# Patient Record
Sex: MALE | Race: WHITE | NOT HISPANIC OR LATINO | Employment: UNEMPLOYED | URBAN - NONMETROPOLITAN AREA
[De-identification: names, ages, dates, MRNs, and addresses within clinical notes are randomized per-mention and may not be internally consistent; named-entity substitution may affect disease eponyms.]

---

## 2018-12-26 ENCOUNTER — HOSPITAL ENCOUNTER (EMERGENCY)
Facility: HOSPITAL | Age: 43
Discharge: HOME | End: 2018-12-27
Attending: EMERGENCY MEDICINE | Admitting: EMERGENCY MEDICINE

## 2018-12-26 ENCOUNTER — APPOINTMENT (OUTPATIENT)
Dept: GENERAL RADIOLOGY | Facility: HOSPITAL | Age: 43
End: 2018-12-26

## 2018-12-26 DIAGNOSIS — J20.9 ACUTE BRONCHITIS, UNSPECIFIED ORGANISM: Primary | ICD-10-CM

## 2018-12-26 LAB
ALBUMIN SERPL-MCNC: 3.7 G/DL (ref 3.5–5)
ALBUMIN/GLOB SERPL: 1.4 G/DL (ref 1.5–2.5)
ALP SERPL-CCNC: 73 U/L (ref 40–129)
ALT SERPL W P-5'-P-CCNC: 34 U/L (ref 10–44)
ANION GAP SERPL CALCULATED.3IONS-SCNC: 11.6 MMOL/L (ref 3.6–11.2)
AST SERPL-CCNC: 25 U/L (ref 10–34)
BASOPHILS # BLD AUTO: 0.03 10*3/MM3 (ref 0–0.3)
BASOPHILS NFR BLD AUTO: 0.5 % (ref 0–2)
BILIRUB SERPL-MCNC: 0.6 MG/DL (ref 0.2–1.8)
BUN BLD-MCNC: 13 MG/DL (ref 7–21)
BUN/CREAT SERPL: 12.6 (ref 7–25)
CALCIUM SPEC-SCNC: 7.8 MG/DL (ref 7.7–10)
CHLORIDE SERPL-SCNC: 103 MMOL/L (ref 99–112)
CO2 SERPL-SCNC: 19.4 MMOL/L (ref 24.3–31.9)
CREAT BLD-MCNC: 1.03 MG/DL (ref 0.43–1.29)
DEPRECATED RDW RBC AUTO: 37.1 FL (ref 37–54)
EOSINOPHIL # BLD AUTO: 0.06 10*3/MM3 (ref 0–0.7)
EOSINOPHIL NFR BLD AUTO: 1.1 % (ref 0–5)
ERYTHROCYTE [DISTWIDTH] IN BLOOD BY AUTOMATED COUNT: 12.2 % (ref 11.5–14.5)
FLUAV AG NPH QL: NEGATIVE
FLUBV AG NPH QL IA: NEGATIVE
GFR SERPL CREATININE-BSD FRML MDRD: 79 ML/MIN/1.73
GLOBULIN UR ELPH-MCNC: 2.6 GM/DL
GLUCOSE BLD-MCNC: 397 MG/DL (ref 70–110)
HCT VFR BLD AUTO: 37.7 % (ref 42–52)
HGB BLD-MCNC: 13 G/DL (ref 14–18)
IMM GRANULOCYTES # BLD AUTO: 0.01 10*3/MM3 (ref 0–0.03)
IMM GRANULOCYTES NFR BLD AUTO: 0.2 % (ref 0–0.5)
LYMPHOCYTES # BLD AUTO: 1.8 10*3/MM3 (ref 1–3)
LYMPHOCYTES NFR BLD AUTO: 31.9 % (ref 21–51)
MCH RBC QN AUTO: 29.7 PG (ref 27–33)
MCHC RBC AUTO-ENTMCNC: 34.5 G/DL (ref 33–37)
MCV RBC AUTO: 86.3 FL (ref 80–94)
MONOCYTES # BLD AUTO: 0.39 10*3/MM3 (ref 0.1–0.9)
MONOCYTES NFR BLD AUTO: 6.9 % (ref 0–10)
NEUTROPHILS # BLD AUTO: 3.36 10*3/MM3 (ref 1.4–6.5)
NEUTROPHILS NFR BLD AUTO: 59.4 % (ref 30–70)
OSMOLALITY SERPL CALC.SUM OF ELEC: 284.9 MOSM/KG (ref 273–305)
PLATELET # BLD AUTO: 172 10*3/MM3 (ref 130–400)
PMV BLD AUTO: 10.8 FL (ref 6–10)
POTASSIUM BLD-SCNC: 3.5 MMOL/L (ref 3.5–5.3)
PROT SERPL-MCNC: 6.3 G/DL (ref 6–8)
RBC # BLD AUTO: 4.37 10*6/MM3 (ref 4.7–6.1)
SODIUM BLD-SCNC: 134 MMOL/L (ref 135–153)
WBC NRBC COR # BLD: 5.65 10*3/MM3 (ref 4.5–12.5)

## 2018-12-26 PROCEDURE — 85025 COMPLETE CBC W/AUTO DIFF WBC: CPT | Performed by: PHYSICIAN ASSISTANT

## 2018-12-26 PROCEDURE — 99284 EMERGENCY DEPT VISIT MOD MDM: CPT

## 2018-12-26 PROCEDURE — 87804 INFLUENZA ASSAY W/OPTIC: CPT | Performed by: PHYSICIAN ASSISTANT

## 2018-12-26 PROCEDURE — 71045 X-RAY EXAM CHEST 1 VIEW: CPT | Performed by: RADIOLOGY

## 2018-12-26 PROCEDURE — 25010000002 METHYLPREDNISOLONE PER 125 MG: Performed by: PHYSICIAN ASSISTANT

## 2018-12-26 PROCEDURE — 96372 THER/PROPH/DIAG INJ SC/IM: CPT

## 2018-12-26 PROCEDURE — 71045 X-RAY EXAM CHEST 1 VIEW: CPT

## 2018-12-26 PROCEDURE — 80053 COMPREHEN METABOLIC PANEL: CPT | Performed by: PHYSICIAN ASSISTANT

## 2018-12-26 PROCEDURE — 36415 COLL VENOUS BLD VENIPUNCTURE: CPT

## 2018-12-26 RX ORDER — BENZONATATE 100 MG/1
200 CAPSULE ORAL ONCE
Status: COMPLETED | OUTPATIENT
Start: 2018-12-27 | End: 2018-12-27

## 2018-12-26 RX ORDER — METHYLPREDNISOLONE SODIUM SUCCINATE 125 MG/2ML
125 INJECTION, POWDER, LYOPHILIZED, FOR SOLUTION INTRAMUSCULAR; INTRAVENOUS ONCE
Status: COMPLETED | OUTPATIENT
Start: 2018-12-26 | End: 2018-12-26

## 2018-12-26 RX ORDER — BENZONATATE 100 MG/1
100 CAPSULE ORAL 3 TIMES DAILY PRN
Qty: 30 CAPSULE | Refills: 0 | Status: ON HOLD | OUTPATIENT
Start: 2018-12-26 | End: 2021-09-01

## 2018-12-26 RX ADMIN — HYDROCODONE POLISTIREX AND CHLORPHENIRAMINE POLISTIREX 5 ML: 10; 8 SUSPENSION, EXTENDED RELEASE ORAL at 23:06

## 2018-12-26 RX ADMIN — METHYLPREDNISOLONE SODIUM SUCCINATE 125 MG: 125 INJECTION, POWDER, FOR SOLUTION INTRAMUSCULAR; INTRAVENOUS at 23:06

## 2018-12-27 VITALS
HEIGHT: 66 IN | OXYGEN SATURATION: 98 % | DIASTOLIC BLOOD PRESSURE: 62 MMHG | BODY MASS INDEX: 37.45 KG/M2 | TEMPERATURE: 98 F | HEART RATE: 88 BPM | SYSTOLIC BLOOD PRESSURE: 138 MMHG | RESPIRATION RATE: 18 BRPM | WEIGHT: 233 LBS

## 2018-12-27 RX ADMIN — BENZONATATE 200 MG: 100 CAPSULE ORAL at 00:05

## 2020-04-03 ENCOUNTER — HOSPITAL ENCOUNTER (EMERGENCY)
Facility: HOSPITAL | Age: 45
Discharge: HOME | End: 2020-04-03
Attending: FAMILY MEDICINE | Admitting: FAMILY MEDICINE

## 2020-04-03 ENCOUNTER — TRANSCRIBE ORDERS (OUTPATIENT)
Dept: INFUSION THERAPY | Facility: HOSPITAL | Age: 45
End: 2020-04-03

## 2020-04-03 VITALS
DIASTOLIC BLOOD PRESSURE: 72 MMHG | TEMPERATURE: 97.9 F | HEART RATE: 83 BPM | RESPIRATION RATE: 16 BRPM | BODY MASS INDEX: 40.18 KG/M2 | SYSTOLIC BLOOD PRESSURE: 110 MMHG | WEIGHT: 250 LBS | OXYGEN SATURATION: 99 % | HEIGHT: 66 IN

## 2020-04-03 DIAGNOSIS — S61.259A CAT BITE OF MULTIPLE SITES OF RIGHT HAND AND FINGERS WITH INFECTION, INITIAL ENCOUNTER: Primary | ICD-10-CM
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
## 2020-04-03 DIAGNOSIS — S61.451A CAT BITE OF RIGHT HAND, INITIAL ENCOUNTER: Primary | ICD-10-CM

## 2020-04-03 DIAGNOSIS — S61.451A CAT BITE OF MULTIPLE SITES OF RIGHT HAND AND FINGERS WITH INFECTION, INITIAL ENCOUNTER: Primary | ICD-10-CM

## 2020-04-03 DIAGNOSIS — L08.9 CAT BITE OF MULTIPLE SITES OF RIGHT HAND AND FINGERS WITH INFECTION, INITIAL ENCOUNTER: Primary | ICD-10-CM
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
## 2020-04-03 PROCEDURE — 25010000002 RABIES IMMUNE GLOBULIN 300 UNIT/ML SOLUTION: Performed by: PHYSICIAN ASSISTANT

## 2020-04-03 PROCEDURE — 96372 THER/PROPH/DIAG INJ SC/IM: CPT

## 2020-04-03 PROCEDURE — 25010000002 RABIES VACCINE PER 1 ML: Performed by: PHYSICIAN ASSISTANT

## 2020-04-03 PROCEDURE — 90375 RABIES IG IM/SC: CPT | Performed by: PHYSICIAN ASSISTANT

## 2020-04-03 PROCEDURE — 90675 RABIES VACCINE IM: CPT | Performed by: PHYSICIAN ASSISTANT

## 2020-04-03 PROCEDURE — 90471 IMMUNIZATION ADMIN: CPT

## 2020-04-03 PROCEDURE — 99283 EMERGENCY DEPT VISIT LOW MDM: CPT

## 2020-04-03 RX ORDER — AMOXICILLIN AND CLAVULANATE POTASSIUM 875; 125 MG/1; MG/1
1 TABLET, FILM COATED ORAL EVERY 12 HOURS
Qty: 20 TABLET | Refills: 0 | Status: ON HOLD | OUTPATIENT
Start: 2020-04-03 | End: 2021-09-01

## 2020-04-03 RX ADMIN — RABIES VACCINE 2.5 UNITS: KIT at 11:34

## 2020-04-03 RX ADMIN — RABIES IMMUNE GLOBULIN (HUMAN) 2259 UNITS: 300 INJECTION, SOLUTION INFILTRATION; INTRAMUSCULAR at 11:33
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
## 2021-09-01 ENCOUNTER — APPOINTMENT (OUTPATIENT)
Dept: CT IMAGING | Facility: HOSPITAL | Age: 46
End: 2021-09-01

## 2021-09-01 ENCOUNTER — APPOINTMENT (OUTPATIENT)
Dept: GENERAL RADIOLOGY | Facility: HOSPITAL | Age: 46
End: 2021-09-01

## 2021-09-01 ENCOUNTER — HOSPITAL ENCOUNTER (INPATIENT)
Facility: HOSPITAL | Age: 46
LOS: 1 days | Discharge: HOME | End: 2021-09-03
Attending: STUDENT IN AN ORGANIZED HEALTH CARE EDUCATION/TRAINING PROGRAM | Admitting: INTERNAL MEDICINE

## 2021-09-01 DIAGNOSIS — R07.9 CHEST PAIN, UNSPECIFIED TYPE: Primary | ICD-10-CM

## 2021-09-01 DIAGNOSIS — N17.9 AKI (ACUTE KIDNEY INJURY) (HCC): ICD-10-CM

## 2021-09-01 DIAGNOSIS — I10 HYPERTENSION, UNSPECIFIED TYPE: ICD-10-CM

## 2021-09-01 DIAGNOSIS — E11.9 TYPE 2 DIABETES MELLITUS WITHOUT COMPLICATION, WITH LONG-TERM CURRENT USE OF INSULIN (HCC): ICD-10-CM

## 2021-09-01 DIAGNOSIS — I21.4 NSTEMI, INITIAL EPISODE OF CARE (HCC): ICD-10-CM

## 2021-09-01 DIAGNOSIS — I21.4 NSTEMI (NON-ST ELEVATED MYOCARDIAL INFARCTION) (HCC): ICD-10-CM

## 2021-09-01 DIAGNOSIS — E87.20 METABOLIC ACIDOSIS: ICD-10-CM

## 2021-09-01 DIAGNOSIS — Z79.4 TYPE 2 DIABETES MELLITUS WITHOUT COMPLICATION, WITH LONG-TERM CURRENT USE OF INSULIN (HCC): ICD-10-CM

## 2021-09-01 LAB
ACETONE BLD QL: NEGATIVE
ALBUMIN SERPL-MCNC: 3.81 G/DL (ref 3.5–5.2)
ALBUMIN/GLOB SERPL: 1.2 G/DL
ALP SERPL-CCNC: 92 U/L (ref 39–117)
ALT SERPL W P-5'-P-CCNC: 28 U/L (ref 1–41)
ANION GAP SERPL CALCULATED.3IONS-SCNC: 13.9 MMOL/L (ref 5–15)
AST SERPL-CCNC: 27 U/L (ref 1–40)
ATMOSPHERIC PRESS: 722 MMHG
BACTERIA UR QL AUTO: ABNORMAL /HPF
BASE EXCESS BLDV CALC-SCNC: -7 MMOL/L (ref 0–2)
BASOPHILS # BLD AUTO: 0.03 10*3/MM3 (ref 0–0.2)
BASOPHILS NFR BLD AUTO: 0.5 % (ref 0–1.5)
BDY SITE: ABNORMAL
BILIRUB SERPL-MCNC: 1.3 MG/DL (ref 0–1.2)
BILIRUB UR QL STRIP: NEGATIVE
BODY TEMPERATURE: 37 C
BUN SERPL-MCNC: 31 MG/DL (ref 6–20)
BUN/CREAT SERPL: 17.9 (ref 7–25)
CALCIUM SPEC-SCNC: 8.9 MG/DL (ref 8.6–10.5)
CHLORIDE SERPL-SCNC: 102 MMOL/L (ref 98–107)
CLARITY UR: CLEAR
CO2 BLDA-SCNC: 20.4 MMOL/L (ref 22–33)
CO2 SERPL-SCNC: 18.1 MMOL/L (ref 22–29)
COHGB MFR BLD: 1.3 % (ref 0–5)
COLOR UR: YELLOW
CREAT SERPL-MCNC: 1.73 MG/DL (ref 0.76–1.27)
D DIMER PPP FEU-MCNC: 0.36 MCGFEU/ML (ref 0–0.5)
D-LACTATE SERPL-SCNC: 1 MMOL/L (ref 0.5–2)
DEPRECATED RDW RBC AUTO: 41.6 FL (ref 37–54)
EOSINOPHIL # BLD AUTO: 0.11 10*3/MM3 (ref 0–0.4)
EOSINOPHIL NFR BLD AUTO: 1.7 % (ref 0.3–6.2)
ERYTHROCYTE [DISTWIDTH] IN BLOOD BY AUTOMATED COUNT: 12.9 % (ref 12.3–15.4)
FLUAV SUBTYP SPEC NAA+PROBE: NOT DETECTED
FLUBV RNA ISLT QL NAA+PROBE: NOT DETECTED
GFR SERPL CREATININE-BSD FRML MDRD: 43 ML/MIN/1.73
GLOBULIN UR ELPH-MCNC: 3.2 GM/DL
GLUCOSE BLDC GLUCOMTR-MCNC: 198 MG/DL (ref 70–130)
GLUCOSE BLDC GLUCOMTR-MCNC: 264 MG/DL (ref 70–130)
GLUCOSE SERPL-MCNC: 365 MG/DL (ref 65–99)
GLUCOSE UR STRIP-MCNC: ABNORMAL MG/DL
HBA1C MFR BLD: 9.4 % (ref 4.8–5.6)
HCO3 BLDV-SCNC: 19.2 MMOL/L (ref 22–28)
HCT VFR BLD AUTO: 39.2 % (ref 37.5–51)
HGB BLD-MCNC: 13.6 G/DL (ref 13–17.7)
HGB BLDA-MCNC: 13.9 G/DL (ref 14–18)
HGB UR QL STRIP.AUTO: ABNORMAL
HOLD SPECIMEN: NORMAL
HOLD SPECIMEN: NORMAL
HYALINE CASTS UR QL AUTO: ABNORMAL /LPF
IMM GRANULOCYTES # BLD AUTO: 0.04 10*3/MM3 (ref 0–0.05)
IMM GRANULOCYTES NFR BLD AUTO: 0.6 % (ref 0–0.5)
INHALED O2 CONCENTRATION: 21 %
KETONES UR QL STRIP: ABNORMAL
LEUKOCYTE ESTERASE UR QL STRIP.AUTO: NEGATIVE
LYMPHOCYTES # BLD AUTO: 1.67 10*3/MM3 (ref 0.7–3.1)
LYMPHOCYTES NFR BLD AUTO: 26.6 % (ref 19.6–45.3)
Lab: ABNORMAL
MCH RBC QN AUTO: 30.6 PG (ref 26.6–33)
MCHC RBC AUTO-ENTMCNC: 34.7 G/DL (ref 31.5–35.7)
MCV RBC AUTO: 88.1 FL (ref 79–97)
METHGB BLD QL: 0.4 % (ref 0–3)
MODALITY: ABNORMAL
MONOCYTES # BLD AUTO: 0.46 10*3/MM3 (ref 0.1–0.9)
MONOCYTES NFR BLD AUTO: 7.3 % (ref 5–12)
NEUTROPHILS NFR BLD AUTO: 3.98 10*3/MM3 (ref 1.7–7)
NEUTROPHILS NFR BLD AUTO: 63.3 % (ref 42.7–76)
NITRITE UR QL STRIP: NEGATIVE
NRBC BLD AUTO-RTO: 0 /100 WBC (ref 0–0.2)
OXYHGB MFR BLDV: 73.5 % (ref 45–75)
PCO2 BLDV: 40.1 MM HG (ref 41–51)
PH BLDV: 7.29 PH UNITS (ref 7.32–7.42)
PH UR STRIP.AUTO: 5.5 [PH] (ref 5–8)
PLATELET # BLD AUTO: 179 10*3/MM3 (ref 140–450)
PMV BLD AUTO: 11 FL (ref 6–12)
PO2 BLDV: 47 MM HG (ref 27–53)
POTASSIUM SERPL-SCNC: 3.8 MMOL/L (ref 3.5–5.2)
PROT SERPL-MCNC: 7 G/DL (ref 6–8.5)
PROT UR QL STRIP: ABNORMAL
RBC # BLD AUTO: 4.45 10*6/MM3 (ref 4.14–5.8)
RBC # UR: ABNORMAL /HPF
REF LAB TEST METHOD: ABNORMAL
SALICYLATES SERPL-MCNC: 0.6 MG/DL
SAO2 % BLDCOV: 74.8 % (ref 45–75)
SARS-COV-2 RNA PNL SPEC NAA+PROBE: NOT DETECTED
SODIUM SERPL-SCNC: 134 MMOL/L (ref 136–145)
SP GR UR STRIP: 1.02 (ref 1–1.03)
SQUAMOUS #/AREA URNS HPF: ABNORMAL /HPF
TROPONIN T SERPL-MCNC: 0.01 NG/ML (ref 0–0.03)
TROPONIN T SERPL-MCNC: 0.02 NG/ML (ref 0–0.03)
TROPONIN T SERPL-MCNC: 0.08 NG/ML (ref 0–0.03)
UROBILINOGEN UR QL STRIP: ABNORMAL
VENTILATOR MODE: ABNORMAL
WBC # BLD AUTO: 6.29 10*3/MM3 (ref 3.4–10.8)
WBC UR QL AUTO: ABNORMAL /HPF
WHOLE BLOOD HOLD SPECIMEN: NORMAL
WHOLE BLOOD HOLD SPECIMEN: NORMAL

## 2021-09-01 PROCEDURE — 80179 DRUG ASSAY SALICYLATE: CPT | Performed by: PHYSICIAN ASSISTANT

## 2021-09-01 PROCEDURE — G0378 HOSPITAL OBSERVATION PER HR: HCPCS

## 2021-09-01 PROCEDURE — 82805 BLOOD GASES W/O2 SATURATION: CPT

## 2021-09-01 PROCEDURE — 99284 EMERGENCY DEPT VISIT MOD MDM: CPT

## 2021-09-01 PROCEDURE — 93005 ELECTROCARDIOGRAM TRACING: CPT | Performed by: PHYSICIAN ASSISTANT

## 2021-09-01 PROCEDURE — 84484 ASSAY OF TROPONIN QUANT: CPT | Performed by: PHYSICIAN ASSISTANT

## 2021-09-01 PROCEDURE — 71045 X-RAY EXAM CHEST 1 VIEW: CPT

## 2021-09-01 PROCEDURE — 85025 COMPLETE CBC W/AUTO DIFF WBC: CPT | Performed by: PHYSICIAN ASSISTANT

## 2021-09-01 PROCEDURE — 63710000001 INSULIN DETEMIR PER 5 UNITS: Performed by: INTERNAL MEDICINE

## 2021-09-01 PROCEDURE — 83605 ASSAY OF LACTIC ACID: CPT | Performed by: PHYSICIAN ASSISTANT

## 2021-09-01 PROCEDURE — 25010000002 ENOXAPARIN PER 10 MG: Performed by: INTERNAL MEDICINE

## 2021-09-01 PROCEDURE — 87636 SARSCOV2 & INF A&B AMP PRB: CPT | Performed by: PHYSICIAN ASSISTANT

## 2021-09-01 PROCEDURE — 71250 CT THORAX DX C-: CPT

## 2021-09-01 PROCEDURE — 82820 HEMOGLOBIN-OXYGEN AFFINITY: CPT

## 2021-09-01 PROCEDURE — 83036 HEMOGLOBIN GLYCOSYLATED A1C: CPT | Performed by: INTERNAL MEDICINE

## 2021-09-01 PROCEDURE — 63710000001 INSULIN REGULAR HUMAN PER 5 UNITS: Performed by: INTERNAL MEDICINE

## 2021-09-01 PROCEDURE — 93010 ELECTROCARDIOGRAM REPORT: CPT | Performed by: INTERNAL MEDICINE

## 2021-09-01 PROCEDURE — 84484 ASSAY OF TROPONIN QUANT: CPT | Performed by: INTERNAL MEDICINE

## 2021-09-01 PROCEDURE — 82962 GLUCOSE BLOOD TEST: CPT

## 2021-09-01 PROCEDURE — 81001 URINALYSIS AUTO W/SCOPE: CPT | Performed by: NURSE PRACTITIONER

## 2021-09-01 PROCEDURE — 85379 FIBRIN DEGRADATION QUANT: CPT | Performed by: PHYSICIAN ASSISTANT

## 2021-09-01 PROCEDURE — 93005 ELECTROCARDIOGRAM TRACING: CPT | Performed by: INTERNAL MEDICINE

## 2021-09-01 PROCEDURE — 71250 CT THORAX DX C-: CPT | Performed by: RADIOLOGY

## 2021-09-01 PROCEDURE — 99223 1ST HOSP IP/OBS HIGH 75: CPT | Performed by: NURSE PRACTITIONER

## 2021-09-01 PROCEDURE — 93005 ELECTROCARDIOGRAM TRACING: CPT | Performed by: STUDENT IN AN ORGANIZED HEALTH CARE EDUCATION/TRAINING PROGRAM

## 2021-09-01 PROCEDURE — 63710000001 INSULIN ASPART PER 5 UNITS: Performed by: INTERNAL MEDICINE

## 2021-09-01 PROCEDURE — 73030 X-RAY EXAM OF SHOULDER: CPT | Performed by: RADIOLOGY

## 2021-09-01 PROCEDURE — 80053 COMPREHEN METABOLIC PANEL: CPT | Performed by: PHYSICIAN ASSISTANT

## 2021-09-01 PROCEDURE — 71045 X-RAY EXAM CHEST 1 VIEW: CPT | Performed by: RADIOLOGY

## 2021-09-01 PROCEDURE — 82009 KETONE BODYS QUAL: CPT | Performed by: PHYSICIAN ASSISTANT

## 2021-09-01 PROCEDURE — 72125 CT NECK SPINE W/O DYE: CPT

## 2021-09-01 PROCEDURE — 72125 CT NECK SPINE W/O DYE: CPT | Performed by: RADIOLOGY

## 2021-09-01 PROCEDURE — 25010000002 MORPHINE PER 10 MG: Performed by: STUDENT IN AN ORGANIZED HEALTH CARE EDUCATION/TRAINING PROGRAM

## 2021-09-01 PROCEDURE — 73030 X-RAY EXAM OF SHOULDER: CPT

## 2021-09-01 RX ORDER — SODIUM CHLORIDE 0.9 % (FLUSH) 0.9 %
10 SYRINGE (ML) INJECTION AS NEEDED
Status: DISCONTINUED | OUTPATIENT
Start: 2021-09-01 | End: 2021-09-03 | Stop reason: HOSPADM

## 2021-09-01 RX ORDER — NITROGLYCERIN 0.4 MG/1
0.4 TABLET SUBLINGUAL
Status: DISCONTINUED | OUTPATIENT
Start: 2021-09-01 | End: 2021-09-03 | Stop reason: HOSPADM

## 2021-09-01 RX ORDER — GABAPENTIN 400 MG/1
800 CAPSULE ORAL 3 TIMES DAILY
Status: DISCONTINUED | OUTPATIENT
Start: 2021-09-01 | End: 2021-09-03 | Stop reason: HOSPADM

## 2021-09-01 RX ORDER — ATORVASTATIN CALCIUM 40 MG/1
40 TABLET, FILM COATED ORAL NIGHTLY
Status: DISCONTINUED | OUTPATIENT
Start: 2021-09-01 | End: 2021-09-02

## 2021-09-01 RX ORDER — ALPRAZOLAM 0.5 MG/1
0.5 TABLET ORAL 2 TIMES DAILY PRN
Status: DISCONTINUED | OUTPATIENT
Start: 2021-09-01 | End: 2021-09-03 | Stop reason: HOSPADM

## 2021-09-01 RX ORDER — ALPRAZOLAM 0.5 MG/1
0.5 TABLET ORAL 3 TIMES DAILY PRN
COMMUNITY

## 2021-09-01 RX ORDER — INSULIN ASPART 100 [IU]/ML
50 INJECTION, SUSPENSION SUBCUTANEOUS 2 TIMES DAILY WITH MEALS
Status: CANCELLED | OUTPATIENT
Start: 2021-09-01

## 2021-09-01 RX ORDER — IBUPROFEN 800 MG/1
800 TABLET ORAL 2 TIMES DAILY PRN
COMMUNITY
End: 2021-09-03 | Stop reason: HOSPADM

## 2021-09-01 RX ORDER — ONDANSETRON 2 MG/ML
4 INJECTION INTRAMUSCULAR; INTRAVENOUS EVERY 6 HOURS PRN
Status: DISCONTINUED | OUTPATIENT
Start: 2021-09-01 | End: 2021-09-02 | Stop reason: SDUPTHER

## 2021-09-01 RX ORDER — DEXTROSE MONOHYDRATE 25 G/50ML
25 INJECTION, SOLUTION INTRAVENOUS
Status: DISCONTINUED | OUTPATIENT
Start: 2021-09-01 | End: 2021-09-03 | Stop reason: HOSPADM

## 2021-09-01 RX ORDER — CLONIDINE HYDROCHLORIDE 0.1 MG/1
0.2 TABLET ORAL ONCE
Status: COMPLETED | OUTPATIENT
Start: 2021-09-01 | End: 2021-09-01

## 2021-09-01 RX ORDER — ASPIRIN 81 MG/1
81 TABLET ORAL DAILY
Status: DISCONTINUED | OUTPATIENT
Start: 2021-09-02 | End: 2021-09-03 | Stop reason: HOSPADM

## 2021-09-01 RX ORDER — ALLOPURINOL 300 MG/1
300 TABLET ORAL DAILY
COMMUNITY

## 2021-09-01 RX ORDER — IBUPROFEN 800 MG/1
800 TABLET ORAL 2 TIMES DAILY PRN
Status: CANCELLED | OUTPATIENT
Start: 2021-09-01

## 2021-09-01 RX ORDER — SODIUM CHLORIDE, SODIUM LACTATE, POTASSIUM CHLORIDE, CALCIUM CHLORIDE 600; 310; 30; 20 MG/100ML; MG/100ML; MG/100ML; MG/100ML
100 INJECTION, SOLUTION INTRAVENOUS CONTINUOUS
Status: ACTIVE | OUTPATIENT
Start: 2021-09-01 | End: 2021-09-02

## 2021-09-01 RX ORDER — ALBUTEROL SULFATE 2.5 MG/3ML
2.5 SOLUTION RESPIRATORY (INHALATION) EVERY 4 HOURS PRN
Status: DISCONTINUED | OUTPATIENT
Start: 2021-09-01 | End: 2021-09-03 | Stop reason: HOSPADM

## 2021-09-01 RX ORDER — PANTOPRAZOLE SODIUM 40 MG/1
40 TABLET, DELAYED RELEASE ORAL EVERY MORNING
Status: DISCONTINUED | OUTPATIENT
Start: 2021-09-01 | End: 2021-09-03 | Stop reason: HOSPADM

## 2021-09-01 RX ORDER — ORAL SEMAGLUTIDE 14 MG/1
14 TABLET ORAL
COMMUNITY
End: 2022-09-15

## 2021-09-01 RX ORDER — METFORMIN HYDROCHLORIDE 500 MG/1
1000 TABLET, EXTENDED RELEASE ORAL 2 TIMES DAILY
Status: ON HOLD | COMMUNITY
End: 2021-09-03 | Stop reason: SDUPTHER

## 2021-09-01 RX ORDER — ALLOPURINOL 300 MG/1
300 TABLET ORAL DAILY
Status: DISCONTINUED | OUTPATIENT
Start: 2021-09-01 | End: 2021-09-03 | Stop reason: HOSPADM

## 2021-09-01 RX ORDER — ASPIRIN 81 MG/1
324 TABLET, CHEWABLE ORAL ONCE
Status: COMPLETED | OUTPATIENT
Start: 2021-09-01 | End: 2021-09-01

## 2021-09-01 RX ORDER — SODIUM CHLORIDE 0.9 % (FLUSH) 0.9 %
10 SYRINGE (ML) INJECTION EVERY 12 HOURS SCHEDULED
Status: DISCONTINUED | OUTPATIENT
Start: 2021-09-01 | End: 2021-09-03 | Stop reason: HOSPADM

## 2021-09-01 RX ORDER — NICOTINE POLACRILEX 4 MG
15 LOZENGE BUCCAL
Status: DISCONTINUED | OUTPATIENT
Start: 2021-09-01 | End: 2021-09-03 | Stop reason: HOSPADM

## 2021-09-01 RX ORDER — ACETAMINOPHEN 325 MG/1
650 TABLET ORAL EVERY 4 HOURS PRN
Status: DISCONTINUED | OUTPATIENT
Start: 2021-09-01 | End: 2021-09-03 | Stop reason: HOSPADM

## 2021-09-01 RX ORDER — GABAPENTIN 800 MG/1
800 TABLET ORAL 3 TIMES DAILY
COMMUNITY

## 2021-09-01 RX ORDER — ALBUTEROL SULFATE 90 UG/1
2 AEROSOL, METERED RESPIRATORY (INHALATION) AS NEEDED
COMMUNITY

## 2021-09-01 RX ORDER — NIFEDIPINE 30 MG/1
30 TABLET, FILM COATED, EXTENDED RELEASE ORAL ONCE
Status: COMPLETED | OUTPATIENT
Start: 2021-09-01 | End: 2021-09-01

## 2021-09-01 RX ADMIN — ASPIRIN 324 MG: 81 TABLET, CHEWABLE ORAL at 10:12

## 2021-09-01 RX ADMIN — INSULIN ASPART 3 UNITS: 100 INJECTION, SOLUTION INTRAVENOUS; SUBCUTANEOUS at 17:42

## 2021-09-01 RX ADMIN — MORPHINE SULFATE 4 MG: 4 INJECTION, SOLUTION INTRAMUSCULAR; INTRAVENOUS at 10:12

## 2021-09-01 RX ADMIN — INSULIN ASPART 2 UNITS: 100 INJECTION, SOLUTION INTRAVENOUS; SUBCUTANEOUS at 17:42

## 2021-09-01 RX ADMIN — SODIUM CHLORIDE 500 ML: 9 INJECTION, SOLUTION INTRAVENOUS at 11:26

## 2021-09-01 RX ADMIN — SODIUM CHLORIDE, POTASSIUM CHLORIDE, SODIUM LACTATE AND CALCIUM CHLORIDE 100 ML/HR: 600; 310; 30; 20 INJECTION, SOLUTION INTRAVENOUS at 21:24

## 2021-09-01 RX ADMIN — HUMAN INSULIN 10 UNITS: 100 INJECTION, SOLUTION SUBCUTANEOUS at 14:18

## 2021-09-01 RX ADMIN — ATORVASTATIN CALCIUM 40 MG: 40 TABLET, FILM COATED ORAL at 21:51

## 2021-09-01 RX ADMIN — INSULIN DETEMIR 10 UNITS: 100 INJECTION, SOLUTION SUBCUTANEOUS at 21:53

## 2021-09-01 RX ADMIN — NITROGLYCERIN 1 INCH: 20 OINTMENT TOPICAL at 10:12

## 2021-09-01 RX ADMIN — MORPHINE SULFATE 4 MG: 4 INJECTION, SOLUTION INTRAMUSCULAR; INTRAVENOUS at 11:45

## 2021-09-01 RX ADMIN — ACETAMINOPHEN 650 MG: 325 TABLET ORAL at 18:16

## 2021-09-01 RX ADMIN — ENOXAPARIN SODIUM 40 MG: 40 INJECTION SUBCUTANEOUS at 17:42

## 2021-09-01 RX ADMIN — NIFEDIPINE 30 MG: 30 TABLET, EXTENDED RELEASE ORAL at 14:18

## 2021-09-01 RX ADMIN — CLONIDINE HYDROCHLORIDE 0.2 MG: 0.1 TABLET ORAL at 11:00

## 2021-09-01 RX ADMIN — PANTOPRAZOLE SODIUM 40 MG: 40 TABLET, DELAYED RELEASE ORAL at 21:51

## 2021-09-01 RX ADMIN — ALLOPURINOL 300 MG: 300 TABLET ORAL at 21:51

## 2021-09-01 NOTE — ED NOTES
MEDICAL SCREENING:    Reason for Visit: chest pain    Patient initially seen in triage.  The patient was advised further evaluation and diagnostic testing will be needed, some of the treatment and testing will be initiated in the lobby in order to begin the process.  The patient will be returned to the waiting area for the time being and possibly be re-assessed by a subsequent ED provider.  The patient will be brought back to the treatment area in as timely manner as possible.       Ruben Polanco PA  09/01/21 0913

## 2021-09-01 NOTE — PLAN OF CARE
Goal Outcome Evaluation:  Plan of Care Reviewed With: patient        Progress: improving  Outcome Summary: Pt resting in bed. Pt voices no concerns or complaints at this time. No s/s of acute distress. Will follow care of plan

## 2021-09-01 NOTE — H&P
[facility] Medicine Services  History & Physical          Patient Identification:  Name:  Matthew Madrid  Age:  46 y.o.  Sex:  male  :  1975  MRN:  [id number]   Admit Date: 2021   Visit Number:  [id number]  Primary Care Physician:  Malik Ridley MD    I have seen the patient in conjunction with CARLOS Irving and I agree with the following statements:     Subjective     Chief complaint: Chest pain    History of presenting illness:    Mr. Madrid is a 46 year old male patient who presented to [facility] on 21 for chest pain.  He states he woke up this morning with left-sided chest pain radiating down into his left arm.  He states this pain was sharp and throbbing 10 out of 10, he states he did have nausea but no vomiting he did have cold sweats.  His wife was with him and brought him to the ER for further evaluation.  He states the morphine did help his pain.    His treatment in the ER included aspirin 324 mg p.o. x1, clonidine 0.2 mg p.o. x1, 10 units of regular insulin IV, morphine 4 mg IV x2, nifedipine 30 mg p.o. x1, nitro ointment 1 inch, 500 cc fluid bolus of normal saline.His v/s in the ER were temp 98.5, HR 61-96, RR 18, -/96.     His past medical history is significant for hypertension, diabetes, hyperlipidemia, and gout.  He denies any history of cardiac stenting, no history of thyroid issues, no history of cancers blood clots seizures or strokes.  He is a former smoker and quit around 25 years ago at which time he daily smoked around 8 years, he does use alcohol around once a week drinking 4-5 beers sometimes on , he states his last drink was last Saturday.  He denies any drug use.  His next kin is his wife Flora.      ---------------------------------------------------------------------------------------------------------------------   Review of Systems   Constitutional: Positive for diaphoresis. Negative for chills, fatigue and fever.    HENT: Negative for congestion.    Respiratory: Negative for cough and shortness of breath.    Cardiovascular: Positive for chest pain. Negative for leg swelling.   Gastrointestinal: Positive for nausea. Negative for abdominal pain, constipation, diarrhea and vomiting.   Genitourinary: Negative for difficulty urinating.   Musculoskeletal: Positive for arthralgias (left shoulder).   Neurological: Negative for dizziness, weakness and light-headedness.   Psychiatric/Behavioral: Negative for agitation, behavioral problems and confusion.      ---------------------------------------------------------------------------------------------------------------------   Past Medical History:   Diagnosis Date   • Diabetes mellitus (CMS/[facility])    • Hyperlipidemia    • Hypertension      Past Surgical History:   Procedure Laterality Date   • LAPAROSCOPIC CHOLECYSTECTOMY       Family History   Family history unknown: Yes     Social History     Socioeconomic History   • Marital status:      Spouse name: Not on file   • Number of children: Not on file   • Years of education: Not on file   • Highest education level: Not on file   Tobacco Use   • Smoking status: Never Smoker   • Smokeless tobacco: Never Used   Substance and Sexual Activity   • Alcohol use: Yes     Comment: drinks some on saturdays    • Drug use: No   • Sexual activity: Defer     ---------------------------------------------------------------------------------------------------------------------   Allergies:  Tuberculin tests  ---------------------------------------------------------------------------------------------------------------------     Medications below are reported home medications pulling from within the system; at this time, these medications have not been reconciled unless otherwise specified and are in the verification process for further verifcation as current home medications.    Prior to Admission Medications     Prescriptions Last Dose Informant  Patient Reported? Taking?    amoxicillin-clavulanate (AUGMENTIN) 875-125 MG per tablet   No No    Take 1 tablet by mouth Every 12 (Twelve) Hours.    atorvastatin (LIPITOR) 40 MG tablet   Yes No    Take 40 mg by mouth Daily.    benzonatate (TESSALON) 100 MG capsule   No No    Take 1 capsule by mouth 3 (Three) Times a Day As Needed for Cough.    cetirizine (zyrTEC) 10 MG tablet   Yes No    Take 10 mg by mouth Daily.    gabapentin (NEURONTIN) 300 MG capsule   Yes No    Take 300 mg by mouth 2 (Two) Times a Day.    insulin NPH (humuLIN N,novoLIN N) 100 UNIT/ML injection   Yes No    Inject 50 Units under the skin 2 (Two) Times a Day Before Meals.    insulin NPH-insulin regular (novoLIN 70/30) (70-30) 100 UNIT/ML injection   Yes No    Inject 50 Units under the skin Every Night.    levoFLOXacin (LEVAQUIN) 500 MG tablet   Yes No    Take 500 mg by mouth Daily. X 7    lisinopril (PRINIVIL,ZESTRIL) 20 MG tablet   Yes No    Take 20 mg by mouth Daily.    metFORMIN (GLUCOPHAGE) 1000 MG tablet   Yes No    Take 1,000 mg by mouth 2 (Two) Times a Day With Meals.    montelukast (SINGULAIR) 10 MG tablet   Yes No    Take 10 mg by mouth Every Night.    omeprazole (priLOSEC) 20 MG capsule   Yes No    Take 20 mg by mouth 2 (Two) Times a Day.    ondansetron ODT (ZOFRAN-ODT) 4 MG disintegrating tablet   Yes No    Take 4 mg by mouth Every 6 (Six) Hours As Needed for nausea or vomiting.    predniSONE (DELTASONE) 20 MG tablet   Yes No    Take 20 mg by mouth Daily. Tapered dose pack    QUEtiapine XR (SEROquel XR) 200 MG 24 hr tablet   Yes No    Take 200 mg by mouth Every Night.    venlafaxine (EFFEXOR) 50 MG tablet   Yes No    Take 200 mg by mouth Every Morning. 100 mg Q pm        Hospital Scheduled Meds:        ---------------------------------------------------------------------------------------------------------------------   Objective       Vital Signs:  Temp:  [98.5 °F (36.9 °C)] 98.5 °F (36.9 °C)  Heart Rate:  [61-96] 66  Resp:  [18]  18  BP: (139-206)/() 139/73      09/01/21  0848   Weight: 107 kg (235 lb)     Body mass index is 37.93 kg/m².  ---------------------------------------------------------------------------------------------------------------------       Physical Exam    Physical Exam:  Constitutional:  Well-developed and well-nourished.     HENT:  Head: Normocephalic and atraumatic.  Mouth:  Moist mucous membranes.    Eyes:  Conjunctivae and EOM are normal.  Pupils are equal, round, and reactive to light.  No scleral icterus.  Neck:  Neck supple.  No JVD present.    Cardiovascular:  Normal rate, regular rhythm.  with no murmur.  Pulmonary/Chest:  No respiratory distress, no wheezes, no crackles, with normal breath sounds and good air movement.  Abdominal:  Soft.  Bowel sounds are present.  No distension and no tenderness.   Musculoskeletal:  No edema, no tenderness, and no deformity.  No red or swollen joints anywhere.    Neurological:  Alert and oriented to person, place, and time.  No cranial nerve deficit.  No tongue deviation.  No facial droop.  No slurred speech.   Skin:  Skin is warm and dry.  No rash noted.  No pallor.   Psychiatric:  Normal mood and affect.  Behavior is normal.  Judgment and thought content normal.   Peripheral vascular:  No edema and strong pulses on all 4 extremities.  ---------------------------------------------------------------------------------------------------------------------  EKG:             ---------------------------------------------------------------------------------------------------------------------   Results from last 7 days   Lab Units 09/01/21  1246 09/01/21  0942   LACTATE mmol/L 1.0  --    WBC 10*3/mm3  --  6.29   HEMOGLOBIN g/dL  --  13.6   HEMATOCRIT %  --  39.2   MCV fL  --  88.1   MCHC g/dL  --  34.7   PLATELETS 10*3/mm3  --  179         Results from last 7 days   Lab Units 09/01/21  0942   SODIUM mmol/L 134*   POTASSIUM mmol/L 3.8   CHLORIDE mmol/L 102   CO2 mmol/L 18.1*    BUN mg/dL 31*   CREATININE mg/dL 1.73*   EGFR IF NONAFRICN AM mL/min/1.73 43*   CALCIUM mg/dL 8.9   GLUCOSE mg/dL 365*   ALBUMIN g/dL 3.81   BILIRUBIN mg/dL 1.3*   ALK PHOS U/L 92   AST (SGOT) U/L 27   ALT (SGPT) U/L 28   Estimated Creatinine Clearance: 61.2 mL/min (A) (by C-G formula based on SCr of 1.73 mg/dL (H)).  No results found for: AMMONIA  Results from last 7 days   Lab Units 09/01/21  1204 09/01/21  0942   TROPONIN T ng/mL 0.023 0.011         No results found for: HGBA1C  No results found for: TSH, FREET4  No results found for: PREGTESTUR, PREGSERUM, HCG, HCGQUANT  Pain Management Panel    There is no flowsheet data to display.       No results found for: BLOODCX  No results found for: URINECX  No results found for: WOUNDCX  No results found for: STOOLCX      ---------------------------------------------------------------------------------------------------------------------  Imaging Results (Last 7 Days)     Procedure Component Value Units Date/Time    XR Chest 1 View [[id number]] Collected: 09/01/21 0941     Updated: 09/01/21 1336    Narrative:      EXAM:    XR Chest, 1 View     EXAM DATE:    9/1/2021 9:19 AM     CLINICAL HISTORY:    Chest pain protocol     TECHNIQUE:    Frontal view of the chest.     COMPARISON:    No relevant prior studies available.     FINDINGS:    LUNGS:  Unremarkable.  No consolidation.    PLEURAL SPACE:  Unremarkable.  No pneumothorax.    HEART:  Unremarkable.  No cardiomegaly.    MEDIASTINUM:  Unremarkable.    BONES/JOINTS:  Unremarkable.       Impression:        Unremarkable exam. No acute cardiopulmonary findings identified.     This report was finalized on 9/1/2021 9:41 AM by Dr. Ron Wharton MD.       CT Cervical Spine Without Contrast [[id number]] Collected: 09/01/21 1325     Updated: 09/01/21 1336    Narrative:      EXAM:    CT Cervical Spine Without Intravenous Contrast     EXAM DATE:    9/1/2021 12:12 PM     CLINICAL HISTORY:    arm pain     TECHNIQUE:    Axial computed  tomography images of the cervical spine without  intravenous contrast.  Sagittal and coronal reformatted images were  created and reviewed.  This CT exam was performed using one or more of  the following dose reduction techniques:  automated exposure control,  adjustment of the mA and/or kV according to patient size, and/or use of  iterative reconstruction technique.     COMPARISON:    No relevant prior studies available.     FINDINGS:    VERTEBRAE:  Unremarkable.  No acute fracture.    DISCS/SPINAL CANAL/NEURAL FORAMINA:  No acute findings.  No spinal  canal stenosis.    SOFT TISSUES:  Unremarkable.       Impression:        No acute findings in the cervical spine.     This report was finalized on 9/1/2021 1:25 PM by Dr. Ron Wharton MD.       CT Chest Without Contrast Diagnostic [[id number]] Collected: 09/01/21 1241     Updated: 09/01/21 1301    Narrative:      EXAM:    CT Chest Without Intravenous Contrast     EXAM DATE:    9/1/2021 12:13 PM     CLINICAL HISTORY:    chest pain     TECHNIQUE:    Axial computed tomography images of the chest without intravenous  contrast.  Sagittal and coronal reformatted images were created and  reviewed.  This CT exam was performed using one or more of the following  dose reduction techniques:  automated exposure control, adjustment of  the mA and/or kV according to patient size, and/or use of iterative  reconstruction technique.     COMPARISON:    No relevant prior studies available.     FINDINGS:    LUNGS:  Unremarkable.  No mass.  No consolidation.    PLEURAL SPACE:  Unremarkable.  No pneumothorax.  No significant  effusion.    HEART:  Unremarkable.  No cardiomegaly.  No significant pericardial  effusion.    BONES/JOINTS:  Unremarkable.  No acute fracture.  No dislocation.    SOFT TISSUES:  Unremarkable.    VASCULATURE:  Unremarkable.  No thoracic aortic aneurysm.    LYMPH NODES:  Unremarkable.  No enlarged lymph nodes.       Impression:        No acute findings in the chest.      This report was finalized on 9/1/2021 12:41 PM by Dr. Ron Wharton MD.               ---------------------------------------------------------------------------------------------------------------------  Assessment / Plan       Assessment and Plan:    ACS  -COVID-19 negative on admission today   -troponin T <0.011, repeat 0.023  -ASA 324mg PO x1 given in the ER  -Nitro ointment placed in the ER  -CT of the chest showed no acute findings in the chest   -Telemetry monitoring  -Echocardiogram ordered  -D-Dimer 0.36  -ASA 81 mg PO daily   -NPO after MN  -Lipid panel ordered for the am  -Trend troponin   -Repeat labs in the am    Left shoulder pain  -CT of the cervical spine showed no acute findings   -Left shoulder x-ray ordered  -denies any injury/fall    HTN  Hyperlipidemia  -/73, HR 66  -had clonidine, morphine, NTG ointment, and nifedipine in the ER   -await home med-rec     DM Type 2  -A1c ordered  -hypoglycemia protocol initiated  -accu checks ac/hs and prn, diabetic diet, sliding scale  -Levemir 10 units HS SQ    LEÓN on CKD vs CKD   Metabolic Acidosis   -creatinine today is 1.73  -VBG with pH 7.288 (not corrected) HCO 19.2  -lisinopril was recently stopped for around 1 month  -denies any history of CKD   -500 ml of fluid given in the ER  -Renal US ordered  -UA ordered   -Labs from PCP requested for comparison.   -Monitor urine output  -Repeat labs in the am     Hyperbilirubinemia  -Total bili is 1.3  -ALT and AST WNL  -has had cholecystectomy in the past  -Repeat CMP In the am     Obesity BMI 37.93    DVT prophylaxis: Lovenox SQ daily     Code status: Full      Disposition: home once clinically stable     Li Montoya, CARLOS  09/01/21  15:14 EDT  ---------------------------------------------------------------------------------------------------------------------

## 2021-09-01 NOTE — ED PROVIDER NOTES
Subjective   46-year-old white male presents secondary to left-sided chest pain that radiates into his left arm.  Patient states he awoke around 5 this morning with this pain.  He states he is never had anything like this before.  He states he has felt short of breath.  He has had nausea.  He denies any other relieving or exacerbating factors.  He denies any falls or injuries.  No recent lifting pulling tugging.  He has not had a cardiac work-up including stress test or heart catheterization in the past.  Patient does have a history of diabetes, hyperlipidemia, hypertension and a family history of heart disease.          Review of Systems   Constitutional: Negative.  Negative for fever.   HENT: Negative.    Respiratory: Negative.    Cardiovascular: Positive for chest pain.   Gastrointestinal: Negative.  Negative for abdominal pain.   Endocrine: Negative.    Genitourinary: Negative.  Negative for dysuria.   Skin: Negative.    Neurological: Negative.    Psychiatric/Behavioral: Negative.    All other systems reviewed and are negative.      Past Medical History:   Diagnosis Date   • Diabetes mellitus (CMS/HCC)    • Hyperlipidemia    • Hypertension        Allergies   Allergen Reactions   • Tuberculin Tests Rash       Past Surgical History:   Procedure Laterality Date   • LAPAROSCOPIC CHOLECYSTECTOMY         Family History   Family history unknown: Yes       Social History     Socioeconomic History   • Marital status:      Spouse name: Not on file   • Number of children: Not on file   • Years of education: Not on file   • Highest education level: Not on file   Tobacco Use   • Smoking status: Never Smoker   • Smokeless tobacco: Never Used   Substance and Sexual Activity   • Alcohol use: Yes     Comment: drinks some on saturdays    • Drug use: No   • Sexual activity: Defer           Objective   Physical Exam  Vitals and nursing note reviewed.   Constitutional:       General: He is not in acute distress.      Appearance: He is well-developed. He is not diaphoretic.   HENT:      Head: Normocephalic and atraumatic.      Right Ear: External ear normal.      Left Ear: External ear normal.      Nose: Nose normal.   Eyes:      Conjunctiva/sclera: Conjunctivae normal.      Pupils: Pupils are equal, round, and reactive to light.   Neck:      Vascular: No JVD.      Trachea: No tracheal deviation.   Cardiovascular:      Rate and Rhythm: Normal rate and regular rhythm.      Heart sounds: Normal heart sounds. No murmur heard.     Pulmonary:      Effort: Pulmonary effort is normal. No respiratory distress.      Breath sounds: Normal breath sounds. No wheezing.   Abdominal:      General: Bowel sounds are normal.      Palpations: Abdomen is soft.      Tenderness: There is no abdominal tenderness.   Musculoskeletal:         General: No deformity. Normal range of motion.      Cervical back: Normal range of motion and neck supple.   Skin:     General: Skin is warm and dry.      Coloration: Skin is not pale.      Findings: No erythema or rash.   Neurological:      Mental Status: He is alert and oriented to person, place, and time.      Cranial Nerves: No cranial nerve deficit.   Psychiatric:         Behavior: Behavior normal.         Thought Content: Thought content normal.         Procedures           ED Course  ED Course as of Sep 01 1723   Wed Sep 01, 2021   0852 EKG interpretation, ventricular rate 60, , QRS 94, QTc 430, normal sinus rhythm, no acute ST elevation abnormal T waves in 1 and aVL    [JM]   1135 Patient now states that he has pain in his arm is predominantly with movement.  No rash.  No sign of infection.    [JI]   1340 Accepted by Dr Behbahani.  Heart score is 5.    [JI]      ED Course User Index  [JI] Ruben Polanco PA  [JM] Juan Ramon Doan DO                                           MDM  Number of Diagnoses or Management Options  LEÓN (acute kidney injury) (CMS/[facility]): new and requires workup  Chest pain,  unspecified type: new and requires workup  Hypertension, unspecified type: new and requires workup  Metabolic acidosis: new and requires workup     Amount and/or Complexity of Data Reviewed  Clinical lab tests: reviewed and ordered  Tests in the radiology section of CPT®: reviewed and ordered  Tests in the medicine section of CPT®: reviewed and ordered  Decide to obtain previous medical records or to obtain history from someone other than the patient: yes  Discuss the patient with other providers: yes  Independent visualization of images, tracings, or specimens: yes        Final diagnoses:   Chest pain, unspecified type   Metabolic acidosis   LEÓN (acute kidney injury) (CMS/[facility])   Hypertension, unspecified type       ED Disposition  ED Disposition     ED Disposition Condition Comment    Decision to Admit  Level of Care: Telemetry [5]   Diagnosis: Chest pain, unspecified type [[id number]]            No follow-up provider specified.       Medication List      ASK your doctor about these medications    gabapentin 800 MG tablet  Commonly known as: NEURONTIN  Ask about: Which instructions should I use?     insulin NPH-insulin regular (70-30) 100 UNIT/ML injection  Commonly known as: humuLIN 70/30,novoLIN 70/30  Ask about: Which instructions should I use?     metFORMIN  MG 24 hr tablet  Commonly known as: GLUCOPHAGE-XR  Ask about: Which instructions should I use?             Ruben Polanco PA  09/01/21 3984

## 2021-09-02 ENCOUNTER — APPOINTMENT (OUTPATIENT)
Dept: CARDIOLOGY | Facility: HOSPITAL | Age: 46
End: 2021-09-02

## 2021-09-02 ENCOUNTER — APPOINTMENT (OUTPATIENT)
Dept: ULTRASOUND IMAGING | Facility: HOSPITAL | Age: 46
End: 2021-09-02

## 2021-09-02 PROBLEM — I21.4 NSTEMI, INITIAL EPISODE OF CARE: Status: ACTIVE | Noted: 2021-09-01

## 2021-09-02 PROBLEM — I21.4 NSTEMI (NON-ST ELEVATED MYOCARDIAL INFARCTION): Status: ACTIVE | Noted: 2021-09-02

## 2021-09-02 LAB
ALBUMIN SERPL-MCNC: 3.07 G/DL (ref 3.5–5.2)
ALBUMIN/GLOB SERPL: 1.1 G/DL
ALP SERPL-CCNC: 75 U/L (ref 39–117)
ALT SERPL W P-5'-P-CCNC: 25 U/L (ref 1–41)
ANION GAP SERPL CALCULATED.3IONS-SCNC: 13.2 MMOL/L (ref 5–15)
APTT PPP: 33.3 SECONDS (ref 25.5–35.4)
APTT PPP: 67.6 SECONDS (ref 25.5–35.4)
AST SERPL-CCNC: 40 U/L (ref 1–40)
BASOPHILS # BLD AUTO: 0.05 10*3/MM3 (ref 0–0.2)
BASOPHILS NFR BLD AUTO: 0.9 % (ref 0–1.5)
BH CV ECHO MEAS - ACS: 2.5 CM
BH CV ECHO MEAS - AO ROOT AREA (BSA CORRECTED): 1.6
BH CV ECHO MEAS - AO ROOT AREA: 8.6 CM^2
BH CV ECHO MEAS - AO ROOT DIAM: 3.3 CM
BH CV ECHO MEAS - BSA(HAYCOCK): 2.3 M^2
BH CV ECHO MEAS - BSA: 2.1 M^2
BH CV ECHO MEAS - BZI_BMI: 37.3 KILOGRAMS/M^2
BH CV ECHO MEAS - BZI_METRIC_HEIGHT: 167.6 CM
BH CV ECHO MEAS - BZI_METRIC_WEIGHT: 104.8 KG
BH CV ECHO MEAS - EDV(CUBED): 68.9 ML
BH CV ECHO MEAS - EDV(MOD-SP4): 69.7 ML
BH CV ECHO MEAS - EDV(TEICH): 74.2 ML
BH CV ECHO MEAS - EF(CUBED): 52.5 %
BH CV ECHO MEAS - EF(MOD-SP4): 71.2 %
BH CV ECHO MEAS - EF(TEICH): 44.8 %
BH CV ECHO MEAS - ESV(CUBED): 32.8 ML
BH CV ECHO MEAS - ESV(MOD-SP4): 20.1 ML
BH CV ECHO MEAS - ESV(TEICH): 41 ML
BH CV ECHO MEAS - FS: 22 %
BH CV ECHO MEAS - IVS/LVPW: 1.1
BH CV ECHO MEAS - IVSD: 1.7 CM
BH CV ECHO MEAS - LA DIMENSION: 4.4 CM
BH CV ECHO MEAS - LA/AO: 1.3
BH CV ECHO MEAS - LV DIASTOLIC VOL/BSA (35-75): 32.8 ML/M^2
BH CV ECHO MEAS - LV MASS(C)D: 266.9 GRAMS
BH CV ECHO MEAS - LV MASS(C)DI: 125.5 GRAMS/M^2
BH CV ECHO MEAS - LV SYSTOLIC VOL/BSA (12-30): 9.5 ML/M^2
BH CV ECHO MEAS - LVIDD: 4.1 CM
BH CV ECHO MEAS - LVIDS: 3.2 CM
BH CV ECHO MEAS - LVLD AP4: 7.3 CM
BH CV ECHO MEAS - LVLS AP4: 6.6 CM
BH CV ECHO MEAS - LVOT AREA (M): 3.8 CM^2
BH CV ECHO MEAS - LVOT AREA: 3.8 CM^2
BH CV ECHO MEAS - LVOT DIAM: 2.2 CM
BH CV ECHO MEAS - LVPWD: 1.5 CM
BH CV ECHO MEAS - MV A MAX VEL: 114 CM/SEC
BH CV ECHO MEAS - MV E MAX VEL: 98.5 CM/SEC
BH CV ECHO MEAS - MV E/A: 0.86
BH CV ECHO MEAS - PA ACC TIME: 0.12 SEC
BH CV ECHO MEAS - PA PR(ACCEL): 25 MMHG
BH CV ECHO MEAS - SI(CUBED): 17 ML/M^2
BH CV ECHO MEAS - SI(MOD-SP4): 23.3 ML/M^2
BH CV ECHO MEAS - SI(TEICH): 15.6 ML/M^2
BH CV ECHO MEAS - SV(CUBED): 36.2 ML
BH CV ECHO MEAS - SV(MOD-SP4): 49.6 ML
BH CV ECHO MEAS - SV(TEICH): 33.3 ML
BILIRUB SERPL-MCNC: 0.5 MG/DL (ref 0–1.2)
BUN SERPL-MCNC: 31 MG/DL (ref 6–20)
BUN/CREAT SERPL: 19.6 (ref 7–25)
CALCIUM SPEC-SCNC: 7.8 MG/DL (ref 8.6–10.5)
CHLORIDE SERPL-SCNC: 105 MMOL/L (ref 98–107)
CHOLEST SERPL-MCNC: 163 MG/DL (ref 0–200)
CO2 SERPL-SCNC: 16.8 MMOL/L (ref 22–29)
CREAT SERPL-MCNC: 1.58 MG/DL (ref 0.76–1.27)
DEPRECATED RDW RBC AUTO: 43.2 FL (ref 37–54)
EOSINOPHIL # BLD AUTO: 0.15 10*3/MM3 (ref 0–0.4)
EOSINOPHIL NFR BLD AUTO: 2.6 % (ref 0.3–6.2)
ERYTHROCYTE [DISTWIDTH] IN BLOOD BY AUTOMATED COUNT: 13.2 % (ref 12.3–15.4)
GFR SERPL CREATININE-BSD FRML MDRD: 47 ML/MIN/1.73
GLOBULIN UR ELPH-MCNC: 2.7 GM/DL
GLUCOSE BLDC GLUCOMTR-MCNC: 155 MG/DL (ref 70–130)
GLUCOSE BLDC GLUCOMTR-MCNC: 268 MG/DL (ref 70–130)
GLUCOSE BLDC GLUCOMTR-MCNC: 313 MG/DL (ref 70–130)
GLUCOSE SERPL-MCNC: 294 MG/DL (ref 65–99)
HCT VFR BLD AUTO: 35.5 % (ref 37.5–51)
HDLC SERPL-MCNC: 26 MG/DL (ref 40–60)
HGB BLD-MCNC: 11.8 G/DL (ref 13–17.7)
IMM GRANULOCYTES # BLD AUTO: 0.02 10*3/MM3 (ref 0–0.05)
IMM GRANULOCYTES NFR BLD AUTO: 0.3 % (ref 0–0.5)
INR PPP: 1.06 (ref 0.9–1.1)
LDLC SERPL CALC-MCNC: 94 MG/DL (ref 0–100)
LDLC/HDLC SERPL: 3.32 {RATIO}
LV EF 2D ECHO EST: 60 %
LYMPHOCYTES # BLD AUTO: 2.41 10*3/MM3 (ref 0.7–3.1)
LYMPHOCYTES NFR BLD AUTO: 41.3 % (ref 19.6–45.3)
MAXIMAL PREDICTED HEART RATE: 174 BPM
MCH RBC QN AUTO: 30.3 PG (ref 26.6–33)
MCHC RBC AUTO-ENTMCNC: 33.2 G/DL (ref 31.5–35.7)
MCV RBC AUTO: 91 FL (ref 79–97)
MONOCYTES # BLD AUTO: 0.53 10*3/MM3 (ref 0.1–0.9)
MONOCYTES NFR BLD AUTO: 9.1 % (ref 5–12)
NEUTROPHILS NFR BLD AUTO: 2.68 10*3/MM3 (ref 1.7–7)
NEUTROPHILS NFR BLD AUTO: 45.8 % (ref 42.7–76)
NRBC BLD AUTO-RTO: 0 /100 WBC (ref 0–0.2)
PLATELET # BLD AUTO: 151 10*3/MM3 (ref 140–450)
PMV BLD AUTO: 11 FL (ref 6–12)
POTASSIUM SERPL-SCNC: 3.8 MMOL/L (ref 3.5–5.2)
PROT SERPL-MCNC: 5.8 G/DL (ref 6–8.5)
PROTHROMBIN TIME: 14.2 SECONDS (ref 12.8–14.5)
QT INTERVAL: 430 MS
QT INTERVAL: 436 MS
QT INTERVAL: 470 MS
QT INTERVAL: 476 MS
QTC INTERVAL: 430 MS
QTC INTERVAL: 445 MS
QTC INTERVAL: 480 MS
QTC INTERVAL: 491 MS
RBC # BLD AUTO: 3.9 10*6/MM3 (ref 4.14–5.8)
SODIUM SERPL-SCNC: 135 MMOL/L (ref 136–145)
STRESS TARGET HR: 148 BPM
TRIGL SERPL-MCNC: 253 MG/DL (ref 0–150)
TROPONIN T SERPL-MCNC: 0.18 NG/ML (ref 0–0.03)
TROPONIN T SERPL-MCNC: 0.32 NG/ML (ref 0–0.03)
TROPONIN T SERPL-MCNC: 0.38 NG/ML (ref 0–0.03)
TROPONIN T SERPL-MCNC: 0.55 NG/ML (ref 0–0.03)
TROPONIN T SERPL-MCNC: 0.58 NG/ML (ref 0–0.03)
VLDLC SERPL-MCNC: 43 MG/DL (ref 5–40)
WBC # BLD AUTO: 5.84 10*3/MM3 (ref 3.4–10.8)

## 2021-09-02 PROCEDURE — 85025 COMPLETE CBC W/AUTO DIFF WBC: CPT | Performed by: NURSE PRACTITIONER

## 2021-09-02 PROCEDURE — 93010 ELECTROCARDIOGRAM REPORT: CPT | Performed by: INTERNAL MEDICINE

## 2021-09-02 PROCEDURE — 63710000001 INSULIN ASPART PER 5 UNITS: Performed by: INTERNAL MEDICINE

## 2021-09-02 PROCEDURE — 84484 ASSAY OF TROPONIN QUANT: CPT | Performed by: INTERNAL MEDICINE

## 2021-09-02 PROCEDURE — C1725 CATH, TRANSLUMIN NON-LASER: HCPCS | Performed by: INTERNAL MEDICINE

## 2021-09-02 PROCEDURE — 027034Z DILATION OF CORONARY ARTERY, ONE ARTERY WITH DRUG-ELUTING INTRALUMINAL DEVICE, PERCUTANEOUS APPROACH: ICD-10-PCS | Performed by: INTERNAL MEDICINE

## 2021-09-02 PROCEDURE — 99152 MOD SED SAME PHYS/QHP 5/>YRS: CPT | Performed by: INTERNAL MEDICINE

## 2021-09-02 PROCEDURE — C1874 STENT, COATED/COV W/DEL SYS: HCPCS | Performed by: INTERNAL MEDICINE

## 2021-09-02 PROCEDURE — 76775 US EXAM ABDO BACK WALL LIM: CPT

## 2021-09-02 PROCEDURE — C1769 GUIDE WIRE: HCPCS | Performed by: INTERNAL MEDICINE

## 2021-09-02 PROCEDURE — 25010000002 ENOXAPARIN PER 10 MG: Performed by: INTERNAL MEDICINE

## 2021-09-02 PROCEDURE — 4A023N7 MEASUREMENT OF CARDIAC SAMPLING AND PRESSURE, LEFT HEART, PERCUTANEOUS APPROACH: ICD-10-PCS | Performed by: INTERNAL MEDICINE

## 2021-09-02 PROCEDURE — 80053 COMPREHEN METABOLIC PANEL: CPT | Performed by: NURSE PRACTITIONER

## 2021-09-02 PROCEDURE — 99232 SBSQ HOSP IP/OBS MODERATE 35: CPT | Performed by: INTERNAL MEDICINE

## 2021-09-02 PROCEDURE — 25010000002 MORPHINE SULFATE (PF) 2 MG/ML SOLUTION: Performed by: INTERNAL MEDICINE

## 2021-09-02 PROCEDURE — 25010000002 FENTANYL CITRATE (PF) 50 MCG/ML SOLUTION: Performed by: INTERNAL MEDICINE

## 2021-09-02 PROCEDURE — 85610 PROTHROMBIN TIME: CPT | Performed by: INTERNAL MEDICINE

## 2021-09-02 PROCEDURE — 93306 TTE W/DOPPLER COMPLETE: CPT

## 2021-09-02 PROCEDURE — 25010000002 HEPARIN (PORCINE) PER 1000 UNITS: Performed by: INTERNAL MEDICINE

## 2021-09-02 PROCEDURE — 76775 US EXAM ABDO BACK WALL LIM: CPT | Performed by: RADIOLOGY

## 2021-09-02 PROCEDURE — 92928 PRQ TCAT PLMT NTRAC ST 1 LES: CPT | Performed by: INTERNAL MEDICINE

## 2021-09-02 PROCEDURE — 99222 1ST HOSP IP/OBS MODERATE 55: CPT | Performed by: NURSE PRACTITIONER

## 2021-09-02 PROCEDURE — 93454 CORONARY ARTERY ANGIO S&I: CPT | Performed by: INTERNAL MEDICINE

## 2021-09-02 PROCEDURE — 93005 ELECTROCARDIOGRAM TRACING: CPT | Performed by: INTERNAL MEDICINE

## 2021-09-02 PROCEDURE — C1894 INTRO/SHEATH, NON-LASER: HCPCS | Performed by: INTERNAL MEDICINE

## 2021-09-02 PROCEDURE — 94799 UNLISTED PULMONARY SVC/PX: CPT

## 2021-09-02 PROCEDURE — 85730 THROMBOPLASTIN TIME PARTIAL: CPT | Performed by: INTERNAL MEDICINE

## 2021-09-02 PROCEDURE — C1887 CATHETER, GUIDING: HCPCS | Performed by: INTERNAL MEDICINE

## 2021-09-02 PROCEDURE — 25010000002 MIDAZOLAM PER 1 MG: Performed by: INTERNAL MEDICINE

## 2021-09-02 PROCEDURE — 0 IOPAMIDOL PER 1 ML: Performed by: INTERNAL MEDICINE

## 2021-09-02 PROCEDURE — B2111ZZ FLUOROSCOPY OF MULTIPLE CORONARY ARTERIES USING LOW OSMOLAR CONTRAST: ICD-10-PCS | Performed by: INTERNAL MEDICINE

## 2021-09-02 PROCEDURE — 82962 GLUCOSE BLOOD TEST: CPT

## 2021-09-02 PROCEDURE — 80061 LIPID PANEL: CPT | Performed by: INTERNAL MEDICINE

## 2021-09-02 PROCEDURE — 84484 ASSAY OF TROPONIN QUANT: CPT | Performed by: PHYSICIAN ASSISTANT

## 2021-09-02 PROCEDURE — C9600 PERC DRUG-EL COR STENT SING: HCPCS | Performed by: INTERNAL MEDICINE

## 2021-09-02 PROCEDURE — 93306 TTE W/DOPPLER COMPLETE: CPT | Performed by: INTERNAL MEDICINE

## 2021-09-02 DEVICE — XIENCE SIERRA™ EVEROLIMUS ELUTING CORONARY STENT SYSTEM 2.25 MM X 23 MM / RAPID-EXCHANGE
Type: IMPLANTABLE DEVICE | Status: FUNCTIONAL
Brand: XIENCE SIERRA™

## 2021-09-02 RX ORDER — NITROGLYCERIN 0.4 MG/1
TABLET SUBLINGUAL AS NEEDED
Status: DISCONTINUED | OUTPATIENT
Start: 2021-09-02 | End: 2021-09-02 | Stop reason: HOSPADM

## 2021-09-02 RX ORDER — FENTANYL CITRATE 50 UG/ML
INJECTION, SOLUTION INTRAMUSCULAR; INTRAVENOUS AS NEEDED
Status: DISCONTINUED | OUTPATIENT
Start: 2021-09-02 | End: 2021-09-02 | Stop reason: HOSPADM

## 2021-09-02 RX ORDER — LIDOCAINE HYDROCHLORIDE 20 MG/ML
INJECTION, SOLUTION INFILTRATION; PERINEURAL AS NEEDED
Status: DISCONTINUED | OUTPATIENT
Start: 2021-09-02 | End: 2021-09-02 | Stop reason: HOSPADM

## 2021-09-02 RX ORDER — ACETAMINOPHEN 325 MG/1
650 TABLET ORAL EVERY 4 HOURS PRN
Status: DISCONTINUED | OUTPATIENT
Start: 2021-09-02 | End: 2021-09-03 | Stop reason: HOSPADM

## 2021-09-02 RX ORDER — ONDANSETRON 2 MG/ML
4 INJECTION INTRAMUSCULAR; INTRAVENOUS EVERY 6 HOURS PRN
Status: DISCONTINUED | OUTPATIENT
Start: 2021-09-02 | End: 2021-09-03 | Stop reason: HOSPADM

## 2021-09-02 RX ORDER — HEPARIN SODIUM 10000 [USP'U]/100ML
9.52 INJECTION, SOLUTION INTRAVENOUS
Status: DISCONTINUED | OUTPATIENT
Start: 2021-09-02 | End: 2021-09-02

## 2021-09-02 RX ORDER — SODIUM CHLORIDE 9 MG/ML
INJECTION, SOLUTION INTRAVENOUS CONTINUOUS PRN
Status: COMPLETED | OUTPATIENT
Start: 2021-09-02 | End: 2021-09-02

## 2021-09-02 RX ORDER — SODIUM CHLORIDE 9 MG/ML
100 INJECTION, SOLUTION INTRAVENOUS ONCE
Status: COMPLETED | OUTPATIENT
Start: 2021-09-02 | End: 2021-09-02

## 2021-09-02 RX ORDER — LORAZEPAM 2 MG/ML
1 INJECTION INTRAMUSCULAR EVERY 6 HOURS PRN
Status: DISCONTINUED | OUTPATIENT
Start: 2021-09-02 | End: 2021-09-03 | Stop reason: HOSPADM

## 2021-09-02 RX ORDER — HEPARIN SODIUM 5000 [USP'U]/ML
2500 INJECTION, SOLUTION INTRAVENOUS; SUBCUTANEOUS AS NEEDED
Status: DISCONTINUED | OUTPATIENT
Start: 2021-09-02 | End: 2021-09-02

## 2021-09-02 RX ORDER — HEPARIN SODIUM 5000 [USP'U]/ML
5000 INJECTION, SOLUTION INTRAVENOUS; SUBCUTANEOUS AS NEEDED
Status: DISCONTINUED | OUTPATIENT
Start: 2021-09-02 | End: 2021-09-02

## 2021-09-02 RX ORDER — LISINOPRIL 2.5 MG/1
5 TABLET ORAL DAILY
Status: DISCONTINUED | OUTPATIENT
Start: 2021-09-02 | End: 2021-09-03 | Stop reason: HOSPADM

## 2021-09-02 RX ORDER — MIDAZOLAM HYDROCHLORIDE 1 MG/ML
INJECTION INTRAMUSCULAR; INTRAVENOUS AS NEEDED
Status: DISCONTINUED | OUTPATIENT
Start: 2021-09-02 | End: 2021-09-02 | Stop reason: HOSPADM

## 2021-09-02 RX ORDER — ONDANSETRON 4 MG/1
4 TABLET, FILM COATED ORAL EVERY 6 HOURS PRN
Status: DISCONTINUED | OUTPATIENT
Start: 2021-09-02 | End: 2021-09-03 | Stop reason: HOSPADM

## 2021-09-02 RX ORDER — HEPARIN SODIUM 1000 [USP'U]/ML
INJECTION, SOLUTION INTRAVENOUS; SUBCUTANEOUS AS NEEDED
Status: DISCONTINUED | OUTPATIENT
Start: 2021-09-02 | End: 2021-09-02 | Stop reason: HOSPADM

## 2021-09-02 RX ORDER — MORPHINE SULFATE 2 MG/ML
INJECTION, SOLUTION INTRAMUSCULAR; INTRAVENOUS AS NEEDED
Status: DISCONTINUED | OUTPATIENT
Start: 2021-09-02 | End: 2021-09-02 | Stop reason: HOSPADM

## 2021-09-02 RX ORDER — SODIUM CHLORIDE 9 MG/ML
75 INJECTION, SOLUTION INTRAVENOUS CONTINUOUS
Status: DISCONTINUED | OUTPATIENT
Start: 2021-09-02 | End: 2021-09-03 | Stop reason: HOSPADM

## 2021-09-02 RX ORDER — ATORVASTATIN CALCIUM 40 MG/1
80 TABLET, FILM COATED ORAL NIGHTLY
Status: DISCONTINUED | OUTPATIENT
Start: 2021-09-02 | End: 2021-09-03 | Stop reason: HOSPADM

## 2021-09-02 RX ORDER — HEPARIN SODIUM 5000 [USP'U]/ML
5000 INJECTION, SOLUTION INTRAVENOUS; SUBCUTANEOUS ONCE
Status: COMPLETED | OUTPATIENT
Start: 2021-09-02 | End: 2021-09-02

## 2021-09-02 RX ADMIN — INSULIN ASPART 4 UNITS: 100 INJECTION, SOLUTION INTRAVENOUS; SUBCUTANEOUS at 16:39

## 2021-09-02 RX ADMIN — METOPROLOL TARTRATE 25 MG: 25 TABLET, FILM COATED ORAL at 13:50

## 2021-09-02 RX ADMIN — ATORVASTATIN CALCIUM 80 MG: 40 TABLET, FILM COATED ORAL at 20:23

## 2021-09-02 RX ADMIN — ALLOPURINOL 300 MG: 300 TABLET ORAL at 13:50

## 2021-09-02 RX ADMIN — HEPARIN SODIUM 9.52 UNITS/KG/HR: 10000 INJECTION, SOLUTION INTRAVENOUS at 02:51

## 2021-09-02 RX ADMIN — ENOXAPARIN SODIUM 40 MG: 40 INJECTION SUBCUTANEOUS at 20:23

## 2021-09-02 RX ADMIN — TICAGRELOR 90 MG: 90 TABLET ORAL at 21:22

## 2021-09-02 RX ADMIN — GABAPENTIN 800 MG: 400 CAPSULE ORAL at 20:24

## 2021-09-02 RX ADMIN — ASPIRIN 81 MG: 81 TABLET, COATED ORAL at 13:50

## 2021-09-02 RX ADMIN — LISINOPRIL 5 MG: 2.5 TABLET ORAL at 13:50

## 2021-09-02 RX ADMIN — SODIUM CHLORIDE, PRESERVATIVE FREE 10 ML: 5 INJECTION INTRAVENOUS at 20:24

## 2021-09-02 RX ADMIN — METOPROLOL TARTRATE 25 MG: 25 TABLET, FILM COATED ORAL at 20:24

## 2021-09-02 RX ADMIN — SODIUM CHLORIDE 75 ML/HR: 9 INJECTION, SOLUTION INTRAVENOUS at 13:51

## 2021-09-02 RX ADMIN — SODIUM CHLORIDE 100 ML/HR: 9 INJECTION, SOLUTION INTRAVENOUS at 13:51

## 2021-09-02 RX ADMIN — SODIUM CHLORIDE, PRESERVATIVE FREE 10 ML: 5 INJECTION INTRAVENOUS at 13:51

## 2021-09-02 RX ADMIN — INSULIN ASPART 8 UNITS: 100 INJECTION, SOLUTION INTRAVENOUS; SUBCUTANEOUS at 16:40

## 2021-09-02 RX ADMIN — HEPARIN SODIUM 5000 UNITS: 5000 INJECTION INTRAVENOUS; SUBCUTANEOUS at 02:52

## 2021-09-02 RX ADMIN — SODIUM CHLORIDE 75 ML/HR: 9 INJECTION, SOLUTION INTRAVENOUS at 09:25

## 2021-09-02 RX ADMIN — GABAPENTIN 800 MG: 400 CAPSULE ORAL at 15:27

## 2021-09-02 NOTE — PROGRESS NOTES
[facility] Medicine Services  PROGRESS NOTE     Patient Identification:  Name:  Matthew Madrid  Age:  46 y.o.  Sex:  male  :  1975  MRN:  [id number]  Visit Number:  [id number]  Primary Care Provider:  Malik Ridley MD    Length of stay:  0    ----------------------------------------------------------------------------------------------------------------------  Subjective     Chief Complaint:  Follow up for CP    Subjective:  Today, the patient is feeling much better after PCI. His left upper extremity pain has resolved.    ----------------------------------------------------------------------------------------------------------------------  Objective     Current Hospital Meds:  allopurinol, 300 mg, Oral, Daily  aspirin, 81 mg, Oral, Daily  atorvastatin, 80 mg, Oral, Nightly  gabapentin, 800 mg, Oral, TID  insulin aspart, 0-7 Units, Subcutaneous, TID AC  insulin aspart, 8 Units, Subcutaneous, TID With Meals  insulin detemir, 20 Units, Subcutaneous, Nightly  insulin NPH, 10 Units, Subcutaneous, Once  lisinopril, 5 mg, Oral, Daily  metoprolol tartrate, 25 mg, Oral, Q12H  pantoprazole, 40 mg, Oral, QAM  sodium chloride, 10 mL, Intravenous, Q12H  ticagrelor, 90 mg, Oral, BID      sodium chloride, 75 mL/hr, Last Rate: 75 mL/hr (21 1351)      ----------------------------------------------------------------------------------------------------------------------  Vital Signs:  Temp:  [97.5 °F (36.4 °C)-98 °F (36.7 °C)] 98 °F (36.7 °C)  Heart Rate:  [62-87] 71  Resp:  [18] 18  BP: (102-154)/(59-81) 132/71  Mean Arterial Pressure (Non-Invasive) for the past 24 hrs (Last 3 readings):   Noninvasive MAP (mmHg)   21 1735 98   21 1535 95   21 1505 106     SpO2 Percentage    21 1305 21 1405 21 1635   SpO2: 99% 99% 99%     SpO2:  [96 %-99 %] 99 %  on   ;   Device (Oxygen Therapy): room air    Body mass index is 37.28 kg/m².  Wt  Readings from Last 3 Encounters:   09/02/21 105 kg (231 lb)   04/03/20 113 kg (250 lb)   12/26/18 106 kg (233 lb)        Intake/Output Summary (Last 24 hours) at 9/2/2021 1813  Last data filed at 9/2/2021 1739  Gross per 24 hour   Intake 1500 ml   Output 1850 ml   Net -350 ml     Diet Regular; Consistent Carbohydrate, Cardiac  ----------------------------------------------------------------------------------------------------------------------  Physical exam:   GEN: NAD, obese  CV: RRR, no audible murmur  PULM: Clear to auscultation, no wheeze or crackles  SKIN: No peripheral edema, no new rashes    ----------------------------------------------------------------------------------------------------------------------  Results from last 7 days   Lab Units 09/02/21  1416 09/02/21  0835 09/02/21  0637   TROPONIN T ng/mL 0.553* 0.379* 0.318*       Results from last 7 days   Lab Units 09/02/21  0058   CHOLESTEROL mg/dL 163   TRIGLYCERIDES mg/dL 253*   HDL CHOL mg/dL 26*   LDL CHOL mg/dL 94       Results from last 7 days   Lab Units 09/02/21  0246 09/02/21  0058 09/01/21  1246 09/01/21  0942   LACTATE mmol/L  --   --  1.0  --    WBC 10*3/mm3  --  5.84  --  6.29   HEMOGLOBIN g/dL  --  11.8*  --  13.6   HEMATOCRIT %  --  35.5*  --  39.2   MCV fL  --  91.0  --  88.1   MCHC g/dL  --  33.2  --  34.7   PLATELETS 10*3/mm3  --  151  --  179   INR  1.06  --   --   --      Results from last 7 days   Lab Units 09/02/21  0058 09/01/21  0942   SODIUM mmol/L 135* 134*   POTASSIUM mmol/L 3.8 3.8   CHLORIDE mmol/L 105 102   CO2 mmol/L 16.8* 18.1*   BUN mg/dL 31* 31*   CREATININE mg/dL 1.58* 1.73*   EGFR IF NONAFRICN AM mL/min/1.73 47* 43*   CALCIUM mg/dL 7.8* 8.9   GLUCOSE mg/dL 294* 365*   ALBUMIN g/dL 3.07* 3.81   BILIRUBIN mg/dL 0.5 1.3*   ALK PHOS U/L 75 92   AST (SGOT) U/L 40 27   ALT (SGPT) U/L 25 28   Estimated Creatinine Clearance: 66.4 mL/min (A) (by C-G formula based on SCr of 1.58 mg/dL (H)).  No results found for:  AMMONIA    Hemoglobin A1C   Date/Time Value Ref Range Status   09/01/2021 0942 9.40 (H) 4.80 - 5.60 % Final     Glucose   Date/Time Value Ref Range Status   09/02/2021 1606 268 (H) 70 - 130 mg/dL Final     Comment:     Meter: [id number] : [id number] ARMIDA OJLLEY   09/02/2021 0627 313 (H) 70 - 130 mg/dL Final     Comment:     Meter: [id number] : [id number] ARMIDA JOLLEY   09/01/2021 2151 264 (H) 70 - 130 mg/dL Final     Comment:     Meter: [id number] : [id number] MILAD SCHWAB   09/01/2021 1735 198 (H) 70 - 130 mg/dL Final     Comment:     Meter: [id number] : [id number] STONEY RUSSO     Lab Results   Component Value Date    HGBA1C 9.40 (H) 09/01/2021       ----------------------------------------------------------------------------------------------------------------------  Imaging Results (Last 24 Hours)     Procedure Component Value Units Date/Time    US Renal Bilateral [[id number]] Collected: 09/02/21 0844     Updated: 09/02/21 0846    Narrative:      EXAM:    US Retroperitoneal Limited, Renal     EXAM DATE:    9/2/2021 8:01 AM     CLINICAL HISTORY:    LEÓN; R07.9-Chest pain, unspecified; E87.2-Acidosis; N17.9-Acute kidney  failure, unspecified; I10-Essential (primary) hypertension     TECHNIQUE:    Real-time limited ultrasound of the retroperitoneum with image  documentation.     COMPARISON:    No relevant prior studies available.     FINDINGS:    Right kidney:  The right kidney measures 12.08 cm.  No stones.  No  hydronephrosis.    Left kidney:  The left kidney measures  12.21 cm.  No stones.  No  hydronephrosis.       Impression:        No acute findings in the retroperitoneum.     This report was finalized on 9/2/2021 8:44 AM by Dr. Ron Wharton MD.             ----------------------------------------------------------------------------------------------------------------------  Assessment/Plan     ACS/NSTEMI (POA)-initial troponins checked in the ER were negative however increased from  there on. He had no ischemic changes on EKG however continue to have chest pain and left upper extremity pain which was resolving with nitroglycerin. He underwent left heart cath today and was found to have an RCA occlusion and received PCI. He has been started on ticagrelor and aspirin has been continued. I have increased his Lipitor to 80 mg. His heart rate remains in the sixties therefore beta-blocker was not titrated and kept at 25 mg twice daily. He was also started on lisinopril at 5 mg by cardiology today. TTE with preserved LVEF.   Hypertension-noted to be hypertensive with systolic blood pressure in the two hundreds initially on admission. Received nifedipine and clonidine in the ER which dropped blood pressures in the nineties. Both of these were held further. This morning his blood pressure was in normal range. He has been started on low-dose lisinopril by cardiology due to recent NSTEMI. Will monitor blood pressure closely. Patient was on lisinopril at one point and was taken off by PCP due to unclear reasons. He will need close follow-up for renal function test and monitoring given his LEÓN.  Diabetes 2-uncontrolled with A1c greater than 9. Titrating insulin. Will need tight control as outpatient therefore will need close follow-up with PCP  CKD 2-creatinine is around 1.5-1.7. It is unclear what his baseline creatinine had been prior to this admission however no major change noted. Renal ultrasound shows chronic changes and no acute hydronephrosis. Patient will need to follow-up with nephrology as outpatient.  Upper lipidemia-increase Lipitor to 80  Mild hyperbilirubinemia-unclear etiology. May need follow-up with PCP for repeat  Gout-allopurinol    --------------------------------------------------  DVT Prophylaxis: Lovenox     Disposition: d/c home tomorrow if okay with cards  --------------------------------------------------      Katayoun Behbahani, MD  Hospitalist Service -- [facility]      09/02/21  18:13 EDT

## 2021-09-02 NOTE — CONSULTS
Inpatient Cardiology Consult  Consult performed by: Nadira Melendez APRN  Consult ordered by: Whitney Davidson PA-C        Date of Admit: 9/1/2021  Date of Consult: 09/02/21  Provider, No Known  Matthew Madrid  1975    Consulting Physician: Vasile Moulton MD    Cardiology consultation    Reason for consultation: Non-STEMI    Assessment:  1. Non-STEMI  2. Hypertension  3. Dyslipidemia  4. Insulin-dependent type 2 diabetes, poor control with that hemoglobin A1c of 11.8  5. Obesity        Recommendations:  1. Patient's troponins continue to rise.  We recommend proceeding with cardiac catheterization.  The risks including hemorrhage, kidney damage, arrhythmia, stroke, MI, and death were discussed with the patient.  The patient elected to proceed.  2. Hypertension  3. Dyslipidemia  4. Insulin-dependent type 2 diabetes      History of Present Illness    Subjective     Chief Complaint   Patient presents with   • Chest Pain       Matthew Madrid is a 46 y.o. male with past medical history significant for hypertension, dyslipidemia, insulin-dependent type 2 diabetes, diabetic neuropathy, gout, anxiety, and obesity.  Patient presented to the ED with complaint of chest pain on 8/1/2021.  Initial troponins negative x2 and then began to trend upward.  Patient was admitted for further evaluation and management.  Cardiology was consulted for non-STEMI.    The patient was seen and examined.  He is not currently having chest pain.  He reports that he woke yesterday from sleep with left-sided chest pain which radiated down his left arm.  He stated it was associated with shortness of breath and diaphoresis.  At around 830 9:00 he came to the ER because the pain had not subsided.  He states that he was given morphine and a nitro patch which helped considerably.  He reports that he is been having chest pain for several months intermittently which never lasted as long as it had yesterday.  He states that he has had a stress test  "in the past but it was \"years ago\" and he is unsure of exactly when.    Patient denies past medical history of congenital heart defect or rheumatic fever.  He denies tobacco use.      Cardiac risk factors:diabetes mellitus, hypercholesterolemia, hypertension and Obesity    Last Echo: Completed, not yet read    Past Medical History:   Diagnosis Date   • Diabetes mellitus (CMS/HCC)    • Hyperlipidemia    • Hypertension      Past Surgical History:   Procedure Laterality Date   • LAPAROSCOPIC CHOLECYSTECTOMY       Family History   Family history unknown: Yes     Social History     Tobacco Use   • Smoking status: Never Smoker   • Smokeless tobacco: Never Used   Substance Use Topics   • Alcohol use: Yes     Comment: drinks some on saturdays    • Drug use: No     Medications Prior to Admission   Medication Sig Dispense Refill Last Dose   • albuterol sulfate  (90 Base) MCG/ACT inhaler Inhale 2 puffs As Needed for Wheezing or Shortness of Air.   8/31/2021 at Unknown time   • allopurinol (ZYLOPRIM) 300 MG tablet Take 300 mg by mouth Daily.   8/31/2021 at Unknown time   • ALPRAZolam (XANAX) 0.5 MG tablet Take 0.5 mg by mouth 2 (Two) Times a Day As Needed for Anxiety.   8/31/2021 at Unknown time   • atorvastatin (LIPITOR) 40 MG tablet Take 40 mg by mouth Every Night.   8/31/2021 at Unknown time   • gabapentin (NEURONTIN) 800 MG tablet Take 800 mg by mouth 3 (Three) Times a Day.   8/31/2021 at Unknown time   • ibuprofen (ADVIL,MOTRIN) 800 MG tablet Take 800 mg by mouth 2 (Two) Times a Day As Needed for Mild Pain .   8/31/2021 at Unknown time   • insulin NPH-insulin regular (humuLIN 70/30,novoLIN 70/30) (70-30) 100 UNIT/ML injection Inject 50 Units under the skin into the appropriate area as directed 2 (two) times a day.   8/31/2021 at Unknown time   • metFORMIN ER (GLUCOPHAGE-XR) 500 MG 24 hr tablet Take 1,000 mg by mouth 2 (two) times a day.   8/31/2021 at Unknown time   • omeprazole (priLOSEC) 20 MG capsule Take 20 mg " by mouth 2 (Two) Times a Day.   8/31/2021 at Unknown time   • Semaglutide (Rybelsus) 14 MG tablet Take 14 mg by mouth Every Morning Before Breakfast.   8/31/2021 at Unknown time     Allergies:  Tuberculin tests    Review of Systems   Constitutional: Positive for diaphoresis. Negative for fatigue.   Respiratory: Positive for shortness of breath.    Cardiovascular: Positive for chest pain. Negative for palpitations and leg swelling.   Gastrointestinal: Negative for blood in stool.   Neurological: Negative for dizziness, syncope, weakness and light-headedness.   Hematological: Does not bruise/bleed easily.   All other systems reviewed and are negative.        Objective      Vital Signs  Temp:  [96.9 °F (36.1 °C)-98.5 °F (36.9 °C)] 97.8 °F (36.6 °C)  Heart Rate:  [61-96] 66  Resp:  [18-20] 18  BP: ()/() 119/62  Body mass index is 37.28 kg/m².    Intake/Output Summary (Last 24 hours) at 9/2/2021 0844  Last data filed at 9/1/2021 2351  Gross per 24 hour   Intake 1220 ml   Output 550 ml   Net 670 ml       Physical Exam  Vitals reviewed.   Constitutional:       Appearance: He is well-developed.   HENT:      Head: Normocephalic and atraumatic.   Eyes:      Pupils: Pupils are equal, round, and reactive to light.   Neck:      Vascular: No JVD.   Cardiovascular:      Rate and Rhythm: Normal rate and regular rhythm.      Heart sounds: No murmur heard.   No friction rub. No gallop.    Pulmonary:      Effort: Pulmonary effort is normal. No respiratory distress.      Breath sounds: Normal breath sounds. No wheezing or rales.   Abdominal:      Palpations: Abdomen is soft. There is no mass.      Tenderness: There is no abdominal tenderness.      Hernia: No hernia is present.   Skin:     General: Skin is warm and dry.         Telemetry:  Sinus 60s    Results Review:   I reviewed the patient's new clinical results.  Results from last 7 days   Lab Units 09/02/21  0637 09/02/21  0058 09/01/21  1912 09/01/21  1204  09/01/21  0942   TROPONIN T ng/mL 0.318* 0.182* 0.084* 0.023 0.011     Results from last 7 days   Lab Units 09/02/21  0058 09/01/21 0942   WBC 10*3/mm3 5.84 6.29   HEMOGLOBIN g/dL 11.8* 13.6   PLATELETS 10*3/mm3 151 179     Results from last 7 days   Lab Units 09/02/21  0058 09/01/21 0942   SODIUM mmol/L 135* 134*   POTASSIUM mmol/L 3.8 3.8   CHLORIDE mmol/L 105 102   CO2 mmol/L 16.8* 18.1*   BUN mg/dL 31* 31*   CREATININE mg/dL 1.58* 1.73*   CALCIUM mg/dL 7.8* 8.9   GLUCOSE mg/dL 294* 365*   ALT (SGPT) U/L 25 28   AST (SGOT) U/L 40 27     Lab Results   Component Value Date    INR 1.06 09/02/2021     No results found for: MG  Lab Results   Component Value Date    TRIG 253 (H) 09/02/2021    HDL 26 (L) 09/02/2021    LDL 94 09/02/2021      No results found for: BNP     EKG:     Imaging Results (Last 72 Hours)     Procedure Component Value Units Date/Time    US Renal Bilateral [[id number]] Resulted: 09/02/21 0836     Updated: 09/02/21 0806    XR Shoulder 2+ View Left [[id number]] Collected: 09/01/21 1645     Updated: 09/01/21 1647    Narrative:      EXAM:    XR Left Shoulder Complete, 2 or More Views     EXAM DATE:    9/1/2021 3:50 PM     CLINICAL HISTORY:    pain; R07.9-Chest pain, unspecified; E87.2-Acidosis; N17.9-Acute  kidney failure, unspecified; I10-Essential (primary) hypertension     TECHNIQUE:    Two or more views of the left shoulder.     COMPARISON:    No relevant prior studies available.     FINDINGS:    BONES/JOINTS:  Unremarkable.  No acute fracture.  No dislocation.    SOFT TISSUES:  Unremarkable.       Impression:        No acute findings in the left shoulder.     This report was finalized on 9/1/2021 4:45 PM by Dr. Ron Wharton MD.       XR Chest 1 View [[id number]] Collected: 09/01/21 0941     Updated: 09/01/21 1336    Narrative:      EXAM:    XR Chest, 1 View     EXAM DATE:    9/1/2021 9:19 AM     CLINICAL HISTORY:    Chest pain protocol     TECHNIQUE:    Frontal view of the chest.     COMPARISON:     No relevant prior studies available.     FINDINGS:    LUNGS:  Unremarkable.  No consolidation.    PLEURAL SPACE:  Unremarkable.  No pneumothorax.    HEART:  Unremarkable.  No cardiomegaly.    MEDIASTINUM:  Unremarkable.    BONES/JOINTS:  Unremarkable.       Impression:        Unremarkable exam. No acute cardiopulmonary findings identified.     This report was finalized on 9/1/2021 9:41 AM by Dr. Ron Wharton MD.       CT Cervical Spine Without Contrast [[id number]] Collected: 09/01/21 1325     Updated: 09/01/21 1336    Narrative:      EXAM:    CT Cervical Spine Without Intravenous Contrast     EXAM DATE:    9/1/2021 12:12 PM     CLINICAL HISTORY:    arm pain     TECHNIQUE:    Axial computed tomography images of the cervical spine without  intravenous contrast.  Sagittal and coronal reformatted images were  created and reviewed.  This CT exam was performed using one or more of  the following dose reduction techniques:  automated exposure control,  adjustment of the mA and/or kV according to patient size, and/or use of  iterative reconstruction technique.     COMPARISON:    No relevant prior studies available.     FINDINGS:    VERTEBRAE:  Unremarkable.  No acute fracture.    DISCS/SPINAL CANAL/NEURAL FORAMINA:  No acute findings.  No spinal  canal stenosis.    SOFT TISSUES:  Unremarkable.       Impression:        No acute findings in the cervical spine.     This report was finalized on 9/1/2021 1:25 PM by Dr. Ron Wharton MD.       CT Chest Without Contrast Diagnostic [[id number]] Collected: 09/01/21 1241     Updated: 09/01/21 1301    Narrative:      EXAM:    CT Chest Without Intravenous Contrast     EXAM DATE:    9/1/2021 12:13 PM     CLINICAL HISTORY:    chest pain     TECHNIQUE:    Axial computed tomography images of the chest without intravenous  contrast.  Sagittal and coronal reformatted images were created and  reviewed.  This CT exam was performed using one or more of the following  dose reduction techniques:   automated exposure control, adjustment of  the mA and/or kV according to patient size, and/or use of iterative  reconstruction technique.     COMPARISON:    No relevant prior studies available.     FINDINGS:    LUNGS:  Unremarkable.  No mass.  No consolidation.    PLEURAL SPACE:  Unremarkable.  No pneumothorax.  No significant  effusion.    HEART:  Unremarkable.  No cardiomegaly.  No significant pericardial  effusion.    BONES/JOINTS:  Unremarkable.  No acute fracture.  No dislocation.    SOFT TISSUES:  Unremarkable.    VASCULATURE:  Unremarkable.  No thoracic aortic aneurysm.    LYMPH NODES:  Unremarkable.  No enlarged lymph nodes.       Impression:        No acute findings in the chest.     This report was finalized on 9/1/2021 12:41 PM by Dr. Ron Wharton MD.                Thank you very much for asking us to be involved in this patient's care.  We will follow along with you.    Nadira Melendez, ACRLOS   09/02/21  08:44 EDT

## 2021-09-02 NOTE — PLAN OF CARE
Goal Outcome Evaluation:            Patient NPO past midnight for stress. C/o chest pain intermittently. BP still borderline. PA aware. Cont to monitor.

## 2021-09-02 NOTE — NURSING NOTE
Troponin trending upward. NISA Davidson aware. Patient resting well but states the CP comes and goes. Cardiology consult placed. Spoke with Dr. Moulton. Heparin started per protocol to be stopped on call to cath lab. Prepped. NPO. Patient agreeable to procedure

## 2021-09-02 NOTE — NURSING NOTE
Time In 1430 Time Out 1450      Order received for Cardiac Rehab Consultation.     Patient stated he understands the covid restrictions and will wait for a opening .       Information discussed with: Patient        Educated on: Benefits of Exercise,  Educated on Cardiac Rehab and Program Protocol, Brochure and/or educational material provided, Contact information given and Teach Back Verified        Thank you for the referral. Please contact the Cardiac Rehab Dept. (ext. [phone]) with any further questions or concerns.

## 2021-09-02 NOTE — PLAN OF CARE
Goal Outcome Evaluation:   Pt resting in bed, watching television. Denies chest pain. No signs or symptoms of distress noted. Heart cath completed today. X1 stent placed to the RCA. Site dry and   intact. No signs of bleeding noted. Vss. Will continue with plan of care.

## 2021-09-02 NOTE — CONSULTS
Diabetes Education    Patient Name:  Matthew Madrid  YOB: 1975  MRN: [id number]  Admit Date:  9/1/2021        Patient off floor will continue to follow.       Electronically signed by:  Flora Rice RN  09/02/21 12:38 EDT

## 2021-09-03 VITALS
OXYGEN SATURATION: 99 % | SYSTOLIC BLOOD PRESSURE: 163 MMHG | RESPIRATION RATE: 18 BRPM | HEIGHT: 66 IN | HEART RATE: 70 BPM | WEIGHT: 232.6 LBS | DIASTOLIC BLOOD PRESSURE: 84 MMHG | BODY MASS INDEX: 37.38 KG/M2 | TEMPERATURE: 98 F

## 2021-09-03 PROBLEM — R07.9 CHEST PAIN: Status: RESOLVED | Noted: 2021-09-01 | Resolved: 2021-09-03

## 2021-09-03 LAB
ANION GAP SERPL CALCULATED.3IONS-SCNC: 12.4 MMOL/L (ref 5–15)
BUN SERPL-MCNC: 26 MG/DL (ref 6–20)
BUN/CREAT SERPL: 18.4 (ref 7–25)
CALCIUM SPEC-SCNC: 7.7 MG/DL (ref 8.6–10.5)
CHLORIDE SERPL-SCNC: 107 MMOL/L (ref 98–107)
CHOLEST SERPL-MCNC: 142 MG/DL (ref 0–200)
CO2 SERPL-SCNC: 16.6 MMOL/L (ref 22–29)
CREAT SERPL-MCNC: 1.41 MG/DL (ref 0.76–1.27)
DEPRECATED RDW RBC AUTO: 42.1 FL (ref 37–54)
ERYTHROCYTE [DISTWIDTH] IN BLOOD BY AUTOMATED COUNT: 13.1 % (ref 12.3–15.4)
GFR SERPL CREATININE-BSD FRML MDRD: 54 ML/MIN/1.73
GLUCOSE BLDC GLUCOMTR-MCNC: 171 MG/DL (ref 70–130)
GLUCOSE BLDC GLUCOMTR-MCNC: 247 MG/DL (ref 70–130)
GLUCOSE SERPL-MCNC: 235 MG/DL (ref 65–99)
HCT VFR BLD AUTO: 34.1 % (ref 37.5–51)
HDLC SERPL-MCNC: 25 MG/DL (ref 40–60)
HGB BLD-MCNC: 11.7 G/DL (ref 13–17.7)
LDLC SERPL CALC-MCNC: 72 MG/DL (ref 0–100)
LDLC/HDLC SERPL: 2.49 {RATIO}
MCH RBC QN AUTO: 30.3 PG (ref 26.6–33)
MCHC RBC AUTO-ENTMCNC: 34.3 G/DL (ref 31.5–35.7)
MCV RBC AUTO: 88.3 FL (ref 79–97)
PLATELET # BLD AUTO: 168 10*3/MM3 (ref 140–450)
PMV BLD AUTO: 11.5 FL (ref 6–12)
POTASSIUM SERPL-SCNC: 3.9 MMOL/L (ref 3.5–5.2)
QT INTERVAL: 462 MS
QT INTERVAL: 474 MS
QTC INTERVAL: 484 MS
QTC INTERVAL: 485 MS
RBC # BLD AUTO: 3.86 10*6/MM3 (ref 4.14–5.8)
SODIUM SERPL-SCNC: 136 MMOL/L (ref 136–145)
TRIGL SERPL-MCNC: 274 MG/DL (ref 0–150)
TROPONIN T SERPL-MCNC: 0.35 NG/ML (ref 0–0.03)
TROPONIN T SERPL-MCNC: 0.41 NG/ML (ref 0–0.03)
TROPONIN T SERPL-MCNC: 0.46 NG/ML (ref 0–0.03)
VLDLC SERPL-MCNC: 45 MG/DL (ref 5–40)
WBC # BLD AUTO: 6.46 10*3/MM3 (ref 3.4–10.8)

## 2021-09-03 PROCEDURE — 84484 ASSAY OF TROPONIN QUANT: CPT | Performed by: PHYSICIAN ASSISTANT

## 2021-09-03 PROCEDURE — 93010 ELECTROCARDIOGRAM REPORT: CPT | Performed by: INTERNAL MEDICINE

## 2021-09-03 PROCEDURE — 80061 LIPID PANEL: CPT | Performed by: INTERNAL MEDICINE

## 2021-09-03 PROCEDURE — 63710000001 INSULIN ASPART PER 5 UNITS: Performed by: INTERNAL MEDICINE

## 2021-09-03 PROCEDURE — 93005 ELECTROCARDIOGRAM TRACING: CPT | Performed by: INTERNAL MEDICINE

## 2021-09-03 PROCEDURE — 99239 HOSP IP/OBS DSCHRG MGMT >30: CPT | Performed by: INTERNAL MEDICINE

## 2021-09-03 PROCEDURE — 99232 SBSQ HOSP IP/OBS MODERATE 35: CPT | Performed by: NURSE PRACTITIONER

## 2021-09-03 PROCEDURE — 82962 GLUCOSE BLOOD TEST: CPT

## 2021-09-03 PROCEDURE — 80048 BASIC METABOLIC PNL TOTAL CA: CPT | Performed by: INTERNAL MEDICINE

## 2021-09-03 PROCEDURE — 85027 COMPLETE CBC AUTOMATED: CPT | Performed by: INTERNAL MEDICINE

## 2021-09-03 RX ORDER — ASPIRIN 81 MG/1
81 TABLET ORAL DAILY
Qty: 30 TABLET | Refills: 0 | Status: SHIPPED | OUTPATIENT
Start: 2021-09-04 | End: 2021-09-13 | Stop reason: SDUPTHER

## 2021-09-03 RX ORDER — ATORVASTATIN CALCIUM 40 MG/1
80 TABLET, FILM COATED ORAL NIGHTLY
Start: 2021-09-03 | End: 2021-09-13 | Stop reason: SDUPTHER

## 2021-09-03 RX ORDER — METFORMIN HYDROCHLORIDE 500 MG/1
1000 TABLET, EXTENDED RELEASE ORAL 2 TIMES DAILY
Start: 2021-09-04

## 2021-09-03 RX ORDER — LISINOPRIL 5 MG/1
5 TABLET ORAL DAILY
Qty: 30 TABLET | Refills: 0 | Status: SHIPPED | OUTPATIENT
Start: 2021-09-04 | End: 2021-09-13 | Stop reason: SDUPTHER

## 2021-09-03 RX ADMIN — SODIUM CHLORIDE, PRESERVATIVE FREE 10 ML: 5 INJECTION INTRAVENOUS at 08:56

## 2021-09-03 RX ADMIN — PANTOPRAZOLE SODIUM 40 MG: 40 TABLET, DELAYED RELEASE ORAL at 05:37

## 2021-09-03 RX ADMIN — LISINOPRIL 5 MG: 2.5 TABLET ORAL at 08:56

## 2021-09-03 RX ADMIN — ASPIRIN 81 MG: 81 TABLET, COATED ORAL at 08:56

## 2021-09-03 RX ADMIN — ALLOPURINOL 300 MG: 300 TABLET ORAL at 08:56

## 2021-09-03 RX ADMIN — INSULIN ASPART 8 UNITS: 100 INJECTION, SOLUTION INTRAVENOUS; SUBCUTANEOUS at 08:57

## 2021-09-03 RX ADMIN — INSULIN ASPART 2 UNITS: 100 INJECTION, SOLUTION INTRAVENOUS; SUBCUTANEOUS at 08:56

## 2021-09-03 RX ADMIN — METOPROLOL TARTRATE 25 MG: 25 TABLET, FILM COATED ORAL at 08:56

## 2021-09-03 RX ADMIN — GABAPENTIN 800 MG: 400 CAPSULE ORAL at 08:56

## 2021-09-03 RX ADMIN — TICAGRELOR 90 MG: 90 TABLET ORAL at 08:56

## 2021-09-03 RX ADMIN — SODIUM CHLORIDE 75 ML/HR: 9 INJECTION, SOLUTION INTRAVENOUS at 04:12

## 2021-09-03 NOTE — CASE MANAGEMENT/SOCIAL WORK
Continued Stay Note  COLIN Go     Patient Name: Matthew Madrid  MRN: [id number]  Today's Date: 9/3/2021    Admit Date: 9/1/2021    Discharge Plan     Row Name 09/03/21 1206       Plan    Final Note  Pt is being d/c home today. Pt will  a b/p cuff today if he does not have one and monitor twice a day. Encouraged him to keep a log and take to PCP visit. Pt has a glucometer and will monitor BS daily. Encouraged pt to use 24 hr RN call line with any questions after d/c.          Telma Zuñiga RN

## 2021-09-03 NOTE — PROGRESS NOTES
LOS: 1 day     Name: Matthew Madrid  Age/Sex: 46 y.o. male  :  1975        PCP: Malik Ridley MD    Principal Problem:    NSTEMI, initial episode of care (CMS/[facility])  Active Problems:    NSTEMI (non-ST elevated myocardial infarction) (CMS/[facility])      Admission Information: Matthew Madrid is a 46 y.o. male with a past medical history significant for hypertension, dyslipidemia, insulin-dependent type 2 diabetes, diabetic neuropathy, gout, anxiety, and obesity.  Patient presented to the ED with complaint of chest pain on 2021.  Initial troponins negative x2 and then began to trend upward.  Patient was admitted for further evaluation and management.  Cardiology was consulted for non-STEMI.    Chief Complaint: Follow up non-STEMI    Interval history: Patient underwent cardiac catheterization with PCI MONTSE to the distal RCA on 2021.  He was then transferred to the telemetry unit where his vital signs were monitored and his right radial access site was monitored.    The patient was seen and examined today.  He denies any current chest pain.  States that he feels well.  Vital signs are stable and creatinine has decreased from admission creatinine of 1.73, now down to 1.41.    Subjective     Vital Signs  Vital Signs (last 72 hrs)        0700  -   0659  07  -   0659  07  -   0659  07  -   1117   Most Recent    Temp (°F)   96.9 -  98.5    97.6 -  98.3      98     98 (36.7)    Heart Rate   61 -  96    62 -  105      70     70    Resp   18 -  20    16 -  18      18     18    BP   95/54 -  206/96    121/57 -  154/81      163/84     163/84    SpO2 (%)   96 -  100    96 -  99      99     99        Temp:  [97.6 °F (36.4 °C)-98.3 °F (36.8 °C)] 98 °F (36.7 °C)  Heart Rate:  [] 70  Resp:  [16-18] 18  BP: (121-163)/(57-84) 163/84  Body mass index is 37.54 kg/m².      Intake/Output Summary (Last 24 hours) at 9/3/2021 1117  Last data filed at 9/3/2021 1058  Gross  per 24 hour   Intake 2537 ml   Output 3100 ml   Net -563 ml       Vitals reviewed.   Constitutional:       Appearance: Well-developed.   Neck:      Vascular: No carotid bruit or JVD.   Pulmonary:      Effort: Pulmonary effort is normal. No respiratory distress.      Breath sounds: Normal breath sounds. No wheezing. No rales.   Cardiovascular:      Normal rate. Regular rhythm.      Comments: Right radial access site without ecchymosis or erythema.  Pulses intact.  No lower extremity edema  Skin:     General: Skin is warm and dry.   Neurological:      Mental Status: Alert and oriented to person, place, and time.         Telemetry: Sinus 70s       Results Review:     Results from last 7 days   Lab Units 09/03/21  0047 09/02/21  0058 09/01/21  0942   WBC 10*3/mm3 6.46 5.84 6.29   HEMOGLOBIN g/dL 11.7* 11.8* 13.6   PLATELETS 10*3/mm3 168 151 179     Results from last 7 days   Lab Units 09/03/21  0047 09/02/21  0058 09/01/21  0942   SODIUM mmol/L 136 135* 134*   POTASSIUM mmol/L 3.9 3.8 3.8   CHLORIDE mmol/L 107 105 102   CO2 mmol/L 16.6* 16.8* 18.1*   BUN mg/dL 26* 31* 31*   CREATININE mg/dL 1.41* 1.58* 1.73*   CALCIUM mg/dL 7.7* 7.8* 8.9   GLUCOSE mg/dL 235* 294* 365*     Results from last 7 days   Lab Units 09/03/21  0637 09/03/21  0047 09/02/21  1905 09/02/21  1416 09/02/21  0835   TROPONIN T ng/mL 0.406* 0.461* 0.582* 0.553* 0.379*       Results from last 7 days   Lab Units 09/02/21  0246   INR  1.06       I reviewed the patient's new clinical results.  I reviewed the patient's new imaging results and agree with the interpretation.  I personally viewed and interpreted the patient's EKG/Telemetry data      Medication Review:   allopurinol, 300 mg, Oral, Daily  aspirin, 81 mg, Oral, Daily  atorvastatin, 80 mg, Oral, Nightly  enoxaparin, 40 mg, Subcutaneous, Nightly  gabapentin, 800 mg, Oral, TID  insulin aspart, 0-7 Units, Subcutaneous, TID AC  insulin aspart, 8 Units, Subcutaneous, TID With Meals  insulin detemir, 20  Units, Subcutaneous, Nightly  lisinopril, 5 mg, Oral, Daily  metoprolol tartrate, 25 mg, Oral, Q12H  pantoprazole, 40 mg, Oral, QAM  sodium chloride, 10 mL, Intravenous, Q12H  ticagrelor, 90 mg, Oral, BID      sodium chloride, 75 mL/hr, Last Rate: 75 mL/hr (09/03/21 0412)        Assessment:  1. Non-STEMI, status post PCI MONTSE to the distal RCA on 9/2/2021.  2. Acute kidney injury with an admitting creatinine of 1.73, now improved to 1.41  3. Dyslipidemia with triglycerides of 274  4. Hypertension, controlled  5. Insulin-dependent type 2 diabetes, hemoglobin A1c 9.40 on 9/1/2021      Recommendations:  1. Patient has been placed on guideline directed medical therapy for coronary artery disease including aspirin, Brilinta, atorvastatin, lisinopril, and metoprolol tartrate.  2. The patient has been counseled regarding medication compliance.  3. The patient has been encouraged to participate in cardiac rehabilitation.  Diet and exercise were discussed.  4. The patient is stable for discharge.  He will follow-up in our office in 1 week with a basic metabolic panel to follow his creatinine.  Patient is aware.  Discussed with Dr. Stapleton.    I discussed the patients findings and my recommendations with patient and family      CARLOS Villa  09/03/21  11:17 EDT    Addendum:

## 2021-09-03 NOTE — PLAN OF CARE
Goal Outcome Evaluation:  Plan of Care Reviewed With: patient    Patient resting in bed with no acute distress noted at this time. Patient has denied SOA or chest pain when asked. Will continue to monitor and follow plan of care with interventions implemented as needed.

## 2021-09-03 NOTE — DISCHARGE SUMMARY
Discharge Summary:    Date of Admission: 9/1/2021  Date of Discharge:  9/3/2021    PCP: Malik Ridley MD    DISCHARGE DIAGNOSIS  -Acute coronary syndrome/non-ST elevation myocardial infarction, status post PCI to the RCA  -Hypertension, currently controlled  -Presumed acute kidney injury on CKD stage II, but no recent laboratory studies in our system (since 2018)  -Hyperlipidemia, increased dose of Lipitor  -Obesity  -Mild hyperbilirubinemia,resolved and of unclear etiology    SECONDARY DIAGNOSES  Past Medical History:   Diagnosis Date   • Diabetes mellitus (CMS/[facility])    • Hyperlipidemia    • Hypertension        CONSULTS   Dr. Moulton/[facility] - Interventional Cardiology    PROCEDURES PERFORMED  Left Heart Catheterization:  · Dist RCA lesion is 95% stenosed.  · Prox RCA lesion is 60% stenosed.  · Mid RCA lesion is 70% stenosed on a sharp curve and it appears to be a kink in the vessel.  · Successfule PCI to distal RCA prior to bifurcation with 2.25x23 Xience post dilated with 2.5 NC from 99% to 0% with TMI flow 0-3    Echocardiogram:  Interpretation Summary    · Estimated left ventricular EF = 60% Left ventricular ejection fraction appears to be 56 - 60%. Left ventricular systolic function is normal.  · Left ventricular diastolic function was normal.  · No significant valvular disease  · No pericardial effusion  · Normal wall motion noted  · No prev echo       HOSPITAL COURSE  Patient is a 46 y.o. male presented to [facility] complaining of chest pain and left upper extremity pain.  Please see the admitting history and physical for further details.      Patient's troponin T levels trended up during his hospitalization. He was seen by interventional cardiology early on during his hospitalization and underwent a left heart catheterization with stenting to the right coronary artery.  On this day of discharge, the patient denies any further chest pains.  He also denied any left upper extremity  pain.  Patient was started on goal-directed medical therapy such as a beta-blocker, aspirin, Brilinta and lisinopril.  The patient is to increase the dose of his Lipitor to 80 mg nightly.  Follow-up with cardiology in approximately 1 week with a BMP at that time as patient was noted to have some presumed acute kidney injury on top of chronic kidney disease.  Patient has been hydrated and his creatinine has improved during his hospitalization from 1.73 to 1.41    The patient's diabetes mellitus is noted to be uncontrolled given his A1c of 9.40%.  Patient is to resume his home insulin regimen but at a decreased dose as patient's blood glucose levels were actually well controlled for the majority during his hospital stay.  Patient to increase his insulin based on ongoing blood glucose readings.    Patient did have a CT scan of the cervical spine without contrast in addition to x-rays of the left shoulder for further evaluation of patient's left upper extremity pain although it was suspected to be due to acute coronary syndrome.  The patient CT scan was unremarkable without any acute findings.  The patient's left shoulder x-ray was also unremarkable with no acute findings in the left shoulder.    On the day of discharge, the patient is resting comfortably in bed and denies any further complaints of chest pains or left upper extremity/shoulder pain.  The patient has been ambulatory in his room.  He does complain of some mild intermittent abdominal pain.  He denies any constipation.  He has good urine output.    It is felt that the patient had maximized the benefit of his inpatient hospital stay.  Patient to be discharged home today.  I discussed the discharge plan with Nadira Melendez, Cardiology APRN.  Patient will follow up with cardiology in approximately 1 week and will have a repeat BMP at that time.    I personally handed patient his PCI card. Patient's nurse JASON Bennett present in the room.     CONDITION ON  "DISCHARGE  Stable.      VITAL SIGNS  /76 (BP Location: Left arm, Patient Position: Lying)   Pulse 105   Temp 98.1 °F (36.7 °C) (Oral)   Resp 18   Ht 167.6 cm (66\")   Wt 106 kg (232 lb 9.6 oz)   SpO2 99%   BMI 37.54 kg/m²   Objective:  General Appearance:  Comfortable, well-appearing and in no acute distress.    Vital signs: (most recent): Blood pressure 128/76, pulse 105, temperature 98.1 °F (36.7 °C), temperature source Oral, resp. rate 18, height 167.6 cm (66\"), weight 106 kg (232 lb 9.6 oz), SpO2 99 %.  Vital signs are normal.    Output: Producing urine (noted in bedside urinal; clear/pale yellow).    HEENT: Normal HEENT exam.    Lungs:  Normal effort.  Breath sounds clear to auscultation.  No rales, wheezes or rhonchi.    Heart: Normal rate.  S1 normal and S2 normal.  No murmur, gallop or friction rub.   Chest: Symmetric chest wall expansion.   Abdomen: Abdomen is soft and non-distended.  Bowel sounds are normal.   There is no abdominal tenderness.     Extremities: There is no deformity or dependent edema.  (Right wrist evaluated; no hematoma, no bruising, no swelling, no active bleeding)  Pulses: Distal pulses are intact.    Neurological: Patient is alert and oriented to person, place and time.  Normal strength.    Skin:  Warm and dry.  No rash or ulceration.             DISCHARGE DISPOSITION   Home or Self Care    DISCHARGE MEDICATIONS     Discharge Medications      New Medications      Instructions Start Date   aspirin 81 MG EC tablet   81 mg, Oral, Daily   Start Date: September 4, 2021     lisinopril 5 MG tablet  Commonly known as: PRINIVIL,ZESTRIL   5 mg, Oral, Daily   Start Date: September 4, 2021     metoprolol tartrate 25 MG tablet  Commonly known as: LOPRESSOR   25 mg, Oral, Every 12 Hours Scheduled, Do not take if blood pressure is less than 110/60 and/or heart rate less than 60      ticagrelor 90 MG tablet tablet  Commonly known as: BRILINTA   90 mg, Oral, 2 Times Daily         Changes to " Medications      Instructions Start Date   atorvastatin 40 MG tablet  Commonly known as: LIPITOR  What changed: how much to take   80 mg, Oral, Nightly      insulin NPH-insulin regular (70-30) 100 UNIT/ML injection  Commonly known as: humuLIN 70/30,novoLIN 70/30  What changed:   · how much to take  · additional instructions   20 Units, Subcutaneous, 2 times daily, Gradually increase to previous home dose as blood glucose levels allow         Continue These Medications      Instructions Start Date   albuterol sulfate  (90 Base) MCG/ACT inhaler  Commonly known as: PROVENTIL HFA;VENTOLIN HFA;PROAIR HFA   2 puffs, Inhalation, As Needed      allopurinol 300 MG tablet  Commonly known as: ZYLOPRIM   300 mg, Oral, Daily      ALPRAZolam 0.5 MG tablet  Commonly known as: XANAX   0.5 mg, Oral, 2 Times Daily PRN      gabapentin 800 MG tablet  Commonly known as: NEURONTIN   800 mg, Oral, 3 Times Daily      metFORMIN  MG 24 hr tablet  Commonly known as: GLUCOPHAGE-XR   1,000 mg, Oral, 2 times daily   Start Date: September 4, 2021     omeprazole 20 MG capsule  Commonly known as: priLOSEC   20 mg, Oral, 2 Times Daily      Rybelsus 14 MG tablet  Generic drug: Semaglutide   14 mg, Oral, Every Morning Before Breakfast         Stop These Medications    ibuprofen 800 MG tablet  Commonly known as: ADVIL,MOTRIN            DISCHARGE DIET  cardiac diet, diabetic diet  Dietary Orders (From admission, onward)     Start     Ordered    09/02/21 1300  Diet Regular; Consistent Carbohydrate, Cardiac  Diet Effective Now     Question Answer Comment   Diet Texture / Consistency Regular    Common Modifiers Consistent Carbohydrate    Common Modifiers Cardiac        09/02/21 1259                ACTIVITY AT DISCHARGE   activity as tolerated      Additional Instructions for the Follow-ups that You Need to Schedule     Ambulatory Referral to Cardiac Rehab   As directed      Discharge Follow-up with PCP   As directed       Currently Documented  PCP:    Malik Ridley MD    PCP Phone Number:    [phone]     Follow Up Details: 7-10 days; hospital follow up NSTEMI         Discharge Follow-up with Specified Provider: Nadira Toledo Cardiology NP; 1 Week   As directed      To: Nadira Toledo Cardiology NP    Follow Up: 1 Week    Follow Up Details: needs BMP 30 minutes prior to apointment; hospital follow up NSTEMI               TEST  RESULTS PENDING AT DISCHARGE         Camille Stapleton DO  09/03/21  10:31 EDT      Time: greater than 30 minutes.

## 2021-09-03 NOTE — NURSING NOTE
Pt resting in bed, watching television. Pt has tolerated all interventions well. No complaints/concerns. No acute distress noted. Pt being D/C'd per Dr. Stapleton. Will continue to provide care until pt off floor.

## 2021-09-03 NOTE — DISCHARGE INSTR - APPOINTMENTS
Pt  Has  An  Apt  With taylor newell  For sept 9 at 8:30  With  Bmp  On  Sept 13  At 9:30  And  fabiola scott  For sept 13 a t 10  am

## 2021-09-04 ENCOUNTER — READMISSION MANAGEMENT (OUTPATIENT)
Dept: CALL CENTER | Facility: HOSPITAL | Age: 46
End: 2021-09-04

## 2021-09-04 NOTE — OUTREACH NOTE
Prep Survey      Responses   [facility] facility patient discharged from?  [facility]   Is LACE score < 7 ?  Yes   Emergency Room discharge w/ pulse ox?  No   Eligibility  Readm Mgmt   Discharge diagnosis  NSTEMI   Does the patient have one of the following disease processes/diagnoses(primary or secondary)?  Acute MI (STEMI,NSTEMI)   Does the patient have Home health ordered?  No   Is there a DME ordered?  No   Comments regarding appointments  Appts needed   Medication alerts for this patient  ASA, Brilinta, Prinvil and Lopressor   Prep survey completed?  Yes          Dalila Ortiz RN

## 2021-09-07 ENCOUNTER — READMISSION MANAGEMENT (OUTPATIENT)
Dept: CALL CENTER | Facility: HOSPITAL | Age: 46
End: 2021-09-07

## 2021-09-07 NOTE — OUTREACH NOTE
AMI Week 1 Survey      Responses   [facility] patient discharged from?  [facility]   Does the patient have one of the following disease processes/diagnoses(primary or secondary)?  Acute MI (STEMI,NSTEMI)   Week 1 attempt successful?  Yes   Call start time  1706   Call end time  1709   Discharge diagnosis  NSTEMI   Is patient permission given to speak with other caregiver?  No   List who call center can speak with  Patient only   Meds reviewed with patient/caregiver?  Yes   Is the patient having any side effects they believe may be caused by any medication additions or changes?  No   Does the patient have all prescriptions related to this admission filled (includes statins,anticoagulants,HTN meds,anti-arrhythmia meds)  Yes   Is the patient taking all medications as directed (includes completed medication regime)?  Yes   Does the patient have a primary care provider?   Yes   Does the patient have an appointment with their PCP,cardiologist,or clinic within 7 days of discharge?  Yes   Has the patient kept scheduled appointments due by today?  Yes   Comments  Cardiology next week      PCP today 09/072021   Has home health visited the patient within 72 hours of discharge?  N/A   Psychosocial issues?  No   Did the patient receive a copy of their discharge instructions?  Yes   Nursing interventions  Reviewed instructions with patient   What is the patient's perception of their health status since discharge?  Improving   Nursing interventions  Nurse provided patient education   Is the patient/caregiver able to teach back signs and symptoms of when to call for help immediately:  Sudden chest discomfort, Sudden discomfort in arms, back, neck or jaw, Shortness of breath at any time, Dizziness or lightheadedness, Irregular or rapid heart rate   Nursing interventions  Nurse provided patient education   Is the patient/caregiver able to teach back lifestyle changes to help prevent MIs  Quit smoking, Reducing stress, Limiting  alcohol intake, Maintaining a healthy weight   Is the patient/caregiver able to teach back ways to prevent a second heart attack:  Take medications, Follow up with MD, Manage risk factors   If the patient is a current smoker, are they able to teach back resources for cessation?  Not a smoker   Is the patient/caregiver able to teach back the hierarchy of who to call/visit for symptoms/problems? PCP, Specialist, Home health nurse, Urgent Care, ED, 911  Yes   Week 1 call completed?  Yes          Megan Garcia RN

## 2021-09-13 ENCOUNTER — OFFICE VISIT (OUTPATIENT)
Dept: CARDIOLOGY | Facility: CLINIC | Age: 46
End: 2021-09-13

## 2021-09-13 ENCOUNTER — LAB (OUTPATIENT)
Dept: LAB | Facility: HOSPITAL | Age: 46
End: 2021-09-13

## 2021-09-13 VITALS
HEIGHT: 66 IN | WEIGHT: 231 LBS | OXYGEN SATURATION: 98 % | SYSTOLIC BLOOD PRESSURE: 132 MMHG | HEART RATE: 61 BPM | DIASTOLIC BLOOD PRESSURE: 88 MMHG | BODY MASS INDEX: 37.12 KG/M2

## 2021-09-13 DIAGNOSIS — N17.9 AKI (ACUTE KIDNEY INJURY) (HCC): ICD-10-CM

## 2021-09-13 DIAGNOSIS — I10 ESSENTIAL HYPERTENSION: Chronic | ICD-10-CM

## 2021-09-13 DIAGNOSIS — Z95.5 S/P CORONARY ARTERY STENT PLACEMENT: ICD-10-CM

## 2021-09-13 DIAGNOSIS — I21.4 NSTEMI (NON-ST ELEVATED MYOCARDIAL INFARCTION) (HCC): Primary | ICD-10-CM

## 2021-09-13 DIAGNOSIS — I25.10 ASCVD (ARTERIOSCLEROTIC CARDIOVASCULAR DISEASE): Primary | Chronic | ICD-10-CM

## 2021-09-13 DIAGNOSIS — E78.5 DYSLIPIDEMIA: Chronic | ICD-10-CM

## 2021-09-13 PROBLEM — E11.8 DM (DIABETES MELLITUS), TYPE 2 WITH COMPLICATIONS: Chronic | Status: ACTIVE | Noted: 2021-09-13

## 2021-09-13 PROBLEM — E11.8 DM (DIABETES MELLITUS), TYPE 2 WITH COMPLICATIONS: Status: ACTIVE | Noted: 2021-09-13

## 2021-09-13 LAB
ANION GAP SERPL CALCULATED.3IONS-SCNC: 11.5 MMOL/L (ref 5–15)
BUN SERPL-MCNC: 19 MG/DL (ref 6–20)
BUN/CREAT SERPL: 16.5 (ref 7–25)
CALCIUM SPEC-SCNC: 8.4 MG/DL (ref 8.6–10.5)
CHLORIDE SERPL-SCNC: 107 MMOL/L (ref 98–107)
CO2 SERPL-SCNC: 20.5 MMOL/L (ref 22–29)
CREAT SERPL-MCNC: 1.15 MG/DL (ref 0.76–1.27)
GFR SERPL CREATININE-BSD FRML MDRD: 68 ML/MIN/1.73
GLUCOSE SERPL-MCNC: 221 MG/DL (ref 65–99)
POTASSIUM SERPL-SCNC: 4 MMOL/L (ref 3.5–5.2)
SODIUM SERPL-SCNC: 139 MMOL/L (ref 136–145)

## 2021-09-13 PROCEDURE — 80048 BASIC METABOLIC PNL TOTAL CA: CPT | Performed by: NURSE PRACTITIONER

## 2021-09-13 PROCEDURE — 99214 OFFICE O/P EST MOD 30 MIN: CPT | Performed by: NURSE PRACTITIONER

## 2021-09-13 PROCEDURE — 36415 COLL VENOUS BLD VENIPUNCTURE: CPT | Performed by: NURSE PRACTITIONER

## 2021-09-13 PROCEDURE — 93000 ELECTROCARDIOGRAM COMPLETE: CPT | Performed by: NURSE PRACTITIONER

## 2021-09-13 RX ORDER — MIRTAZAPINE 15 MG/1
15 TABLET, FILM COATED ORAL NIGHTLY
COMMUNITY
End: 2021-12-27

## 2021-09-13 RX ORDER — LISINOPRIL 5 MG/1
5 TABLET ORAL DAILY
Qty: 90 TABLET | Refills: 2 | Status: SHIPPED | OUTPATIENT
Start: 2021-09-13 | End: 2021-10-14 | Stop reason: SDUPTHER

## 2021-09-13 RX ORDER — ATORVASTATIN CALCIUM 40 MG/1
80 TABLET, FILM COATED ORAL NIGHTLY
Qty: 90 TABLET | Refills: 2
Start: 2021-09-13 | End: 2021-12-27 | Stop reason: SDUPTHER

## 2021-09-13 RX ORDER — ASPIRIN 81 MG/1
81 TABLET ORAL DAILY
Qty: 90 TABLET | Refills: 2 | Status: SHIPPED | OUTPATIENT
Start: 2021-09-13 | End: 2021-12-27 | Stop reason: SDUPTHER

## 2021-09-13 RX ORDER — ISOSORBIDE MONONITRATE 30 MG/1
30 TABLET, EXTENDED RELEASE ORAL DAILY
Qty: 90 TABLET | Refills: 2 | Status: SHIPPED | OUTPATIENT
Start: 2021-09-13 | End: 2021-12-27 | Stop reason: SDUPTHER

## 2021-09-13 RX ORDER — CEPHALEXIN 250 MG/1
250 CAPSULE ORAL
COMMUNITY
End: 2021-12-27

## 2021-09-13 NOTE — PROGRESS NOTES
"Chief Complaint  Hospital Follow Up Visit (NSTEMI), Coronary Artery Disease, and Chest Pain (Retrosternal)    Subjective          Matthew Madrid presents to [facility] for follow-up of recent non-STEMI with left heart cath.    History of Present Illness    On 9/2/2021 patient underwent PCI MONTSE to the distal RCA for non-STEMI.  Proximal RCA was noted to be 60% stenosed and mid RCA 70% stenosed.    Mr. Madrid reports that he has had occasional sharp pains shoot across his chest.  These happen intermittently and without any exertion.  He also states that a few days ago he had an episode of chest pressure with associated left hand tingling.  He reports chest tightness.    He does deny any shortness of breath or dyspnea on exertion.    He reports medication compliance.  He brings with him today a blood pressure log and his blood pressures are mostly in the 150s systolically.    Objective     Vital Signs:   /88 (BP Location: Left arm, Patient Position: Sitting, Cuff Size: Adult)   Pulse 61   Ht 167.6 cm (66\")   Wt 105 kg (231 lb)   SpO2 98%   BMI 37.28 kg/m²       Physical Exam  Constitutional:       Appearance: Normal appearance. He is well-developed.   Cardiovascular:      Rate and Rhythm: Normal rate and regular rhythm.      Heart sounds: No murmur heard.   No friction rub. No gallop.    Pulmonary:      Effort: Pulmonary effort is normal. No respiratory distress.      Breath sounds: Normal breath sounds. No wheezing or rales.   Skin:     General: Skin is warm and dry.   Neurological:      Mental Status: He is alert and oriented to person, place, and time.   Psychiatric:         Mood and Affect: Mood normal.         Behavior: Behavior normal.          Result Review :            ECG 12 Lead    Date/Time: 9/13/2021 10:21 AM  Performed by: Nadira Melendez APRN  Authorized by: Nadira Melendez APRN   Comparison: compared with previous ECG from 9/3/2021  Comparison to previous ECG: " Normal sinus rhythm, prolonged QT  Rhythm: sinus bradycardia  Rate: bradycardic  BPM: 57  Q waves: II, III and aVF    QRS axis: right  Comments:   When compared to EKG of 9/3/2021 prolonged QT has resolved.             Current Outpatient Medications   Medication Sig Dispense Refill   • albuterol sulfate  (90 Base) MCG/ACT inhaler Inhale 2 puffs As Needed for Wheezing or Shortness of Air.     • allopurinol (ZYLOPRIM) 300 MG tablet Take 300 mg by mouth Daily.     • ALPRAZolam (XANAX) 0.5 MG tablet Take 0.5 mg by mouth 3 (Three) Times a Day As Needed for Anxiety.     • aspirin 81 MG EC tablet Take 1 tablet by mouth Daily. 90 tablet 2   • atorvastatin (LIPITOR) 40 MG tablet Take 2 tablets by mouth Every Night. 90 tablet 2   • cephalexin (KEFLEX) 250 MG capsule Take 250 mg by mouth. 3 capsules BID     • gabapentin (NEURONTIN) 800 MG tablet Take 800 mg by mouth 3 (Three) Times a Day.     • insulin NPH-insulin regular (humuLIN 70/30,novoLIN 70/30) (70-30) 100 UNIT/ML injection Inject 20 Units under the skin into the appropriate area as directed 2 (two) times a day. Gradually increase to previous home dose as blood glucose levels allow  12   • lisinopril (PRINIVIL,ZESTRIL) 5 MG tablet Take 1 tablet by mouth Daily. 90 tablet 2   • metFORMIN ER (GLUCOPHAGE-XR) 500 MG 24 hr tablet Take 2 tablets by mouth 2 (two) times a day.     • metoprolol tartrate (LOPRESSOR) 25 MG tablet Take 1 tablet by mouth Every 12 (Twelve) Hours. Do not take if blood pressure is less than 110/60 and/or heart rate less than 60 180 tablet 2   • mirtazapine (REMERON) 15 MG tablet Take 15 mg by mouth Every Night.     • omeprazole (priLOSEC) 20 MG capsule Take 20 mg by mouth 2 (Two) Times a Day.     • Semaglutide (Rybelsus) 14 MG tablet Take 14 mg by mouth Every Morning Before Breakfast.     • ticagrelor (BRILINTA) 90 MG tablet tablet Take 1 tablet by mouth 2 (Two) Times a Day. 180 tablet 2   • isosorbide mononitrate (IMDUR) 30 MG 24 hr  tablet Take 1 tablet by mouth Daily. 90 tablet 2     No current facility-administered medications for this visit.     Current outpatient and discharge medications have been reconciled for the patient.  Reviewed by: CARLOS Villa         Assessment and Plan    Problem List Items Addressed This Visit        Cardiac and Vasculature    ASCVD (arteriosclerotic cardiovascular disease) - Primary (Chronic)    Overview     · 9/1/2021 [facility]: LVEF 56 to 60%  · 9/2/2021 [facility] for non-STEMI: PCI MONTSE to distal RCA.  Proximal RCA 60% stenosed and mid RCA 70% stenosed         Essential hypertension (Chronic)    Relevant Medications    lisinopril (PRINIVIL,ZESTRIL) 5 MG tablet    metoprolol tartrate (LOPRESSOR) 25 MG tablet    Dyslipidemia (Chronic)    Overview     · 9/3/2021 total cholesterol 142, triglycerides 274, HDL 25 and LDL 72  · 9/3/2021 new start statin medication           Other Visit Diagnoses     LEÓN (acute kidney injury) (CMS/[facility])        Relevant Orders    Basic Metabolic Panel              Follow Up     Medications were reviewed with the patient.    Patient is to continue GDMT for CAD including aspirin, Brilinta, metoprolol, lisinopril, and atorvastatin.  Imdur added today for anginal pain and for blood pressures.    Risk factor modification: Patient counseled regarding diet and exercise.  Patient encouraged to follow Mediterranean diet.  Handout given.  Patient counseled to participate in physical activity 30 minutes a day most days of the week.    During his hospitalization Mr. Barbour creatinine was slightly elevated, I have asked him to get labs drawn today.    Risk factor modification: The patient was counseled regarding the correlation between uncontrolled diabetes and coronary artery disease.  The patient was urged to follow an appropriate diet with very limited simple carbohydrates and discuss with their primary care provider possible strategies to control blood sugar, including additional  medications.    Return in about 4 weeks (around 10/11/2021).    Patient was given instructions and counseling regarding his condition or for health maintenance advice. Please see specific information pulled into the AVS if appropriate.

## 2021-09-14 ENCOUNTER — TELEPHONE (OUTPATIENT)
Dept: CARDIOLOGY | Facility: CLINIC | Age: 46
End: 2021-09-14

## 2021-09-14 NOTE — TELEPHONE ENCOUNTER
----- Message from CARLOS Villa sent at 9/14/2021  9:29 AM EDT -----  Please call patient.  Creatinine has now returned to normal.  This means your kidney function has improved.  Your serum glucose was elevated at 221.      Thanks, Nadira   General

## 2021-09-15 ENCOUNTER — TELEPHONE (OUTPATIENT)
Dept: CARDIAC REHAB | Facility: HOSPITAL | Age: 46
End: 2021-09-15

## 2021-09-15 ENCOUNTER — READMISSION MANAGEMENT (OUTPATIENT)
Dept: CALL CENTER | Facility: HOSPITAL | Age: 46
End: 2021-09-15

## 2021-09-15 NOTE — OUTREACH NOTE
AMI Week 2 Survey      Responses   [facility] patient discharged from?  [place]   Does the patient have one of the following disease processes/diagnoses(primary or secondary)?  Acute MI (STEMI,NSTEMI)   Week 2 attempt successful?  Yes   Call start time  1256   Call end time  1258   Discharge diagnosis  NSTEMI   Medication alerts for this patient  ASA, Brilinta, Prinvil and Lopressor   Meds reviewed with patient/caregiver?  Yes   Is the patient having any side effects they believe may be caused by any medication additions or changes?  No   Does the patient have all prescriptions related to this admission filled (includes statins,anticoagulants,HTN meds,anti-arrhythmia meds)  Yes   Is the patient taking all medications as directed (includes completed medication regime)?  Yes   Does the patient have a primary care provider?   Yes   Does the patient have an appointment with their PCP,cardiologist,or clinic within 7 days of discharge?  Yes   Has the patient kept scheduled appointments due by today?  Yes   Has home health visited the patient within 72 hours of discharge?  N/A   Psychosocial issues?  No   Did the patient receive a copy of their discharge instructions?  Yes   Nursing interventions  Reviewed instructions with patient   What is the patient's perception of their health status since discharge?  Improving   Nursing interventions  Nurse provided patient education   Is the patient/caregiver able to teach back signs and symptoms of when to call for help immediately:  Sudden chest discomfort, Sudden discomfort in arms, back, neck or jaw, Shortness of breath at any time, Sudden sweating or clammy skin, Nausea or vomiting, Dizziness or lightheadedness, Irregular or rapid heart rate   Nursing interventions  Nurse provided patient education   Is the pateint /caregiver able to teach back the importance of cardiac rehab?  Yes   Nursing interventions  Provided education on importance of cardiac rehab   Is the  patient/caregiver able to teach back lifestyle changes to help prevent MIs  Regular exercise as approved by provider, Heart healthy diet, Maintaining a healthy weight   Is the patient/caregiver able to teach back ways to prevent a second heart attack:  Take medications, Follow up with MD, Participate in Cardiac Rehab   If the patient is a current smoker, are they able to teach back resources for cessation?  Not a smoker   Is the patient/caregiver able to teach back the hierarchy of who to call/visit for symptoms/problems? PCP, Specialist, Home health nurse, Urgent Care, ED, 911  Yes   Additional teach back comments  Says he has Card Rehab tomorrow, but working on a ride to get there.   Week 2 call completed?  Yes   Wrap up additional comments  Improving.          Elsa Rosa RN

## 2021-09-16 ENCOUNTER — DOCUMENTATION (OUTPATIENT)
Dept: CARDIAC REHAB | Facility: HOSPITAL | Age: 46
End: 2021-09-16

## 2021-09-16 ENCOUNTER — APPOINTMENT (OUTPATIENT)
Dept: CARDIAC REHAB | Facility: HOSPITAL | Age: 46
End: 2021-09-16

## 2021-09-16 NOTE — PROGRESS NOTES
Patient was a no show for his Cardiac Rehab appointment. I spoke with him yesterday to verify appointment. If he calls back to reschedule we will put him back on waiting list    DISPLAY PLAN FREE TEXT DISPLAY PLAN FREE TEXT

## 2021-09-24 ENCOUNTER — READMISSION MANAGEMENT (OUTPATIENT)
Dept: CALL CENTER | Facility: HOSPITAL | Age: 46
End: 2021-09-24

## 2021-09-24 NOTE — OUTREACH NOTE
AMI Week 3 Survey      Responses   [facility] facility patient discharged from?  [place]   Does the patient have one of the following disease processes/diagnoses(primary or secondary)?  Acute MI (STEMI,NSTEMI)   Week 3 attempt successful?  No   Unsuccessful attempts  Attempt 1   Discharge diagnosis  NSTEMI          Dari Jc RN

## 2021-10-14 ENCOUNTER — LAB (OUTPATIENT)
Dept: LAB | Facility: HOSPITAL | Age: 46
End: 2021-10-14

## 2021-10-14 ENCOUNTER — OFFICE VISIT (OUTPATIENT)
Dept: CARDIOLOGY | Facility: CLINIC | Age: 46
End: 2021-10-14

## 2021-10-14 VITALS
HEART RATE: 73 BPM | HEIGHT: 66 IN | WEIGHT: 227.4 LBS | OXYGEN SATURATION: 97 % | BODY MASS INDEX: 36.55 KG/M2 | SYSTOLIC BLOOD PRESSURE: 145 MMHG | DIASTOLIC BLOOD PRESSURE: 83 MMHG

## 2021-10-14 DIAGNOSIS — I10 ESSENTIAL HYPERTENSION: Primary | Chronic | ICD-10-CM

## 2021-10-14 DIAGNOSIS — I25.10 ASCVD (ARTERIOSCLEROTIC CARDIOVASCULAR DISEASE): Chronic | ICD-10-CM

## 2021-10-14 DIAGNOSIS — E11.8 DM (DIABETES MELLITUS), TYPE 2 WITH COMPLICATIONS (HCC): ICD-10-CM

## 2021-10-14 DIAGNOSIS — E66.2 CLASS 2 OBESITY WITH ALVEOLAR HYPOVENTILATION, SERIOUS COMORBIDITY, AND BODY MASS INDEX (BMI) OF 36.0 TO 36.9 IN ADULT (HCC): ICD-10-CM

## 2021-10-14 DIAGNOSIS — E78.5 DYSLIPIDEMIA: Chronic | ICD-10-CM

## 2021-10-14 LAB
25(OH)D3 SERPL-MCNC: 26.6 NG/ML
ALBUMIN SERPL-MCNC: 3.7 G/DL (ref 3.5–5.2)
ALBUMIN/GLOB SERPL: 1.3 G/DL
ALP SERPL-CCNC: 85 U/L (ref 39–117)
ALT SERPL W P-5'-P-CCNC: 18 U/L (ref 1–41)
ANION GAP SERPL CALCULATED.3IONS-SCNC: 14.6 MMOL/L (ref 5–15)
AST SERPL-CCNC: 13 U/L (ref 1–40)
BILIRUB SERPL-MCNC: 1 MG/DL (ref 0–1.2)
BUN SERPL-MCNC: 16 MG/DL (ref 6–20)
BUN/CREAT SERPL: 12.2 (ref 7–25)
CALCIUM SPEC-SCNC: 8.5 MG/DL (ref 8.6–10.5)
CHLORIDE SERPL-SCNC: 106 MMOL/L (ref 98–107)
CHOLEST SERPL-MCNC: 98 MG/DL (ref 0–200)
CK MB SERPL-CCNC: 2.13 NG/ML
CK SERPL-CCNC: 71 U/L (ref 20–200)
CO2 SERPL-SCNC: 21.4 MMOL/L (ref 22–29)
CREAT SERPL-MCNC: 1.31 MG/DL (ref 0.76–1.27)
DEPRECATED RDW RBC AUTO: 44.4 FL (ref 37–54)
ERYTHROCYTE [DISTWIDTH] IN BLOOD BY AUTOMATED COUNT: 13.6 % (ref 12.3–15.4)
GFR SERPL CREATININE-BSD FRML MDRD: 59 ML/MIN/1.73
GLOBULIN UR ELPH-MCNC: 2.9 GM/DL
GLUCOSE SERPL-MCNC: 133 MG/DL (ref 65–99)
HCT VFR BLD AUTO: 35.7 % (ref 37.5–51)
HDLC SERPL-MCNC: 26 MG/DL (ref 40–60)
HGB BLD-MCNC: 12.3 G/DL (ref 13–17.7)
LDLC SERPL CALC-MCNC: 47 MG/DL (ref 0–100)
LDLC/HDLC SERPL: 1.65 {RATIO}
MCH RBC QN AUTO: 31.1 PG (ref 26.6–33)
MCHC RBC AUTO-ENTMCNC: 34.5 G/DL (ref 31.5–35.7)
MCV RBC AUTO: 90.4 FL (ref 79–97)
PLATELET # BLD AUTO: 193 10*3/MM3 (ref 140–450)
PMV BLD AUTO: 11 FL (ref 6–12)
POTASSIUM SERPL-SCNC: 4.4 MMOL/L (ref 3.5–5.2)
PROT SERPL-MCNC: 6.6 G/DL (ref 6–8.5)
RBC # BLD AUTO: 3.95 10*6/MM3 (ref 4.14–5.8)
SODIUM SERPL-SCNC: 142 MMOL/L (ref 136–145)
TRIGL SERPL-MCNC: 145 MG/DL (ref 0–150)
VLDLC SERPL-MCNC: 25 MG/DL (ref 5–40)
WBC # BLD AUTO: 7.75 10*3/MM3 (ref 3.4–10.8)

## 2021-10-14 PROCEDURE — 82553 CREATINE MB FRACTION: CPT | Performed by: NURSE PRACTITIONER

## 2021-10-14 PROCEDURE — 82306 VITAMIN D 25 HYDROXY: CPT | Performed by: NURSE PRACTITIONER

## 2021-10-14 PROCEDURE — 80053 COMPREHEN METABOLIC PANEL: CPT | Performed by: NURSE PRACTITIONER

## 2021-10-14 PROCEDURE — 80061 LIPID PANEL: CPT | Performed by: NURSE PRACTITIONER

## 2021-10-14 PROCEDURE — 85027 COMPLETE CBC AUTOMATED: CPT | Performed by: NURSE PRACTITIONER

## 2021-10-14 PROCEDURE — 82550 ASSAY OF CK (CPK): CPT | Performed by: NURSE PRACTITIONER

## 2021-10-14 PROCEDURE — 36415 COLL VENOUS BLD VENIPUNCTURE: CPT | Performed by: NURSE PRACTITIONER

## 2021-10-14 PROCEDURE — 93000 ELECTROCARDIOGRAM COMPLETE: CPT | Performed by: NURSE PRACTITIONER

## 2021-10-14 PROCEDURE — 99214 OFFICE O/P EST MOD 30 MIN: CPT | Performed by: NURSE PRACTITIONER

## 2021-10-14 RX ORDER — LISINOPRIL 20 MG/1
20 TABLET ORAL DAILY
Qty: 30 TABLET | Refills: 11 | Status: SHIPPED | OUTPATIENT
Start: 2021-10-14 | End: 2022-06-28 | Stop reason: SDUPTHER

## 2021-10-14 NOTE — PATIENT INSTRUCTIONS
"Diabetes Mellitus and Exercise  Exercising regularly is important for overall health, especially for people who have diabetes mellitus. Exercising is not only about losing weight. It has many other health benefits, such as increasing muscle strength and bone density and reducing body fat and stress. This leads to improved fitness, flexibility, and endurance, all of which result in better overall health.  What are the benefits of exercise if I have diabetes?  Exercise has many benefits for people with diabetes. They include:  · Helping to lower and control blood sugar (glucose).  · Helping the body to respond better to the hormone insulin by improving insulin sensitivity.  · Reducing how much insulin the body needs.  · Lowering the risk for heart disease by:  ? Lowering \"bad\" cholesterol and triglyceride levels.  ? Increasing \"good\" cholesterol levels.  ? Lowering blood pressure.  ? Lowering blood glucose levels.  What is my activity plan?  Your health care provider or certified diabetes educator can help you make a plan for the type and frequency of exercise that works for you. This is called your activity plan. Be sure to:  · Get at least 150 minutes of medium-intensity or high-intensity exercise each week. Exercises may include brisk walking, biking, or water aerobics.  · Do stretching and strengthening exercises, such as yoga or weight lifting, at least 2 times a week.  · Spread out your activity over at least 3 days of the week.  · Get some form of physical activity each day.  ? Do not go more than 2 days in a row without some kind of physical activity.  ? Avoid being inactive for more than 90 minutes at a time. Take frequent breaks to walk or stretch.  · Choose exercises or activities that you enjoy. Set realistic goals.  · Start slowly and gradually increase your exercise intensity over time.  How do I manage my diabetes during exercise?    Monitor your blood glucose  · Check your blood glucose before and " after exercising. If your blood glucose is:  ? 240 mg/dL (13.3 mmol/L) or higher before you exercise, check your urine for ketones. These are chemicals created by the liver. If you have ketones in your urine, do not exercise until your blood glucose returns to normal.  ? 100 mg/dL (5.6 mmol/L) or lower, eat a snack containing 15-20 grams of carbohydrate. Check your blood glucose 15 minutes after the snack to make sure that your glucose level is above 100 mg/dL (5.6 mmol/L) before you start your exercise.  · Know the symptoms of low blood glucose (hypoglycemia) and how to treat it. Your risk for hypoglycemia increases during and after exercise.  Follow these tips and your health care provider's instructions  · Keep a carbohydrate snack that is fast-acting for use before, during, and after exercise to help prevent or treat hypoglycemia.  · Avoid injecting insulin into areas of the body that are going to be exercised. For example, avoid injecting insulin into:  ? Your arms, when you are about to play tennis.  ? Your legs, when you are about to go jogging.  · Keep records of your exercise habits. Doing this can help you and your health care provider adjust your diabetes management plan as needed. Write down:  ? Food that you eat before and after you exercise.  ? Blood glucose levels before and after you exercise.  ? The type and amount of exercise you have done.  · Work with your health care provider when you start a new exercise or activity. He or she may need to:  ? Make sure that the activity is safe for you.  ? Adjust your insulin, other medicines, and food that you eat.  · Drink plenty of water while you exercise. This prevents loss of water (dehydration) and problems caused by a lot of heat in the body (heat stroke).  Where to find more information  · American Diabetes Association: www.diabetes.org  Summary  · Exercising regularly is important for overall health, especially for people who have diabetes  "mellitus.  · Exercising has many health benefits. It increases muscle strength and bone density and reduces body fat and stress. It also lowers and controls blood glucose.  · Your health care provider or certified diabetes educator can help you make an activity plan for the type and frequency of exercise that works for you.  · Work with your health care provider to make sure any new activity is safe for you. Also work with your health care provider to adjust your insulin, other medicines, and the food you eat.  This information is not intended to replace advice given to you by your health care provider. Make sure you discuss any questions you have with your health care provider.  Document Revised: 09/14/2020 Document Reviewed: 09/14/2020  [employer] Patient Education © 2021 [employer].    https://www.diabeteseducator.org/docs/default-source/living-with-diabetes/conquering-the-grocery-store-v1.pdf?sfvrsn=4\">   Carbohydrate Counting for Diabetes Mellitus, Adult  Carbohydrate counting is a method of keeping track of how many carbohydrates you eat. Eating carbohydrates naturally increases the amount of sugar (glucose) in the blood. Counting how many carbohydrates you eat improves your blood glucose control, which helps you manage your diabetes.  It is important to know how many carbohydrates you can safely have in each meal. This is different for every person. A dietitian can help you make a meal plan and calculate how many carbohydrates you should have at each meal and snack.  What foods contain carbohydrates?  Carbohydrates are found in the following foods:  · Grains, such as breads and cereals.  · Dried beans and soy products.  · Starchy vegetables, such as potatoes, peas, and corn.  · Fruit and fruit juices.  · Milk and yogurt.  · Sweets and snack foods, such as cake, cookies, candy, chips, and soft drinks.  How do I count carbohydrates in foods?  There are two ways to count carbohydrates in food. You can read food " labels or learn standard serving sizes of foods. You can use either of the methods or a combination of both.  Using the Nutrition Facts label  The Nutrition Facts list is included on the labels of almost all packaged foods and beverages in the [place] It includes:  · The serving size.  · Information about nutrients in each serving, including the grams (g) of carbohydrate per serving.  To use the Nutrition Facts:  · Decide how many servings you will have.  · Multiply the number of servings by the number of carbohydrates per serving.  · The resulting number is the total amount of carbohydrates that you will be having.  Learning the standard serving sizes of foods  When you eat carbohydrate foods that are not packaged or do not include Nutrition Facts on the label, you need to measure the servings in order to count the amount of carbohydrates.  · Measure the foods that you will eat with a food scale or measuring cup, if needed.  · Decide how many standard-size servings you will eat.  · Multiply the number of servings by 15. For foods that contain carbohydrates, one serving equals 15 g of carbohydrates.  ? For example, if you eat 2 cups or 10 oz (300 g) of strawberries, you will have eaten 2 servings and 30 g of carbohydrates (2 servings x 15 g = 30 g).  · For foods that have more than one food mixed, such as soups and casseroles, you must count the carbohydrates in each food that is included.  The following list contains standard serving sizes of common carbohydrate-rich foods. Each of these servings has about 15 g of carbohydrates:  · 1 slice of bread.  · 1 six-inch (15 cm) tortilla.  · ? cup or 2 oz (53 g) cooked rice or pasta.  · ½ cup or 3 oz (85 g) cooked or canned, drained and rinsed beans or lentils.  · ½ cup or 3 oz (85 g) starchy vegetable, such as peas, corn, or squash.  · ½ cup or 4 oz (120 g) hot cereal.  · ½ cup or 3 oz (85 g) boiled or mashed potatoes, or ¼ or 3 oz (85 g) of a large baked potato.  · ½ cup or  4 fl oz (118 mL) fruit juice.  · 1 cup or 8 fl oz (237 mL) milk.  · 1 small or 4 oz (106 g) apple.  · ½ or 2 oz (63 g) of a medium banana.  · 1 cup or 5 oz (150 g) strawberries.  · 3 cups or 1 oz (24 g) popped popcorn.  What is an example of carbohydrate counting?  To calculate the number of carbohydrates in this sample meal, follow the steps shown below.  Sample meal  · 3 oz (85 g) chicken breast.  · ? cup or 4 oz (106 g) brown rice.  · ½ cup or 3 oz (85 g) corn.  · 1 cup or 8 fl oz (237 mL) milk.  · 1 cup or 5 oz (150 g) strawberries with sugar-free whipped topping.  Carbohydrate calculation  1. Identify the foods that contain carbohydrates:  ? Rice.  ? Corn.  ? Milk.  ? Strawberries.  2. Calculate how many servings you have of each food:  ? 2 servings rice.  ? 1 serving corn.  ? 1 serving milk.  ? 1 serving strawberries.  3. Multiply each number of servings by 15 g:  ? 2 servings rice x 15 g = 30 g.  ? 1 serving corn x 15 g = 15 g.  ? 1 serving milk x 15 g = 15 g.  ? 1 serving strawberries x 15 g = 15 g.  4. Add together all of the amounts to find the total grams of carbohydrates eaten:  ? 30 g + 15 g + 15 g + 15 g = 75 g of carbohydrates total.  What are tips for following this plan?  Shopping  · Develop a meal plan and then make a shopping list.  · Buy fresh and frozen vegetables, fresh and frozen fruit, dairy, eggs, beans, lentils, and whole grains.  · Look at food labels. Choose foods that have more fiber and less sugar.  · Avoid processed foods and foods with added sugars.  Meal planning  · Aim to have the same amount of carbohydrates at each meal and for each snack time.  · Plan to have regular, balanced meals and snacks.  Where to find more information  · American Diabetes Association: www.diabetes.org  · Centers for Disease Control and Prevention: www.cdc.gov  Summary  · Carbohydrate counting is a method of keeping track of how many carbohydrates you eat.  · Eating carbohydrates naturally increases the  amount of sugar (glucose) in the blood.  · Counting how many carbohydrates you eat improves your blood glucose control, which helps you manage your diabetes.  · A dietitian can help you make a meal plan and calculate how many carbohydrates you should have at each meal and snack.  This information is not intended to replace advice given to you by your health care provider. Make sure you discuss any questions you have with your health care provider.  Document Revised: 12/17/2020 Document Reviewed: 12/18/2020  Elsevier Patient Education © 2021 [employer]

## 2021-10-14 NOTE — PROGRESS NOTES
"Chief Complaint  Chest Pain (pt states he has been having tightness and soreness in the chest ) and Shortness of Breath (pt states meds are the same)    Subjective          Matthew Madrid presents to [facility] for follow-up.    History of Present Illness    Mr. Madrid underwent cardiac catheterization with PCI MONTSE to the mid RCA on 9/2/2021.  This is his second follow-up visit.  At his last visit isosorbide mononitrate was added for improved blood pressure control as well as chest pain.    Mr. Madrid reports continued difficulty with occasional chest pain.  He reports that this usually occurs with exertion.  He describes it as a squeezing and tightness which resolves but he is left with an ache for a period of time.  He denies any associated palpitations or radiation of pain.  He does endorse some shortness of breath.    Mr. Ambriz also states that his blood pressures at home have had systolic readings in the 140s to 170s.    Objective     Vital Signs:   /83   Pulse 73   Ht 167.6 cm (66\")   Wt 103 kg (227 lb 6.4 oz)   SpO2 97%   BMI 36.70 kg/m²       Physical Exam  Constitutional:       Appearance: Normal appearance. He is well-developed.   Cardiovascular:      Rate and Rhythm: Normal rate and regular rhythm.      Heart sounds: No murmur heard.  No friction rub. No gallop.    Pulmonary:      Effort: Pulmonary effort is normal. No respiratory distress.      Breath sounds: Normal breath sounds. No wheezing or rales.   Skin:     General: Skin is warm and dry.   Neurological:      Mental Status: He is alert and oriented to person, place, and time.   Psychiatric:         Mood and Affect: Mood normal.         Behavior: Behavior normal.          Result Review :     CMP    CMP 9/2/21 9/3/21 9/13/21   Glucose 294 (A) 235 (A) 221 (A)   BUN 31 (A) 26 (A) 19   Creatinine 1.58 (A) 1.41 (A) 1.15   eGFR Non African Am 47 (A) 54 (A) 68   Sodium 135 (A) 136 139   Potassium 3.8 3.9 4.0   Chloride " 105 107 107   Calcium 7.8 (A) 7.7 (A) 8.4 (A)   Albumin 3.07 (A)     Total Bilirubin 0.5     Alkaline Phosphatase 75     AST (SGOT) 40     ALT (SGPT) 25     (A) Abnormal value       Comments are available for some flowsheets but are not being displayed.           CBC    CBC 9/1/21 9/2/21 9/3/21   WBC 6.29 5.84 6.46   RBC 4.45 3.90 (A) 3.86 (A)   Hemoglobin 13.6 11.8 (A) 11.7 (A)   Hematocrit 39.2 35.5 (A) 34.1 (A)   MCV 88.1 91.0 88.3   MCH 30.6 30.3 30.3   MCHC 34.7 33.2 34.3   RDW 12.9 13.2 13.1   Platelets 179 151 168   (A) Abnormal value            Lipid Panel    Lipid Panel 9/2/21 9/3/21   Total Cholesterol 163 142   Triglycerides 253 (A) 274 (A)   HDL Cholesterol 26 (A) 25 (A)   VLDL Cholesterol 43 (A) 45 (A)   LDL Cholesterol  94 72   LDL/HDL Ratio 3.32 2.49   (A) Abnormal value            Data reviewed: Cardiology studies cardiac catheterization and transthoracic echocardiogram     ECG 12 Lead    Date/Time: 10/14/2021 9:38 AM  Performed by: Nadira Melendez APRN  Authorized by: Nadira Melendez APRN   Comparison: compared with previous ECG from 9/13/2021  Similar to previous ECG  Rhythm: sinus rhythm  Rate: normal  BPM: 72  Q waves: II and III    Comments: QTC no longer prolonged.               Current Outpatient Medications   Medication Sig Dispense Refill   • albuterol sulfate  (90 Base) MCG/ACT inhaler Inhale 2 puffs As Needed for Wheezing or Shortness of Air.     • allopurinol (ZYLOPRIM) 300 MG tablet Take 300 mg by mouth Daily.     • ALPRAZolam (XANAX) 0.5 MG tablet Take 0.5 mg by mouth 3 (Three) Times a Day As Needed for Anxiety.     • aspirin 81 MG EC tablet Take 1 tablet by mouth Daily. 90 tablet 2   • atorvastatin (LIPITOR) 40 MG tablet Take 2 tablets by mouth Every Night. 90 tablet 2   • cephalexin (KEFLEX) 250 MG capsule Take 250 mg by mouth. 3 capsules BID     • gabapentin (NEURONTIN) 800 MG tablet Take 800 mg by mouth 3 (Three) Times a Day.     • insulin NPH-insulin regular  (humuLIN 70/30,novoLIN 70/30) (70-30) 100 UNIT/ML injection Inject 20 Units under the skin into the appropriate area as directed 2 (two) times a day. Gradually increase to previous home dose as blood glucose levels allow  12   • isosorbide mononitrate (IMDUR) 30 MG 24 hr tablet Take 1 tablet by mouth Daily. 90 tablet 2   • lisinopril (PRINIVIL,ZESTRIL) 20 MG tablet Take 1 tablet by mouth Daily. 30 tablet 11   • metFORMIN ER (GLUCOPHAGE-XR) 500 MG 24 hr tablet Take 2 tablets by mouth 2 (two) times a day.     • metoprolol tartrate (LOPRESSOR) 25 MG tablet Take 1 tablet by mouth Every 12 (Twelve) Hours. Do not take if blood pressure is less than 110/60 and/or heart rate less than 60 180 tablet 2   • mirtazapine (REMERON) 15 MG tablet Take 15 mg by mouth Every Night.     • omeprazole (priLOSEC) 20 MG capsule Take 20 mg by mouth 2 (Two) Times a Day.     • Semaglutide (Rybelsus) 14 MG tablet Take 14 mg by mouth Every Morning Before Breakfast.     • ticagrelor (BRILINTA) 90 MG tablet tablet Take 1 tablet by mouth 2 (Two) Times a Day. 180 tablet 2     No current facility-administered medications for this visit.            Assessment and Plan    Problem List Items Addressed This Visit        Cardiac and Vasculature    ASCVD (arteriosclerotic cardiovascular disease) (Chronic)    Overview     · 9/1/2021 [facility]: LVEF 56 to 60%  · 9/2/2021 [facility] for non-STEMI: PCI MONTSE to distal RCA.  Proximal RCA 60% stenosed and mid RCA 70% stenosed         Relevant Orders    Stress Test With Myocardial Perfusion One Day    Comprehensive Metabolic Panel    Vitamin D 25 Hydroxy    CBC (No Diff)    CK    CK MB    Essential hypertension - Primary (Chronic)    Relevant Medications    lisinopril (PRINIVIL,ZESTRIL) 20 MG tablet    Dyslipidemia (Chronic)    Overview     · 9/3/2021 total cholesterol 142, triglycerides 274, HDL 25 and LDL 72  · 9/3/2021 new start statin medication         Relevant Orders    Lipid Panel    CK    CK MB       Endocrine and  Metabolic    DM (diabetes mellitus), type 2 with complications (HCC) (Chronic)      Other Visit Diagnoses     Body mass index (BMI) 36.0-36.9, adult         Relevant Orders    Vitamin D 25 Hydroxy    Class 2 obesity with alveolar hypoventilation, serious comorbidity, and body mass index (BMI) of 36.0 to 36.9 in adult (HCC)                  Follow Up     Medications were reviewed with the patient.    Patient is to continue guideline directed medical therapy for coronary artery disease including aspirin, Brilinta, atorvastatin, lisinopril, and metoprolol.  Lisinopril was increased to 20 mg daily at today's visit.    Due to patient's ongoing complaint of chest pain, I have ordered a nuclear stress test.  Patient had additional 70% lesion to the RCA noted on catheterization.    Requested patient have his labs drawn today for cholesterol surveillance and monitoring.    Risk factor modification: Patient counseled regarding diet and exercise.  Patient encouraged to follow diabetic.  Handout given.  Patient counseled to participate in physical activity 30 minutes a day most days of the week.    Risk factor modification: The patient was counseled regarding the correlation between uncontrolled diabetes and coronary artery disease.  The patient was urged to follow an appropriate diet with very limited simple carbohydrates and discuss with their primary care provider possible strategies to control blood sugar, including additional medications.    Return in about 6 weeks (around 11/25/2021).    Patient was given instructions and counseling regarding his condition or for health maintenance advice. Please see specific information pulled into the AVS if appropriate.

## 2021-10-29 ENCOUNTER — HOSPITAL ENCOUNTER (OUTPATIENT)
Dept: NUCLEAR MEDICINE | Facility: HOSPITAL | Age: 46
Discharge: HOME | End: 2021-10-29

## 2021-10-29 ENCOUNTER — HOSPITAL ENCOUNTER (OUTPATIENT)
Dept: CARDIOLOGY | Facility: HOSPITAL | Age: 46
Discharge: HOME | End: 2021-10-29

## 2021-10-29 DIAGNOSIS — I25.10 ASCVD (ARTERIOSCLEROTIC CARDIOVASCULAR DISEASE): ICD-10-CM

## 2021-10-29 LAB
BH CV NUCLEAR PRIOR STUDY: 3
BH CV REST NUCLEAR ISOTOPE DOSE: 10.5 MCI
BH CV STRESS BP STAGE 1: NORMAL
BH CV STRESS BP STAGE 2: NORMAL
BH CV STRESS COMMENTS STAGE 1: NORMAL
BH CV STRESS COMMENTS STAGE 2: NORMAL
BH CV STRESS DOSE REGADENOSON STAGE 1: 0.4
BH CV STRESS DURATION MIN STAGE 1: 0
BH CV STRESS DURATION MIN STAGE 2: 4
BH CV STRESS DURATION SEC STAGE 1: 10
BH CV STRESS DURATION SEC STAGE 2: 0
BH CV STRESS HR STAGE 1: 85
BH CV STRESS HR STAGE 2: 90
BH CV STRESS NUCLEAR ISOTOPE DOSE: 30.7 MCI
BH CV STRESS PROTOCOL 1: NORMAL
BH CV STRESS RECOVERY BP: NORMAL MMHG
BH CV STRESS RECOVERY HR: 70 BPM
BH CV STRESS STAGE 1: 1
BH CV STRESS STAGE 2: 2
LV EF NUC BP: 53 %
MAXIMAL PREDICTED HEART RATE: 174 BPM
PERCENT MAX PREDICTED HR: 51.72 %
STRESS BASELINE BP: NORMAL MMHG
STRESS BASELINE HR: 64 BPM
STRESS PERCENT HR: 61 %
STRESS POST PEAK BP: NORMAL MMHG
STRESS POST PEAK HR: 90 BPM
STRESS TARGET HR: 148 BPM

## 2021-10-29 PROCEDURE — 93017 CV STRESS TEST TRACING ONLY: CPT

## 2021-10-29 PROCEDURE — 78452 HT MUSCLE IMAGE SPECT MULT: CPT

## 2021-10-29 PROCEDURE — 25010000002 REGADENOSON 0.4 MG/5ML SOLUTION: Performed by: INTERNAL MEDICINE

## 2021-10-29 PROCEDURE — 78452 HT MUSCLE IMAGE SPECT MULT: CPT | Performed by: INTERNAL MEDICINE

## 2021-10-29 PROCEDURE — 0 TECHNETIUM SESTAMIBI: Performed by: NURSE PRACTITIONER

## 2021-10-29 PROCEDURE — A9500 TC99M SESTAMIBI: HCPCS | Performed by: NURSE PRACTITIONER

## 2021-10-29 PROCEDURE — 93018 CV STRESS TEST I&R ONLY: CPT | Performed by: INTERNAL MEDICINE

## 2021-10-29 RX ADMIN — TECHNETIUM TC 99M SESTAMIBI 1 DOSE: 1 INJECTION INTRAVENOUS at 08:41

## 2021-10-29 RX ADMIN — REGADENOSON 0.4 MG: 0.08 INJECTION, SOLUTION INTRAVENOUS at 08:41

## 2021-10-29 RX ADMIN — TECHNETIUM TC 99M SESTAMIBI 1 DOSE: 1 INJECTION INTRAVENOUS at 07:34

## 2021-11-01 ENCOUNTER — TELEPHONE (OUTPATIENT)
Dept: CARDIOLOGY | Facility: CLINIC | Age: 46
End: 2021-11-01

## 2021-11-01 NOTE — TELEPHONE ENCOUNTER
Spoke with pt gave him his test results       ----- Message from CARLOS Villa sent at 11/1/2021  8:31 AM EDT -----  Please call patient.  Normal results.    Thanks, Nadira

## 2021-11-02 ENCOUNTER — TELEPHONE (OUTPATIENT)
Dept: CARDIOLOGY | Facility: CLINIC | Age: 46
End: 2021-11-02

## 2021-12-20 PROBLEM — R06.02 SOB (SHORTNESS OF BREATH): Status: ACTIVE | Noted: 2021-12-20

## 2021-12-27 ENCOUNTER — OFFICE VISIT (OUTPATIENT)
Dept: CARDIOLOGY | Facility: CLINIC | Age: 46
End: 2021-12-27

## 2021-12-27 ENCOUNTER — TELEPHONE (OUTPATIENT)
Dept: CARDIOLOGY | Facility: CLINIC | Age: 46
End: 2021-12-27

## 2021-12-27 VITALS
DIASTOLIC BLOOD PRESSURE: 76 MMHG | WEIGHT: 230 LBS | HEIGHT: 66 IN | OXYGEN SATURATION: 98 % | BODY MASS INDEX: 36.96 KG/M2 | SYSTOLIC BLOOD PRESSURE: 149 MMHG | HEART RATE: 80 BPM

## 2021-12-27 DIAGNOSIS — E66.01 SEVERE OBESITY (BMI 35.0-39.9) WITH COMORBIDITY (HCC): ICD-10-CM

## 2021-12-27 DIAGNOSIS — I25.10 ASCVD (ARTERIOSCLEROTIC CARDIOVASCULAR DISEASE): Primary | Chronic | ICD-10-CM

## 2021-12-27 DIAGNOSIS — E78.5 DYSLIPIDEMIA: Chronic | ICD-10-CM

## 2021-12-27 DIAGNOSIS — I10 ESSENTIAL HYPERTENSION: Chronic | ICD-10-CM

## 2021-12-27 PROCEDURE — 99214 OFFICE O/P EST MOD 30 MIN: CPT | Performed by: NURSE PRACTITIONER

## 2021-12-27 RX ORDER — ATORVASTATIN CALCIUM 40 MG/1
80 TABLET, FILM COATED ORAL NIGHTLY
Qty: 90 TABLET | Refills: 2
Start: 2021-12-27 | End: 2022-06-28 | Stop reason: SDUPTHER

## 2021-12-27 RX ORDER — ASPIRIN 81 MG/1
81 TABLET ORAL DAILY
Qty: 90 TABLET | Refills: 2 | Status: SHIPPED | OUTPATIENT
Start: 2021-12-27 | End: 2022-06-28 | Stop reason: SDUPTHER

## 2021-12-27 RX ORDER — QUETIAPINE FUMARATE 25 MG/1
25 TABLET, FILM COATED ORAL NIGHTLY
COMMUNITY

## 2021-12-27 RX ORDER — DULAGLUTIDE 4.5 MG/.5ML
4.5 INJECTION, SOLUTION SUBCUTANEOUS WEEKLY
COMMUNITY

## 2021-12-27 RX ORDER — METOPROLOL TARTRATE 50 MG/1
50 TABLET, FILM COATED ORAL EVERY 12 HOURS SCHEDULED
Qty: 180 TABLET | Refills: 2 | Status: SHIPPED | OUTPATIENT
Start: 2021-12-27 | End: 2022-06-28 | Stop reason: SDUPTHER

## 2021-12-27 RX ORDER — ISOSORBIDE MONONITRATE 30 MG/1
30 TABLET, EXTENDED RELEASE ORAL DAILY
Qty: 90 TABLET | Refills: 2 | Status: SHIPPED | OUTPATIENT
Start: 2021-12-27 | End: 2022-06-28 | Stop reason: SDUPTHER

## 2021-12-27 NOTE — PATIENT INSTRUCTIONS
Mediterranean Diet  A Mediterranean diet refers to food and lifestyle choices that are based on the traditions of countries located on the [place]. This way of eating has been shown to help prevent certain conditions and improve outcomes for people who have chronic diseases, like kidney disease and heart disease.  What are tips for following this plan?  Lifestyle  · Cook and eat meals together with your family, when possible.  · Drink enough fluid to keep your urine clear or pale yellow.  · Be physically active every day. This includes:  ? Aerobic exercise like running or swimming.  ? Leisure activities like gardening, walking, or housework.  · Get 7-8 hours of sleep each night.  · If recommended by your health care provider, drink red wine in moderation. This means 1 glass a day for nonpregnant women and 2 glasses a day for men. A glass of wine equals 5 oz (150 mL).  Reading food labels  · Check the serving size of packaged foods. For foods such as rice and pasta, the serving size refers to the amount of cooked product, not dry.  · Check the total fat in packaged foods. Avoid foods that have saturated fat or trans fats.  · Check the ingredients list for added sugars, such as corn syrup.  Shopping  · At the grocery store, buy most of your food from the areas near the walls of the store. This includes:  ? Fresh fruits and vegetables (produce).  ? Grains, beans, nuts, and seeds. Some of these may be available in unpackaged forms or large amounts (in bulk).  ? Fresh seafood.  ? Poultry and eggs.  ? Low-fat dairy products.  · Buy whole ingredients instead of prepackaged foods.  · Buy fresh fruits and vegetables in-season from local farmers markets.  · Buy frozen fruits and vegetables in resealable bags.  · If you do not have access to quality fresh seafood, buy precooked frozen shrimp or canned fish, such as tuna, salmon, or sardines.  · Buy small amounts of raw or cooked vegetables, salads, or olives from the  deli or salad bar at your store.  · Stock your pantry so you always have certain foods on hand, such as olive oil, canned tuna, canned tomatoes, rice, pasta, and beans.  Cooking  · Cook foods with extra-virgin olive oil instead of using butter or other vegetable oils.  · Have meat as a side dish, and have vegetables or grains as your main dish. This means having meat in small portions or adding small amounts of meat to foods like pasta or stew.  · Use beans or vegetables instead of meat in common dishes like chili or lasagna.  · [place] with different cooking methods. Try roasting or broiling vegetables instead of steaming or sautéeing them.  · Add frozen vegetables to soups, stews, pasta, or rice.  · Add nuts or seeds for added healthy fat at each meal. You can add these to yogurt, salads, or vegetable dishes.  · Marinate fish or vegetables using olive oil, lemon juice, garlic, and fresh herbs.  Meal planning  · Plan to eat 1 vegetarian meal one day each week. Try to work up to 2 vegetarian meals, if possible.  · Eat seafood 2 or more times a week.  · Have healthy snacks readily available, such as:  ? Vegetable sticks with hummus.  ? Greek yogurt.  ? Fruit and nut trail mix.  · Eat balanced meals throughout the week. This includes:  ? Fruit: 2-3 servings a day  ? Vegetables: 4-5 servings a day  ? Low-fat dairy: 2 servings a day  ? Fish, poultry, or lean meat: 1 serving a day  ? Beans and legumes: 2 or more servings a week  ? Nuts and seeds: 1-2 servings a day  ? Whole grains: 6-8 servings a day  ? Extra-virgin olive oil: 3-4 servings a day  · Limit red meat and sweets to only a few servings a month  What are my food choices?  · Mediterranean diet  ? Recommended  ? Grains: Whole-grain pasta. Brown rice. Bulgar wheat. Polenta. Couscous. Whole-wheat bread. Oatmeal. Quinoa.  ? Vegetables: Artichokes. Beets. Broccoli. Cabbage. Carrots. Eggplant. Green beans. Chard. Kale. Spinach. Onions. Leeks. Peas. Squash.  Tomatoes. Peppers. Radishes.  ? Fruits: Apples. Apricots. Avocado. Berries. Bananas. Cherries. Dates. Figs. Grapes. Val. Melon. Oranges. Peaches. Plums. Pomegranate.  ? Meats and other protein foods: Beans. Almonds. Sunflower seeds. Pine nuts. Peanuts. Cod. [place]. Scallops. Shrimp. Tuna. Tilapia. Clams. Oysters. Eggs.  ? Dairy: Low-fat milk. Cheese. Greek yogurt.  ? Beverages: Water. Red wine. Herbal tea.  ? Fats and oils: Extra virgin olive oil. Avocado oil. Grape seed oil.  ? Sweets and desserts: Greek yogurt with honey. Baked apples. Poached pears. Trail mix.  ? Seasoning and other foods: Basil. Cilantro. Coriander. Cumin. Mint. Parsley. Sumit. Rosemary. Tarragon. Garlic. Oregano. Thyme. Pepper. Balsalmic vinegar. Tahini. Hummus. Tomato sauce. Olives. Mushrooms.  ? Limit these  ? Grains: Prepackaged pasta or rice dishes. Prepackaged cereal with added sugar.  ? Vegetables: Deep fried potatoes (french fries).  ? Fruits: Fruit canned in syrup.  ? Meats and other protein foods: Beef. Pork. Lamb. Poultry with skin. Hot dogs. [place].  ? Dairy: Ice cream. Sour cream. Whole milk.  ? Beverages: Juice. Sugar-sweetened soft drinks. Beer. Liquor and spirits.  ? Fats and oils: Butter. Canola oil. Vegetable oil. Beef fat (tallow). Lard.  ? Sweets and desserts: Cookies. Cakes. Pies. Candy.  ? Seasoning and other foods: Mayonnaise. Premade sauces and marinades.  ? The items listed may not be a complete list. Talk with your dietitian about what dietary choices are right for you.  Summary  · The Mediterranean diet includes both food and lifestyle choices.  · Eat a variety of fresh fruits and vegetables, beans, nuts, seeds, and whole grains.  · Limit the amount of red meat and sweets that you eat.  · Talk with your health care provider about whether it is safe for you to drink red wine in moderation. This means 1 glass a day for nonpregnant women and 2 glasses a day for men. A glass of wine equals 5 oz (150 mL).  This information  is not intended to replace advice given to you by your health care provider. Make sure you discuss any questions you have with your health care provider.  Document Released: 08/10/2017 Document Revised: 09/12/2017 Document Reviewed: 08/10/2017  [employer] Interactive Patient Education © 2019 [employer]

## 2021-12-27 NOTE — PROGRESS NOTES
"Chief Complaint  Follow-up ASCVD, hypertension, dyslipidemia    Subjective          Matthew CLAU Madrid presents to [facility] for follow-up.    History of Present Illness    Mr. Madrid was last seen on 10/14/2021.  Nuclear stress test was added due to patient's report of chest pain.  On 10/29/2021 he completed a nuclear stress stress test which was consistent with a low risk study.  Mr. Madrid's  blood pressure was also elevated at that visit.  Lisinopril was increased.    At today's visit Mr. Madrid denies any chest pain or palpitations.  He denies shortness of breath or dyspnea on exertion.  He denies syncope or near syncopal event.  He reports medication compliance.    Patient brings his automatic blood pressure monitor with him today.  Most recent blood pressures are on the high side with systolic readings greater than 140.  Heart rate is in the 70s or greater for most readings.    Objective     Vital Signs:   /76 (BP Location: Left arm, Patient Position: Sitting, Cuff Size: Adult)   Pulse 80   Ht 167.6 cm (66\")   Wt 104 kg (230 lb)   SpO2 98%   BMI 37.12 kg/m²       Physical Exam  Constitutional:       Appearance: Normal appearance. He is well-developed.   Cardiovascular:      Rate and Rhythm: Normal rate and regular rhythm.      Heart sounds: No murmur heard.  No friction rub. No gallop.    Pulmonary:      Effort: Pulmonary effort is normal. No respiratory distress.      Breath sounds: Normal breath sounds. No wheezing or rales.   Skin:     General: Skin is warm and dry.   Neurological:      Mental Status: He is alert and oriented to person, place, and time.   Psychiatric:         Mood and Affect: Mood normal.         Behavior: Behavior normal.          Result Review :                Current Outpatient Medications   Medication Sig Dispense Refill   • albuterol sulfate  (90 Base) MCG/ACT inhaler Inhale 2 puffs As Needed for Wheezing or Shortness of Air.     • allopurinol " (ZYLOPRIM) 300 MG tablet Take 300 mg by mouth Daily.     • ALPRAZolam (XANAX) 0.5 MG tablet Take 0.5 mg by mouth 3 (Three) Times a Day As Needed for Anxiety.     • aspirin 81 MG EC tablet Take 1 tablet by mouth Daily. 90 tablet 2   • atorvastatin (LIPITOR) 40 MG tablet Take 2 tablets by mouth Every Night. 90 tablet 2   • Dulaglutide (Trulicity) 4.5 MG/0.5ML solution pen-injector Inject 4.5 mg under the skin into the appropriate area as directed 1 (One) Time Per Week.     • gabapentin (NEURONTIN) 800 MG tablet Take 800 mg by mouth 3 (Three) Times a Day.     • insulin NPH-insulin regular (Novolin) (70-30) 100 UNIT/ML injection Inject  under the skin into the appropriate area as directed.     • isosorbide mononitrate (IMDUR) 30 MG 24 hr tablet Take 1 tablet by mouth Daily. 90 tablet 2   • lisinopril (PRINIVIL,ZESTRIL) 20 MG tablet Take 1 tablet by mouth Daily. 30 tablet 11   • metFORMIN ER (GLUCOPHAGE-XR) 500 MG 24 hr tablet Take 2 tablets by mouth 2 (two) times a day.     • metoprolol tartrate (LOPRESSOR) 50 MG tablet Take 1 tablet by mouth Every 12 (Twelve) Hours. Do not take if blood pressure is less than 110/60 and/or heart rate less than 60 180 tablet 2   • omeprazole (priLOSEC) 20 MG capsule Take 20 mg by mouth 2 (Two) Times a Day.     • QUEtiapine (SEROquel) 25 MG tablet Take 25 mg by mouth Every Night.     • Semaglutide (Rybelsus) 14 MG tablet Take 14 mg by mouth Every Morning Before Breakfast.     • ticagrelor (BRILINTA) 90 MG tablet tablet Take 1 tablet by mouth 2 (Two) Times a Day. 180 tablet 2     No current facility-administered medications for this visit.            Assessment and Plan    Problem List Items Addressed This Visit        Cardiac and Vasculature    ASCVD (arteriosclerotic cardiovascular disease) - Primary (Chronic)    Overview     · 9/1/2021 TTE: LVEF 56 to 60%  · 9/2/2021 [facility] for non-STEMI: PCI MONTSE to distal RCA.  Proximal RCA 60% stenosed and mid RCA 70% stenosed         Relevant  Medications    isosorbide mononitrate (IMDUR) 30 MG 24 hr tablet    aspirin 81 MG EC tablet    ticagrelor (BRILINTA) 90 MG tablet tablet    Other Relevant Orders    Comprehensive Metabolic Panel    CBC (No Diff)    Vitamin D 25 Hydroxy    TSH    Essential hypertension (Chronic)    Relevant Medications    metoprolol tartrate (LOPRESSOR) 50 MG tablet    Other Relevant Orders    Vitamin D 25 Hydroxy    TSH    Dyslipidemia (Chronic)    Overview     · 9/3/2021 total cholesterol 142, triglycerides 274, HDL 25 and LDL 72  · 9/3/2021 new start statin medication         Relevant Medications    atorvastatin (LIPITOR) 40 MG tablet    Other Relevant Orders    Lipid Panel    Comprehensive Metabolic Panel    CBC (No Diff)    TSH       Endocrine and Metabolic    Severe obesity (BMI 35.0-39.9) with comorbidity (HCC)      Other Visit Diagnoses     Body mass index (BMI) 37.0-37.9, adult         Relevant Orders    Vitamin D 25 Hydroxy              Follow Up     Medications were reviewed with the patient.    ASCVD is stable.  Patient is to continue GDMT for ASCVD including aspirin, Brilinta, atorvastatin, lisinopril, and metoprolol tartrate.  Continue Imdur.    Hypertension is not well controlled.  Metoprolol has been increased today.  Patient has been asked to continue to monitor his blood pressures.    With regard to dyslipidemia, I have ordered fasting lab work to be completed in the next 2 weeks.    Risk factor modification: The patient was counseled regarding the correlation between uncontrolled diabetes and coronary artery disease.  The patient was urged to follow an appropriate diet with very limited simple carbohydrates and discuss with their primary care provider possible strategies to control blood sugar, including additional medications.    Return in about 3 months (around 3/27/2022).    Patient was given instructions and counseling regarding his condition or for health maintenance advice. Please see specific information  pulled into the AVS if appropriate.

## 2021-12-29 ENCOUNTER — LAB (OUTPATIENT)
Dept: LAB | Facility: HOSPITAL | Age: 46
End: 2021-12-29

## 2021-12-29 LAB
25(OH)D3 SERPL-MCNC: 13.1 NG/ML (ref 30–100)
ALBUMIN SERPL-MCNC: 3.6 G/DL (ref 3.5–5.2)
ALBUMIN/GLOB SERPL: 1.4 G/DL
ALP SERPL-CCNC: 79 U/L (ref 39–117)
ALT SERPL W P-5'-P-CCNC: 21 U/L (ref 1–41)
ANION GAP SERPL CALCULATED.3IONS-SCNC: 10 MMOL/L (ref 5–15)
AST SERPL-CCNC: 15 U/L (ref 1–40)
BILIRUB SERPL-MCNC: 0.7 MG/DL (ref 0–1.2)
BUN SERPL-MCNC: 14 MG/DL (ref 6–20)
BUN/CREAT SERPL: 12.2 (ref 7–25)
CALCIUM SPEC-SCNC: 8 MG/DL (ref 8.6–10.5)
CHLORIDE SERPL-SCNC: 108 MMOL/L (ref 98–107)
CHOLEST SERPL-MCNC: 100 MG/DL (ref 0–200)
CO2 SERPL-SCNC: 23 MMOL/L (ref 22–29)
CREAT SERPL-MCNC: 1.15 MG/DL (ref 0.76–1.27)
DEPRECATED RDW RBC AUTO: 49.9 FL (ref 37–54)
ERYTHROCYTE [DISTWIDTH] IN BLOOD BY AUTOMATED COUNT: 14.3 % (ref 12.3–15.4)
GFR SERPL CREATININE-BSD FRML MDRD: 68 ML/MIN/1.73
GLOBULIN UR ELPH-MCNC: 2.5 GM/DL
GLUCOSE SERPL-MCNC: 122 MG/DL (ref 65–99)
HCT VFR BLD AUTO: 35.8 % (ref 37.5–51)
HDLC SERPL-MCNC: 25 MG/DL (ref 40–60)
HGB BLD-MCNC: 11.7 G/DL (ref 13–17.7)
LDLC SERPL CALC-MCNC: 48 MG/DL (ref 0–100)
LDLC/HDLC SERPL: 1.72 {RATIO}
MCH RBC QN AUTO: 31.7 PG (ref 26.6–33)
MCHC RBC AUTO-ENTMCNC: 32.7 G/DL (ref 31.5–35.7)
MCV RBC AUTO: 97 FL (ref 79–97)
PLATELET # BLD AUTO: 165 10*3/MM3 (ref 140–450)
PMV BLD AUTO: 10.7 FL (ref 6–12)
POTASSIUM SERPL-SCNC: 4.2 MMOL/L (ref 3.5–5.2)
PROT SERPL-MCNC: 6.1 G/DL (ref 6–8.5)
RBC # BLD AUTO: 3.69 10*6/MM3 (ref 4.14–5.8)
SODIUM SERPL-SCNC: 141 MMOL/L (ref 136–145)
TRIGL SERPL-MCNC: 160 MG/DL (ref 0–150)
TSH SERPL DL<=0.05 MIU/L-ACNC: 2.89 UIU/ML (ref 0.27–4.2)
VLDLC SERPL-MCNC: 27 MG/DL (ref 5–40)
WBC NRBC COR # BLD: 6.43 10*3/MM3 (ref 3.4–10.8)

## 2021-12-29 PROCEDURE — 85027 COMPLETE CBC AUTOMATED: CPT | Performed by: NURSE PRACTITIONER

## 2021-12-29 PROCEDURE — 80053 COMPREHEN METABOLIC PANEL: CPT | Performed by: NURSE PRACTITIONER

## 2021-12-29 PROCEDURE — 80061 LIPID PANEL: CPT | Performed by: NURSE PRACTITIONER

## 2021-12-29 PROCEDURE — 82306 VITAMIN D 25 HYDROXY: CPT | Performed by: NURSE PRACTITIONER

## 2021-12-29 PROCEDURE — 36415 COLL VENOUS BLD VENIPUNCTURE: CPT | Performed by: NURSE PRACTITIONER

## 2021-12-29 PROCEDURE — 84443 ASSAY THYROID STIM HORMONE: CPT | Performed by: NURSE PRACTITIONER

## 2022-01-06 ENCOUNTER — TELEPHONE (OUTPATIENT)
Dept: CARDIOLOGY | Facility: CLINIC | Age: 47
End: 2022-01-06

## 2022-01-06 NOTE — TELEPHONE ENCOUNTER
----- Message from CARLOS Villa sent at 1/4/2022  1:58 PM EST -----  Please call Mr Castro.  His vitamin d level is low.  I will send a weekly prescription.  All other labs were not too concerning.  Lipid panel showed a low bad cholesterol level, which is good.  The triglycerides were slightly elevated so just cutting back on sweets should be effective.  Although some of the other labs are slightly above or below normal, they are insignificant.  Please send a copy of the labs to the PCP.  Thanks, fabiola

## 2022-01-13 RX ORDER — ERGOCALCIFEROL 1.25 MG/1
50000 CAPSULE ORAL WEEKLY
Qty: 5 CAPSULE | Refills: 3 | Status: SHIPPED | OUTPATIENT
Start: 2022-01-13

## 2022-03-29 ENCOUNTER — OFFICE VISIT (OUTPATIENT)
Dept: CARDIOLOGY | Facility: CLINIC | Age: 47
End: 2022-03-29

## 2022-03-29 VITALS
OXYGEN SATURATION: 100 % | WEIGHT: 226.6 LBS | HEART RATE: 71 BPM | HEIGHT: 66 IN | SYSTOLIC BLOOD PRESSURE: 152 MMHG | BODY MASS INDEX: 36.42 KG/M2 | DIASTOLIC BLOOD PRESSURE: 93 MMHG

## 2022-03-29 DIAGNOSIS — I10 ESSENTIAL HYPERTENSION: Chronic | ICD-10-CM

## 2022-03-29 DIAGNOSIS — I25.10 ASCVD (ARTERIOSCLEROTIC CARDIOVASCULAR DISEASE): Primary | Chronic | ICD-10-CM

## 2022-03-29 DIAGNOSIS — E78.5 DYSLIPIDEMIA: Chronic | ICD-10-CM

## 2022-03-29 PROCEDURE — 99214 OFFICE O/P EST MOD 30 MIN: CPT | Performed by: NURSE PRACTITIONER

## 2022-03-29 RX ORDER — PANTOPRAZOLE SODIUM 40 MG/1
40 TABLET, DELAYED RELEASE ORAL DAILY
Qty: 30 TABLET | Refills: 11 | Status: SHIPPED | OUTPATIENT
Start: 2022-03-29

## 2022-03-29 RX ORDER — AMLODIPINE BESYLATE 5 MG/1
5 TABLET ORAL DAILY
Qty: 30 TABLET | Refills: 11 | Status: SHIPPED | OUTPATIENT
Start: 2022-03-29 | End: 2022-06-28 | Stop reason: SDUPTHER

## 2022-03-29 NOTE — PROGRESS NOTES
"Chief Complaint  Follow-up (Patient states his blood pressure has been high )    Subjective          Matthew Madrid presents to [facility] for   History of Present Illness    Patient was last seen in clinic on 12/27/2021.  Metoprolol was increased at that visit due to elevated blood pressures at home and in the clinic.    At today's visit Mr. Madrid reports that he is been having indigestion for the last 3 weeks and it will not go away.  In addition to his Prilosec he has been taking Tums.  He states that this has given him little relief.  He describes this as a burning sensation in his esophagus.  He does not really think that it is much changed by eating or not eating.    He denies any left-sided chest pain.  He does report cough and lower extremity swelling.    Objective     Vital Signs:   /93   Pulse 71   Ht 167.6 cm (66\")   Wt 103 kg (226 lb 9.6 oz)   SpO2 100%   BMI 36.57 kg/m²       Physical Exam  Constitutional:       Appearance: Normal appearance. He is well-developed.   Cardiovascular:      Rate and Rhythm: Normal rate and regular rhythm.      Heart sounds: No murmur heard.    No friction rub. No gallop.   Pulmonary:      Effort: Pulmonary effort is normal. No respiratory distress.      Breath sounds: Normal breath sounds. No wheezing or rales.   Skin:     General: Skin is warm and dry.   Neurological:      Mental Status: He is alert and oriented to person, place, and time.   Psychiatric:         Mood and Affect: Mood normal.         Behavior: Behavior normal.          Result Review :                Current Outpatient Medications   Medication Sig Dispense Refill   • albuterol sulfate  (90 Base) MCG/ACT inhaler Inhale 2 puffs As Needed for Wheezing or Shortness of Air.     • allopurinol (ZYLOPRIM) 300 MG tablet Take 300 mg by mouth Daily.     • ALPRAZolam (XANAX) 0.5 MG tablet Take 0.5 mg by mouth 3 (Three) Times a Day As Needed for Anxiety.     • aspirin 81 MG " EC tablet Take 1 tablet by mouth Daily. 90 tablet 2   • atorvastatin (LIPITOR) 40 MG tablet Take 2 tablets by mouth Every Night. 90 tablet 2   • Dulaglutide (Trulicity) 4.5 MG/0.5ML solution pen-injector Inject 4.5 mg under the skin into the appropriate area as directed 1 (One) Time Per Week.     • gabapentin (NEURONTIN) 800 MG tablet Take 800 mg by mouth 3 (Three) Times a Day.     • insulin NPH-insulin regular (Novolin) (70-30) 100 UNIT/ML injection Inject  under the skin into the appropriate area as directed.     • isosorbide mononitrate (IMDUR) 30 MG 24 hr tablet Take 1 tablet by mouth Daily. 90 tablet 2   • lisinopril (PRINIVIL,ZESTRIL) 20 MG tablet Take 1 tablet by mouth Daily. 30 tablet 11   • metFORMIN ER (GLUCOPHAGE-XR) 500 MG 24 hr tablet Take 2 tablets by mouth 2 (two) times a day.     • metoprolol tartrate (LOPRESSOR) 50 MG tablet Take 1 tablet by mouth Every 12 (Twelve) Hours. Do not take if blood pressure is less than 110/60 and/or heart rate less than 60 180 tablet 2   • QUEtiapine (SEROquel) 25 MG tablet Take 25 mg by mouth Every Night.     • ticagrelor (BRILINTA) 90 MG tablet tablet Take 1 tablet by mouth 2 (Two) Times a Day. 180 tablet 2   • vitamin D (ERGOCALCIFEROL) 1.25 MG (70905 UT) capsule capsule Take 1 capsule by mouth 1 (One) Time Per Week. 5 capsule 3   • amLODIPine (NORVASC) 5 MG tablet Take 1 tablet by mouth Daily. 30 tablet 11   • pantoprazole (Protonix) 40 MG EC tablet Take 1 tablet by mouth Daily. 30 tablet 11   • Semaglutide (Rybelsus) 14 MG tablet Take 14 mg by mouth Every Morning Before Breakfast.       No current facility-administered medications for this visit.            Assessment and Plan    Problem List Items Addressed This Visit        Cardiac and Vasculature    ASCVD (arteriosclerotic cardiovascular disease) - Primary (Chronic)    Overview     · 9/1/2021 TTE: LVEF 56 to 60%  · 9/2/2021 [facility] for non-STEMI: PCI MONTSE to distal RCA.  Proximal RCA 60% stenosed and mid RCA 70%  stenosed           Essential hypertension (Chronic)    Relevant Medications    amLODIPine (NORVASC) 5 MG tablet    Dyslipidemia (Chronic)    Overview     · 9/3/2021 total cholesterol 142, triglycerides 274, HDL 25 and LDL 72  · 9/3/2021 new start statin medication                     Follow Up     Medications were reviewed with the patient.    Due to his symptoms of reflux, I have changed his Prilosec to Protonix.  I do not think that this is cardiac related.  Patient had a good stress test in October.  [place] back with patient in two weeks, if GI distress persists, will refer to GI doc.    CAD is stable.  Patient is to continue aspirin, Brilinta, atorvastatin, metoprolol.    Hypertension is not yet controlled.  Patient given new Rx for amlodipine.  Continue metoprolol, isosorbide mononitrate, and lisinopril.    With regard to dyslipidemia, lipid panel from 12/29/2021 shows LDL at 48.  Will recheck lipid panel at next visit.    Return in about 3 months (around 6/29/2022).    Patient was given instructions and counseling regarding his condition or for health maintenance advice. Please see specific information pulled into the AVS if appropriate.

## 2022-06-28 ENCOUNTER — OFFICE VISIT (OUTPATIENT)
Dept: CARDIOLOGY | Facility: CLINIC | Age: 47
End: 2022-06-28

## 2022-06-28 VITALS
HEIGHT: 66 IN | WEIGHT: 220.8 LBS | DIASTOLIC BLOOD PRESSURE: 72 MMHG | SYSTOLIC BLOOD PRESSURE: 123 MMHG | HEART RATE: 73 BPM | OXYGEN SATURATION: 98 % | BODY MASS INDEX: 35.48 KG/M2

## 2022-06-28 DIAGNOSIS — I10 ESSENTIAL HYPERTENSION: Chronic | ICD-10-CM

## 2022-06-28 DIAGNOSIS — E78.5 DYSLIPIDEMIA: Chronic | ICD-10-CM

## 2022-06-28 DIAGNOSIS — I25.10 ASCVD (ARTERIOSCLEROTIC CARDIOVASCULAR DISEASE): Primary | Chronic | ICD-10-CM

## 2022-06-28 PROCEDURE — 99214 OFFICE O/P EST MOD 30 MIN: CPT | Performed by: NURSE PRACTITIONER

## 2022-06-28 PROCEDURE — 93000 ELECTROCARDIOGRAM COMPLETE: CPT | Performed by: NURSE PRACTITIONER

## 2022-06-28 RX ORDER — DOXYCYCLINE HYCLATE 100 MG/1
100 CAPSULE ORAL DAILY
COMMUNITY
End: 2022-09-15

## 2022-06-28 RX ORDER — ISOSORBIDE MONONITRATE 30 MG/1
30 TABLET, EXTENDED RELEASE ORAL DAILY
Qty: 90 TABLET | Refills: 2 | Status: SHIPPED | OUTPATIENT
Start: 2022-06-28 | End: 2022-12-29 | Stop reason: SDUPTHER

## 2022-06-28 RX ORDER — LISINOPRIL 20 MG/1
20 TABLET ORAL DAILY
Qty: 30 TABLET | Refills: 11 | Status: SHIPPED | OUTPATIENT
Start: 2022-06-28 | End: 2022-12-29 | Stop reason: SDUPTHER

## 2022-06-28 RX ORDER — AMLODIPINE BESYLATE 5 MG/1
5 TABLET ORAL DAILY
Qty: 30 TABLET | Refills: 11 | Status: SHIPPED | OUTPATIENT
Start: 2022-06-28 | End: 2023-03-27 | Stop reason: SINTOL

## 2022-06-28 RX ORDER — ASPIRIN 81 MG/1
81 TABLET ORAL DAILY
Qty: 90 TABLET | Refills: 2 | Status: SHIPPED | OUTPATIENT
Start: 2022-06-28 | End: 2022-12-29 | Stop reason: SDUPTHER

## 2022-06-28 RX ORDER — METOPROLOL TARTRATE 50 MG/1
50 TABLET, FILM COATED ORAL EVERY 12 HOURS SCHEDULED
Qty: 180 TABLET | Refills: 2 | Status: SHIPPED | OUTPATIENT
Start: 2022-06-28 | End: 2022-12-29 | Stop reason: SDUPTHER

## 2022-06-28 RX ORDER — ATORVASTATIN CALCIUM 40 MG/1
80 TABLET, FILM COATED ORAL NIGHTLY
Qty: 90 TABLET | Refills: 2
Start: 2022-06-28 | End: 2022-12-29 | Stop reason: SDUPTHER

## 2022-06-28 NOTE — PROGRESS NOTES
"Chief Complaint  Follow-up (3MO F/U, currently on antibiotics for eye infection needs clearance for cataract surgery) and Med Management    Subjective          Matthew Madrid presents to [facility] for follow up.    History of Present Illness    Matthew was last seen in clinic on 3/29/2022.  Patient was having reflux symptoms at that time and his Prilosec was changed to Protonix.    Matthew states that he has not had any chest pain or palpitations.  He denies shortness of breath or dyspnea on exertion.  He denies PND and orthopnea.  He denies lower extremity swelling.  He does report generalized fatigue and some appetite loss.  He is currently on antibiotics for an eye infection.  He is expecting to have cataract surgery in the next several weeks.  He asked for perioperative risk assessment today.    Objective     Vital Signs:   /72   Pulse 73   Ht 167.6 cm (66\")   Wt 100 kg (220 lb 12.8 oz)   SpO2 98%   BMI 35.64 kg/m²       Physical Exam  Vitals reviewed.   Constitutional:       Appearance: Normal appearance. He is well-developed.   Cardiovascular:      Rate and Rhythm: Normal rate and regular rhythm.      Heart sounds: No murmur heard.    No friction rub. No gallop.   Pulmonary:      Effort: Pulmonary effort is normal. No respiratory distress.      Breath sounds: Normal breath sounds. No wheezing or rales.   Skin:     General: Skin is warm and dry.   Neurological:      Mental Status: He is alert and oriented to person, place, and time.   Psychiatric:         Mood and Affect: Mood normal.         Behavior: Behavior normal.          Result Review :     CMP    CMP 9/13/21 10/14/21 12/29/21   Glucose 221 (A) 133 (A) 122 (A)   BUN 19 16 14   Creatinine 1.15 1.31 (A) 1.15   eGFR Non  Am 68 59 (A) 68   Sodium 139 142 141   Potassium 4.0 4.4 4.2   Chloride 107 106 108 (A)   Calcium 8.4 (A) 8.5 (A) 8.0 (A)   Albumin  3.70 3.60   Total Bilirubin  1.0 0.7   Alkaline Phosphatase "  85 79   AST (SGOT)  13 15   ALT (SGPT)  18 21   (A) Abnormal value            CBC    CBC 9/3/21 10/14/21 12/29/21   WBC 6.46 7.75 6.43   RBC 3.86 (A) 3.95 (A) 3.69 (A)   Hemoglobin 11.7 (A) 12.3 (A) 11.7 (A)   Hematocrit 34.1 (A) 35.7 (A) 35.8 (A)   MCV 88.3 90.4 97.0   MCH 30.3 31.1 31.7   MCHC 34.3 34.5 32.7   RDW 13.1 13.6 14.3   Platelets 168 193 165   (A) Abnormal value            Lipid Panel    Lipid Panel 9/3/21 10/14/21 12/29/21   Total Cholesterol 142 98 100   Triglycerides 274 (A) 145 160 (A)   HDL Cholesterol 25 (A) 26 (A) 25 (A)   VLDL Cholesterol 45 (A) 25 27   LDL Cholesterol  72 47 48   LDL/HDL Ratio 2.49 1.65 1.72   (A) Abnormal value                 ECG 12 Lead    Date/Time: 6/28/2022 10:12 AM  Performed by: Nadira Melendez APRN  Authorized by: Nadira Melendez APRN   Comparison: compared with previous ECG from 10/14/2021  Similar to previous ECG  Comparison to previous ECG: Normal sinus rhythm 68 bpm  Rhythm: sinus rhythm  Rate: normal  BPM: 68  Comments:              Current Outpatient Medications   Medication Sig Dispense Refill   • albuterol sulfate  (90 Base) MCG/ACT inhaler Inhale 2 puffs As Needed for Wheezing or Shortness of Air.     • allopurinol (ZYLOPRIM) 300 MG tablet Take 300 mg by mouth Daily.     • ALPRAZolam (XANAX) 0.5 MG tablet Take 0.5 mg by mouth 3 (Three) Times a Day As Needed for Anxiety.     • amLODIPine (NORVASC) 5 MG tablet Take 1 tablet by mouth Daily. 30 tablet 11   • aspirin 81 MG EC tablet Take 1 tablet by mouth Daily. 90 tablet 2   • atorvastatin (LIPITOR) 40 MG tablet Take 2 tablets by mouth Every Night. 90 tablet 2   • doxycycline (VIBRAMYCIN) 100 MG capsule Take 100 mg by mouth Daily.     • Dulaglutide (Trulicity) 4.5 MG/0.5ML solution pen-injector Inject 4.5 mg under the skin into the appropriate area as directed 1 (One) Time Per Week.     • gabapentin (NEURONTIN) 800 MG tablet Take 800 mg by mouth 3 (Three) Times a Day.     • insulin NPH-insulin  regular (Novolin) (70-30) 100 UNIT/ML injection Inject  under the skin into the appropriate area as directed.     • isosorbide mononitrate (IMDUR) 30 MG 24 hr tablet Take 1 tablet by mouth Daily. 90 tablet 2   • lisinopril (PRINIVIL,ZESTRIL) 20 MG tablet Take 1 tablet by mouth Daily. 30 tablet 11   • metFORMIN ER (GLUCOPHAGE-XR) 500 MG 24 hr tablet Take 2 tablets by mouth 2 (two) times a day.     • metoprolol tartrate (LOPRESSOR) 50 MG tablet Take 1 tablet by mouth Every 12 (Twelve) Hours. Do not take if blood pressure is less than 110/60 and/or heart rate less than 60 180 tablet 2   • pantoprazole (Protonix) 40 MG EC tablet Take 1 tablet by mouth Daily. 30 tablet 11   • QUEtiapine (SEROquel) 25 MG tablet Take 25 mg by mouth Every Night.     • ticagrelor (BRILINTA) 90 MG tablet tablet Take 1 tablet by mouth 2 (Two) Times a Day. 180 tablet 2   • vitamin D (ERGOCALCIFEROL) 1.25 MG (87950 UT) capsule capsule Take 1 capsule by mouth 1 (One) Time Per Week. 5 capsule 3   • Semaglutide (Rybelsus) 14 MG tablet Take 14 mg by mouth Every Morning Before Breakfast.       No current facility-administered medications for this visit.            Assessment and Plan    Problem List Items Addressed This Visit        Cardiac and Vasculature    ASCVD (arteriosclerotic cardiovascular disease) - Primary (Chronic)    Overview     · 9/1/2021 TTE: LVEF 56 to 60%  · 9/2/2021 [facility] for non-STEMI: PCI MONTSE to distal RCA.  Proximal RCA 60% stenosed and mid RCA 70% stenosed           Relevant Medications    aspirin 81 MG EC tablet    isosorbide mononitrate (IMDUR) 30 MG 24 hr tablet    ticagrelor (BRILINTA) 90 MG tablet tablet    Essential hypertension (Chronic)    Relevant Medications    metoprolol tartrate (LOPRESSOR) 50 MG tablet    amLODIPine (NORVASC) 5 MG tablet    lisinopril (PRINIVIL,ZESTRIL) 20 MG tablet    Dyslipidemia (Chronic)    Overview     · 9/3/2021 total cholesterol 142, triglycerides 274, HDL 25 and LDL 72  · 9/3/2021 new start  statin medication  · 10/14/2021 total cholesterol 98, triglycerides 148, HDL 26, and LDL 47  · 12/29/2021 total cholesterol 100, triglycerides 160, HDL 25, and LDL 48           Relevant Medications    atorvastatin (LIPITOR) 40 MG tablet              Follow Up     Medications were reviewed with the patient.    Patient to continue GDMT for ASCVD including aspirin, Brilinta, atorvastatin, lisinopril, and metoprolol tartrate.    Continue amlodipine for hypertension.    Continue atorvastatin for dyslipidemia.  Most recent lipid panel from 12/29/2021 showed LDL at goal, 48.    Patient would be at low risk for perioperative major adverse cardiac event given that he will not need general anesthesia for cataract surgery.  I have advised him that he is not to stop his Brilinta or aspirin.  He will need to postpone his cataract surgery if the ophthalmologist requires that he do so.    Return in about 6 months (around 12/28/2022).    Patient was given instructions and counseling regarding his condition or for health maintenance advice. Please see specific information pulled into the AVS if appropriate.

## 2022-09-15 ENCOUNTER — OFFICE VISIT (OUTPATIENT)
Dept: SURGERY | Facility: CLINIC | Age: 47
End: 2022-09-15

## 2022-09-15 VITALS
DIASTOLIC BLOOD PRESSURE: 90 MMHG | HEIGHT: 66 IN | BODY MASS INDEX: 36.48 KG/M2 | WEIGHT: 227 LBS | HEART RATE: 88 BPM | SYSTOLIC BLOOD PRESSURE: 120 MMHG

## 2022-09-15 DIAGNOSIS — R19.7 DIARRHEA, UNSPECIFIED TYPE: ICD-10-CM

## 2022-09-15 DIAGNOSIS — R10.9 ABDOMINAL PAIN, UNSPECIFIED ABDOMINAL LOCATION: ICD-10-CM

## 2022-09-15 DIAGNOSIS — R13.10 DYSPHAGIA, UNSPECIFIED TYPE: Primary | ICD-10-CM

## 2022-09-15 PROCEDURE — 99213 OFFICE O/P EST LOW 20 MIN: CPT

## 2022-09-15 RX ORDER — LEVOTHYROXINE SODIUM 0.03 MG/1
25 TABLET ORAL DAILY
Status: ON HOLD | COMMUNITY
End: 2022-10-04

## 2022-09-15 RX ORDER — DOXYCYCLINE HYCLATE 50 MG/1
324 CAPSULE, GELATIN COATED ORAL
COMMUNITY

## 2022-09-15 RX ORDER — POLYETHYLENE GLYCOL 3350 17 G/17G
POWDER, FOR SOLUTION ORAL
Qty: 238 G | Refills: 0 | Status: SHIPPED | OUTPATIENT
Start: 2022-09-15 | End: 2022-10-04 | Stop reason: HOSPADM

## 2022-09-15 RX ORDER — BISACODYL 5 MG
5 TABLET, DELAYED RELEASE (ENTERIC COATED) ORAL TAKE AS DIRECTED
Qty: 8 TABLET | Refills: 0 | Status: SHIPPED | OUTPATIENT
Start: 2022-09-15 | End: 2023-09-15

## 2022-09-15 RX ORDER — FLUOXETINE HYDROCHLORIDE 20 MG/1
40 CAPSULE ORAL DAILY
COMMUNITY

## 2022-09-15 RX ORDER — BUTALBITAL, ACETAMINOPHEN AND CAFFEINE 50; 325; 40 MG/1; MG/1; MG/1
TABLET ORAL
COMMUNITY
Start: 2022-09-06

## 2022-09-15 RX ORDER — PROMETHAZINE HYDROCHLORIDE 12.5 MG/1
12.5 TABLET ORAL EVERY 6 HOURS PRN
COMMUNITY

## 2022-09-15 NOTE — PROGRESS NOTES
Subjective   Matthew Madrid is a 47 y.o. male who presents today for Initial Evaluation    Chief Complaint:    Chief Complaint   Patient presents with   • Abdominal Pain        History of Present Illness:    History of Present Illness Matthew is a 47-year-old male who presents with complaints of abdominal pain.  He reports that he has had diarrhea, nausea and abdominal pain for quite some time.  Reports that sometimes he is incontinent to stool when he sleeps.  He also complains of choking when eating.  He does state that he has diarrhea sometimes black.  Reports that he did have a colonoscopy years ago.  However, he is unsure of results.  Also complains of difficulty swallowing states that this is getting worse.  He has recently been on doxycycline for a kidney infection.  Reports that he did have diarrhea prior to this round of antibiotics.  States that he has had diarrhea for 2 to 3 years.  He does take aspirin and Brilinta.  He did have an NSTEMI approximately 1 year ago.    The following portions of the patient's history were reviewed and updated as appropriate: allergies, current medications, past family history, past medical history, past social history, past surgical history and problem list.    Past Medical History:  Past Medical History:   Diagnosis Date   • Diabetes mellitus (HCC)    • Hyperlipidemia    • Hypertension        Social History:  Social History     Socioeconomic History   • Marital status:    Tobacco Use   • Smoking status: Never Smoker   • Smokeless tobacco: Never Used   Vaping Use   • Vaping Use: Never used   Substance and Sexual Activity   • Alcohol use: Yes     Comment: drinks some on saturdays    • Drug use: No   • Sexual activity: Defer       Family History:  Family History   Family history unknown: Yes       Past Surgical History:  Past Surgical History:   Procedure Laterality Date   • CARDIAC CATHETERIZATION N/A 9/2/2021    Procedure: Left Heart Cath;  Surgeon: Vasile Moulton,  MD;  Location:  [facility];  Service: Cardiology;  Laterality: N/A;   • CARDIAC CATHETERIZATION N/A 9/2/2021    Procedure: Percutaneous Coronary Intervention;  Surgeon: Vasile Moulton MD;  Location:  [facility] CATH INVASIVE LOCATION;  Service: Cardiology;  Laterality: N/A;   • LAPAROSCOPIC CHOLECYSTECTOMY         Problem List:  Patient Active Problem List   Diagnosis   • NSTEMI, initial episode of care ([facility])   • NSTEMI (non-ST elevated myocardial infarction) ([facility])   • ASCVD (arteriosclerotic cardiovascular disease)   • Essential hypertension   • Dyslipidemia   • DM (diabetes mellitus), type 2 with complications ([facility])   • SOB (shortness of breath)   • Severe obesity (BMI 35.0-39.9) with comorbidity ([facility])       Allergy:   Allergies   Allergen Reactions   • Tuberculin Tests Rash        Current Medications:   Current Outpatient Medications   Medication Sig Dispense Refill   • albuterol sulfate  (90 Base) MCG/ACT inhaler Inhale 2 puffs As Needed for Wheezing or Shortness of Air.     • allopurinol (ZYLOPRIM) 300 MG tablet Take 300 mg by mouth Daily.     • ALPRAZolam (XANAX) 0.5 MG tablet Take 0.5 mg by mouth 3 (Three) Times a Day As Needed for Anxiety.     • amLODIPine (NORVASC) 5 MG tablet Take 1 tablet by mouth Daily. 30 tablet 11   • aspirin 81 MG EC tablet Take 1 tablet by mouth Daily. 90 tablet 2   • atorvastatin (LIPITOR) 40 MG tablet Take 2 tablets by mouth Every Night. 90 tablet 2   • butalbital-acetaminophen-caffeine (FIORICET, ESGIC) -40 MG per tablet TAKE 1 TABLET BY MOUTH EVERY 12 HOURS AS NEEDED FOR HEADACHE     • Dulaglutide (Trulicity) 4.5 MG/0.5ML solution pen-injector Inject 4.5 mg under the skin into the appropriate area as directed 1 (One) Time Per Week.     • ferrous gluconate (FERGON) 324 MG tablet Take 324 mg by mouth Daily With Breakfast.     • FLUoxetine (PROzac) 20 MG capsule Take 40 mg by mouth Daily.     • gabapentin (NEURONTIN) 800 MG tablet Take 800 mg by mouth 3  (Three) Times a Day.     • insulin NPH-insulin regular (Novolin) (70-30) 100 UNIT/ML injection Inject  under the skin into the appropriate area as directed.     • isosorbide mononitrate (IMDUR) 30 MG 24 hr tablet Take 1 tablet by mouth Daily. 90 tablet 2   • levothyroxine (SYNTHROID, LEVOTHROID) 25 MCG tablet Take 25 mcg by mouth Daily.     • lisinopril (PRINIVIL,ZESTRIL) 20 MG tablet Take 1 tablet by mouth Daily. 30 tablet 11   • metFORMIN ER (GLUCOPHAGE-XR) 500 MG 24 hr tablet Take 2 tablets by mouth 2 (two) times a day.     • metoprolol tartrate (LOPRESSOR) 50 MG tablet Take 1 tablet by mouth Every 12 (Twelve) Hours. Do not take if blood pressure is less than 110/60 and/or heart rate less than 60 180 tablet 2   • pantoprazole (Protonix) 40 MG EC tablet Take 1 tablet by mouth Daily. 30 tablet 11   • promethazine (PHENERGAN) 12.5 MG tablet Take 12.5 mg by mouth Every 6 (Six) Hours As Needed for Nausea or Vomiting.     • QUEtiapine (SEROquel) 25 MG tablet Take 25 mg by mouth Every Night.     • ticagrelor (BRILINTA) 90 MG tablet tablet Take 1 tablet by mouth 2 (Two) Times a Day. 180 tablet 2   • vitamin D (ERGOCALCIFEROL) 1.25 MG (37259 UT) capsule capsule Take 1 capsule by mouth 1 (One) Time Per Week. 5 capsule 3   • bisacodyl (Dulcolax) 5 MG EC tablet Take 1 tablet by mouth Take As Directed. 8 tablet 0   • polyethylene glycol (MIRALAX) 17 GM/SCOOP powder Take as directed for colonosocpy. 238 g 0     No current facility-administered medications for this visit.       Review of Systems:    Review of Systems   Constitutional: Negative for activity change.   HENT: Positive for trouble swallowing. Negative for congestion.    Eyes: Negative for blurred vision.   Respiratory: Negative for shortness of breath.    Gastrointestinal: Positive for abdominal pain and nausea.   Endocrine: Negative for cold intolerance.   Genitourinary: Negative for flank pain.   Musculoskeletal: Negative for arthralgias.   Skin: Negative for  "bruise.   Allergic/Immunologic: Negative for environmental allergies.   Neurological: Negative for confusion.   Hematological: Negative for adenopathy.   Psychiatric/Behavioral: Negative for agitation.         Physical Exam:   Physical Exam  Constitutional:       Appearance: Normal appearance.   HENT:      Head: Normocephalic and atraumatic.      Right Ear: External ear normal.      Left Ear: External ear normal.   Eyes:      Conjunctiva/sclera: Conjunctivae normal.   Cardiovascular:      Rate and Rhythm: Normal rate.      Pulses: Normal pulses.      Heart sounds: Normal heart sounds.   Pulmonary:      Effort: Pulmonary effort is normal.      Breath sounds: Normal breath sounds.   Abdominal:      General: Abdomen is flat. Bowel sounds are normal.      Palpations: Abdomen is soft.      Tenderness: There is abdominal tenderness (Lower abdomen).   Musculoskeletal:         General: Normal range of motion.      Cervical back: Normal range of motion and neck supple.   Skin:     General: Skin is warm and dry.      Capillary Refill: Capillary refill takes less than 2 seconds.   Neurological:      General: No focal deficit present.      Mental Status: He is alert and oriented to person, place, and time.   Psychiatric:         Mood and Affect: Mood normal.         Behavior: Behavior normal.         Vitals:  Blood pressure 120/90, pulse 88, height 167.6 cm (65.98\"), weight 103 kg (227 lb).   Body mass index is 36.66 kg/m².          Assessment & Plan   Diagnoses and all orders for this visit:    1. Dysphagia, unspecified type (Primary)  -     Case Request; Standing  -     Case Request    2. Diarrhea, unspecified type  -     Case Request; Standing  -     Case Request    3. Abdominal pain, unspecified abdominal location  -     Case Request; Standing  -     Case Request    Other orders  -     Follow anesthesia standing orders.  -     Obtain Informed Consent; Future  -     Provide NPO Instructions to Patient; Future  -     " Chlorhexidine Skin Prep; Future  -     bisacodyl (Dulcolax) 5 MG EC tablet; Take 1 tablet by mouth Take As Directed.  Dispense: 8 tablet; Refill: 0  -     polyethylene glycol (MIRALAX) 17 GM/SCOOP powder; Take as directed for colonosocpy.  Dispense: 238 g; Refill: 0    Matthew is a 47-year-old male who presents with nausea, diarrhea, abdominal pain and difficulty swallowing.  He will undergo an EGD and colonoscopy with Dr. Rodríguez.  He verbalized understanding of Instructions and wishes to proceed.    Visit Diagnoses:    ICD-10-CM ICD-9-CM   1. Dysphagia, unspecified type  R13.10 787.20   2. Diarrhea, unspecified type  R19.7 787.91   3. Abdominal pain, unspecified abdominal location  R10.9 789.00         MEDS ORDERED DURING VISIT:  New Medications Ordered This Visit   Medications   • bisacodyl (Dulcolax) 5 MG EC tablet     Sig: Take 1 tablet by mouth Take As Directed.     Dispense:  8 tablet     Refill:  0   • polyethylene glycol (MIRALAX) 17 GM/SCOOP powder     Sig: Take as directed for colonosocpy.     Dispense:  238 g     Refill:  0       No follow-ups on file.             This document has been electronically signed by CARLOS Winston  September 15, 2022 16:39 EDT    Please note that portions of this note were completed with a voice recognition program.

## 2022-09-20 PROBLEM — R13.10 DYSPHAGIA: Status: ACTIVE | Noted: 2022-09-20

## 2022-09-20 PROBLEM — R10.9 ABDOMINAL PAIN: Status: ACTIVE | Noted: 2022-09-20

## 2022-09-20 PROBLEM — R19.7 DIARRHEA: Status: ACTIVE | Noted: 2022-09-20

## 2022-09-23 ENCOUNTER — DOCUMENTATION (OUTPATIENT)
Dept: CARDIOLOGY | Facility: CLINIC | Age: 47
End: 2022-09-23

## 2022-09-23 NOTE — PROGRESS NOTES
Nan-Operative Risk Assessment for Non-Cardiac-Surgery    Patient Name: Matthew Madrid    : 1975     Perioperative Cardiac risk assessment for: COLONOSCOPY     Date of Surgery: 10/04/2022    Is the patient experiencing any current cardiac symptoms:         Has the patient undergone any of the following test:  Echo: Yes, date: 2021  EKG: Yes, date: 2022  Stress Test: Yes, date: 10/29/2021      Cardiac Cath: Yes, date: 2021  Cardiac Stents: PCI MONTSE distal RCA  Valve Replacement/Repair: NO  Coronary Bypass: NO    List Any Anti-Coagulation Treatment: NA    List Any Anti-Platelet Treatment: BRILINTA, ASA    Hold Anti-Platelet Treatment   7 days pre-procedure.    [x] Low Risk for major adverse cardiac event (MACE)  [x] Moderate Risk for major adverse cardiac event (MACE)  [] Severe Risk for major adverse cardiac event (MACE)    Liza Macias MA   22 12:15 EDT     Electronically signed by CARLOS Villa, 22, 4:17 PM EDT.

## 2022-09-27 ENCOUNTER — TELEPHONE (OUTPATIENT)
Dept: SURGERY | Facility: CLINIC | Age: 47
End: 2022-09-27

## 2022-09-27 NOTE — TELEPHONE ENCOUNTER
"ATTEMPTED TO REACH PATIENT THIS DATE REGARDING PREP / INSTRUCTIONS FOR COLONOSCOPY SCHEDULED FOR Tuesday 10/04/22.    PATIENT WILL STOP BLOOD THINNER \"BRILLINTA\" 5 DAYS PRIOR TO SURGERY PER DR. DE LA GARZA.  DATE TO STOP BLOOD THINNER WILL BE 09/29/22.   PATIENT WILL BE INSTRUCTED WHEN TO RESUME BLOOD THINNER AFTER COLONOSCOPY.    2 DAY BOWEL PREP MEDICATIONS HAVE BEEN SENT TO LOCAL PHARMACY.  DAY 1 BOWEL PREP : SUN 10/02/22  DAY 2 BOWEL PREP: MON 10/03/22    TIME OF ARRIVAL FOR Tuesday 10/04/22 WILL BE DETERMINED AT LATER DATE.    PATIENT TO HAVE ADULT  TO DRIVE HOME AFTER COLONOSCOPY.    LETTER WITH DATES/ TIMES / INSTRUCTIONS WILL BE PLACED IN OUT GOING MAIL.    PATIENT TO CONTACT CLINIC IF HE HAS ANY QUESTIONS [phone].  "

## 2022-10-04 ENCOUNTER — ANESTHESIA EVENT (OUTPATIENT)
Dept: PERIOP | Facility: HOSPITAL | Age: 47
End: 2022-10-04

## 2022-10-04 ENCOUNTER — HOSPITAL ENCOUNTER (OUTPATIENT)
Facility: HOSPITAL | Age: 47
Setting detail: HOSPITAL OUTPATIENT SURGERY
Discharge: HOME | End: 2022-10-04
Attending: SURGERY | Admitting: SURGERY

## 2022-10-04 ENCOUNTER — ANESTHESIA (OUTPATIENT)
Dept: PERIOP | Facility: HOSPITAL | Age: 47
End: 2022-10-04

## 2022-10-04 VITALS
BODY MASS INDEX: 36.16 KG/M2 | DIASTOLIC BLOOD PRESSURE: 66 MMHG | HEART RATE: 66 BPM | SYSTOLIC BLOOD PRESSURE: 105 MMHG | RESPIRATION RATE: 18 BRPM | HEIGHT: 66 IN | WEIGHT: 225 LBS | TEMPERATURE: 97.5 F | OXYGEN SATURATION: 95 %

## 2022-10-04 DIAGNOSIS — R13.10 DYSPHAGIA, UNSPECIFIED TYPE: ICD-10-CM

## 2022-10-04 DIAGNOSIS — R13.10 DYSPHAGIA, UNSPECIFIED TYPE: Primary | ICD-10-CM

## 2022-10-04 DIAGNOSIS — R19.7 DIARRHEA, UNSPECIFIED TYPE: ICD-10-CM

## 2022-10-04 DIAGNOSIS — R10.9 ABDOMINAL PAIN, UNSPECIFIED ABDOMINAL LOCATION: ICD-10-CM

## 2022-10-04 LAB — GLUCOSE BLDC GLUCOMTR-MCNC: 114 MG/DL (ref 70–130)

## 2022-10-04 PROCEDURE — 25010000002 PROPOFOL 200 MG/20ML EMULSION: Performed by: NURSE ANESTHETIST, CERTIFIED REGISTERED

## 2022-10-04 PROCEDURE — 25010000002 MIDAZOLAM PER 1 MG: Performed by: NURSE ANESTHETIST, CERTIFIED REGISTERED

## 2022-10-04 PROCEDURE — 82962 GLUCOSE BLOOD TEST: CPT

## 2022-10-04 PROCEDURE — 25010000002 FENTANYL CITRATE (PF) 50 MCG/ML SOLUTION: Performed by: NURSE ANESTHETIST, CERTIFIED REGISTERED

## 2022-10-04 PROCEDURE — 88305 TISSUE EXAM BY PATHOLOGIST: CPT

## 2022-10-04 RX ORDER — SODIUM CHLORIDE, SODIUM LACTATE, POTASSIUM CHLORIDE, CALCIUM CHLORIDE 600; 310; 30; 20 MG/100ML; MG/100ML; MG/100ML; MG/100ML
125 INJECTION, SOLUTION INTRAVENOUS ONCE
Status: COMPLETED | OUTPATIENT
Start: 2022-10-04 | End: 2022-10-04

## 2022-10-04 RX ORDER — ONDANSETRON 2 MG/ML
4 INJECTION INTRAMUSCULAR; INTRAVENOUS AS NEEDED
Status: DISCONTINUED | OUTPATIENT
Start: 2022-10-04 | End: 2022-10-04 | Stop reason: HOSPADM

## 2022-10-04 RX ORDER — MIDAZOLAM HYDROCHLORIDE 1 MG/ML
1 INJECTION INTRAMUSCULAR; INTRAVENOUS
Status: DISCONTINUED | OUTPATIENT
Start: 2022-10-04 | End: 2022-10-04 | Stop reason: HOSPADM

## 2022-10-04 RX ORDER — SODIUM CHLORIDE 0.9 % (FLUSH) 0.9 %
10 SYRINGE (ML) INJECTION AS NEEDED
Status: DISCONTINUED | OUTPATIENT
Start: 2022-10-04 | End: 2022-10-04 | Stop reason: HOSPADM

## 2022-10-04 RX ORDER — FENTANYL CITRATE 50 UG/ML
50 INJECTION, SOLUTION INTRAMUSCULAR; INTRAVENOUS
Status: DISCONTINUED | OUTPATIENT
Start: 2022-10-04 | End: 2022-10-04 | Stop reason: HOSPADM

## 2022-10-04 RX ORDER — PROPOFOL 10 MG/ML
INJECTION, EMULSION INTRAVENOUS AS NEEDED
Status: DISCONTINUED | OUTPATIENT
Start: 2022-10-04 | End: 2022-10-04 | Stop reason: SURG

## 2022-10-04 RX ORDER — IPRATROPIUM BROMIDE AND ALBUTEROL SULFATE 2.5; .5 MG/3ML; MG/3ML
3 SOLUTION RESPIRATORY (INHALATION) ONCE AS NEEDED
Status: DISCONTINUED | OUTPATIENT
Start: 2022-10-04 | End: 2022-10-04 | Stop reason: HOSPADM

## 2022-10-04 RX ORDER — SODIUM CHLORIDE, SODIUM LACTATE, POTASSIUM CHLORIDE, CALCIUM CHLORIDE 600; 310; 30; 20 MG/100ML; MG/100ML; MG/100ML; MG/100ML
125 INJECTION, SOLUTION INTRAVENOUS ONCE
Status: DISCONTINUED | OUTPATIENT
Start: 2022-10-04 | End: 2022-10-04 | Stop reason: HOSPADM

## 2022-10-04 RX ORDER — LIDOCAINE HYDROCHLORIDE 20 MG/ML
INJECTION, SOLUTION EPIDURAL; INFILTRATION; INTRACAUDAL; PERINEURAL AS NEEDED
Status: DISCONTINUED | OUTPATIENT
Start: 2022-10-04 | End: 2022-10-04 | Stop reason: SURG

## 2022-10-04 RX ORDER — SODIUM CHLORIDE 0.9 % (FLUSH) 0.9 %
10 SYRINGE (ML) INJECTION EVERY 12 HOURS SCHEDULED
Status: DISCONTINUED | OUTPATIENT
Start: 2022-10-04 | End: 2022-10-04 | Stop reason: HOSPADM

## 2022-10-04 RX ORDER — FENTANYL CITRATE 50 UG/ML
INJECTION, SOLUTION INTRAMUSCULAR; INTRAVENOUS AS NEEDED
Status: DISCONTINUED | OUTPATIENT
Start: 2022-10-04 | End: 2022-10-04 | Stop reason: SURG

## 2022-10-04 RX ORDER — EPHEDRINE SULFATE 5 MG/ML
INJECTION INTRAVENOUS AS NEEDED
Status: DISCONTINUED | OUTPATIENT
Start: 2022-10-04 | End: 2022-10-04 | Stop reason: SURG

## 2022-10-04 RX ORDER — MIDAZOLAM HYDROCHLORIDE 1 MG/ML
INJECTION INTRAMUSCULAR; INTRAVENOUS AS NEEDED
Status: DISCONTINUED | OUTPATIENT
Start: 2022-10-04 | End: 2022-10-04 | Stop reason: SURG

## 2022-10-04 RX ORDER — SODIUM CHLORIDE, SODIUM LACTATE, POTASSIUM CHLORIDE, CALCIUM CHLORIDE 600; 310; 30; 20 MG/100ML; MG/100ML; MG/100ML; MG/100ML
100 INJECTION, SOLUTION INTRAVENOUS ONCE AS NEEDED
Status: DISCONTINUED | OUTPATIENT
Start: 2022-10-04 | End: 2022-10-04 | Stop reason: HOSPADM

## 2022-10-04 RX ADMIN — EPHEDRINE SULFATE 10 MG: 5 INJECTION INTRAVENOUS at 08:06

## 2022-10-04 RX ADMIN — PROPOFOL 80 MG: 10 INJECTION, EMULSION INTRAVENOUS at 07:59

## 2022-10-04 RX ADMIN — PROPOFOL 70 MG: 10 INJECTION, EMULSION INTRAVENOUS at 07:55

## 2022-10-04 RX ADMIN — MIDAZOLAM 2 MG: 1 INJECTION INTRAMUSCULAR; INTRAVENOUS at 07:48

## 2022-10-04 RX ADMIN — PROPOFOL 20 MG: 10 INJECTION, EMULSION INTRAVENOUS at 08:11

## 2022-10-04 RX ADMIN — PROPOFOL 20 MG: 10 INJECTION, EMULSION INTRAVENOUS at 08:03

## 2022-10-04 RX ADMIN — PROPOFOL 30 MG: 10 INJECTION, EMULSION INTRAVENOUS at 07:51

## 2022-10-04 RX ADMIN — EPHEDRINE SULFATE 10 MG: 5 INJECTION INTRAVENOUS at 08:14

## 2022-10-04 RX ADMIN — FENTANYL CITRATE 100 MCG: 50 INJECTION INTRAMUSCULAR; INTRAVENOUS at 07:48

## 2022-10-04 RX ADMIN — SODIUM CHLORIDE, POTASSIUM CHLORIDE, SODIUM LACTATE AND CALCIUM CHLORIDE: 600; 310; 30; 20 INJECTION, SOLUTION INTRAVENOUS at 07:48

## 2022-10-04 RX ADMIN — PROPOFOL 20 MG: 10 INJECTION, EMULSION INTRAVENOUS at 08:07

## 2022-10-04 RX ADMIN — LIDOCAINE HYDROCHLORIDE 80 MG: 20 INJECTION, SOLUTION EPIDURAL; INFILTRATION; INTRACAUDAL; PERINEURAL at 07:48

## 2022-10-04 NOTE — ANESTHESIA POSTPROCEDURE EVALUATION
Patient: Matthew Madrid    Procedure Summary     Date: 10/04/22 Room / Location: [facility] OR [address] [place] [address] COR OR    Anesthesia Start: 0748 Anesthesia Stop: 0815    Procedures:       COLONOSCOPY (N/A )      ESOPHAGOGASTRODUODENOSCOPY (N/A Esophagus) Diagnosis:       Diarrhea, unspecified type      Abdominal pain, unspecified abdominal location      Dysphagia, unspecified type      (Diarrhea, unspecified type [R19.7])      (Abdominal pain, unspecified abdominal location [R10.9])      (Dysphagia, unspecified type [R13.10])    Surgeons: Gianluca Rodríguez MD Provider: Keenan Godoy MD    Anesthesia Type: general ASA Status: 3          Anesthesia Type: general    Vitals  Vitals Value Taken Time   /66 10/04/22 0848   Temp 97.5 °F (36.4 °C) 10/04/22 0818   Pulse 66 10/04/22 0848   Resp 18 10/04/22 0848   SpO2 95 % 10/04/22 0848           Post Anesthesia Care and Evaluation    Patient location during evaluation: PACU  Patient participation: complete - patient participated  Level of consciousness: awake  Pain score: 0  Pain management: adequate    Airway patency: patent  Anesthetic complications: No anesthetic complications  PONV Status: none  Cardiovascular status: acceptable  Respiratory status: acceptable  Hydration status: acceptable

## 2022-10-04 NOTE — ANESTHESIA PREPROCEDURE EVALUATION
Anesthesia Evaluation     Patient summary reviewed and Nursing notes reviewed   no history of anesthetic complications:  NPO Solid Status: > 8 hours  NPO Liquid Status: > 8 hours           Airway   Mallampati: III  TM distance: >3 FB  Neck ROM: full  Dental - normal exam   (+) poor dentition    Pulmonary - normal exam   (+) shortness of breath,   Cardiovascular - normal exam  Exercise tolerance: good (4-7 METS)    ECG reviewed    (+) hypertension, past MI , cardiac stents hyperlipidemia,     ROS comment: Stress 10/2021  Interpretation Summary    · Diaphragmatic attenuation artifact is present.  · Left ventricular ejection fraction is normal. (Calculated EF = 53%).  · Myocardial perfusion imaging indicates a normal myocardial perfusion study with no evidence of ischemia.  · Impressions are consistent with a low risk study.  · There is no prior study available for comparison.  · Findings consistent with a normal ECG stress test.  · Minor apical fixed defect is likely from artifact         Neuro/Psych- negative ROS  GI/Hepatic/Renal/Endo    (+) obesity, morbid obesity, GERD,  diabetes mellitus type 2 poorly controlled,     Musculoskeletal (-) negative ROS    Abdominal  - normal exam   Substance History - negative use     OB/GYN negative ob/gyn ROS         Other   blood dyscrasia anemia,                   Anesthesia Plan    ASA 3     general     intravenous induction     Anesthetic plan, risks, benefits, and alternatives have been provided, discussed and informed consent has been obtained with: patient.    Use of blood products discussed with patient  Consented to blood products.       CODE STATUS:      FULL    Lab Results   Component Value Date    WBC 6.43 12/29/2021    HGB 11.7 (L) 12/29/2021    HCT 35.8 (L) 12/29/2021    MCV 97.0 12/29/2021     12/29/2021       Lab Results   Component Value Date    GLUCOSE 122 (H) 12/29/2021    BUN 14 12/29/2021    CREATININE 1.15 12/29/2021    EGFRIFNONA 68 12/29/2021    BCR  12.2 12/29/2021    K 4.2 12/29/2021    CO2 23.0 12/29/2021    CALCIUM 8.0 (L) 12/29/2021    ALBUMIN 3.60 12/29/2021    AST 15 12/29/2021    ALT 21 12/29/2021

## 2022-10-05 LAB — REF LAB TEST METHOD: NORMAL

## 2022-10-25 ENCOUNTER — OFFICE VISIT (OUTPATIENT)
Dept: SURGERY | Facility: CLINIC | Age: 47
End: 2022-10-25

## 2022-10-25 ENCOUNTER — LAB (OUTPATIENT)
Dept: LAB | Facility: HOSPITAL | Age: 47
End: 2022-10-25

## 2022-10-25 VITALS — WEIGHT: 225 LBS | BODY MASS INDEX: 36.16 KG/M2 | HEIGHT: 66 IN

## 2022-10-25 DIAGNOSIS — R10.33 PERIUMBILICAL ABDOMINAL PAIN: ICD-10-CM

## 2022-10-25 DIAGNOSIS — R10.33 PERIUMBILICAL ABDOMINAL PAIN: Primary | ICD-10-CM

## 2022-10-25 PROCEDURE — 99213 OFFICE O/P EST LOW 20 MIN: CPT

## 2022-10-25 NOTE — PROGRESS NOTES
Subjective   Matthew Madrid is a 47 y.o. male who presents today for Follow Up    Chief Complaint:    Chief Complaint   Patient presents with   • Post-op        History of Present Illness:    History of Present Illness Matthew is a 47-year-old male who presents for a follow-up visit after an EGD and screening colonoscopy.  Patient states that he continues to have abdominal pain daily as well as a lot of diarrhea.  He does report he has pain after eating at times.  He endorses a prior cholecystectomy.  Patient denies any recent antibiotic use.    The following portions of the patient's history were reviewed and updated as appropriate: allergies, current medications, past family history, past medical history, past social history, past surgical history and problem list.    Past Medical History:  Past Medical History:   Diagnosis Date   • Diabetes mellitus (HCC)    • Elevated cholesterol    • GERD (gastroesophageal reflux disease)    • Hyperlipidemia    • Hypertension        Social History:  Social History     Socioeconomic History   • Marital status:    Tobacco Use   • Smoking status: Never   • Smokeless tobacco: Never   Vaping Use   • Vaping Use: Never used   Substance and Sexual Activity   • Alcohol use: Yes     Comment: Every now and then   • Drug use: No   • Sexual activity: Yes     Partners: Female       Family History:  Family History   Family history unknown: Yes       Past Surgical History:  Past Surgical History:   Procedure Laterality Date   • CARDIAC CATHETERIZATION N/A 09/02/2021    Procedure: Left Heart Cath;  Surgeon: Vasile Moulton MD;  Location: [facility] INVASIVE LOCATION;  Service: Cardiology;  Laterality: N/A;   • CARDIAC CATHETERIZATION N/A 09/02/2021    Procedure: Percutaneous Coronary Intervention;  Surgeon: Vasile Moulton MD;  Location: [facility] CATH INVASIVE LOCATION;  Service: Cardiology;  Laterality: N/A;   • COLONOSCOPY  2013   • COLONOSCOPY N/A 10/4/2022    Procedure: COLONOSCOPY;   Surgeon: Gianluca Rodríguez MD;  Location: [facility];  Service: Gastroenterology;  Laterality: N/A;   • ENDOSCOPY  2013   • ENDOSCOPY N/A 10/4/2022    Procedure: ESOPHAGOGASTRODUODENOSCOPY;  Surgeon: Gianluca Rodríguez MD;  Location: [facility] OR;  Service: Gastroenterology;  Laterality: N/A;   • LAPAROSCOPIC CHOLECYSTECTOMY         Problem List:  Patient Active Problem List   Diagnosis   • NSTEMI, initial episode of care ([facility])   • NSTEMI (non-ST elevated myocardial infarction) ([facility])   • ASCVD (arteriosclerotic cardiovascular disease)   • Essential hypertension   • Dyslipidemia   • DM (diabetes mellitus), type 2 with complications ([facility])   • SOB (shortness of breath)   • Severe obesity (BMI 35.0-39.9) with comorbidity ([facility])   • Diarrhea   • Abdominal pain   • Dysphagia       Allergy:   Allergies   Allergen Reactions   • Tuberculin Tests Rash        Current Medications:   Current Outpatient Medications   Medication Sig Dispense Refill   • albuterol sulfate  (90 Base) MCG/ACT inhaler Inhale 2 puffs As Needed for Wheezing or Shortness of Air.     • allopurinol (ZYLOPRIM) 300 MG tablet Take 300 mg by mouth Daily.     • ALPRAZolam (XANAX) 0.5 MG tablet Take 0.5 mg by mouth 3 (Three) Times a Day As Needed for Anxiety.     • amLODIPine (NORVASC) 5 MG tablet Take 1 tablet by mouth Daily. 30 tablet 11   • aspirin 81 MG EC tablet Take 1 tablet by mouth Daily. 90 tablet 2   • atorvastatin (LIPITOR) 40 MG tablet Take 2 tablets by mouth Every Night. 90 tablet 2   • bisacodyl (Dulcolax) 5 MG EC tablet Take 1 tablet by mouth Take As Directed. 8 tablet 0   • butalbital-acetaminophen-caffeine (FIORICET, ESGIC) -40 MG per tablet TAKE 1 TABLET BY MOUTH EVERY 12 HOURS AS NEEDED FOR HEADACHE     • Dulaglutide (Trulicity) 4.5 MG/0.5ML solution pen-injector Inject 4.5 mg under the skin into the appropriate area as directed 1 (One) Time Per Week.     • ferrous gluconate (FERGON) 324 MG tablet Take 324 mg by mouth Daily With  Breakfast.     • FLUoxetine (PROzac) 20 MG capsule Take 40 mg by mouth Daily.     • gabapentin (NEURONTIN) 800 MG tablet Take 800 mg by mouth 3 (Three) Times a Day.     • insulin NPH-insulin regular (Novolin) (70-30) 100 UNIT/ML injection Inject  under the skin into the appropriate area as directed.     • isosorbide mononitrate (IMDUR) 30 MG 24 hr tablet Take 1 tablet by mouth Daily. 90 tablet 2   • lisinopril (PRINIVIL,ZESTRIL) 20 MG tablet Take 1 tablet by mouth Daily. 30 tablet 11   • metFORMIN ER (GLUCOPHAGE-XR) 500 MG 24 hr tablet Take 2 tablets by mouth 2 (two) times a day.     • metoprolol tartrate (LOPRESSOR) 50 MG tablet Take 1 tablet by mouth Every 12 (Twelve) Hours. Do not take if blood pressure is less than 110/60 and/or heart rate less than 60 180 tablet 2   • pantoprazole (Protonix) 40 MG EC tablet Take 1 tablet by mouth Daily. 30 tablet 11   • promethazine (PHENERGAN) 12.5 MG tablet Take 12.5 mg by mouth Every 6 (Six) Hours As Needed for Nausea or Vomiting.     • psyllium (METAMUCIL) 58.6 % powder Take 1 packet by mouth 2 (Two) Times a Day. 425 g 1   • QUEtiapine (SEROquel) 25 MG tablet Take 25 mg by mouth Every Night.     • ticagrelor (BRILINTA) 90 MG tablet tablet Take 1 tablet by mouth 2 (Two) Times a Day. 180 tablet 2   • vitamin D (ERGOCALCIFEROL) 1.25 MG (50235 UT) capsule capsule Take 1 capsule by mouth 1 (One) Time Per Week. 5 capsule 3     No current facility-administered medications for this visit.       Review of Systems:    Review of Systems   Constitutional: Negative for activity change.   HENT: Negative for congestion.    Eyes: Negative for blurred vision.   Respiratory: Negative for shortness of breath.    Cardiovascular: Negative for chest pain.   Gastrointestinal: Positive for abdominal pain and diarrhea.   Endocrine: Negative for cold intolerance.   Genitourinary: Negative for flank pain.   Musculoskeletal: Negative for arthralgias.   Skin: Negative for bruise.  "  Allergic/Immunologic: Negative for environmental allergies.   Neurological: Negative for confusion.   Hematological: Negative for adenopathy.   Psychiatric/Behavioral: Negative for agitation.         Physical Exam:   Physical Exam  Constitutional:       Appearance: Normal appearance. He is obese.   HENT:      Head: Normocephalic and atraumatic.      Right Ear: External ear normal.      Left Ear: External ear normal.   Eyes:      Conjunctiva/sclera: Conjunctivae normal.   Cardiovascular:      Rate and Rhythm: Normal rate.      Pulses: Normal pulses.   Pulmonary:      Effort: Pulmonary effort is normal.   Abdominal:      General: Abdomen is flat.      Tenderness: There is abdominal tenderness (Periumbilical).   Musculoskeletal:         General: Normal range of motion.      Cervical back: Normal range of motion and neck supple.   Skin:     General: Skin is warm and dry.      Capillary Refill: Capillary refill takes less than 2 seconds.   Neurological:      General: No focal deficit present.      Mental Status: He is alert and oriented to person, place, and time.   Psychiatric:         Mood and Affect: Mood normal.         Behavior: Behavior normal.         Vitals:  Height 167.6 cm (65.98\"), weight 102 kg (225 lb).   Body mass index is 36.33 kg/m².     Lab Results:   Admission on 10/04/2022, Discharged on 10/04/2022   Component Date Value Ref Range Status   • Glucose 10/04/2022 114  70 - 130 mg/dL Final    Meter: [id number] : [id number] Zuleyma CANNON   • Reference Lab Report 10/04/2022    Final                    Value:[facility]  [address] [place] [address]  Phone: [phone] or [phone]  Fax: [phone]  Pablo Root M.D., Medical Director    PATIENT NAME                           LABORATORY NO.  [id number]  MORRIS CABAN                       [id number]  [id number]                         AGE              SEX  SSN           CLIENT REF #  [facility] BENJI   " "           47      1975      [id number]   [id number]    [address] [place] [address]                     REQUESTING M.D.     ATTENDING [place] [address]                   YOLANDE DE LA GARZA  DATE COLLECTED      DATE RECEIVED      DATE REPORTED  10/04/2022          10/04/2022         10/05/2022    DIAGNOSIS:  A.   ANTRUM, BIOPSY:  Reactive gastropathy with minimal chronic gastritis  No intestinal metaplasia or dysplasia identified  No Helicobacter pylori-like organisms identified on routine histologic  sections  B.   GASTRIC POLYP:  Hyperplastic gastric polyp with mild                           inflammation  No intestinal metaplasia or dysplasia identified  No Helicobacter pylori-like organisms identified on routine histologic sections    CAM    CLINICAL HISTORY:  Diarrhea, unspecified type, abdominal pain, unspecified abdominal location,  dysphagia, unspecified type    SPECIMENS RECEIVED:  A.  ANTRUM, BIOPSY  B.  GASTRIC POLYP    MICROSCOPIC DESCRIPTION:  Tissue blocks are prepared and slides are examined microscopically on all  specimens. See diagnosis for details.    Professional interpretation rendered by Ermelinda Payne M.D., MIGUEL.C.A.P. at  [facility], [address] [place] [address].    GROSS DESCRIPTION:  A.  Specimen is received in 1 formalin filled container labeled \"antrum  biopsy\" and consists of 1 piece of tan soft tissue measuring 0.3 x 0.3 x 0.2  cm.  Specimen is submitted entirely in 1 cassette.  EDUARDO  B.  Specimen is received in 1 formalin filled container labeled \"gastric polyp  biopsy\" and consists of 1 piece of tan soft tissue measuring 0.4 x 0.3 x                           0.2  cm.  The specimen is bisected and submitted entirely in 1 cassette.    REVIEWED, DIAGNOSED AND ELECTRONICALLY  SIGNED BY:    Ermelinda Payne M.D., F.C.A.P.  CPT CODES:  [id number]             Assessment & Plan   Diagnoses and all orders for this visit:    1. Periumbilical abdominal pain (Primary)  -     CT " Abdomen Pelvis With Contrast  -     Stool Culture (Reference Lab) - Stool, Per Rectum; Future      Matthew is a 47-year-old male who presents with persistent abdominal pain.  He also states that he does have persistent diarrhea.  Patient will undergo a CT scan of his abdomen and pelvis as well as provide a stool sample for stool study.  Patient agrees with plan.  He will follow-up in 2 weeks.    Visit Diagnoses:    ICD-10-CM ICD-9-CM   1. Periumbilical abdominal pain  R10.33 789.05         MEDS ORDERED DURING VISIT:  No orders of the defined types were placed in this encounter.      No follow-ups on file.             This document has been electronically signed by CARLOS Winston  October 25, 2022 14:18 EDT    Please note that portions of this note were completed with a voice recognition program.

## 2022-12-29 ENCOUNTER — OFFICE VISIT (OUTPATIENT)
Dept: CARDIOLOGY | Facility: CLINIC | Age: 47
End: 2022-12-29

## 2022-12-29 VITALS
OXYGEN SATURATION: 96 % | HEART RATE: 84 BPM | HEIGHT: 66 IN | DIASTOLIC BLOOD PRESSURE: 70 MMHG | BODY MASS INDEX: 37.28 KG/M2 | SYSTOLIC BLOOD PRESSURE: 131 MMHG | WEIGHT: 232 LBS

## 2022-12-29 DIAGNOSIS — I10 ESSENTIAL HYPERTENSION: Chronic | ICD-10-CM

## 2022-12-29 DIAGNOSIS — I25.10 ASCVD (ARTERIOSCLEROTIC CARDIOVASCULAR DISEASE): Primary | Chronic | ICD-10-CM

## 2022-12-29 DIAGNOSIS — I20.0 UNSTABLE ANGINA: ICD-10-CM

## 2022-12-29 DIAGNOSIS — E78.5 DYSLIPIDEMIA: Chronic | ICD-10-CM

## 2022-12-29 PROCEDURE — 99214 OFFICE O/P EST MOD 30 MIN: CPT | Performed by: NURSE PRACTITIONER

## 2022-12-29 RX ORDER — FERROUS SULFATE 325(65) MG
325 TABLET ORAL
COMMUNITY

## 2022-12-29 RX ORDER — ISOSORBIDE MONONITRATE 30 MG/1
30 TABLET, EXTENDED RELEASE ORAL DAILY
Qty: 90 TABLET | Refills: 2 | Status: SHIPPED | OUTPATIENT
Start: 2022-12-29

## 2022-12-29 RX ORDER — LISINOPRIL 20 MG/1
20 TABLET ORAL DAILY
Qty: 90 TABLET | Refills: 2 | Status: SHIPPED | OUTPATIENT
Start: 2022-12-29

## 2022-12-29 RX ORDER — ATORVASTATIN CALCIUM 40 MG/1
80 TABLET, FILM COATED ORAL NIGHTLY
Qty: 90 TABLET | Refills: 2
Start: 2022-12-29

## 2022-12-29 RX ORDER — METOPROLOL TARTRATE 50 MG/1
50 TABLET, FILM COATED ORAL EVERY 12 HOURS SCHEDULED
Qty: 180 TABLET | Refills: 2 | Status: SHIPPED | OUTPATIENT
Start: 2022-12-29

## 2022-12-29 RX ORDER — ASPIRIN 81 MG/1
81 TABLET ORAL DAILY
Qty: 90 TABLET | Refills: 2 | Status: SHIPPED | OUTPATIENT
Start: 2022-12-29

## 2022-12-29 RX ORDER — LEVOCETIRIZINE DIHYDROCHLORIDE 5 MG/1
5 TABLET, FILM COATED ORAL EVERY EVENING
COMMUNITY

## 2022-12-29 NOTE — PROGRESS NOTES
"Chief Complaint  Shortness of Breath (Patient states shortness of breath, recent flu , left arm pain and numbness randomly , weakness, cough )    Subjective          Matthew Madrid presents to [facility]IOLOG[facility] for follow up.    History of Present Illness    Matthew was last seen in clinic on 6/28/2022.  He was stable at that time and no changes were made to his medications.    At today's visit he does reports concern regarding intermittent left arm numbness which started about 6 weeks ago.  He states that it starts generally when he exerts himself.  He has no sensation of chest pain.    Objective     Vital Signs:   /70 (BP Location: Left arm, Patient Position: Sitting, Cuff Size: Adult)   Pulse 84   Ht 167.6 cm (66\")   Wt 105 kg (232 lb)   SpO2 96%   BMI 37.45 kg/m²       Physical Exam  Vitals reviewed.   Constitutional:       Appearance: Normal appearance. He is well-developed.   Cardiovascular:      Rate and Rhythm: Normal rate and regular rhythm.      Heart sounds: No murmur heard.    No friction rub. No gallop.   Pulmonary:      Effort: Pulmonary effort is normal. No respiratory distress.      Breath sounds: Normal breath sounds. No wheezing or rales.   Skin:     General: Skin is warm and dry.   Neurological:      Mental Status: He is alert and oriented to person, place, and time.   Psychiatric:         Mood and Affect: Mood normal.         Behavior: Behavior normal.          Result Review :                Current Outpatient Medications   Medication Sig Dispense Refill   • albuterol sulfate  (90 Base) MCG/ACT inhaler Inhale 2 puffs As Needed for Wheezing or Shortness of Air.     • allopurinol (ZYLOPRIM) 300 MG tablet Take 300 mg by mouth Daily.     • ALPRAZolam (XANAX) 0.5 MG tablet Take 0.5 mg by mouth 3 (Three) Times a Day As Needed for Anxiety.     • amLODIPine (NORVASC) 5 MG tablet Take 1 tablet by mouth Daily. 30 tablet 11   • aspirin 81 MG EC tablet Take 1 tablet by " mouth Daily. 90 tablet 2   • atorvastatin (LIPITOR) 40 MG tablet Take 2 tablets by mouth Every Night. 90 tablet 2   • butalbital-acetaminophen-caffeine (FIORICET, ESGIC) -40 MG per tablet TAKE 1 TABLET BY MOUTH EVERY 12 HOURS AS NEEDED FOR HEADACHE     • Dulaglutide (Trulicity) 4.5 MG/0.5ML solution pen-injector Inject 4.5 mg under the skin into the appropriate area as directed 1 (One) Time Per Week.     • ferrous sulfate 325 (65 FE) MG tablet Take 325 mg by mouth Daily With Breakfast.     • FLUoxetine (PROzac) 20 MG capsule Take 40 mg by mouth Daily.     • gabapentin (NEURONTIN) 800 MG tablet Take 800 mg by mouth 3 (Three) Times a Day.     • insulin NPH-insulin regular (Novolin) (70-30) 100 UNIT/ML injection Inject  under the skin into the appropriate area as directed.     • isosorbide mononitrate (IMDUR) 30 MG 24 hr tablet Take 1 tablet by mouth Daily. 90 tablet 2   • levocetirizine (XYZAL) 5 MG tablet Take 5 mg by mouth Every Evening.     • lisinopril (PRINIVIL,ZESTRIL) 20 MG tablet Take 1 tablet by mouth Daily. 90 tablet 2   • metFORMIN ER (GLUCOPHAGE-XR) 500 MG 24 hr tablet Take 2 tablets by mouth 2 (two) times a day.     • metoprolol tartrate (LOPRESSOR) 50 MG tablet Take 1 tablet by mouth Every 12 (Twelve) Hours. Do not take if blood pressure is less than 110/60 and/or heart rate less than 60 180 tablet 2   • pantoprazole (Protonix) 40 MG EC tablet Take 1 tablet by mouth Daily. 30 tablet 11   • promethazine (PHENERGAN) 12.5 MG tablet Take 12.5 mg by mouth Every 6 (Six) Hours As Needed for Nausea or Vomiting.     • QUEtiapine (SEROquel) 25 MG tablet Take 25 mg by mouth Every Night.     • ticagrelor (BRILINTA) 60 MG tablet tablet Take 1 tablet by mouth 2 (Two) Times a Day. 180 tablet 3   • vitamin D (ERGOCALCIFEROL) 1.25 MG (82894 UT) capsule capsule Take 1 capsule by mouth 1 (One) Time Per Week. 5 capsule 3   • bisacodyl (Dulcolax) 5 MG EC tablet Take 1 tablet by mouth Take As Directed. 8 tablet 0   •  ferrous gluconate (FERGON) 324 MG tablet Take 324 mg by mouth Daily With Breakfast.     • psyllium (METAMUCIL) 58.6 % powder Take 1 packet by mouth 2 (Two) Times a Day. 425 g 1     No current facility-administered medications for this visit.            Assessment and Plan    Problem List Items Addressed This Visit        Cardiac and Vasculature    ASCVD (arteriosclerotic cardiovascular disease) - Primary (Chronic)    Overview     · 9/1/2021 [facility]: LVEF 56 to 60%  · 9/2/2021 [facility] for non-STEMI: PCI MONTSE to distal RCA.  Proximal RCA 60% stenosed and mid RCA 70% stenosed         Relevant Medications    ticagrelor (BRILINTA) 60 MG tablet tablet    isosorbide mononitrate (IMDUR) 30 MG 24 hr tablet    aspirin 81 MG EC tablet    Other Relevant Orders    Stress Test With Myocardial Perfusion One Day    Essential hypertension (Chronic)    Relevant Medications    metoprolol tartrate (LOPRESSOR) 50 MG tablet    lisinopril (PRINIVIL,ZESTRIL) 20 MG tablet    Dyslipidemia (Chronic)    Overview     · 9/3/2021 total cholesterol 142, triglycerides 274, HDL 25 and LDL 72  · 9/3/2021 new start statin medication  · 10/14/2021 total cholesterol 98, triglycerides 148, HDL 26, and LDL 47  · 12/29/2021 total cholesterol 100, triglycerides 160, HDL 25, and LDL 48         Relevant Medications    atorvastatin (LIPITOR) 40 MG tablet   Other Visit Diagnoses     Unstable angina (HCC)        Relevant Medications    ticagrelor (BRILINTA) 60 MG tablet tablet    metoprolol tartrate (LOPRESSOR) 50 MG tablet    isosorbide mononitrate (IMDUR) 30 MG 24 hr tablet              Follow Up     Medications were reviewed with the patient.    With regard to his new complaint of left arm numbness and pain, this could be anginal equivalent.  I have ordered a stress test.    Continue aspirin, atorvastatin, Brilinta, metoprolol and lisinopril for ASCVD.    With regard to dyslipidemia, will request from his PCP office.    Hypertension is controlled.  Continue current  medications.  No follow-ups on file.    Patient was given instructions and counseling regarding his condition or for health maintenance advice. Please see specific information pulled into the AVS if appropriate.

## 2023-02-06 ENCOUNTER — HOSPITAL ENCOUNTER (OUTPATIENT)
Dept: NUCLEAR MEDICINE | Facility: HOSPITAL | Age: 48
Discharge: HOME | End: 2023-02-06
Payer: MEDICARE

## 2023-02-06 ENCOUNTER — HOSPITAL ENCOUNTER (OUTPATIENT)
Dept: CARDIOLOGY | Facility: HOSPITAL | Age: 48
Discharge: HOME | End: 2023-02-06
Payer: MEDICARE

## 2023-02-06 DIAGNOSIS — I25.10 ASCVD (ARTERIOSCLEROTIC CARDIOVASCULAR DISEASE): Chronic | ICD-10-CM

## 2023-02-06 LAB
BH CV NUCLEAR PRIOR STUDY: 3
BH CV REST NUCLEAR ISOTOPE DOSE: 10.1 MCI
BH CV STRESS BP STAGE 1: NORMAL
BH CV STRESS COMMENTS STAGE 1: NORMAL
BH CV STRESS DOSE REGADENOSON STAGE 1: 0.4
BH CV STRESS DURATION MIN STAGE 1: 0
BH CV STRESS DURATION SEC STAGE 1: 10
BH CV STRESS HR STAGE 1: 92
BH CV STRESS NUCLEAR ISOTOPE DOSE: 30.3 MCI
BH CV STRESS PROTOCOL 1: NORMAL
BH CV STRESS RECOVERY BP: NORMAL MMHG
BH CV STRESS RECOVERY HR: 89 BPM
BH CV STRESS STAGE 1: 1
LV EF NUC BP: 64 %
MAXIMAL PREDICTED HEART RATE: 173 BPM
PERCENT MAX PREDICTED HR: 53.18 %
STRESS BASELINE BP: NORMAL MMHG
STRESS BASELINE HR: 71 BPM
STRESS PERCENT HR: 63 %
STRESS POST PEAK BP: NORMAL MMHG
STRESS POST PEAK HR: 92 BPM
STRESS TARGET HR: 147 BPM

## 2023-02-06 PROCEDURE — A9500 TC99M SESTAMIBI: HCPCS | Performed by: NURSE PRACTITIONER

## 2023-02-06 PROCEDURE — 0 TECHNETIUM SESTAMIBI: Performed by: NURSE PRACTITIONER

## 2023-02-06 PROCEDURE — 93017 CV STRESS TEST TRACING ONLY: CPT

## 2023-02-06 PROCEDURE — 78452 HT MUSCLE IMAGE SPECT MULT: CPT

## 2023-02-06 PROCEDURE — 25010000002 REGADENOSON 0.4 MG/5ML SOLUTION: Performed by: NURSE PRACTITIONER

## 2023-02-06 PROCEDURE — 78452 HT MUSCLE IMAGE SPECT MULT: CPT | Performed by: INTERNAL MEDICINE

## 2023-02-06 PROCEDURE — 93018 CV STRESS TEST I&R ONLY: CPT | Performed by: INTERNAL MEDICINE

## 2023-02-06 RX ADMIN — REGADENOSON 0.4 MG: 0.08 INJECTION, SOLUTION INTRAVENOUS at 10:22

## 2023-02-06 RX ADMIN — TECHNETIUM TC 99M SESTAMIBI 1 DOSE: 1 INJECTION INTRAVENOUS at 10:22

## 2023-02-06 RX ADMIN — TECHNETIUM TC 99M SESTAMIBI 1 DOSE: 1 INJECTION INTRAVENOUS at 08:51

## 2023-02-09 ENCOUNTER — TELEPHONE (OUTPATIENT)
Dept: CARDIOLOGY | Facility: CLINIC | Age: 48
End: 2023-02-09
Payer: MEDICARE

## 2023-02-09 NOTE — TELEPHONE ENCOUNTER
Informed patient Nuclear stress test did not show any areas of possible blockage in the coronary arteries.  It does show a small area of scarring, likely from previous stented area.    ----- Message from CARLOS Villa sent at 2/8/2023  5:52 PM EST -----  Please call patient about their normal result.    Nuclear stress test did not show any areas of possible blockage in the coronary arteries.  It does show a small area of scarring, likely from previous stented area.    Thanks, Naidra

## 2023-03-27 ENCOUNTER — OFFICE VISIT (OUTPATIENT)
Dept: CARDIOLOGY | Facility: CLINIC | Age: 48
End: 2023-03-27
Payer: MEDICARE

## 2023-03-27 ENCOUNTER — LAB (OUTPATIENT)
Dept: LAB | Facility: HOSPITAL | Age: 48
End: 2023-03-27
Payer: MEDICARE

## 2023-03-27 VITALS
BODY MASS INDEX: 38.89 KG/M2 | WEIGHT: 242 LBS | HEART RATE: 68 BPM | HEIGHT: 66 IN | OXYGEN SATURATION: 97 % | DIASTOLIC BLOOD PRESSURE: 75 MMHG | SYSTOLIC BLOOD PRESSURE: 122 MMHG

## 2023-03-27 DIAGNOSIS — E78.5 DYSLIPIDEMIA: Chronic | ICD-10-CM

## 2023-03-27 DIAGNOSIS — I25.10 ASCVD (ARTERIOSCLEROTIC CARDIOVASCULAR DISEASE): Primary | Chronic | ICD-10-CM

## 2023-03-27 DIAGNOSIS — I10 ESSENTIAL HYPERTENSION: Chronic | ICD-10-CM

## 2023-03-27 LAB
ALBUMIN SERPL-MCNC: 3.8 G/DL (ref 3.5–5.2)
ALBUMIN/GLOB SERPL: 1.4 G/DL
ALP SERPL-CCNC: 89 U/L (ref 39–117)
ALT SERPL W P-5'-P-CCNC: 31 U/L (ref 1–41)
ANION GAP SERPL CALCULATED.3IONS-SCNC: 10 MMOL/L (ref 5–15)
AST SERPL-CCNC: 24 U/L (ref 1–40)
BILIRUB SERPL-MCNC: 0.5 MG/DL (ref 0–1.2)
BUN SERPL-MCNC: 20 MG/DL (ref 6–20)
BUN/CREAT SERPL: 16.4 (ref 7–25)
CALCIUM SPEC-SCNC: 8.6 MG/DL (ref 8.6–10.5)
CHLORIDE SERPL-SCNC: 110 MMOL/L (ref 98–107)
CHOLEST SERPL-MCNC: 95 MG/DL (ref 0–200)
CO2 SERPL-SCNC: 20 MMOL/L (ref 22–29)
CREAT SERPL-MCNC: 1.22 MG/DL (ref 0.76–1.27)
DEPRECATED RDW RBC AUTO: 44.9 FL (ref 37–54)
EGFRCR SERPLBLD CKD-EPI 2021: 73.1 ML/MIN/1.73
ERYTHROCYTE [DISTWIDTH] IN BLOOD BY AUTOMATED COUNT: 13.2 % (ref 12.3–15.4)
GLOBULIN UR ELPH-MCNC: 2.8 GM/DL
GLUCOSE SERPL-MCNC: 167 MG/DL (ref 65–99)
HCT VFR BLD AUTO: 33.3 % (ref 37.5–51)
HDLC SERPL-MCNC: 29 MG/DL (ref 40–60)
HGB BLD-MCNC: 11.1 G/DL (ref 13–17.7)
LDLC SERPL CALC-MCNC: 34 MG/DL (ref 0–100)
LDLC/HDLC SERPL: 0.89 {RATIO}
MCH RBC QN AUTO: 31.8 PG (ref 26.6–33)
MCHC RBC AUTO-ENTMCNC: 33.3 G/DL (ref 31.5–35.7)
MCV RBC AUTO: 95.4 FL (ref 79–97)
PLATELET # BLD AUTO: 149 10*3/MM3 (ref 140–450)
PMV BLD AUTO: 10.7 FL (ref 6–12)
POTASSIUM SERPL-SCNC: 4.8 MMOL/L (ref 3.5–5.2)
PROT SERPL-MCNC: 6.6 G/DL (ref 6–8.5)
RBC # BLD AUTO: 3.49 10*6/MM3 (ref 4.14–5.8)
SODIUM SERPL-SCNC: 140 MMOL/L (ref 136–145)
TRIGL SERPL-MCNC: 201 MG/DL (ref 0–150)
VLDLC SERPL-MCNC: 32 MG/DL (ref 5–40)
WBC NRBC COR # BLD: 5.45 10*3/MM3 (ref 3.4–10.8)

## 2023-03-27 PROCEDURE — 1159F MED LIST DOCD IN RCRD: CPT | Performed by: NURSE PRACTITIONER

## 2023-03-27 PROCEDURE — 80053 COMPREHEN METABOLIC PANEL: CPT | Performed by: NURSE PRACTITIONER

## 2023-03-27 PROCEDURE — 3078F DIAST BP <80 MM HG: CPT | Performed by: NURSE PRACTITIONER

## 2023-03-27 PROCEDURE — 36415 COLL VENOUS BLD VENIPUNCTURE: CPT | Performed by: NURSE PRACTITIONER

## 2023-03-27 PROCEDURE — 85027 COMPLETE CBC AUTOMATED: CPT | Performed by: NURSE PRACTITIONER

## 2023-03-27 PROCEDURE — 1160F RVW MEDS BY RX/DR IN RCRD: CPT | Performed by: NURSE PRACTITIONER

## 2023-03-27 PROCEDURE — 99214 OFFICE O/P EST MOD 30 MIN: CPT | Performed by: NURSE PRACTITIONER

## 2023-03-27 PROCEDURE — 3074F SYST BP LT 130 MM HG: CPT | Performed by: NURSE PRACTITIONER

## 2023-03-27 PROCEDURE — 80061 LIPID PANEL: CPT | Performed by: NURSE PRACTITIONER

## 2023-03-27 RX ORDER — CHLORTHALIDONE 25 MG/1
25 TABLET ORAL DAILY
Qty: 30 TABLET | Refills: 5 | Status: SHIPPED | OUTPATIENT
Start: 2023-03-27

## 2023-03-27 NOTE — PROGRESS NOTES
"Chief Complaint  Follow-up (3 mo follow up / stress test), Med Management (Reviewed medications verbally. Patient is tolerating medications well. ), Leg Swelling (Bilateral leg swelling. ), and Shortness of Breath (With exertion)    Subjective          Matthew Madrid presents to [facility] CARDIOLOGY for follow up.    History of Present Illness     Mr. Madrid was last seen in clinic on 12/29/2022.  Patient reported intermittent left arm numbness which occurred when he exerted himself.  A stress test was ordered.    Stress test showed no evidence of ischemia, however there was a small infarct in the apex.    At today's visit Mr. Madrid reports that he has had no further problem with numbness in his arm.  He does report lower extremity swelling.  He states that his ankles and feet will get swollen most days.    Objective     Vital Signs:   /75 (BP Location: Left arm, Patient Position: Sitting, Cuff Size: Large Adult)   Pulse 68   Ht 167.6 cm (66\")   Wt 110 kg (242 lb)   SpO2 97%   BMI 39.06 kg/m²       Physical Exam  Vitals reviewed.   Constitutional:       Appearance: Normal appearance. He is well-developed.   Cardiovascular:      Rate and Rhythm: Normal rate and regular rhythm.      Heart sounds: No murmur heard.    No friction rub. No gallop.      Comments: Bilateral mild lower extremities swelling  Pulmonary:      Effort: Pulmonary effort is normal. No respiratory distress.      Breath sounds: Normal breath sounds. No wheezing or rales.   Skin:     General: Skin is warm and dry.   Neurological:      Mental Status: He is alert and oriented to person, place, and time.   Psychiatric:         Mood and Affect: Mood normal.         Behavior: Behavior normal.          Result Review :       Data reviewed: Cardiology studies Nuclear stress test       Interpretation Summary       •  Myocardial perfusion imaging indicates a small-sized infarct located in the apex with no significant ischemia " noted.  •  Left ventricular ejection fraction is normal (Calculated EF = 64%).  •  Impressions are consistent with an intermediate risk study.  •  Diaphragmatic attenuation artifact is present.  •  Findings consistent with a normal ECG stress test.    Current Outpatient Medications   Medication Sig Dispense Refill   • albuterol sulfate  (90 Base) MCG/ACT inhaler Inhale 2 puffs As Needed for Wheezing or Shortness of Air.     • allopurinol (ZYLOPRIM) 300 MG tablet Take 1 tablet by mouth Daily.     • ALPRAZolam (XANAX) 0.5 MG tablet Take 1 tablet by mouth 3 (Three) Times a Day As Needed for Anxiety.     • aspirin 81 MG EC tablet Take 1 tablet by mouth Daily. 90 tablet 2   • atorvastatin (LIPITOR) 40 MG tablet Take 2 tablets by mouth Every Night. 90 tablet 2   • bisacodyl (Dulcolax) 5 MG EC tablet Take 1 tablet by mouth Take As Directed. 8 tablet 0   • butalbital-acetaminophen-caffeine (FIORICET, ESGIC) -40 MG per tablet TAKE 1 TABLET BY MOUTH EVERY 12 HOURS AS NEEDED FOR HEADACHE     • Dulaglutide (Trulicity) 4.5 MG/0.5ML solution pen-injector Inject 4.5 mg under the skin into the appropriate area as directed 1 (One) Time Per Week.     • ferrous gluconate (FERGON) 324 MG tablet Take 1 tablet by mouth Daily With Breakfast.     • ferrous sulfate 325 (65 FE) MG tablet Take 1 tablet by mouth Daily With Breakfast.     • FLUoxetine (PROzac) 20 MG capsule Take 2 capsules by mouth Daily.     • gabapentin (NEURONTIN) 800 MG tablet Take 1 tablet by mouth 3 (Three) Times a Day.     • insulin NPH-insulin regular (Novolin) (70-30) 100 UNIT/ML injection Inject  under the skin into the appropriate area as directed.     • isosorbide mononitrate (IMDUR) 30 MG 24 hr tablet Take 1 tablet by mouth Daily. 90 tablet 2   • levocetirizine (XYZAL) 5 MG tablet Take 1 tablet by mouth Every Evening.     • lisinopril (PRINIVIL,ZESTRIL) 20 MG tablet Take 1 tablet by mouth Daily. 90 tablet 2   • metFORMIN ER (GLUCOPHAGE-XR) 500 MG 24 hr  tablet Take 2 tablets by mouth 2 (two) times a day.     • metoprolol tartrate (LOPRESSOR) 50 MG tablet Take 1 tablet by mouth Every 12 (Twelve) Hours. Do not take if blood pressure is less than 110/60 and/or heart rate less than 60 180 tablet 2   • pantoprazole (Protonix) 40 MG EC tablet Take 1 tablet by mouth Daily. 30 tablet 11   • promethazine (PHENERGAN) 12.5 MG tablet Take 1 tablet by mouth Every 6 (Six) Hours As Needed for Nausea or Vomiting.     • psyllium (METAMUCIL) 58.6 % powder Take 1 packet by mouth 2 (Two) Times a Day. 425 g 1   • QUEtiapine (SEROquel) 25 MG tablet Take 1 tablet by mouth Every Night.     • ticagrelor (BRILINTA) 60 MG tablet tablet Take 1 tablet by mouth 2 (Two) Times a Day. 180 tablet 3   • vitamin D (ERGOCALCIFEROL) 1.25 MG (57856 UT) capsule capsule Take 1 capsule by mouth 1 (One) Time Per Week. 5 capsule 3   • chlorthalidone (HYGROTON) 25 MG tablet Take 1 tablet by mouth Daily. 30 tablet 5     No current facility-administered medications for this visit.            Assessment and Plan    Problem List Items Addressed This Visit        Cardiac and Vasculature    ASCVD (arteriosclerotic cardiovascular disease) - Primary (Chronic)    Overview     · 9/1/2021 TTE: LVEF 56 to 60%  · 9/2/2021 [facility] for non-STEMI: PCI MONTSE to distal RCA.  Proximal RCA 60% stenosed and mid RCA 70% stenosed         Relevant Orders    CBC (No Diff)    Comprehensive Metabolic Panel    Lipid Panel    Essential hypertension (Chronic)    Relevant Medications    chlorthalidone (HYGROTON) 25 MG tablet    Other Relevant Orders    CBC (No Diff)    Comprehensive Metabolic Panel    Lipid Panel    Dyslipidemia (Chronic)    Overview     · 9/3/2021 total cholesterol 142, triglycerides 274, HDL 25 and LDL 72  · 9/3/2021 new start statin medication  · 10/14/2021 total cholesterol 98, triglycerides 148, HDL 26, and LDL 47  · 12/29/2021 total cholesterol 100, triglycerides 160, HDL 25, and LDL 48         Relevant Orders    CBC (No  Diff)    Comprehensive Metabolic Panel    Lipid Panel           Follow Up     Medications were reviewed with the patient.    ASCVD is stable.  Patient is to continue aspirin, Brilinta, atorvastatin, metoprolol, lisinopril.    With regard to hypertension, I am discontinuing his amlodipine today and changing it to chlorthalidone.  I have asked the patient to keep an eye on his blood pressure.  He will call us with any problems.    Continue atorvastatin for dyslipidemia.  Labs today.    Return in about 6 months (around 9/27/2023).    Patient was given instructions and counseling regarding his condition or for health maintenance advice. Please see specific information pulled into the AVS if appropriate.

## 2023-04-05 ENCOUNTER — TELEPHONE (OUTPATIENT)
Dept: CARDIOLOGY | Facility: CLINIC | Age: 48
End: 2023-04-05

## 2023-04-05 ENCOUNTER — TELEPHONE (OUTPATIENT)
Dept: CARDIOLOGY | Facility: CLINIC | Age: 48
End: 2023-04-05
Payer: MEDICARE

## 2023-04-05 NOTE — TELEPHONE ENCOUNTER
Attempted to contact patient and was unable to do so. He had previously called in with complaints of nausea since starting new medications, I have talked to Nadira and she has advised to stop taking the medication. ALSO, called with lab results.     Comprehensive metabolic panel shows good kidney and liver function.  Glucose was a high at 167.  Complete blood count showed stable but low hemoglobin hematocrit.  Platelet count was fine.     Lipid panel showed triglycerides were 201.  Goal is for them to be less than 150.  However, your LDL cholesterol was 34 which is good.  Continue current medications.     ** I will also send the recent labs to PCP**    --------------------------------------------------    ----- Message from CARLOS Villa sent at 4/5/2023  3:59 PM EDT -----  Please call patient about their lab results.    Comprehensive metabolic panel shows good kidney and liver function.  Glucose was a high at 167.  Complete blood count showed stable but low hemoglobin hematocrit.  Platelet count was fine.    Lipid panel showed triglycerides were 201.  Goal is for them to be less than 150.  However, your LDL cholesterol was 34 which is good.  Continue current medications.    Please send labs to patient's primary care provider    Thanks, Nadira

## 2023-04-05 NOTE — TELEPHONE ENCOUNTER
----- Message from CARLOS Villa sent at 4/5/2023  3:59 PM EDT -----  Please call patient about their lab results.    Comprehensive metabolic panel shows good kidney and liver function.  Glucose was a high at 167.  Complete blood count showed stable but low hemoglobin hematocrit.  Platelet count was fine.    Lipid panel showed triglycerides were 201.  Goal is for them to be less than 150.  However, your LDL cholesterol was 34 which is good.  Continue current medications.    Please send labs to patient's primary care provider    Thanks, Nadira

## 2023-06-15 ENCOUNTER — HOSPITAL ENCOUNTER (OUTPATIENT)
Dept: GENERAL RADIOLOGY | Facility: HOSPITAL | Age: 48
Discharge: HOME | End: 2023-06-15
Payer: MEDICARE

## 2023-06-15 ENCOUNTER — TRANSCRIBE ORDERS (OUTPATIENT)
Dept: ADMINISTRATIVE | Facility: HOSPITAL | Age: 48
End: 2023-06-15
Payer: MEDICARE

## 2023-06-15 DIAGNOSIS — M54.50 LOW BACK PAIN, UNSPECIFIED BACK PAIN LATERALITY, UNSPECIFIED CHRONICITY, UNSPECIFIED WHETHER SCIATICA PRESENT: ICD-10-CM

## 2023-06-15 DIAGNOSIS — M54.50 LOW BACK PAIN, UNSPECIFIED BACK PAIN LATERALITY, UNSPECIFIED CHRONICITY, UNSPECIFIED WHETHER SCIATICA PRESENT: Primary | ICD-10-CM

## 2023-06-15 PROCEDURE — 72100 X-RAY EXAM L-S SPINE 2/3 VWS: CPT

## 2023-06-15 PROCEDURE — 72100 X-RAY EXAM L-S SPINE 2/3 VWS: CPT | Performed by: RADIOLOGY

## 2023-07-04 PROBLEM — N17.9 ACUTE RENAL FAILURE, UNSPECIFIED ACUTE RENAL FAILURE TYPE: Status: ACTIVE | Noted: 2023-07-04

## 2023-07-24 ENCOUNTER — OFFICE VISIT (OUTPATIENT)
Dept: CARDIOLOGY | Facility: CLINIC | Age: 48
End: 2023-07-24
Payer: MEDICARE

## 2023-07-24 VITALS
BODY MASS INDEX: 38.09 KG/M2 | DIASTOLIC BLOOD PRESSURE: 77 MMHG | WEIGHT: 237 LBS | SYSTOLIC BLOOD PRESSURE: 152 MMHG | HEIGHT: 66 IN | OXYGEN SATURATION: 99 % | HEART RATE: 62 BPM

## 2023-07-24 DIAGNOSIS — E78.5 DYSLIPIDEMIA: ICD-10-CM

## 2023-07-24 DIAGNOSIS — I25.10 ASCVD (ARTERIOSCLEROTIC CARDIOVASCULAR DISEASE): ICD-10-CM

## 2023-07-24 DIAGNOSIS — I10 ESSENTIAL HYPERTENSION: Primary | Chronic | ICD-10-CM

## 2023-07-24 PROCEDURE — 3077F SYST BP >= 140 MM HG: CPT | Performed by: NURSE PRACTITIONER

## 2023-07-24 PROCEDURE — 1160F RVW MEDS BY RX/DR IN RCRD: CPT | Performed by: NURSE PRACTITIONER

## 2023-07-24 PROCEDURE — 99214 OFFICE O/P EST MOD 30 MIN: CPT | Performed by: NURSE PRACTITIONER

## 2023-07-24 PROCEDURE — 3078F DIAST BP <80 MM HG: CPT | Performed by: NURSE PRACTITIONER

## 2023-07-24 PROCEDURE — 1159F MED LIST DOCD IN RCRD: CPT | Performed by: NURSE PRACTITIONER

## 2023-07-24 RX ORDER — ISOSORBIDE MONONITRATE 60 MG/1
60 TABLET, EXTENDED RELEASE ORAL DAILY
Qty: 30 TABLET | Refills: 11 | Status: SHIPPED | OUTPATIENT
Start: 2023-07-24

## 2023-07-24 RX ORDER — AMLODIPINE BESYLATE 5 MG/1
5 TABLET ORAL DAILY
Qty: 30 TABLET | Refills: 11 | Status: SHIPPED | OUTPATIENT
Start: 2023-07-24

## 2023-07-24 NOTE — PROGRESS NOTES
"Chief Complaint  No chief complaint on file.    Subjective          Matthew Madrid presents to [facility] CARDIOLOGY for follow up.    History of Present Illness    Matthew was last seen during his hospitalization on 7/6/2023.  During hospitalization he was noted to have elevated troponin and was diagnosed with a type II non-STEMI.  Echocardiogram showed normal LVEF of 61 to 65% with grade 1 diastolic dysfunction.  Nuclear stress test was negative for ischemia.    At today's visit Matthew reports that he has been doing well.  He denies any chest pain, palpitations, shortness of breath or dyspnea on exertion.  He does admit that his blood pressures at home have been comparable to his blood pressure reading today.    Objective     Vital Signs:   /77 (BP Location: Left arm, Patient Position: Sitting, Cuff Size: Adult)   Pulse 62   Ht 167.6 cm (66\")   Wt 108 kg (237 lb)   SpO2 99%   BMI 38.25 kg/m²       Physical Exam  Vitals reviewed.   Constitutional:       Appearance: Normal appearance. He is well-developed.   Cardiovascular:      Rate and Rhythm: Normal rate and regular rhythm.      Heart sounds: No murmur heard.    No friction rub. No gallop.   Pulmonary:      Effort: Pulmonary effort is normal. No respiratory distress.      Breath sounds: Normal breath sounds. No wheezing or rales.   Skin:     General: Skin is warm and dry.   Neurological:      Mental Status: He is alert and oriented to person, place, and time.   Psychiatric:         Mood and Affect: Mood normal.         Behavior: Behavior normal.        Result Review :                Current Outpatient Medications   Medication Sig Dispense Refill    albuterol sulfate  (90 Base) MCG/ACT inhaler Inhale 2 puffs As Needed for Wheezing or Shortness of Air.      allopurinol (ZYLOPRIM) 300 MG tablet Take 1 tablet by mouth Daily.      ALPRAZolam (XANAX) 0.5 MG tablet Take 1 tablet by mouth 3 (Three) Times a Day As Needed for Anxiety.   "    clotrimazole-betamethasone (LOTRISONE) 1-0.05 % cream Apply 1 application  topically to the appropriate area as directed 2 (Two) Times a Day.      ferrous sulfate 325 (65 FE) MG tablet Take 1 tablet by mouth 2 (Two) Times a Day.      FLUoxetine (PROzac) 20 MG capsule Take 2 capsules by mouth Daily.      insulin NPH (humuLIN N,novoLIN N) 100 UNIT/ML injection Inject 15 Units under the skin into the appropriate area as directed 2 (Two) Times a Day Before Meals.  12    isosorbide mononitrate (IMDUR) 60 MG 24 hr tablet Take 1 tablet by mouth Daily. 30 tablet 11    levocetirizine (XYZAL) 5 MG tablet Take 1 tablet by mouth Every Evening.      pantoprazole (Protonix) 40 MG EC tablet Take 1 tablet by mouth Daily. 30 tablet 11    QUEtiapine XR (SEROquel XR) 200 MG 24 hr tablet Take 2 tablets by mouth Every Night.      ranolazine (RANEXA) 500 MG 12 hr tablet Take 1 tablet by mouth Every 12 (Twelve) Hours. 60 tablet 0    ticagrelor (BRILINTA) 90 MG tablet tablet 1 tablet 2 (Two) Times a Day.      vitamin D (ERGOCALCIFEROL) 1.25 MG (56736 UT) capsule capsule Take 1 capsule by mouth 1 (One) Time Per Week. 5 capsule 3    amLODIPine (NORVASC) 5 MG tablet Take 1 tablet by mouth Daily. 30 tablet 11    aspirin 81 MG EC tablet Take 1 tablet by mouth Daily. 90 tablet 3    metoprolol tartrate (LOPRESSOR) 25 MG tablet Take ½ tablet by mouth Every 12 (Twelve) Hours. 90 tablet 3     No current facility-administered medications for this visit.            Assessment and Plan    Problem List Items Addressed This Visit          Cardiac and Vasculature    ASCVD (arteriosclerotic cardiovascular disease) (Chronic)    Overview     9/1/2021 TTE: LVEF 56 to 60%  9/2/2021 [facility] for non-STEMI: PCI MONTSE to distal RCA.  Proximal RCA 60% stenosed and mid RCA 70% stenosed         Relevant Medications    isosorbide mononitrate (IMDUR) 60 MG 24 hr tablet    Other Relevant Orders    Comprehensive Metabolic Panel (Completed)    CK Total & CKMB (Completed)     Magnesium (Completed)    CBC & Differential (Completed)    Essential hypertension - Primary (Chronic)    Relevant Medications    amLODIPine (NORVASC) 5 MG tablet    Dyslipidemia (Chronic)    Overview     9/3/2021 total cholesterol 142, triglycerides 274, HDL 25 and LDL 72  9/3/2021 new start statin medication  10/14/2021 total cholesterol 98, triglycerides 148, HDL 26, and LDL 47  12/29/2021 total cholesterol 100, triglycerides 160, HDL 25, and LDL 48  3/27/2023 total cholesterol 95, triglycerides 201, HDL 29, and LDL 34         Relevant Orders    Comprehensive Metabolic Panel (Completed)    CK Total & CKMB (Completed)    Magnesium (Completed)    CBC & Differential (Completed)           Follow Up     Medications were reviewed with the patient.    ASCVD is stable.  Patient is to continue aspirin, Brilinta, metoprolol tartrate.  Continue isosorbide mononitrate which has been increased to 60 mg today.  Continue Ranexa.    Hypertension is not well controlled.  I have increased his isosorbide today.    With regard to dyslipidemia, patient was taken off of his statin during hospitalization due to elevated CK.  I have ordered labs for surveillance.  We will restart statin if able.    Return in about 4 weeks (around 8/21/2023).    Patient was given instructions and counseling regarding his condition or for health maintenance advice. Please see specific information pulled into the AVS if appropriate.

## 2023-07-25 ENCOUNTER — LAB (OUTPATIENT)
Dept: LAB | Facility: HOSPITAL | Age: 48
End: 2023-07-25
Payer: MEDICARE

## 2023-07-25 ENCOUNTER — TRANSCRIBE ORDERS (OUTPATIENT)
Dept: ADMINISTRATIVE | Facility: HOSPITAL | Age: 48
End: 2023-07-25
Payer: MEDICARE

## 2023-07-25 ENCOUNTER — HOSPITAL ENCOUNTER (OUTPATIENT)
Dept: ULTRASOUND IMAGING | Facility: HOSPITAL | Age: 48
Discharge: HOME | End: 2023-07-25
Payer: MEDICARE

## 2023-07-25 DIAGNOSIS — E78.5 DYSLIPIDEMIA: ICD-10-CM

## 2023-07-25 DIAGNOSIS — M19.90 ARTHRITIS: ICD-10-CM

## 2023-07-25 DIAGNOSIS — N18.31 CHRONIC KIDNEY DISEASE, STAGE 3A: ICD-10-CM

## 2023-07-25 DIAGNOSIS — E78.5 DYSLIPIDEMIA: Primary | ICD-10-CM

## 2023-07-25 DIAGNOSIS — M10.9 GOUT, UNSPECIFIED CAUSE, UNSPECIFIED CHRONICITY, UNSPECIFIED SITE: ICD-10-CM

## 2023-07-25 DIAGNOSIS — N18.31 CHRONIC KIDNEY DISEASE (CKD) STAGE G3A/A1, MODERATELY DECREASED GLOMERULAR FILTRATION RATE (GFR) BETWEEN 45-59 ML/MIN/1.73 SQUARE METER AND ALBUMINURIA CREATININE RATIO LESS THAN 30 MG/G (CMS/H*: ICD-10-CM

## 2023-07-25 LAB
25(OH)D3 SERPL-MCNC: 39.7 NG/ML (ref 30–100)
ALBUMIN SERPL-MCNC: 3.8 G/DL (ref 3.5–5.2)
ALBUMIN/GLOB SERPL: 1.5 G/DL
ALP SERPL-CCNC: 65 U/L (ref 39–117)
ALT SERPL W P-5'-P-CCNC: 22 U/L (ref 1–41)
ANION GAP SERPL CALCULATED.3IONS-SCNC: 10 MMOL/L (ref 5–15)
AST SERPL-CCNC: 12 U/L (ref 1–40)
BACTERIA UR QL AUTO: NORMAL /HPF
BASOPHILS # BLD AUTO: 0.03 10*3/MM3 (ref 0–0.2)
BASOPHILS NFR BLD AUTO: 0.6 % (ref 0–1.5)
BILIRUB SERPL-MCNC: 0.7 MG/DL (ref 0–1.2)
BILIRUB UR QL STRIP: NEGATIVE
BUN SERPL-MCNC: 28 MG/DL (ref 6–20)
BUN/CREAT SERPL: 18.7 (ref 7–25)
CALCIUM SPEC-SCNC: 8.5 MG/DL (ref 8.6–10.5)
CHLORIDE SERPL-SCNC: 109 MMOL/L (ref 98–107)
CK MB SERPL-CCNC: 2.4 NG/ML
CK SERPL-CCNC: 67 U/L (ref 20–200)
CK SERPL-CCNC: 68 U/L (ref 20–200)
CLARITY UR: ABNORMAL
CO2 SERPL-SCNC: 21 MMOL/L (ref 22–29)
COLOR UR: YELLOW
CREAT SERPL-MCNC: 1.5 MG/DL (ref 0.76–1.27)
CREAT UR-MCNC: 94 MG/DL
DEPRECATED RDW RBC AUTO: 50.8 FL (ref 37–54)
EGFRCR SERPLBLD CKD-EPI 2021: 57.1 ML/MIN/1.73
EOSINOPHIL # BLD AUTO: 0.1 10*3/MM3 (ref 0–0.4)
EOSINOPHIL NFR BLD AUTO: 1.9 % (ref 0.3–6.2)
ERYTHROCYTE [DISTWIDTH] IN BLOOD BY AUTOMATED COUNT: 14.2 % (ref 12.3–15.4)
GLOBULIN UR ELPH-MCNC: 2.5 GM/DL
GLUCOSE SERPL-MCNC: 138 MG/DL (ref 65–99)
GLUCOSE UR STRIP-MCNC: NEGATIVE MG/DL
HCT VFR BLD AUTO: 34 % (ref 37.5–51)
HGB BLD-MCNC: 11.4 G/DL (ref 13–17.7)
HGB UR QL STRIP.AUTO: NEGATIVE
HYALINE CASTS UR QL AUTO: NORMAL /LPF
IMM GRANULOCYTES # BLD AUTO: 0.02 10*3/MM3 (ref 0–0.05)
IMM GRANULOCYTES NFR BLD AUTO: 0.4 % (ref 0–0.5)
KETONES UR QL STRIP: NEGATIVE
LEUKOCYTE ESTERASE UR QL STRIP.AUTO: NEGATIVE
LYMPHOCYTES # BLD AUTO: 1.6 10*3/MM3 (ref 0.7–3.1)
LYMPHOCYTES NFR BLD AUTO: 30.4 % (ref 19.6–45.3)
MAGNESIUM SERPL-MCNC: 1.6 MG/DL (ref 1.6–2.6)
MCH RBC QN AUTO: 33.5 PG (ref 26.6–33)
MCHC RBC AUTO-ENTMCNC: 33.5 G/DL (ref 31.5–35.7)
MCV RBC AUTO: 100 FL (ref 79–97)
MONOCYTES # BLD AUTO: 0.43 10*3/MM3 (ref 0.1–0.9)
MONOCYTES NFR BLD AUTO: 8.2 % (ref 5–12)
NEUTROPHILS NFR BLD AUTO: 3.09 10*3/MM3 (ref 1.7–7)
NEUTROPHILS NFR BLD AUTO: 58.5 % (ref 42.7–76)
NITRITE UR QL STRIP: NEGATIVE
NRBC BLD AUTO-RTO: 0 /100 WBC (ref 0–0.2)
PH UR STRIP.AUTO: 6 [PH] (ref 5–8)
PHOSPHATE SERPL-MCNC: 3.1 MG/DL (ref 2.5–4.5)
PLATELET # BLD AUTO: 169 10*3/MM3 (ref 140–450)
PMV BLD AUTO: 10.7 FL (ref 6–12)
POTASSIUM SERPL-SCNC: 5 MMOL/L (ref 3.5–5.2)
PROT ?TM UR-MCNC: 132 MG/DL
PROT SERPL-MCNC: 6.3 G/DL (ref 6–8.5)
PROT UR QL STRIP: ABNORMAL
PROT/CREAT UR: 1404.3 MG/G CREA (ref 0–200)
PTH-INTACT SERPL-MCNC: 65.4 PG/ML (ref 15–65)
RBC # BLD AUTO: 3.4 10*6/MM3 (ref 4.14–5.8)
RBC # UR STRIP: NORMAL /HPF
REF LAB TEST METHOD: NORMAL
SODIUM SERPL-SCNC: 140 MMOL/L (ref 136–145)
SP GR UR STRIP: 1.02 (ref 1–1.03)
SQUAMOUS #/AREA URNS HPF: NORMAL /HPF
URATE SERPL-MCNC: 5.3 MG/DL (ref 3.4–7)
UROBILINOGEN UR QL STRIP: ABNORMAL
WBC # UR STRIP: NORMAL /HPF
WBC NRBC COR # BLD: 5.27 10*3/MM3 (ref 3.4–10.8)

## 2023-07-25 PROCEDURE — 82550 ASSAY OF CK (CPK): CPT

## 2023-07-25 PROCEDURE — 84100 ASSAY OF PHOSPHORUS: CPT

## 2023-07-25 PROCEDURE — 84156 ASSAY OF PROTEIN URINE: CPT

## 2023-07-25 PROCEDURE — 84550 ASSAY OF BLOOD/URIC ACID: CPT

## 2023-07-25 PROCEDURE — 76775 US EXAM ABDO BACK WALL LIM: CPT | Performed by: RADIOLOGY

## 2023-07-25 PROCEDURE — 82550 ASSAY OF CK (CPK): CPT | Performed by: NURSE PRACTITIONER

## 2023-07-25 PROCEDURE — 82306 VITAMIN D 25 HYDROXY: CPT

## 2023-07-25 PROCEDURE — 82570 ASSAY OF URINE CREATININE: CPT

## 2023-07-25 PROCEDURE — 36415 COLL VENOUS BLD VENIPUNCTURE: CPT | Performed by: NURSE PRACTITIONER

## 2023-07-25 PROCEDURE — 81001 URINALYSIS AUTO W/SCOPE: CPT

## 2023-07-25 PROCEDURE — 83970 ASSAY OF PARATHORMONE: CPT

## 2023-07-25 PROCEDURE — 82553 CREATINE MB FRACTION: CPT | Performed by: NURSE PRACTITIONER

## 2023-07-25 PROCEDURE — 85025 COMPLETE CBC W/AUTO DIFF WBC: CPT | Performed by: NURSE PRACTITIONER

## 2023-07-25 PROCEDURE — 76775 US EXAM ABDO BACK WALL LIM: CPT

## 2023-07-25 PROCEDURE — 83735 ASSAY OF MAGNESIUM: CPT | Performed by: NURSE PRACTITIONER

## 2023-07-25 PROCEDURE — 80053 COMPREHEN METABOLIC PANEL: CPT | Performed by: NURSE PRACTITIONER

## 2023-07-26 DIAGNOSIS — I25.10 ASCVD (ARTERIOSCLEROTIC CARDIOVASCULAR DISEASE): Chronic | ICD-10-CM

## 2023-07-26 RX ORDER — ASPIRIN 81 MG/1
81 TABLET ORAL DAILY
Qty: 90 TABLET | Refills: 3 | Status: SHIPPED | OUTPATIENT
Start: 2023-07-26

## 2023-08-21 ENCOUNTER — TRANSCRIBE ORDERS (OUTPATIENT)
Dept: ADMINISTRATIVE | Facility: HOSPITAL | Age: 48
End: 2023-08-21
Payer: MEDICARE

## 2023-08-21 DIAGNOSIS — M54.16 LUMBAR RADICULOPATHY: Primary | ICD-10-CM

## 2023-08-24 ENCOUNTER — OFFICE VISIT (OUTPATIENT)
Dept: CARDIOLOGY | Facility: CLINIC | Age: 48
End: 2023-08-24
Payer: MEDICARE

## 2023-08-24 VITALS
HEIGHT: 66 IN | HEART RATE: 65 BPM | OXYGEN SATURATION: 96 % | DIASTOLIC BLOOD PRESSURE: 81 MMHG | RESPIRATION RATE: 18 BRPM | BODY MASS INDEX: 39.53 KG/M2 | SYSTOLIC BLOOD PRESSURE: 140 MMHG | WEIGHT: 246 LBS

## 2023-08-24 DIAGNOSIS — E78.5 DYSLIPIDEMIA: Chronic | ICD-10-CM

## 2023-08-24 DIAGNOSIS — I10 ESSENTIAL HYPERTENSION: Chronic | ICD-10-CM

## 2023-08-24 DIAGNOSIS — I25.10 ASCVD (ARTERIOSCLEROTIC CARDIOVASCULAR DISEASE): Primary | Chronic | ICD-10-CM

## 2023-08-24 PROCEDURE — 3077F SYST BP >= 140 MM HG: CPT | Performed by: PHYSICIAN ASSISTANT

## 2023-08-24 PROCEDURE — 3079F DIAST BP 80-89 MM HG: CPT | Performed by: PHYSICIAN ASSISTANT

## 2023-08-24 PROCEDURE — 99214 OFFICE O/P EST MOD 30 MIN: CPT | Performed by: PHYSICIAN ASSISTANT

## 2023-08-24 PROCEDURE — 1160F RVW MEDS BY RX/DR IN RCRD: CPT | Performed by: PHYSICIAN ASSISTANT

## 2023-08-24 PROCEDURE — 1159F MED LIST DOCD IN RCRD: CPT | Performed by: PHYSICIAN ASSISTANT

## 2023-08-24 RX ORDER — CARVEDILOL 6.25 MG/1
6.25 TABLET ORAL 2 TIMES DAILY
Qty: 60 TABLET | Refills: 11 | Status: SHIPPED | OUTPATIENT
Start: 2023-08-24

## 2023-08-24 RX ORDER — PRAVASTATIN SODIUM 10 MG
10 TABLET ORAL DAILY
Qty: 30 TABLET | Refills: 2 | Status: SHIPPED | OUTPATIENT
Start: 2023-08-24

## 2023-08-24 RX ORDER — ISOSORBIDE MONONITRATE 120 MG/1
120 TABLET, EXTENDED RELEASE ORAL DAILY
Qty: 90 TABLET | Refills: 3 | Status: SHIPPED | OUTPATIENT
Start: 2023-08-24

## 2023-08-24 RX ORDER — GABAPENTIN 800 MG/1
800 TABLET ORAL 3 TIMES DAILY
COMMUNITY

## 2023-08-24 RX ORDER — PROMETHAZINE HYDROCHLORIDE 12.5 MG/1
12.5 TABLET ORAL EVERY 6 HOURS PRN
COMMUNITY

## 2023-08-24 NOTE — PROGRESS NOTES
Susanna Johnson APRN  Matthew Madrid  1975  08/24/2023    Patient Active Problem List   Diagnosis    NSTEMI, initial episode of care    NSTEMI (non-ST elevated myocardial infarction)    ASCVD (arteriosclerotic cardiovascular disease)    Essential hypertension    Dyslipidemia    DM (diabetes mellitus), type 2 with complications    SOB (shortness of breath)    Severe obesity (BMI 35.0-39.9) with comorbidity    Diarrhea    Abdominal pain    Dysphagia    Acute renal failure, unspecified acute renal failure type       Dear Susanna Johnson APRN:    Subjective     History of Present Illness:    Chief Complaint   Patient presents with    [facility]     CHANGE OF CARE       Matthew Madrid is a pleasant 48 y.o. male with a past medical history significant for  Coronary artery disease with history of acute NSTEMI on 9/2/2021 with subsequent left heart catheterization revealing a proximal RCA lesion of 60%, mid RCA 70%, and distal 95% lesion PCI was performed to the distal RCA with 0% residual stenosis with JODI flow 3.  He also has history of diabetes mellitus, CKD stage III, essential hypertension, dyslipidemia.  He denies history of tobacco abuse.  He comes in today for cardiology follow-up.    Mr. Madrid reports that he has been having some chest pains that occur 1-2 times weekly.  He reports that this occurs in the left upper area of his chest radiating down into his arm.  He describes this pain as a ache type pain and reports that over the last for roughly 10 to 15 minutes in duration.  He does report that this pain does come on at rest. He does also report that he has been getting short of breath with exertion.  He does deny any orthopnea, PND, or pedal edema.  Reviewing chart he did have intolerance to atorvastatin 40 mg with elevated CK levels.  He reports he has never been tried on lower intensity or lower dose of statin prior to this.    Allergies   Allergen Reactions    Tuberculin Tests  Rash   :      Current Outpatient Medications:     albuterol sulfate  (90 Base) MCG/ACT inhaler, Inhale 2 puffs As Needed for Wheezing or Shortness of Air., Disp: , Rfl:     allopurinol (ZYLOPRIM) 300 MG tablet, Take 1 tablet by mouth Daily., Disp: , Rfl:     ALPRAZolam (XANAX) 0.5 MG tablet, Take 1 tablet by mouth 3 (Three) Times a Day As Needed for Anxiety., Disp: , Rfl:     aspirin 81 MG EC tablet, Take 1 tablet by mouth Daily., Disp: 90 tablet, Rfl: 3    clotrimazole-betamethasone (LOTRISONE) 1-0.05 % cream, Apply 1 application  topically to the appropriate area as directed 2 (Two) Times a Day., Disp: , Rfl:     ferrous sulfate 325 (65 FE) MG tablet, Take 1 tablet by mouth 2 (Two) Times a Day., Disp: , Rfl:     FLUoxetine (PROzac) 20 MG capsule, Take 2 capsules by mouth Daily., Disp: , Rfl:     gabapentin (NEURONTIN) 800 MG tablet, Take 1 tablet by mouth 3 (Three) Times a Day., Disp: , Rfl:     insulin NPH (humuLIN N,novoLIN N) 100 UNIT/ML injection, Inject 15 Units under the skin into the appropriate area as directed 2 (Two) Times a Day Before Meals., Disp: , Rfl: 12    isosorbide mononitrate (IMDUR) 120 MG 24 hr tablet, Take 1 tablet by mouth Daily., Disp: 90 tablet, Rfl: 3    levocetirizine (XYZAL) 5 MG tablet, Take 1 tablet by mouth Every Evening., Disp: , Rfl:     pantoprazole (Protonix) 40 MG EC tablet, Take 1 tablet by mouth Daily., Disp: 30 tablet, Rfl: 11    promethazine (PHENERGAN) 12.5 MG tablet, Take 1 tablet by mouth Every 6 (Six) Hours As Needed for Nausea or Vomiting., Disp: , Rfl:     QUEtiapine XR (SEROquel XR) 200 MG 24 hr tablet, Take 2 tablets by mouth Every Night., Disp: , Rfl:     ranolazine (RANEXA) 500 MG 12 hr tablet, Take 1 tablet by mouth Every 12 (Twelve) Hours., Disp: 60 tablet, Rfl: 0    ticagrelor (BRILINTA) 90 MG tablet tablet, 1 tablet 2 (Two) Times a Day., Disp: , Rfl:     vitamin D (ERGOCALCIFEROL) 1.25 MG (33769 UT) capsule capsule, Take 1 capsule by mouth 1 (One) Time  "Per Week., Disp: 5 capsule, Rfl: 3    amLODIPine (NORVASC) 5 MG tablet, Take 1 tablet by mouth Daily. (Patient not taking: Reported on 2023), Disp: 30 tablet, Rfl: 11    carvedilol (COREG) 6.25 MG tablet, Take 1 tablet by mouth 2 (Two) Times a Day., Disp: 60 tablet, Rfl: 11    pravastatin (PRAVACHOL) 10 MG tablet, Take 1 tablet by mouth Daily., Disp: 30 tablet, Rfl: 2    The following portions of the patient's history were reviewed and updated as appropriate: allergies, current medications, past family history, past medical history, past social history, past surgical history and problem list.    Social History     Tobacco Use    Smoking status: Former     Types: Cigarettes     Quit date:      Years since quittin.    Smokeless tobacco: Never   Vaping Use    Vaping Use: Never used   Substance Use Topics    Alcohol use: Yes     Alcohol/week: 6.0 standard drinks     Types: 6 Glasses of wine per week     Comment: Occasional    Drug use: Never         Objective   Vitals:    23 0838   BP: 140/81   BP Location: Right arm   Patient Position: Sitting   Cuff Size: Adult   Pulse: 65   Resp: 18   SpO2: 96%   Weight: 112 kg (246 lb)   Height: 167.6 cm (66\")     Body mass index is 39.71 kg/mý.    Constitutional:       General: Not in acute distress.     Appearance: Healthy appearance. Well-developed and not in distress. Not diaphoretic.   Eyes:      Conjunctiva/sclera: Conjunctivae normal.      Pupils: Pupils are equal, round, and reactive to light.   HENT:      Head: Normocephalic and atraumatic.   Neck:      Vascular: No carotid bruit or JVD.   Pulmonary:      Effort: Pulmonary effort is normal. No respiratory distress.      Breath sounds: Normal breath sounds.   Cardiovascular:      Normal rate. Regular rhythm.   Edema:     Pretibial: bilateral trace edema of the pretibial area.     Ankle: bilateral trace edema of the ankle.     Feet: bilateral trace edema of the feet.  Skin:     General: Skin is cool. "   Neurological:      Mental Status: Alert, oriented to person, place, and time and oriented to person, place and time.       Lab Results   Component Value Date     07/25/2023    K 5.0 07/25/2023     (H) 07/25/2023    CO2 21.0 (L) 07/25/2023    BUN 28 (H) 07/25/2023    CREATININE 1.50 (H) 07/25/2023    GLUCOSE 138 (H) 07/25/2023    CALCIUM 8.5 (L) 07/25/2023    AST 12 07/25/2023    ALT 22 07/25/2023    ALKPHOS 65 07/25/2023     Lab Results   Component Value Date    CKTOTAL 67 07/25/2023    CKTOTAL 68 07/25/2023     Lab Results   Component Value Date    WBC 5.27 07/25/2023    HGB 11.4 (L) 07/25/2023    HCT 34.0 (L) 07/25/2023     07/25/2023     Lab Results   Component Value Date    INR 1.06 09/02/2021     Lab Results   Component Value Date    MG 1.6 07/25/2023     Lab Results   Component Value Date    TSH 2.890 12/29/2021    TRIG 201 (H) 03/27/2023    HDL 29 (L) 03/27/2023    LDL 34 03/27/2023      No results found for: BNP    During this visit the following were done:  Labs Reviewed []    Labs Ordered []    Radiology Reports Reviewed []    Radiology Ordered []    PCP Records Reviewed []    Referring Provider Records Reviewed []    ER Records Reviewed []    Hospital Records Reviewed []    History Obtained From Family []    Radiology Images Reviewed []    Other Reviewed []    Records Requested []       Procedures    Assessment & Plan    Diagnosis Plan   1. ASCVD (arteriosclerotic cardiovascular disease)  isosorbide mononitrate (IMDUR) 120 MG 24 hr tablet    Comprehensive Metabolic Panel    CK      2. Essential hypertension        3. Dyslipidemia                 Recommendations:  ASCVD with precordial pain  Reviewed stress test with the patient since it did show normal myocardial perfusion I did recommend GDMT for now.  However he did have known mid 70% stenosis in the RCA so I did explain to him if he continues to have persistent chest pains despite GDMT may consider coronary angiography.  He  expressed understanding.  I will increase Imdur to 120 mg.  I am also going to change his metoprolol to carvedilol 6.25 mg both for better blood pressure control and hopefully better antianginal effect.  Reviewing chart he did have elevated CK levels with atorvastatin 40 his cholesterol is excellently controlled however I do think he would still benefit from statin therapy.  I will try much lower intensity statin at a lower dose.  I will start him on pravastatin 10 mg  Recheck CK level and CMP in 2 weeks.  Continue aspirin, Brilinta, Ranexa.  Essential hypertension  He did bring in a blood pressure log which showed elevated blood pressure.  Hopefully will have better blood pressure effect with addition of carvedilol.    Return in about 4 weeks (around 9/21/2023).    As always, I appreciate very much the opportunity to participate in the cardiovascular care of your patients.      With Best Regards,    Bhupinder Graham PA-C

## 2023-09-01 ENCOUNTER — HOSPITAL ENCOUNTER (OUTPATIENT)
Dept: MRI IMAGING | Facility: HOSPITAL | Age: 48
Discharge: HOME | End: 2023-09-01
Payer: MEDICARE

## 2023-09-01 ENCOUNTER — LAB (OUTPATIENT)
Dept: LAB | Facility: HOSPITAL | Age: 48
End: 2023-09-01
Payer: MEDICARE

## 2023-09-01 ENCOUNTER — TRANSCRIBE ORDERS (OUTPATIENT)
Dept: ADMINISTRATIVE | Facility: HOSPITAL | Age: 48
End: 2023-09-01
Payer: MEDICARE

## 2023-09-01 DIAGNOSIS — N18.31 CHRONIC KIDNEY DISEASE (CKD) STAGE G3A/A1, MODERATELY DECREASED GLOMERULAR FILTRATION RATE (GFR) BETWEEN 45-59 ML/MIN/1.73 SQUARE METER AND ALBUMINURIA CREATININE RATIO LESS THAN 30 MG/G (CMS/H*: ICD-10-CM

## 2023-09-01 DIAGNOSIS — I25.10 ASCVD (ARTERIOSCLEROTIC CARDIOVASCULAR DISEASE): Chronic | ICD-10-CM

## 2023-09-01 DIAGNOSIS — N18.31 CHRONIC KIDNEY DISEASE (CKD) STAGE G3A/A1, MODERATELY DECREASED GLOMERULAR FILTRATION RATE (GFR) BETWEEN 45-59 ML/MIN/1.73 SQUARE METER AND ALBUMINURIA CREATININE RATIO LESS THAN 30 MG/G (CMS/H*: Primary | ICD-10-CM

## 2023-09-01 DIAGNOSIS — M54.16 LUMBAR RADICULOPATHY: ICD-10-CM

## 2023-09-01 LAB
ALBUMIN SERPL-MCNC: 4.2 G/DL (ref 3.5–5.2)
ALBUMIN UR-MCNC: 26.6 MG/DL
ALBUMIN/GLOB SERPL: 1.7 G/DL
ALP SERPL-CCNC: 68 U/L (ref 39–117)
ALT SERPL W P-5'-P-CCNC: 20 U/L (ref 1–41)
ANION GAP SERPL CALCULATED.3IONS-SCNC: 9.8 MMOL/L (ref 5–15)
AST SERPL-CCNC: 12 U/L (ref 1–40)
BACTERIA UR QL AUTO: NORMAL /HPF
BILIRUB SERPL-MCNC: 0.4 MG/DL (ref 0–1.2)
BILIRUB UR QL STRIP: NEGATIVE
BUN SERPL-MCNC: 22 MG/DL (ref 6–20)
BUN/CREAT SERPL: 15.3 (ref 7–25)
C3 SERPL-MCNC: 163 MG/DL (ref 82–167)
C4 SERPL-MCNC: 29 MG/DL (ref 14–44)
CALCIUM SPEC-SCNC: 8.8 MG/DL (ref 8.6–10.5)
CHLORIDE SERPL-SCNC: 111 MMOL/L (ref 98–107)
CK SERPL-CCNC: 109 U/L (ref 20–200)
CLARITY UR: CLEAR
CO2 SERPL-SCNC: 21.2 MMOL/L (ref 22–29)
COLOR UR: YELLOW
CREAT SERPL-MCNC: 1.44 MG/DL (ref 0.76–1.27)
CREAT UR-MCNC: 36.9 MG/DL
CREAT UR-MCNC: 36.9 MG/DL
EGFRCR SERPLBLD CKD-EPI 2021: 59.9 ML/MIN/1.73
GLOBULIN UR ELPH-MCNC: 2.5 GM/DL
GLUCOSE SERPL-MCNC: 165 MG/DL (ref 65–99)
GLUCOSE UR STRIP-MCNC: NEGATIVE MG/DL
HBV SURFACE AG SERPL QL IA: NORMAL
HCV AB SER DONR QL: NORMAL
HGB UR QL STRIP.AUTO: NEGATIVE
HYALINE CASTS UR QL AUTO: NORMAL /LPF
KETONES UR QL STRIP: NEGATIVE
LEUKOCYTE ESTERASE UR QL STRIP.AUTO: NEGATIVE
MICROALBUMIN/CREAT UR: 720.9 MG/G
NITRITE UR QL STRIP: NEGATIVE
PH UR STRIP.AUTO: 6 [PH] (ref 5–8)
POTASSIUM SERPL-SCNC: 4.1 MMOL/L (ref 3.5–5.2)
PROT ?TM UR-MCNC: 40.1 MG/DL
PROT SERPL-MCNC: 6.7 G/DL (ref 6–8.5)
PROT UR QL STRIP: ABNORMAL
PROT/CREAT UR: 1086.7 MG/G CREA (ref 0–200)
RBC # UR STRIP: NORMAL /HPF
REF LAB TEST METHOD: NORMAL
SODIUM SERPL-SCNC: 142 MMOL/L (ref 136–145)
SP GR UR STRIP: 1.01 (ref 1–1.03)
SQUAMOUS #/AREA URNS HPF: NORMAL /HPF
UROBILINOGEN UR QL STRIP: ABNORMAL
WBC # UR STRIP: NORMAL /HPF

## 2023-09-01 PROCEDURE — 86037 ANCA TITER EACH ANTIBODY: CPT

## 2023-09-01 PROCEDURE — 87340 HEPATITIS B SURFACE AG IA: CPT

## 2023-09-01 PROCEDURE — 86160 COMPLEMENT ANTIGEN: CPT

## 2023-09-01 PROCEDURE — 82550 ASSAY OF CK (CPK): CPT

## 2023-09-01 PROCEDURE — 83516 IMMUNOASSAY NONANTIBODY: CPT

## 2023-09-01 PROCEDURE — 84156 ASSAY OF PROTEIN URINE: CPT

## 2023-09-01 PROCEDURE — 82784 ASSAY IGA/IGD/IGG/IGM EACH: CPT

## 2023-09-01 PROCEDURE — 36415 COLL VENOUS BLD VENIPUNCTURE: CPT

## 2023-09-01 PROCEDURE — 72148 MRI LUMBAR SPINE W/O DYE: CPT

## 2023-09-01 PROCEDURE — 86334 IMMUNOFIX E-PHORESIS SERUM: CPT

## 2023-09-01 PROCEDURE — 82570 ASSAY OF URINE CREATININE: CPT

## 2023-09-01 PROCEDURE — 86803 HEPATITIS C AB TEST: CPT

## 2023-09-01 PROCEDURE — 84165 PROTEIN E-PHORESIS SERUM: CPT

## 2023-09-01 PROCEDURE — 82043 UR ALBUMIN QUANTITATIVE: CPT

## 2023-09-01 PROCEDURE — 80053 COMPREHEN METABOLIC PANEL: CPT

## 2023-09-01 PROCEDURE — 86225 DNA ANTIBODY NATIVE: CPT

## 2023-09-01 PROCEDURE — 86162 COMPLEMENT TOTAL (CH50): CPT

## 2023-09-01 PROCEDURE — 81001 URINALYSIS AUTO W/SCOPE: CPT

## 2023-09-01 PROCEDURE — 86038 ANTINUCLEAR ANTIBODIES: CPT

## 2023-09-05 ENCOUNTER — LAB (OUTPATIENT)
Dept: LAB | Facility: HOSPITAL | Age: 48
End: 2023-09-05
Payer: MEDICARE

## 2023-09-05 DIAGNOSIS — N18.31 CHRONIC KIDNEY DISEASE (CKD) STAGE G3A/A1, MODERATELY DECREASED GLOMERULAR FILTRATION RATE (GFR) BETWEEN 45-59 ML/MIN/1.73 SQUARE METER AND ALBUMINURIA CREATININE RATIO LESS THAN 30 MG/G (CMS/H*: ICD-10-CM

## 2023-09-05 LAB
ALBUMIN SERPL ELPH-MCNC: 3 G/DL (ref 2.9–4.4)
ALBUMIN/GLOB SERPL: 1.1 {RATIO} (ref 0.7–1.7)
ALPHA1 GLOB SERPL ELPH-MCNC: 0.3 G/DL (ref 0–0.4)
ALPHA2 GLOB SERPL ELPH-MCNC: 0.9 G/DL (ref 0.4–1)
ANA SER QL: NEGATIVE
B-GLOBULIN SERPL ELPH-MCNC: 1.1 G/DL (ref 0.7–1.3)
C-ANCA TITR SER IF: NORMAL TITER
CH50 SERPL-ACNC: >60 U/ML
COLLECT DURATION TIME UR: 24 HRS
DSDNA AB SER-ACNC: <1 IU/ML (ref 0–9)
GAMMA GLOB SERPL ELPH-MCNC: 0.8 G/DL (ref 0.4–1.8)
GBM AB SER IA-ACNC: <0.2 UNITS (ref 0–0.9)
GLOBULIN SER-MCNC: 3 G/DL (ref 2.2–3.9)
IGA SERPL-MCNC: 178 MG/DL (ref 90–386)
IGG SERPL-MCNC: 840 MG/DL (ref 603–1613)
IGM SERPL-MCNC: 28 MG/DL (ref 20–172)
INTERPRETATION SERPL IEP-IMP: NORMAL
LABORATORY COMMENT REPORT: NORMAL
M PROTEIN SERPL ELPH-MCNC: NORMAL G/DL
MYELOPEROXIDASE AB SER IA-ACNC: <0.2 UNITS (ref 0–0.9)
P-ANCA ATYPICAL TITR SER IF: NORMAL TITER
P-ANCA TITR SER IF: NORMAL TITER
PROT 24H UR-MRATE: 835.2 MG/24HOURS (ref 0–150)
PROT SERPL-MCNC: 6 G/DL (ref 6–8.5)
PROTEINASE3 AB SER IA-ACNC: <0.2 UNITS (ref 0–0.9)
SPECIMEN VOL 24H UR: 1200 ML

## 2023-09-05 PROCEDURE — 84156 ASSAY OF PROTEIN URINE: CPT

## 2023-09-05 PROCEDURE — 81050 URINALYSIS VOLUME MEASURE: CPT

## 2023-09-07 ENCOUNTER — APPOINTMENT (OUTPATIENT)
Dept: GENERAL RADIOLOGY | Facility: HOSPITAL | Age: 48
End: 2023-09-07
Payer: MEDICARE

## 2023-09-07 ENCOUNTER — HOSPITAL ENCOUNTER (EMERGENCY)
Facility: HOSPITAL | Age: 48
Discharge: HOME | End: 2023-09-07
Attending: STUDENT IN AN ORGANIZED HEALTH CARE EDUCATION/TRAINING PROGRAM
Payer: MEDICARE

## 2023-09-07 VITALS
HEIGHT: 66 IN | HEART RATE: 66 BPM | WEIGHT: 260 LBS | DIASTOLIC BLOOD PRESSURE: 85 MMHG | SYSTOLIC BLOOD PRESSURE: 134 MMHG | BODY MASS INDEX: 41.78 KG/M2 | RESPIRATION RATE: 17 BRPM | OXYGEN SATURATION: 98 % | TEMPERATURE: 97.8 F

## 2023-09-07 DIAGNOSIS — N17.9 ACUTE KIDNEY INJURY: ICD-10-CM

## 2023-09-07 DIAGNOSIS — E83.42 HYPOMAGNESEMIA: ICD-10-CM

## 2023-09-07 DIAGNOSIS — R07.9 CHEST PAIN, UNSPECIFIED TYPE: Primary | ICD-10-CM

## 2023-09-07 LAB
ALBUMIN SERPL-MCNC: 3.7 G/DL (ref 3.5–5.2)
ALBUMIN/GLOB SERPL: 1.2 G/DL
ALP SERPL-CCNC: 76 U/L (ref 39–117)
ALT SERPL W P-5'-P-CCNC: 22 U/L (ref 1–41)
ANION GAP SERPL CALCULATED.3IONS-SCNC: 10.6 MMOL/L (ref 5–15)
AST SERPL-CCNC: 14 U/L (ref 1–40)
BASOPHILS # BLD AUTO: 0.04 10*3/MM3 (ref 0–0.2)
BASOPHILS NFR BLD AUTO: 0.6 % (ref 0–1.5)
BILIRUB SERPL-MCNC: 0.3 MG/DL (ref 0–1.2)
BUN SERPL-MCNC: 35 MG/DL (ref 6–20)
BUN/CREAT SERPL: 20.7 (ref 7–25)
CALCIUM SPEC-SCNC: 8.5 MG/DL (ref 8.6–10.5)
CHLORIDE SERPL-SCNC: 105 MMOL/L (ref 98–107)
CO2 SERPL-SCNC: 23.4 MMOL/L (ref 22–29)
CREAT SERPL-MCNC: 1.69 MG/DL (ref 0.76–1.27)
D DIMER PPP FEU-MCNC: <0.27 MCGFEU/ML (ref 0–0.5)
DEPRECATED RDW RBC AUTO: 46.5 FL (ref 37–54)
EGFRCR SERPLBLD CKD-EPI 2021: 49.5 ML/MIN/1.73
EOSINOPHIL # BLD AUTO: 0.03 10*3/MM3 (ref 0–0.4)
EOSINOPHIL NFR BLD AUTO: 0.5 % (ref 0.3–6.2)
ERYTHROCYTE [DISTWIDTH] IN BLOOD BY AUTOMATED COUNT: 13 % (ref 12.3–15.4)
FLUAV RNA RESP QL NAA+PROBE: NOT DETECTED
FLUBV RNA RESP QL NAA+PROBE: NOT DETECTED
GEN 5 2HR TROPONIN T REFLEX: 18 NG/L
GLOBULIN UR ELPH-MCNC: 3 GM/DL
GLUCOSE SERPL-MCNC: 322 MG/DL (ref 65–99)
HCT VFR BLD AUTO: 31.8 % (ref 37.5–51)
HGB BLD-MCNC: 10.7 G/DL (ref 13–17.7)
HOLD SPECIMEN: NORMAL
HOLD SPECIMEN: NORMAL
IMM GRANULOCYTES # BLD AUTO: 0.06 10*3/MM3 (ref 0–0.05)
IMM GRANULOCYTES NFR BLD AUTO: 0.9 % (ref 0–0.5)
LYMPHOCYTES # BLD AUTO: 0.93 10*3/MM3 (ref 0.7–3.1)
LYMPHOCYTES NFR BLD AUTO: 14.7 % (ref 19.6–45.3)
MAGNESIUM SERPL-MCNC: 1.3 MG/DL (ref 1.6–2.6)
MCH RBC QN AUTO: 32.7 PG (ref 26.6–33)
MCHC RBC AUTO-ENTMCNC: 33.6 G/DL (ref 31.5–35.7)
MCV RBC AUTO: 97.2 FL (ref 79–97)
MONOCYTES # BLD AUTO: 0.48 10*3/MM3 (ref 0.1–0.9)
MONOCYTES NFR BLD AUTO: 7.6 % (ref 5–12)
NEUTROPHILS NFR BLD AUTO: 4.79 10*3/MM3 (ref 1.7–7)
NEUTROPHILS NFR BLD AUTO: 75.7 % (ref 42.7–76)
NRBC BLD AUTO-RTO: 0 /100 WBC (ref 0–0.2)
NT-PROBNP SERPL-MCNC: 900.9 PG/ML (ref 0–450)
PLATELET # BLD AUTO: 158 10*3/MM3 (ref 140–450)
PMV BLD AUTO: 10.3 FL (ref 6–12)
POTASSIUM SERPL-SCNC: 4.6 MMOL/L (ref 3.5–5.2)
PROT SERPL-MCNC: 6.7 G/DL (ref 6–8.5)
RBC # BLD AUTO: 3.27 10*6/MM3 (ref 4.14–5.8)
SARS-COV-2 RNA RESP QL NAA+PROBE: NOT DETECTED
SODIUM SERPL-SCNC: 139 MMOL/L (ref 136–145)
TROPONIN T DELTA: -1 NG/L
TROPONIN T SERPL HS-MCNC: 19 NG/L
TSH SERPL DL<=0.05 MIU/L-ACNC: 0.85 UIU/ML (ref 0.27–4.2)
WBC NRBC COR # BLD: 6.33 10*3/MM3 (ref 3.4–10.8)
WHOLE BLOOD HOLD COAG: NORMAL
WHOLE BLOOD HOLD SPECIMEN: NORMAL

## 2023-09-07 PROCEDURE — 96366 THER/PROPH/DIAG IV INF ADDON: CPT

## 2023-09-07 PROCEDURE — 71045 X-RAY EXAM CHEST 1 VIEW: CPT | Performed by: RADIOLOGY

## 2023-09-07 PROCEDURE — 85379 FIBRIN DEGRADATION QUANT: CPT | Performed by: PHYSICIAN ASSISTANT

## 2023-09-07 PROCEDURE — 85025 COMPLETE CBC W/AUTO DIFF WBC: CPT | Performed by: PHYSICIAN ASSISTANT

## 2023-09-07 PROCEDURE — 96375 TX/PRO/DX INJ NEW DRUG ADDON: CPT

## 2023-09-07 PROCEDURE — 83735 ASSAY OF MAGNESIUM: CPT | Performed by: PHYSICIAN ASSISTANT

## 2023-09-07 PROCEDURE — 83880 ASSAY OF NATRIURETIC PEPTIDE: CPT | Performed by: PHYSICIAN ASSISTANT

## 2023-09-07 PROCEDURE — 80053 COMPREHEN METABOLIC PANEL: CPT | Performed by: PHYSICIAN ASSISTANT

## 2023-09-07 PROCEDURE — 96365 THER/PROPH/DIAG IV INF INIT: CPT

## 2023-09-07 PROCEDURE — 93005 ELECTROCARDIOGRAM TRACING: CPT | Performed by: PHYSICIAN ASSISTANT

## 2023-09-07 PROCEDURE — 71045 X-RAY EXAM CHEST 1 VIEW: CPT

## 2023-09-07 PROCEDURE — 99284 EMERGENCY DEPT VISIT MOD MDM: CPT

## 2023-09-07 PROCEDURE — 25010000002 FUROSEMIDE PER 20 MG: Performed by: PHYSICIAN ASSISTANT

## 2023-09-07 PROCEDURE — 84484 ASSAY OF TROPONIN QUANT: CPT | Performed by: PHYSICIAN ASSISTANT

## 2023-09-07 PROCEDURE — 84443 ASSAY THYROID STIM HORMONE: CPT | Performed by: PHYSICIAN ASSISTANT

## 2023-09-07 PROCEDURE — 87636 SARSCOV2 & INF A&B AMP PRB: CPT | Performed by: PHYSICIAN ASSISTANT

## 2023-09-07 PROCEDURE — 25010000002 MAGNESIUM SULFATE 2 GM/50ML SOLUTION: Performed by: PHYSICIAN ASSISTANT

## 2023-09-07 PROCEDURE — 36415 COLL VENOUS BLD VENIPUNCTURE: CPT

## 2023-09-07 RX ORDER — MAGNESIUM SULFATE HEPTAHYDRATE 40 MG/ML
2 INJECTION, SOLUTION INTRAVENOUS ONCE
Status: COMPLETED | OUTPATIENT
Start: 2023-09-07 | End: 2023-09-07

## 2023-09-07 RX ORDER — FUROSEMIDE 10 MG/ML
80 INJECTION INTRAMUSCULAR; INTRAVENOUS ONCE
Status: COMPLETED | OUTPATIENT
Start: 2023-09-07 | End: 2023-09-07

## 2023-09-07 RX ORDER — ASPIRIN 81 MG/1
324 TABLET, CHEWABLE ORAL ONCE
Status: COMPLETED | OUTPATIENT
Start: 2023-09-07 | End: 2023-09-07

## 2023-09-07 RX ORDER — MAGNESIUM OXIDE 400 MG/1
400 TABLET ORAL 2 TIMES DAILY
Qty: 60 TABLET | Refills: 0 | Status: SHIPPED | OUTPATIENT
Start: 2023-09-07

## 2023-09-07 RX ADMIN — FUROSEMIDE 80 MG: 10 INJECTION, SOLUTION INTRAMUSCULAR; INTRAVENOUS at 17:25

## 2023-09-07 RX ADMIN — MAGNESIUM SULFATE HEPTAHYDRATE 2 G: 40 INJECTION, SOLUTION INTRAVENOUS at 18:11

## 2023-09-07 RX ADMIN — ASPIRIN 324 MG: 81 TABLET, CHEWABLE ORAL at 16:40

## 2023-09-07 NOTE — ED PROVIDER NOTES
Subjective   History of Present Illness  48-year-old male with past medical history of diabetes, hypercholesterolemia, GERD, hypertension, and hyperlipidemia presents to the emergency room with chest pain and shortness of breath for the past 1 week.  Patient states that the pain that he is experiencing in his chest feels like pressure.  He denies chest tightness or squeezing.  He also reports that he has noticed swelling around his ankles and his primary care physician wrote him Bumex 0.5 mg twice daily, however states he does not think it is helping.  Aggravating factors include exertion.  Denies any alleviating factors.  Denies any other complaints or concerns at this time.    History provided by:  Patient   used: No      Review of Systems   Constitutional: Negative.  Negative for fever.   HENT: Negative.     Respiratory:  Positive for shortness of breath.    Cardiovascular:  Positive for chest pain and leg swelling.   Gastrointestinal: Negative.  Negative for abdominal pain.   Endocrine: Negative.    Genitourinary: Negative.  Negative for dysuria.   Skin: Negative.    Neurological: Negative.    Psychiatric/Behavioral: Negative.     All other systems reviewed and are negative.    Past Medical History:   Diagnosis Date    Diabetes mellitus     Elevated cholesterol     GERD (gastroesophageal reflux disease)     Hyperlipidemia     Hypertension        Allergies   Allergen Reactions    Tuberculin Tests Rash       Past Surgical History:   Procedure Laterality Date    CARDIAC CATHETERIZATION N/A 09/02/2021    Procedure: Left Heart Cath;  Surgeon: Vasile Moulton MD;  Location: [facility] INVASIVE LOCATION;  Service: Cardiology;  Laterality: N/A;    CARDIAC CATHETERIZATION N/A 09/02/2021    Procedure: Percutaneous Coronary Intervention;  Surgeon: Vasile Moulton MD;  Location: [facility] CATH INVASIVE LOCATION;  Service: Cardiology;  Laterality: N/A;    COLONOSCOPY  2013    COLONOSCOPY N/A 10/4/2022     Procedure: COLONOSCOPY;  Surgeon: Gianluca Rodríguez MD;  Location: [facility] OR;  Service: Gastroenterology;  Laterality: N/A;    ENDOSCOPY  2013    ENDOSCOPY N/A 10/4/2022    Procedure: ESOPHAGOGASTRODUODENOSCOPY;  Surgeon: Gianluca Rodríguez MD;  Location: [facility] OR;  Service: Gastroenterology;  Laterality: N/A;    LAPAROSCOPIC CHOLECYSTECTOMY         Family History   Problem Relation Age of Onset    Hyperlipidemia Mother     Hyperlipidemia Father     Heart attack Paternal Uncle        Social History     Socioeconomic History    Marital status:    Tobacco Use    Smoking status: Former     Types: Cigarettes     Quit date:      Years since quittin.6    Smokeless tobacco: Never   Vaping Use    Vaping Use: Never used   Substance and Sexual Activity    Alcohol use: Yes     Alcohol/week: 6.0 standard drinks     Types: 6 Glasses of wine per week     Comment: Occasional    Drug use: Never    Sexual activity: Defer           Objective   Physical Exam  Vitals and nursing note reviewed.   Constitutional:       General: He is not in acute distress.     Appearance: He is well-developed. He is obese. He is not diaphoretic.   HENT:      Head: Normocephalic and atraumatic.      Right Ear: External ear normal.      Left Ear: External ear normal.      Nose: Nose normal.   Eyes:      Conjunctiva/sclera: Conjunctivae normal.      Pupils: Pupils are equal, round, and reactive to light.   Neck:      Vascular: No JVD.      Trachea: No tracheal deviation.   Cardiovascular:      Rate and Rhythm: Normal rate and regular rhythm.      Heart sounds: Normal heart sounds. No murmur heard.  Pulmonary:      Effort: Pulmonary effort is normal. No respiratory distress.      Breath sounds: Rales present. No wheezing.      Comments: mild  Abdominal:      General: Bowel sounds are normal.      Palpations: Abdomen is soft.      Tenderness: There is no abdominal tenderness.   Musculoskeletal:         General: No deformity. Normal range of  motion.      Cervical back: Normal range of motion and neck supple.      Right lower le+ Pitting Edema present.      Left lower le+ Pitting Edema present.   Skin:     General: Skin is warm and dry.      Coloration: Skin is not pale.      Findings: No erythema or rash.   Neurological:      Mental Status: He is alert and oriented to person, place, and time.      Cranial Nerves: No cranial nerve deficit.   Psychiatric:         Behavior: Behavior normal.         Thought Content: Thought content normal.       Procedures           ED Course  ED Course as of 23 1925   Thu Sep 07, 2023   1519 EKG notes sinus rhythm.  72 bpm.  I directly evaluated the EKG of this patient and noted no acute abnormalities.  Electronically signed by Vinayak Fan DO, 23, 3:19 PM EDT.   [SF]   1624 XR Chest 1 View [TK]   1718 Echocardiogram 2023:  Interpretation Summary     ·  Left ventricular systolic function is normal. Left ventricular ejection fraction appears to be 61 - 65%.  ·  Left ventricular diastolic function is consistent with (grade I) impaired relaxation.  ·  No significant valvular heart disease is present  ·  Mild pulmonary hypertension is present  ·  There is no evidence of pericardial effusion.   [TK]    Uneventful ED course. Pt chest pain free in the ED and states he actually feels better after IV Lasix and adequate urinary output. Patient with stress test and echo in July without significant abnormalities noted. [TK]      ED Course User Index  [SF] Vinayak Fan DO  [TK] Kylie Carranza, NEREYDA           HEART SCORE: 4    Results for orders placed or performed during the hospital encounter of 23   COVID-19 and FLU A/B PCR - Swab, Nasopharynx    Specimen: Nasopharynx; Swab   Result Value Ref Range    COVID19 Not Detected Not Detected - Ref. Range    Influenza A PCR Not Detected Not Detected    Influenza B PCR Not Detected Not Detected   Comprehensive Metabolic Panel    Specimen: Blood    Result Value Ref Range    Glucose 322 (H) 65 - 99 mg/dL    BUN 35 (H) 6 - 20 mg/dL    Creatinine 1.69 (H) 0.76 - 1.27 mg/dL    Sodium 139 136 - 145 mmol/L    Potassium 4.6 3.5 - 5.2 mmol/L    Chloride 105 98 - 107 mmol/L    CO2 23.4 22.0 - 29.0 mmol/L    Calcium 8.5 (L) 8.6 - 10.5 mg/dL    Total Protein 6.7 6.0 - 8.5 g/dL    Albumin 3.7 3.5 - 5.2 g/dL    ALT (SGPT) 22 1 - 41 U/L    AST (SGOT) 14 1 - 40 U/L    Alkaline Phosphatase 76 39 - 117 U/L    Total Bilirubin 0.3 0.0 - 1.2 mg/dL    Globulin 3.0 gm/dL    A/G Ratio 1.2 g/dL    BUN/Creatinine Ratio 20.7 7.0 - 25.0    Anion Gap 10.6 5.0 - 15.0 mmol/L    eGFR 49.5 (L) >60.0 mL/min/1.73   High Sensitivity Troponin T    Specimen: Blood   Result Value Ref Range    HS Troponin T 19 (H) <15 ng/L   BNP    Specimen: Blood   Result Value Ref Range    proBNP 900.9 (H) 0.0 - 450.0 pg/mL   TSH    Specimen: Blood   Result Value Ref Range    TSH 0.849 0.270 - 4.200 uIU/mL   CBC Auto Differential    Specimen: Blood   Result Value Ref Range    WBC 6.33 3.40 - 10.80 10*3/mm3    RBC 3.27 (L) 4.14 - 5.80 10*6/mm3    Hemoglobin 10.7 (L) 13.0 - 17.7 g/dL    Hematocrit 31.8 (L) 37.5 - 51.0 %    MCV 97.2 (H) 79.0 - 97.0 fL    MCH 32.7 26.6 - 33.0 pg    MCHC 33.6 31.5 - 35.7 g/dL    RDW 13.0 12.3 - 15.4 %    RDW-SD 46.5 37.0 - 54.0 fl    MPV 10.3 6.0 - 12.0 fL    Platelets 158 140 - 450 10*3/mm3    Neutrophil % 75.7 42.7 - 76.0 %    Lymphocyte % 14.7 (L) 19.6 - 45.3 %    Monocyte % 7.6 5.0 - 12.0 %    Eosinophil % 0.5 0.3 - 6.2 %    Basophil % 0.6 0.0 - 1.5 %    Immature Grans % 0.9 (H) 0.0 - 0.5 %    Neutrophils, Absolute 4.79 1.70 - 7.00 10*3/mm3    Lymphocytes, Absolute 0.93 0.70 - 3.10 10*3/mm3    Monocytes, Absolute 0.48 0.10 - 0.90 10*3/mm3    Eosinophils, Absolute 0.03 0.00 - 0.40 10*3/mm3    Basophils, Absolute 0.04 0.00 - 0.20 10*3/mm3    Immature Grans, Absolute 0.06 (H) 0.00 - 0.05 10*3/mm3    nRBC 0.0 0.0 - 0.2 /100 WBC   High Sensitivity Troponin T 2Hr    Specimen: Arm,  Right; Blood   Result Value Ref Range    HS Troponin T 18 (H) <15 ng/L    Troponin T Delta -1 >=-4 - <+4 ng/L   Magnesium    Specimen: Blood   Result Value Ref Range    Magnesium 1.3 (L) 1.6 - 2.6 mg/dL   D-dimer, Quantitative    Specimen: Blood   Result Value Ref Range    D-Dimer, Quantitative <0.27 0.00 - 0.50 MCGFEU/mL   ECG 12 Lead Chest Pain   Result Value Ref Range    QT Interval 414 ms    QTC Interval 453 ms   Green Top (Gel)   Result Value Ref Range    Extra Tube Hold for add-ons.    Lavender Top   Result Value Ref Range    Extra Tube hold for add-on    Gold Top - SST   Result Value Ref Range    Extra Tube Hold for add-ons.    Light Blue Top   Result Value Ref Range    Extra Tube Hold for add-ons.        XR Chest 1 View   Final Result     Unremarkable exam. No acute cardiopulmonary findings identified.       This report was finalized on 9/7/2023 4:18 PM by Dr. Ron Wharton MD.                                              Medical Decision Making  48-year-old male with past medical history of diabetes, hypercholesterolemia, GERD, hypertension, and hyperlipidemia presents to the emergency room with chest pain and shortness of breath for the past 1 week.  Patient states that the pain that he is experiencing in his chest feels like pressure.  He denies chest tightness or squeezing.  He also reports that he has noticed swelling around his ankles and his primary care physician wrote him Bumex 0.5 mg twice daily, however states he does not think it is helping.  Aggravating factors include exertion.  Denies any alleviating factors.  Denies any other complaints or concerns at this time.      Amount and/or Complexity of Data Reviewed  Labs: ordered. Decision-making details documented in ED Course.  Radiology: ordered. Decision-making details documented in ED Course.  ECG/medicine tests: ordered. Decision-making details documented in ED Course.    Risk  OTC drugs.        Final diagnoses:   Chest pain, unspecified type    Hypomagnesemia   Acute kidney injury       ED Disposition  ED Disposition       ED Disposition   Discharge    Condition   Stable    Comment   --               Susanna Johnsonbeth, APRN  [address]  [place] [id number] [phone]    In 2 days           Medication List        New Prescriptions      magnesium oxide 400 MG tablet  Commonly known as: MAG-OX  Take 1 tablet by mouth 2 (Two) Times a Day.               Where to Get Your Medications        These medications were sent to [address] [phone] [place] [id number] [place] [id number]      Phone: [phone]   magnesium oxide 400 MG tablet            Kylie Carranza PA-C  09/07/23 1624       Kylie Carranza PA-C  09/07/23 [id number]

## 2023-09-08 LAB
QT INTERVAL: 414 MS
QTC INTERVAL: 453 MS

## 2023-09-13 RX ORDER — PRAVASTATIN SODIUM 10 MG
TABLET ORAL
Qty: 30 TABLET | Refills: 0 | Status: SHIPPED | OUTPATIENT
Start: 2023-09-13 | End: 2023-09-18 | Stop reason: SDUPTHER

## 2023-09-18 ENCOUNTER — OFFICE VISIT (OUTPATIENT)
Dept: CARDIOLOGY | Facility: CLINIC | Age: 48
End: 2023-09-18
Payer: MEDICARE

## 2023-09-18 VITALS
SYSTOLIC BLOOD PRESSURE: 146 MMHG | OXYGEN SATURATION: 97 % | HEART RATE: 69 BPM | WEIGHT: 258.2 LBS | DIASTOLIC BLOOD PRESSURE: 79 MMHG | RESPIRATION RATE: 16 BRPM | BODY MASS INDEX: 41.5 KG/M2 | HEIGHT: 66 IN

## 2023-09-18 DIAGNOSIS — E78.5 DYSLIPIDEMIA: Chronic | ICD-10-CM

## 2023-09-18 DIAGNOSIS — I25.10 ASCVD (ARTERIOSCLEROTIC CARDIOVASCULAR DISEASE): Primary | Chronic | ICD-10-CM

## 2023-09-18 DIAGNOSIS — I10 ESSENTIAL HYPERTENSION: Chronic | ICD-10-CM

## 2023-09-18 PROCEDURE — 1160F RVW MEDS BY RX/DR IN RCRD: CPT | Performed by: PHYSICIAN ASSISTANT

## 2023-09-18 PROCEDURE — 99214 OFFICE O/P EST MOD 30 MIN: CPT | Performed by: PHYSICIAN ASSISTANT

## 2023-09-18 PROCEDURE — 3078F DIAST BP <80 MM HG: CPT | Performed by: PHYSICIAN ASSISTANT

## 2023-09-18 PROCEDURE — 3077F SYST BP >= 140 MM HG: CPT | Performed by: PHYSICIAN ASSISTANT

## 2023-09-18 PROCEDURE — 1159F MED LIST DOCD IN RCRD: CPT | Performed by: PHYSICIAN ASSISTANT

## 2023-09-18 RX ORDER — BUTALBITAL/ACETAMINOPHEN 50MG-325MG
TABLET ORAL EVERY 4 HOURS PRN
COMMUNITY

## 2023-09-18 RX ORDER — PRAVASTATIN SODIUM 10 MG
10 TABLET ORAL DAILY
Qty: 90 TABLET | Refills: 3 | Status: SHIPPED | OUTPATIENT
Start: 2023-09-18

## 2023-09-18 RX ORDER — CARVEDILOL 12.5 MG/1
12.5 TABLET ORAL 2 TIMES DAILY
Qty: 180 TABLET | Refills: 3 | Status: SHIPPED | OUTPATIENT
Start: 2023-09-18

## 2023-09-18 RX ORDER — ATORVASTATIN CALCIUM 40 MG/1
40 TABLET, FILM COATED ORAL DAILY
COMMUNITY
End: 2023-09-18

## 2023-09-18 RX ORDER — ISOSORBIDE MONONITRATE 120 MG/1
120 TABLET, EXTENDED RELEASE ORAL DAILY
Qty: 90 TABLET | Refills: 3 | Status: SHIPPED | OUTPATIENT
Start: 2023-09-18

## 2023-09-18 NOTE — PROGRESS NOTES
Susanna Johnson APRN  Matthew Madrid  1975  09/18/2023    Patient Active Problem List   Diagnosis    NSTEMI, initial episode of care    NSTEMI (non-ST elevated myocardial infarction)    ASCVD (arteriosclerotic cardiovascular disease)    Essential hypertension    Dyslipidemia    DM (diabetes mellitus), type 2 with complications    SOB (shortness of breath)    Severe obesity (BMI 35.0-39.9) with comorbidity    Diarrhea    Abdominal pain    Dysphagia    Acute renal failure, unspecified acute renal failure type       Dear Susanna Johnson, CARLOS:    Subjective     History of Present Illness:    Chief Complaint   Patient presents with    Coronary Artery Disease     3 week follow    cardiac clearance     Pain and Spine spinal inj s/p back pain    Med Management     List provided       Matthew Madrid is a pleasant 48 y.o. male with a past medical history significant for Coronary artery disease with history of acute NSTEMI on 9/2/2021 with subsequent left heart catheterization revealing a proximal RCA lesion of 60%, mid RCA 70%, and distal 95% lesion PCI was performed to the distal RCA with 0% residual stenosis with JODI flow 3.  He also has history of diabetes mellitus, CKD stage III, essential hypertension, dyslipidemia.  He denies history of tobacco abuse.  He comes in today for cardiology follow-up.     Mr. Madrid reports that he has been able to tolerate pravastatin that we initiated at last visit his CK levels have remained in a normal limit.  Since he was last seen he was seen in the emergency department for chest pains.  He did rule out for acute MI was found to have some pulmonary and fluid congestion was diuresed with subsequent improvement of his chest pains and also required magnesium replacement as he was hypomagnesemic.  He reports since his ER visit he has been chest pain-free.        Allergies   Allergen Reactions    Tuberculin Tests Rash   :      Current Outpatient Medications:      allopurinol (ZYLOPRIM) 300 MG tablet, Take 1 tablet by mouth Daily., Disp: , Rfl:     ALPRAZolam (XANAX) 0.5 MG tablet, Take 1 tablet by mouth 3 (Three) Times a Day As Needed for Anxiety., Disp: , Rfl:     aspirin 81 MG EC tablet, Take 1 tablet by mouth Daily., Disp: 90 tablet, Rfl: 3    carvedilol (COREG) 12.5 MG tablet, Take 1 tablet by mouth 2 (Two) Times a Day., Disp: 180 tablet, Rfl: 3    ferrous sulfate 325 (65 FE) MG tablet, Take 1 tablet by mouth 2 (Two) Times a Day., Disp: , Rfl:     FLUoxetine (PROzac) 20 MG capsule, Take 2 capsules by mouth Daily., Disp: , Rfl:     gabapentin (NEURONTIN) 800 MG tablet, Take 1 tablet by mouth 3 (Three) Times a Day., Disp: , Rfl:     insulin NPH (humuLIN N,novoLIN N) 100 UNIT/ML injection, Inject 15 Units under the skin into the appropriate area as directed 2 (Two) Times a Day Before Meals., Disp: , Rfl: 12    isosorbide mononitrate (IMDUR) 120 MG 24 hr tablet, Take 1 tablet by mouth Daily., Disp: 90 tablet, Rfl: 3    levocetirizine (XYZAL) 5 MG tablet, Take 1 tablet by mouth Every Evening., Disp: , Rfl:     magnesium oxide (MAG-OX) 400 MG tablet, Take 1 tablet by mouth 2 (Two) Times a Day., Disp: 60 tablet, Rfl: 0    pantoprazole (Protonix) 40 MG EC tablet, Take 1 tablet by mouth Daily., Disp: 30 tablet, Rfl: 11    pravastatin (PRAVACHOL) 10 MG tablet, Take 1 tablet by mouth Daily., Disp: 90 tablet, Rfl: 3    promethazine (PHENERGAN) 12.5 MG tablet, Take 1 tablet by mouth Every 6 (Six) Hours As Needed for Nausea or Vomiting., Disp: , Rfl:     QUEtiapine XR (SEROquel XR) 200 MG 24 hr tablet, Take 2 tablets by mouth Every Night., Disp: , Rfl:     ranolazine (RANEXA) 500 MG 12 hr tablet, Take 1 tablet by mouth Every 12 (Twelve) Hours., Disp: 60 tablet, Rfl: 0    ticagrelor (BRILINTA) 90 MG tablet tablet, Take 1 tablet by mouth 2 (Two) Times a Day., Disp: 180 tablet, Rfl: 3    vitamin D (ERGOCALCIFEROL) 1.25 MG (94603 UT) capsule capsule, Take 1 capsule by mouth 1 (One) Time  "Per Week., Disp: 5 capsule, Rfl: 3    albuterol sulfate  (90 Base) MCG/ACT inhaler, Inhale 2 puffs As Needed for Wheezing or Shortness of Air., Disp: , Rfl:     butalbital-acetaminophen  MG tablet tablet, Take  by mouth Every 4 (Four) Hours As Needed., Disp: , Rfl:     clotrimazole-betamethasone (LOTRISONE) 1-0.05 % cream, Apply 1 application  topically to the appropriate area as directed 2 (Two) Times a Day. (Patient not taking: Reported on 2023), Disp: , Rfl:     The following portions of the patient's history were reviewed and updated as appropriate: allergies, current medications, past family history, past medical history, past social history, past surgical history and problem list.    Social History     Tobacco Use    Smoking status: Former     Types: Cigarettes     Quit date:      Years since quittin.7    Smokeless tobacco: Never   Vaping Use    Vaping Use: Never used   Substance Use Topics    Alcohol use: Yes     Alcohol/week: 6.0 standard drinks     Types: 6 Glasses of wine per week     Comment: Occasional    Drug use: Never         Objective   Vitals:    23 1317   BP: 146/79   Pulse: 69   Resp: 16   SpO2: 97%   Weight: 117 kg (258 lb 3.2 oz)   Height: 167.6 cm (66\")     Body mass index is 41.67 kg/m².    Constitutional:       General: Not in acute distress.     Appearance: Healthy appearance. Well-developed and not in distress. Not diaphoretic.   Eyes:      Conjunctiva/sclera: Conjunctivae normal.      Pupils: Pupils are equal, round, and reactive to light.   HENT:      Head: Normocephalic and atraumatic.   Neck:      Vascular: No carotid bruit or JVD.   Pulmonary:      Effort: Pulmonary effort is normal. No respiratory distress.      Breath sounds: Normal breath sounds.   Cardiovascular:      Normal rate. Regular rhythm.   Edema:     Peripheral edema absent.   Skin:     General: Skin is cool.   Neurological:      Mental Status: Alert, oriented to person, place, and time and " oriented to person, place and time.       Lab Results   Component Value Date     09/07/2023    K 4.6 09/07/2023     09/07/2023    CO2 23.4 09/07/2023    BUN 35 (H) 09/07/2023    CREATININE 1.69 (H) 09/07/2023    GLUCOSE 322 (H) 09/07/2023    CALCIUM 8.5 (L) 09/07/2023    AST 14 09/07/2023    ALT 22 09/07/2023    ALKPHOS 76 09/07/2023    LABIL2 1.1 09/01/2023     Lab Results   Component Value Date    CKTOTAL 109 09/01/2023     Lab Results   Component Value Date    WBC 6.33 09/07/2023    HGB 10.7 (L) 09/07/2023    HCT 31.8 (L) 09/07/2023     09/07/2023     Lab Results   Component Value Date    INR 1.06 09/02/2021     Lab Results   Component Value Date    MG 1.3 (L) 09/07/2023     Lab Results   Component Value Date    TSH 0.849 09/07/2023    TRIG 201 (H) 03/27/2023    HDL 29 (L) 03/27/2023    LDL 34 03/27/2023      No results found for: BNP    During this visit the following were done:  Labs Reviewed []    Labs Ordered []    Radiology Reports Reviewed []    Radiology Ordered []    PCP Records Reviewed []    Referring Provider Records Reviewed []    ER Records Reviewed []    Hospital Records Reviewed []    History Obtained From Family []    Radiology Images Reviewed []    Other Reviewed []    Records Requested []       Procedures    Assessment & Plan    Diagnosis Plan   1. ASCVD (arteriosclerotic cardiovascular disease)  isosorbide mononitrate (IMDUR) 120 MG 24 hr tablet    CK    Magnesium      2. Essential hypertension        3. Dyslipidemia                 Recommendations:  ASCVD  Reviewed recent ER stay where he presented with chest pain subsequent was diuresed with resolution of chest pain.  He has had no recurrence since so we will try to continue with GDMT.  Continue with aspirin, Brilinta, carvedilol, Imdur, Ranexa, and pravastatin.  I did explain to him that if he has recurrent chest pains contact our office and we will consider repeating left heart catheterization in perhaps performing IFR on  other RCA stenosis seen in 2021.  Check cmp CK, Magnesium level.   Surgical risk evaluation for epidural injection  Discussed case with Dr. Etienne, can hold brilinta for 7 days given that his last stent was 2021. Recommend continue aspirin throughout if possible. Patient will be moderate cardiovascular risk given his history of CAD.     Return in about 6 weeks (around 10/30/2023).    As always, I appreciate very much the opportunity to participate in the cardiovascular care of your patients.      With Best Regards,    Bhupinder Graham PA-C

## 2023-10-12 ENCOUNTER — TRANSCRIBE ORDERS (OUTPATIENT)
Dept: ADMINISTRATIVE | Facility: HOSPITAL | Age: 48
End: 2023-10-12
Payer: MEDICARE

## 2023-10-12 ENCOUNTER — HOSPITAL ENCOUNTER (OUTPATIENT)
Dept: GENERAL RADIOLOGY | Facility: HOSPITAL | Age: 48
Discharge: HOME | End: 2023-10-12
Payer: MEDICARE

## 2023-10-12 DIAGNOSIS — M25.551 RIGHT HIP PAIN: ICD-10-CM

## 2023-10-12 DIAGNOSIS — M54.50 LOW BACK PAIN, UNSPECIFIED BACK PAIN LATERALITY, UNSPECIFIED CHRONICITY, UNSPECIFIED WHETHER SCIATICA PRESENT: Primary | ICD-10-CM

## 2023-10-12 DIAGNOSIS — M54.50 LOW BACK PAIN, UNSPECIFIED BACK PAIN LATERALITY, UNSPECIFIED CHRONICITY, UNSPECIFIED WHETHER SCIATICA PRESENT: ICD-10-CM

## 2023-10-12 PROCEDURE — 73502 X-RAY EXAM HIP UNI 2-3 VIEWS: CPT

## 2023-10-12 PROCEDURE — 72100 X-RAY EXAM L-S SPINE 2/3 VWS: CPT

## 2023-10-12 PROCEDURE — 72110 X-RAY EXAM L-2 SPINE 4/>VWS: CPT

## 2023-10-25 ENCOUNTER — EXTERNAL PBMM DATA (OUTPATIENT)
Dept: PHARMACY | Facility: OTHER | Age: 48
End: 2023-10-25
Payer: MEDICARE

## 2023-11-07 ENCOUNTER — LAB (OUTPATIENT)
Dept: LAB | Facility: HOSPITAL | Age: 48
End: 2023-11-07
Payer: MEDICARE

## 2023-11-07 ENCOUNTER — TRANSCRIBE ORDERS (OUTPATIENT)
Dept: ADMINISTRATIVE | Facility: HOSPITAL | Age: 48
End: 2023-11-07
Payer: MEDICARE

## 2023-11-07 DIAGNOSIS — E78.5 HYPERLIPIDEMIA, UNSPECIFIED HYPERLIPIDEMIA TYPE: ICD-10-CM

## 2023-11-07 DIAGNOSIS — M10.9 GOUT, UNSPECIFIED CAUSE, UNSPECIFIED CHRONICITY, UNSPECIFIED SITE: ICD-10-CM

## 2023-11-07 DIAGNOSIS — N18.31 CHRONIC KIDNEY DISEASE (CKD) STAGE G3A/A1, MODERATELY DECREASED GLOMERULAR FILTRATION RATE (GFR) BETWEEN 45-59 ML/MIN/1.73 SQUARE METER AND ALBUMINURIA CREATININE RATIO LESS THAN 30 MG/G (CMS/H*: ICD-10-CM

## 2023-11-07 DIAGNOSIS — N18.31 CHRONIC KIDNEY DISEASE (CKD) STAGE G3A/A1, MODERATELY DECREASED GLOMERULAR FILTRATION RATE (GFR) BETWEEN 45-59 ML/MIN/1.73 SQUARE METER AND ALBUMINURIA CREATININE RATIO LESS THAN 30 MG/G (CMS/H*: Primary | ICD-10-CM

## 2023-11-07 LAB
ALBUMIN SERPL-MCNC: 3.4 G/DL (ref 3.5–5.2)
ALBUMIN/GLOB SERPL: 1.2 G/DL
ALP SERPL-CCNC: 70 U/L (ref 39–117)
ALT SERPL W P-5'-P-CCNC: 17 U/L (ref 1–41)
ANION GAP SERPL CALCULATED.3IONS-SCNC: 14.1 MMOL/L (ref 5–15)
AST SERPL-CCNC: 17 U/L (ref 1–40)
BACTERIA UR QL AUTO: NORMAL /HPF
BILIRUB SERPL-MCNC: 0.3 MG/DL (ref 0–1.2)
BILIRUB UR QL STRIP: NEGATIVE
BUN SERPL-MCNC: 24 MG/DL (ref 6–20)
BUN/CREAT SERPL: 17.4 (ref 7–25)
CALCIUM SPEC-SCNC: 8 MG/DL (ref 8.6–10.5)
CHLORIDE SERPL-SCNC: 104 MMOL/L (ref 98–107)
CK SERPL-CCNC: 252 U/L (ref 20–200)
CLARITY UR: CLEAR
CO2 SERPL-SCNC: 20.9 MMOL/L (ref 22–29)
COLOR UR: YELLOW
CREAT SERPL-MCNC: 1.38 MG/DL (ref 0.76–1.27)
CREAT UR-MCNC: 69.1 MG/DL
EGFRCR SERPLBLD CKD-EPI 2021: 63.1 ML/MIN/1.73
GLOBULIN UR ELPH-MCNC: 2.9 GM/DL
GLUCOSE SERPL-MCNC: 196 MG/DL (ref 65–99)
GLUCOSE UR STRIP-MCNC: NEGATIVE MG/DL
HGB UR QL STRIP.AUTO: NEGATIVE
HOLD SPECIMEN: NORMAL
HYALINE CASTS UR QL AUTO: NORMAL /LPF
KETONES UR QL STRIP: NEGATIVE
LEUKOCYTE ESTERASE UR QL STRIP.AUTO: NEGATIVE
NITRITE UR QL STRIP: NEGATIVE
PH UR STRIP.AUTO: 6 [PH] (ref 5–8)
POTASSIUM SERPL-SCNC: 4 MMOL/L (ref 3.5–5.2)
PROT ?TM UR-MCNC: 82.7 MG/DL
PROT SERPL-MCNC: 6.3 G/DL (ref 6–8.5)
PROT UR QL STRIP: ABNORMAL
PROT/CREAT UR: 1196.8 MG/G CREA (ref 0–200)
RBC # UR STRIP: NORMAL /HPF
REF LAB TEST METHOD: NORMAL
SODIUM SERPL-SCNC: 139 MMOL/L (ref 136–145)
SP GR UR STRIP: 1.01 (ref 1–1.03)
SQUAMOUS #/AREA URNS HPF: NORMAL /HPF
URATE SERPL-MCNC: 7 MG/DL (ref 3.4–7)
UROBILINOGEN UR QL STRIP: ABNORMAL
WBC # UR STRIP: NORMAL /HPF

## 2023-11-07 PROCEDURE — 81001 URINALYSIS AUTO W/SCOPE: CPT

## 2023-11-07 PROCEDURE — 84156 ASSAY OF PROTEIN URINE: CPT

## 2023-11-07 PROCEDURE — 82570 ASSAY OF URINE CREATININE: CPT

## 2023-11-07 PROCEDURE — 82550 ASSAY OF CK (CPK): CPT

## 2023-11-07 PROCEDURE — 84550 ASSAY OF BLOOD/URIC ACID: CPT

## 2023-11-07 PROCEDURE — 36415 COLL VENOUS BLD VENIPUNCTURE: CPT

## 2023-11-07 PROCEDURE — 80053 COMPREHEN METABOLIC PANEL: CPT

## 2023-11-09 ENCOUNTER — TELEPHONE (OUTPATIENT)
Dept: CARDIOLOGY | Facility: CLINIC | Age: 48
End: 2023-11-09
Payer: MEDICARE

## 2023-11-09 NOTE — TELEPHONE ENCOUNTER
Caller: Matthew Madrid    Relationship to patient: Self    Best call back number [phone]    Chief complaint: SWELLING AND BP HIGH    Patient directed to call 911 or go to their nearest emergency room.     Patient verbalized understanding: [x] Yes  [] No  If no, why?    Additional notes:PATIENT STATES HE IS HAVING ISSUES WITH HIGH BLOOD PRESSURE AND SWELLING IN HIS FEET. TALKED TO JAMAAL AT OFFICE SAID TO DIRECT PATIENT TO ER NO APPOINTMENTS AVAILABLE UNTIL PATIENTS SCHEDULED APPOINTMENT ON 11-20-23    
Abdias Hackett(Attending)

## 2023-11-20 ENCOUNTER — OFFICE VISIT (OUTPATIENT)
Dept: CARDIOLOGY | Facility: CLINIC | Age: 48
End: 2023-11-20
Payer: MEDICARE

## 2023-11-20 VITALS
BODY MASS INDEX: 41.21 KG/M2 | SYSTOLIC BLOOD PRESSURE: 124 MMHG | DIASTOLIC BLOOD PRESSURE: 70 MMHG | HEIGHT: 66 IN | WEIGHT: 256.4 LBS | OXYGEN SATURATION: 96 % | HEART RATE: 69 BPM

## 2023-11-20 DIAGNOSIS — I50.32 CHRONIC HEART FAILURE WITH PRESERVED EJECTION FRACTION (HFPEF): ICD-10-CM

## 2023-11-20 DIAGNOSIS — I25.10 ASCVD (ARTERIOSCLEROTIC CARDIOVASCULAR DISEASE): Primary | Chronic | ICD-10-CM

## 2023-11-20 PROCEDURE — 1159F MED LIST DOCD IN RCRD: CPT | Performed by: PHYSICIAN ASSISTANT

## 2023-11-20 PROCEDURE — 3074F SYST BP LT 130 MM HG: CPT | Performed by: PHYSICIAN ASSISTANT

## 2023-11-20 PROCEDURE — 93000 ELECTROCARDIOGRAM COMPLETE: CPT | Performed by: PHYSICIAN ASSISTANT

## 2023-11-20 PROCEDURE — 1160F RVW MEDS BY RX/DR IN RCRD: CPT | Performed by: PHYSICIAN ASSISTANT

## 2023-11-20 PROCEDURE — 99214 OFFICE O/P EST MOD 30 MIN: CPT | Performed by: PHYSICIAN ASSISTANT

## 2023-11-20 PROCEDURE — 3078F DIAST BP <80 MM HG: CPT | Performed by: PHYSICIAN ASSISTANT

## 2023-11-20 RX ORDER — AMLODIPINE BESYLATE 5 MG/1
5 TABLET ORAL DAILY
COMMUNITY

## 2023-11-20 RX ORDER — RANOLAZINE 1000 MG/1
1000 TABLET, EXTENDED RELEASE ORAL EVERY 12 HOURS SCHEDULED
Qty: 60 TABLET | Refills: 2 | Status: SHIPPED | OUTPATIENT
Start: 2023-11-20

## 2023-11-20 RX ORDER — HYDRALAZINE HYDROCHLORIDE 25 MG/1
25 TABLET, FILM COATED ORAL 3 TIMES DAILY
Qty: 90 TABLET | Refills: 2 | Status: SHIPPED | OUTPATIENT
Start: 2023-11-20 | End: 2023-11-28 | Stop reason: SDUPTHER

## 2023-11-20 RX ORDER — FUROSEMIDE 40 MG/1
40 TABLET ORAL DAILY
Qty: 5 TABLET | Refills: 0 | Status: SHIPPED | OUTPATIENT
Start: 2023-11-20 | End: 2023-11-28

## 2023-11-20 NOTE — PROGRESS NOTES
Susanna Johnson, CARLOS  Matthew Madrid  1975  11/20/2023    Patient Active Problem List   Diagnosis    NSTEMI, initial episode of care    NSTEMI (non-ST elevated myocardial infarction)    ASCVD (arteriosclerotic cardiovascular disease)    Essential hypertension    Dyslipidemia    DM (diabetes mellitus), type 2 with complications    SOB (shortness of breath)    Severe obesity (BMI 35.0-39.9) with comorbidity    Diarrhea    Abdominal pain    Dysphagia    Acute renal failure, unspecified acute renal failure type       Dear Susanna Johnson, CARLOS:    Subjective     Chest Pain   Associated symptoms include shortness of breath.   Shortness of Breath  Associated symptoms include chest pain.   :    Chief Complaint   Patient presents with    Follow-up     ROUTINE    Chest Pain     WANTS HEART CATH    Shortness of Breath     WITH EDEMA       Matthew Madrid is a pleasant 48 y.o. male with a past medical history significant for Coronary artery disease with history of acute NSTEMI on 9/2/2021 with subsequent left heart catheterization revealing a proximal RCA lesion of 60%, mid RCA 70%, and distal 95% lesion PCI was performed to the distal RCA with 0% residual stenosis with JODI flow 3.  He also has history of diabetes mellitus, CKD stage III, essential hypertension, dyslipidemia.  He denies history of tobacco abuse.  He comes in today for cardiology follow-up.     Mr. Madrid still reports that he is having chest pain/pressure that he describes and reports that it comes on with minimal exertion and also has occurred at rest.  He also reports he has had significant development of shortness of breath lately as well as a weight gain of 15 to 20 pounds that he believes is related to fluid.  He does have increased pedal edema today of 2+.  He reports that this pain last 10 minutes at a time and states that this is similar pain that he had prior to his previous left heart catheterization    Allergies   Allergen  Reactions    Tuberculin Tests Rash   :      Current Outpatient Medications:     albuterol sulfate  (90 Base) MCG/ACT inhaler, Inhale 2 puffs As Needed for Wheezing or Shortness of Air., Disp: , Rfl:     allopurinol (ZYLOPRIM) 300 MG tablet, Take 1 tablet by mouth Daily., Disp: , Rfl:     ALPRAZolam (XANAX) 0.5 MG tablet, Take 1 tablet by mouth 3 (Three) Times a Day As Needed for Anxiety., Disp: , Rfl:     amLODIPine (NORVASC) 5 MG tablet, Take 1 tablet by mouth Daily., Disp: , Rfl:     aspirin 81 MG EC tablet, Take 1 tablet by mouth Daily., Disp: 90 tablet, Rfl: 3    butalbital-acetaminophen  MG tablet tablet, Take  by mouth Every 4 (Four) Hours As Needed., Disp: , Rfl:     carvedilol (COREG) 12.5 MG tablet, Take 1 tablet by mouth 2 (Two) Times a Day. (Patient taking differently: Take 2 tablets by mouth 2 (Two) Times a Day.), Disp: 180 tablet, Rfl: 3    clotrimazole-betamethasone (LOTRISONE) 1-0.05 % cream, Apply 1 application  topically to the appropriate area as directed 2 (Two) Times a Day., Disp: , Rfl:     ferrous sulfate 325 (65 FE) MG tablet, Take 1 tablet by mouth 2 (Two) Times a Day., Disp: , Rfl:     FLUoxetine (PROzac) 20 MG capsule, Take 2 capsules by mouth Daily., Disp: , Rfl:     gabapentin (NEURONTIN) 800 MG tablet, Take 1 tablet by mouth 3 (Three) Times a Day., Disp: , Rfl:     insulin NPH (humuLIN N,novoLIN N) 100 UNIT/ML injection, Inject 15 Units under the skin into the appropriate area as directed 2 (Two) Times a Day Before Meals., Disp: , Rfl: 12    isosorbide mononitrate (IMDUR) 120 MG 24 hr tablet, Take 1 tablet by mouth Daily., Disp: 90 tablet, Rfl: 3    levocetirizine (XYZAL) 5 MG tablet, Take 1 tablet by mouth Every Evening., Disp: , Rfl:     magnesium oxide (MAG-OX) 400 MG tablet, Take 1 tablet by mouth 2 (Two) Times a Day., Disp: 60 tablet, Rfl: 0    pantoprazole (Protonix) 40 MG EC tablet, Take 1 tablet by mouth Daily., Disp: 30 tablet, Rfl: 11    pravastatin (PRAVACHOL) 10  "MG tablet, Take 1 tablet by mouth Daily., Disp: 90 tablet, Rfl: 3    promethazine (PHENERGAN) 12.5 MG tablet, Take 1 tablet by mouth Every 6 (Six) Hours As Needed for Nausea or Vomiting., Disp: , Rfl:     ranolazine (RANEXA) 1000 MG 12 hr tablet, Take 1 tablet by mouth Every 12 (Twelve) Hours., Disp: 60 tablet, Rfl: 2    ticagrelor (BRILINTA) 90 MG tablet tablet, Take 1 tablet by mouth 2 (Two) Times a Day., Disp: 180 tablet, Rfl: 3    vitamin D (ERGOCALCIFEROL) 1.25 MG (71360 UT) capsule capsule, Take 1 capsule by mouth 1 (One) Time Per Week., Disp: 5 capsule, Rfl: 3    furosemide (LASIX) 40 MG tablet, Take 1 tablet by mouth Daily., Disp: 5 tablet, Rfl: 0    hydrALAZINE (APRESOLINE) 25 MG tablet, Take 1 tablet by mouth 3 (Three) Times a Day., Disp: 90 tablet, Rfl: 2    QUEtiapine XR (SEROquel XR) 200 MG 24 hr tablet, Take 2 tablets by mouth Every Night. (Patient not taking: Reported on 2023), Disp: , Rfl:     The following portions of the patient's history were reviewed and updated as appropriate: allergies, current medications, past family history, past medical history, past social history, past surgical history and problem list.    Social History     Tobacco Use    Smoking status: Former     Types: Cigarettes     Quit date: 2000     Years since quittin.9    Smokeless tobacco: Never   Vaping Use    Vaping Use: Never used   Substance Use Topics    Alcohol use: Yes     Alcohol/week: 6.0 standard drinks of alcohol     Types: 6 Glasses of wine per week     Comment: Occasional    Drug use: Never         Objective   Vitals:    23 1242   BP: 124/70   Pulse: 69   SpO2: 96%   Weight: 116 kg (256 lb 6.4 oz)   Height: 167.6 cm (66\")     Body mass index is 41.38 kg/m².    Review of Systems   Cardiovascular:  Positive for chest pain.   Respiratory:  Positive for shortness of breath.        Constitutional:       General: Not in acute distress.     Appearance: Healthy appearance. Well-developed and not in " "distress. Not diaphoretic.   Eyes:      Conjunctiva/sclera: Conjunctivae normal.      Pupils: Pupils are equal, round, and reactive to light.   HENT:      Head: Normocephalic and atraumatic.   Neck:      Vascular: No carotid bruit or JVD.   Pulmonary:      Effort: Pulmonary effort is normal. No respiratory distress.      Breath sounds: Normal breath sounds.   Cardiovascular:      Normal rate. Regular rhythm.   Edema:     Peripheral edema absent.   Skin:     General: Skin is cool.   Neurological:      Mental Status: Alert, oriented to person, place, and time and oriented to person, place and time.         Lab Results   Component Value Date     11/07/2023    K 4.0 11/07/2023     11/07/2023    CO2 20.9 (L) 11/07/2023    BUN 24 (H) 11/07/2023    CREATININE 1.38 (H) 11/07/2023    GLUCOSE 196 (H) 11/07/2023    CALCIUM 8.0 (L) 11/07/2023    AST 17 11/07/2023    ALT 17 11/07/2023    ALKPHOS 70 11/07/2023    LABIL2 1.1 09/01/2023     Lab Results   Component Value Date    CKTOTAL 252 (H) 11/07/2023     Lab Results   Component Value Date    WBC 6.33 09/07/2023    HGB 10.7 (L) 09/07/2023    HCT 31.8 (L) 09/07/2023     09/07/2023     Lab Results   Component Value Date    INR 1.06 09/02/2021     Lab Results   Component Value Date    MG 1.3 (L) 09/07/2023     Lab Results   Component Value Date    TSH 0.849 09/07/2023    TRIG 201 (H) 03/27/2023    HDL 29 (L) 03/27/2023    LDL 34 03/27/2023      No results found for: \"BNP\"    During this visit the following were done:  Labs Reviewed []    Labs Ordered []    Radiology Reports Reviewed []    Radiology Ordered []    PCP Records Reviewed []    Referring Provider Records Reviewed []    ER Records Reviewed []    Hospital Records Reviewed []    History Obtained From Family []    Radiology Images Reviewed []    Other Reviewed []    Records Requested []         ECG 12 Lead    Date/Time: 11/20/2023 12:47 PM  Performed by: Bhupinder Graham PA-C    Authorized by: Santos, " Bhupinder OLGUIN PA-C  Comparison: compared with previous ECG   Similar to previous ECG  Rhythm: sinus rhythm  Conduction: conduction normal  ST Segments: ST segments normal    Clinical impression: normal ECG        Assessment & Plan    Diagnosis Plan   1. ASCVD (arteriosclerotic cardiovascular disease)        2. Chronic heart failure with preserved ejection fraction (HFpEF)  Ambulatory Referral to Heart Failure Clinic               Recommendations:  ASCVD with stable angina  Discussed the case with interventional cardiologist, Dr. Mejia who did agree to proceed with left heart catheterization for further evaluation given his persistent symptoms.  Continue amlodipine, aspirin, Coreg, Imdur, pravastatin, and Ranexa.  I did discuss the risk of left heart catheterization including damage to arterial system, renal injury, MI, CVA, and death.  He expressed understanding and was agreeable to proceed.  Chronic HFrEF  Refer to heart failure clinic for assistance in management.    Return in about 2 months (around 1/20/2024).    As always, I appreciate very much the opportunity to participate in the cardiovascular care of your patients.      With Best Regards,    Bhupinder Graham PA-C

## 2023-11-21 ENCOUNTER — TELEPHONE (OUTPATIENT)
Dept: CARDIOLOGY | Facility: CLINIC | Age: 48
End: 2023-11-21
Payer: MEDICARE

## 2023-11-21 NOTE — TELEPHONE ENCOUNTER
Patient was called by our clinic RN Dimitrios with attempts to work him into our schedule today before the holidays.  He declined being seen today.  He declines additionally being seen next Monday and accepts appointment one week from today.         Zuri Johnson PA-C  [facility]  11/21/23  08:28 EST

## 2023-11-27 ENCOUNTER — PREP FOR SURGERY (OUTPATIENT)
Dept: OTHER | Facility: HOSPITAL | Age: 48
End: 2023-11-27
Payer: MEDICARE

## 2023-11-27 DIAGNOSIS — I50.32 CHRONIC HEART FAILURE WITH PRESERVED EJECTION FRACTION (HFPEF): ICD-10-CM

## 2023-11-27 DIAGNOSIS — I25.10 ASCVD (ARTERIOSCLEROTIC CARDIOVASCULAR DISEASE): Primary | ICD-10-CM

## 2023-11-28 ENCOUNTER — HOSPITAL ENCOUNTER (OUTPATIENT)
Dept: CARDIOLOGY | Facility: HOSPITAL | Age: 48
Discharge: HOME | End: 2023-11-28
Admitting: PHYSICIAN ASSISTANT
Payer: MEDICARE

## 2023-11-28 VITALS
OXYGEN SATURATION: 97 % | BODY MASS INDEX: 41.46 KG/M2 | DIASTOLIC BLOOD PRESSURE: 78 MMHG | HEIGHT: 66 IN | HEART RATE: 68 BPM | WEIGHT: 258 LBS | SYSTOLIC BLOOD PRESSURE: 156 MMHG

## 2023-11-28 DIAGNOSIS — I10 UNCONTROLLED HYPERTENSION: ICD-10-CM

## 2023-11-28 DIAGNOSIS — N18.30 STAGE 3 CHRONIC KIDNEY DISEASE, UNSPECIFIED WHETHER STAGE 3A OR 3B CKD: ICD-10-CM

## 2023-11-28 DIAGNOSIS — I25.10 ASCVD (ARTERIOSCLEROTIC CARDIOVASCULAR DISEASE): Chronic | ICD-10-CM

## 2023-11-28 DIAGNOSIS — I50.31 ACUTE HEART FAILURE WITH PRESERVED EJECTION FRACTION (HFPEF): Primary | ICD-10-CM

## 2023-11-28 LAB
ABSOLUTE LUNG FLUID CONTENT: 34 % (ref 20–35)
ALBUMIN SERPL-MCNC: 3.9 G/DL (ref 3.5–5.2)
ALBUMIN/GLOB SERPL: 1.4 G/DL
ALP SERPL-CCNC: 77 U/L (ref 39–117)
ALT SERPL W P-5'-P-CCNC: 15 U/L (ref 1–41)
ANION GAP SERPL CALCULATED.3IONS-SCNC: 10.1 MMOL/L (ref 5–15)
ANION GAP SERPL CALCULATED.3IONS-SCNC: 9.2 MMOL/L (ref 5–15)
AST SERPL-CCNC: 16 U/L (ref 1–40)
BILIRUB SERPL-MCNC: 0.4 MG/DL (ref 0–1.2)
BUN SERPL-MCNC: 23 MG/DL (ref 6–20)
BUN SERPL-MCNC: 24 MG/DL (ref 6–20)
BUN/CREAT SERPL: 14.3 (ref 7–25)
BUN/CREAT SERPL: 14.8 (ref 7–25)
CALCIUM SPEC-SCNC: 9 MG/DL (ref 8.6–10.5)
CALCIUM SPEC-SCNC: 9.1 MG/DL (ref 8.6–10.5)
CHLORIDE SERPL-SCNC: 106 MMOL/L (ref 98–107)
CHLORIDE SERPL-SCNC: 107 MMOL/L (ref 98–107)
CK SERPL-CCNC: 126 U/L (ref 20–200)
CO2 SERPL-SCNC: 21.8 MMOL/L (ref 22–29)
CO2 SERPL-SCNC: 21.9 MMOL/L (ref 22–29)
CREAT SERPL-MCNC: 1.61 MG/DL (ref 0.76–1.27)
CREAT SERPL-MCNC: 1.62 MG/DL (ref 0.76–1.27)
EGFRCR SERPLBLD CKD-EPI 2021: 52 ML/MIN/1.73
EGFRCR SERPLBLD CKD-EPI 2021: 52.4 ML/MIN/1.73
GLOBULIN UR ELPH-MCNC: 2.8 GM/DL
GLUCOSE SERPL-MCNC: 223 MG/DL (ref 65–99)
GLUCOSE SERPL-MCNC: 225 MG/DL (ref 65–99)
MAGNESIUM SERPL-MCNC: 1.5 MG/DL (ref 1.6–2.6)
NT-PROBNP SERPL-MCNC: 109.6 PG/ML (ref 0–450)
POTASSIUM SERPL-SCNC: 4.9 MMOL/L (ref 3.5–5.2)
POTASSIUM SERPL-SCNC: 4.9 MMOL/L (ref 3.5–5.2)
PROT SERPL-MCNC: 6.7 G/DL (ref 6–8.5)
SODIUM SERPL-SCNC: 138 MMOL/L (ref 136–145)
SODIUM SERPL-SCNC: 138 MMOL/L (ref 136–145)

## 2023-11-28 PROCEDURE — 83735 ASSAY OF MAGNESIUM: CPT | Performed by: PHYSICIAN ASSISTANT

## 2023-11-28 PROCEDURE — 83880 ASSAY OF NATRIURETIC PEPTIDE: CPT | Performed by: PHYSICIAN ASSISTANT

## 2023-11-28 PROCEDURE — 94726 PLETHYSMOGRAPHY LUNG VOLUMES: CPT | Performed by: PHYSICIAN ASSISTANT

## 2023-11-28 PROCEDURE — 80053 COMPREHEN METABOLIC PANEL: CPT | Performed by: PHYSICIAN ASSISTANT

## 2023-11-28 PROCEDURE — 82550 ASSAY OF CK (CPK): CPT | Performed by: PHYSICIAN ASSISTANT

## 2023-11-28 RX ORDER — HYDRALAZINE HYDROCHLORIDE 25 MG/1
50 TABLET, FILM COATED ORAL 3 TIMES DAILY
Qty: 90 TABLET | Refills: 2 | Status: SHIPPED | OUTPATIENT
Start: 2023-11-28

## 2023-11-28 RX ORDER — DULAGLUTIDE 4.5 MG/.5ML
INJECTION, SOLUTION SUBCUTANEOUS WEEKLY
COMMUNITY

## 2023-11-28 RX ORDER — TRAZODONE HYDROCHLORIDE 100 MG/1
100 TABLET ORAL NIGHTLY
COMMUNITY

## 2023-11-28 RX ORDER — BUMETANIDE 0.25 MG/ML
3 INJECTION INTRAMUSCULAR; INTRAVENOUS ONCE
Status: COMPLETED | OUTPATIENT
Start: 2023-11-28 | End: 2023-11-28

## 2023-11-28 RX ORDER — BUMETANIDE 1 MG/1
2 TABLET ORAL DAILY
Qty: 14 TABLET | Refills: 0 | Status: SHIPPED | OUTPATIENT
Start: 2023-11-28 | End: 2023-12-05

## 2023-11-28 RX ORDER — FOLIC ACID 0.8 MG
TABLET ORAL DAILY
COMMUNITY

## 2023-11-28 RX ADMIN — BUMETANIDE 3 MG: 0.25 INJECTION, SOLUTION INTRAMUSCULAR; INTRAVENOUS at 10:05

## 2023-11-28 NOTE — PROGRESS NOTES
[facility]  NEREYDA Aly Devin L, PA-C  [address] [place] [facility] [place] [address]    Thank you for asking me to see Matthew Madrid for congestive heart failure.    HPI:     This is a 48 y.o. male with known past medical history of:    ASCVD  [facility] September 2021 with distal RCA lesion 95% stenosed, proximal RCA lesion 60% stenosed, mid RCA lesion 70% stenosed with successful PCI to distal RCA prior to bifurcation with Xience drug-eluting stent placed and recommendation for dual antiplatelet therapy for 1 year  CKD stage III  Chronic HFpEF  July 2023 TTE with EF 61 to 65% and grade 1 diastolic dysfunction as well as mild pulmonary hypertension  Mild pulmonary hypertension  Insulin-dependent diabetes type 2  GERD  Anxiety/depression  Morbid obesity  History of hypotension secondary to GI losses from chronic diarrhea since prior cholecystectomy  Hyperlipidemia      Matthew Madrid presents for today for heart failure clinic evaluation.  The patient is typically seen by Susanna Johnson APRN.  Patient's primary cardiologist is Dr. Etienne & Bhupinder Graham PA-C.  Patient was referred here after being evaluated by Bhupinder Graham on November 20, 2023 with concern for worsening weight gain and heart failure exacerbation.  Please note office note was also reviewed with patient to soon undergo left heart catheterization for further ischemic evaluation given persistent angina.    Last known EF 61-65%.   Last known hospitalization and/or ED visit: July 2023 hospitalization with LEÓN and hypotension felt to be secondary to chronic GI losses and responsive to fluids during hospitalization.  Accompanied by: Wife          11/28/23 visit data/details regarding:   Dyspnea: Worsening shortness of breath   Lower extremity swelling: Worsening pitting edema of the lower extremities  Abdominal swelling: Worsening abdominal distension  Home weight: Weight monitoring booklet provided during  initial visit; Scale provided  Home BP: BP monitoring booklet provided during initial visit; Has BP cuff.   Home heart rate: HR monitoring booklet provided during initial visit; Has monitoring device  Daily activities of living: Performing with wife's assistance   Pillows/lying flat:1-2 pillows   HF zone: Yellow  Mr. Madrid reports worsening shortness of breath and swelling.  He reports he has gained around 20-30 lbs over the past few months.   I have reviewed prior weights and bed scale weights and there are some variations within the system.   He reports dry cough and orthopnea.   He did take 5 days of Lasix and reports some mild improvement but still has quite pitting edema.    He reports chest pain with upcoming cardiac catheterization.  He is curious about when cardiac cath will be.    Bilateral lower extremity edema complicating ambulation and making it difficult to perform daily activities of living.       Specialists:   Cardiology: Bhupinder & Dr. Etienne with upcoming cath with Dr. Grey      Review of Systems - Review of Systems   Constitutional: Negative for decreased appetite, diaphoresis and fever.   HENT:  Negative for congestion and ear pain.    Eyes:  Negative for blurred vision, discharge and double vision.   Cardiovascular:  Positive for dyspnea on exertion and leg swelling.   Respiratory:  Positive for cough and shortness of breath. Negative for hemoptysis.    Endocrine: Negative for cold intolerance and heat intolerance.   Hematologic/Lymphatic: Negative for adenopathy and bleeding problem.   Skin:  Negative for dry skin and nail changes.   Musculoskeletal:  Negative for arthritis and falls.   Gastrointestinal:  Negative for bloating and anorexia.   Genitourinary:  Negative for bladder incontinence and dysuria.   Neurological:  Negative for aphonia and difficulty with concentration.   Psychiatric/Behavioral:  Negative for altered mental status and hallucinations.    Allergic/Immunologic: Negative for  environmental allergies and HIV exposure.         All other systems were reviewed and were negative.    Patient Active Problem List   Diagnosis    NSTEMI, initial episode of care    NSTEMI (non-ST elevated myocardial infarction)    ASCVD (arteriosclerotic cardiovascular disease)    Essential hypertension    Dyslipidemia    DM (diabetes mellitus), type 2 with complications    SOB (shortness of breath)    Severe obesity (BMI 35.0-39.9) with comorbidity    Diarrhea    Abdominal pain    Dysphagia    Acute renal failure, unspecified acute renal failure type       family history includes Heart attack in his paternal uncle; Hyperlipidemia in his father and mother.     reports that he quit smoking about 23 years ago. His smoking use included cigarettes. He has never used smokeless tobacco. He reports current alcohol use of about 6.0 standard drinks of alcohol per week. He reports that he does not use drugs.    Allergies   Allergen Reactions    Tuberculin Tests Rash         Current Outpatient Medications:     albuterol sulfate  (90 Base) MCG/ACT inhaler, Inhale 2 puffs As Needed for Wheezing or Shortness of Air., Disp: , Rfl:     allopurinol (ZYLOPRIM) 300 MG tablet, Take 1 tablet by mouth Daily., Disp: , Rfl:     ALPRAZolam (XANAX) 0.5 MG tablet, Take 1 tablet by mouth 3 (Three) Times a Day As Needed for Anxiety., Disp: , Rfl:     amLODIPine (NORVASC) 5 MG tablet, Take 1 tablet by mouth Daily., Disp: , Rfl:     aspirin 81 MG EC tablet, Take 1 tablet by mouth Daily., Disp: 90 tablet, Rfl: 3    butalbital-acetaminophen  MG tablet tablet, Take  by mouth Every 4 (Four) Hours As Needed., Disp: , Rfl:     carvedilol (COREG) 12.5 MG tablet, Take 1 tablet by mouth 2 (Two) Times a Day. (Patient taking differently: Take 2 tablets by mouth 2 (Two) Times a Day.), Disp: 180 tablet, Rfl: 3    clotrimazole-betamethasone (LOTRISONE) 1-0.05 % cream, Apply 1 application  topically to the appropriate area as directed 2 (Two)  Times a Day., Disp: , Rfl:     Dulaglutide (Trulicity) 4.5 MG/0.5ML solution pen-injector, Inject  under the skin into the appropriate area as directed 1 (One) Time Per Week., Disp: , Rfl:     ferrous sulfate 325 (65 FE) MG tablet, Take 1 tablet by mouth 2 (Two) Times a Day., Disp: , Rfl:     FLUoxetine (PROzac) 20 MG capsule, Take 2 capsules by mouth Daily., Disp: , Rfl:     gabapentin (NEURONTIN) 800 MG tablet, Take 1 tablet by mouth 3 (Three) Times a Day., Disp: , Rfl:     hydrALAZINE (APRESOLINE) 25 MG tablet, Take 2 tablets by mouth 3 (Three) Times a Day., Disp: 90 tablet, Rfl: 2    insulin NPH (humuLIN N,novoLIN N) 100 UNIT/ML injection, Inject 15 Units under the skin into the appropriate area as directed 2 (Two) Times a Day Before Meals. (Patient taking differently: Inject 25 Units under the skin into the appropriate area as directed 2 (Two) Times a Day Before Meals.), Disp: , Rfl: 12    isosorbide mononitrate (IMDUR) 120 MG 24 hr tablet, Take 1 tablet by mouth Daily., Disp: 90 tablet, Rfl: 3    levocetirizine (XYZAL) 5 MG tablet, Take 1 tablet by mouth Every Evening., Disp: , Rfl:     Magnesium 500 MG capsule, Take  by mouth Daily., Disp: , Rfl:     pantoprazole (Protonix) 40 MG EC tablet, Take 1 tablet by mouth Daily., Disp: 30 tablet, Rfl: 11    pravastatin (PRAVACHOL) 10 MG tablet, Take 1 tablet by mouth Daily., Disp: 90 tablet, Rfl: 3    promethazine (PHENERGAN) 12.5 MG tablet, Take 1 tablet by mouth Every 6 (Six) Hours As Needed for Nausea or Vomiting., Disp: , Rfl:     ranolazine (RANEXA) 1000 MG 12 hr tablet, Take 1 tablet by mouth Every 12 (Twelve) Hours., Disp: 60 tablet, Rfl: 2    ticagrelor (BRILINTA) 90 MG tablet tablet, Take 1 tablet by mouth 2 (Two) Times a Day., Disp: 180 tablet, Rfl: 3    traZODone (DESYREL) 100 MG tablet, Take 1 tablet by mouth Every Night., Disp: , Rfl:     vitamin D (ERGOCALCIFEROL) 1.25 MG (55144 UT) capsule capsule, Take 1 capsule by mouth 1 (One) Time Per Week., Disp: 5  capsule, Rfl: 3    bumetanide (Bumex) 1 MG tablet, Take 2 tablets by mouth Daily for 7 days., Disp: 14 tablet, Rfl: 0    QUEtiapine XR (SEROquel XR) 200 MG 24 hr tablet, Take 2 tablets by mouth Every Night. (Patient not taking: Reported on 2023), Disp: , Rfl:     Current Facility-Administered Medications:     bumetanide (BUMEX) injection 3 mg, 3 mg, Intravenous, Once, Zuri Johnson PA-C      Physical Exam:  I have reviewed the patient's current vital signs as documented in the patient's EMR.   Vitals:    23   BP: 156/78   Pulse: 68   SpO2: 97%     Body mass index is 41.64 kg/m².       23   Weight: 117 kg (258 lb)      Physical Exam  Vitals and nursing note reviewed.   Constitutional:       General: He is awake.      Appearance: Normal appearance.   HENT:      Head: Normocephalic and atraumatic.   Eyes:      General: Lids are normal.      Conjunctiva/sclera:      Right eye: Right conjunctiva is not injected.      Left eye: Left conjunctiva is not injected.      Comments: Bilateral undereye swelling present.    Cardiovascular:      Rate and Rhythm: Normal rate and regular rhythm.      Heart sounds:       with a grade of 2/6.   Pulmonary:      Effort: No tachypnea, bradypnea or prolonged expiration.      Breath sounds: No wheezing, rhonchi or rales.   Abdominal:      General: There is distension.      Tenderness: There is no abdominal tenderness.   Musculoskeletal:      Right lower le+ Pitting Edema present.      Left lower le+ Pitting Edema present.   Skin:     General: Skin is warm and dry.   Neurological:      Mental Status: He is alert and oriented to person, place, and time.   Psychiatric:         Attention and Perception: Attention normal.         Mood and Affect: Mood normal.         Behavior: Behavior is cooperative.          JVP: Volume/Pulsation: Normal.        DATA REVIEWED:     \  ---------------------------------------------------  TTE/GAYLE:  Results for orders  placed during the hospital encounter of 07/04/23    Adult Transthoracic Echo Complete W/ Cont if Necessary Per Protocol    Interpretation Summary    Left ventricular systolic function is normal. Left ventricular ejection fraction appears to be 61 - 65%.    Left ventricular diastolic function is consistent with (grade I) impaired relaxation.    No significant valvular heart disease is present    Mild pulmonary hypertension is present    There is no evidence of pericardial effusion.        LAST HEART CATH/IF AVAILABLE:     Results for orders placed during the hospital encounter of 09/01/21    Cardiac Catheterization/Vascular Study    Narrative  · Dist RCA lesion is 95% stenosed.  · Prox RCA lesion is 60% stenosed.  · Mid RCA lesion is 70% stenosed on a sharp curve and it appears to be a kink in the vessel.  · Successfule PCI to distal RCA prior to bifurcation with 2.25x23 Xience post dilated with 2.5 NC from 99% to 0% with TMI flow 0-3      -----------------------------------------------------  CXR/Imaging:   Imaging Results (Most Recent)       None            I personally reviewed and interpreted the CXR.      -----------------------------------------------------  CT:   No radiology results for the last 30 days.  I personally reviewed the images of the CT scan.  My personal interpretation is below.      ----------------------------------------------------      --------------------------------------------------------------------------------------------------    Laboratory evaluations:    Lab Results   Component Value Date    GLUCOSE 225 (H) 11/28/2023    GLUCOSE 223 (H) 11/28/2023    BUN 24 (H) 11/28/2023    BUN 23 (H) 11/28/2023    CREATININE 1.62 (H) 11/28/2023    CREATININE 1.61 (H) 11/28/2023    EGFRIFNONA 68 12/29/2021    BCR 14.8 11/28/2023    BCR 14.3 11/28/2023    K 4.9 11/28/2023    K 4.9 11/28/2023    CO2 21.9 (L) 11/28/2023    CO2 21.8 (L) 11/28/2023    CALCIUM 9.1 11/28/2023    CALCIUM 9.0 11/28/2023     "PROTENTOTREF 6.0 09/01/2023    ALBUMIN 3.9 11/28/2023    LABIL2 1.1 09/01/2023    AST 16 11/28/2023    ALT 15 11/28/2023     Lab Results   Component Value Date    WBC 6.33 09/07/2023    HGB 10.7 (L) 09/07/2023    HCT 31.8 (L) 09/07/2023    MCV 97.2 (H) 09/07/2023     09/07/2023     Lab Results   Component Value Date    CHOL 95 03/27/2023    TRIG 201 (H) 03/27/2023    HDL 29 (L) 03/27/2023    LDL 34 03/27/2023     Lab Results   Component Value Date    TSH 0.849 09/07/2023     Lab Results   Component Value Date    HGBA1C 5.70 (H) 07/04/2023     Lab Results   Component Value Date    ALT 15 11/28/2023     Lab Results   Component Value Date    HGBA1C 5.70 (H) 07/04/2023    HGBA1C 9.40 (H) 09/01/2021     Lab Results   Component Value Date    MICROALBUR 26.6 09/01/2023    CREATININE 1.62 (H) 11/28/2023    CREATININE 1.61 (H) 11/28/2023     No results found for: \"IRON\", \"TIBC\", \"FERRITIN\"  Lab Results   Component Value Date    INR 1.06 09/02/2021    PROTIME 14.2 09/02/2021        Lab Results   Component Value Date    ABSOLUTELUNG 34 11/28/2023       PAH RISK ASSESSMENT:      1. Acute heart failure with preserved ejection fraction (HFpEF)    2. Stage 3 chronic kidney disease, unspecified whether stage 3a or 3b CKD    3. Uncontrolled hypertension          ORDERS PLACED TODAY:  Orders Placed This Encounter   Procedures    Basic Metabolic Panel    proBNP    Magnesium    Basic Metabolic Panel    proBNP    Magnesium    Comprehensive Metabolic Panel    CK    Comprehensive Metabolic Panel    CK    ReDs Vest        Diagnoses and all orders for this visit:    1. Acute heart failure with preserved ejection fraction (HFpEF) (Primary)  -     Basic Metabolic Panel; Future  -     proBNP; Future  -     Magnesium; Future  -     Basic Metabolic Panel; Standing  -     Basic Metabolic Panel  -     proBNP; Standing  -     proBNP  -     Magnesium; Standing  -     Magnesium  -     ReDs Vest  -     Comprehensive Metabolic Panel; Future  -   "   CK; Future  -     Comprehensive Metabolic Panel; Standing  -     Comprehensive Metabolic Panel  -     CK; Standing  -     CK    2. Stage 3 chronic kidney disease, unspecified whether stage 3a or 3b CKD  -     Comprehensive Metabolic Panel; Future  -     CK; Future  -     Comprehensive Metabolic Panel; Standing  -     Comprehensive Metabolic Panel  -     CK; Standing  -     CK    3. Uncontrolled hypertension    Other orders  -     hydrALAZINE (APRESOLINE) 25 MG tablet; Take 2 tablets by mouth 3 (Three) Times a Day.  Dispense: 90 tablet; Refill: 2  -     bumetanide (BUMEX) injection 3 mg  -     bumetanide (Bumex) 1 MG tablet; Take 2 tablets by mouth Daily for 7 days.  Dispense: 14 tablet; Refill: 0             MEDS ORDERED TODAY:    New Medications Ordered This Visit   Medications    hydrALAZINE (APRESOLINE) 25 MG tablet     Sig: Take 2 tablets by mouth 3 (Three) Times a Day.     Dispense:  90 tablet     Refill:  2    bumetanide (BUMEX) injection 3 mg    bumetanide (Bumex) 1 MG tablet     Sig: Take 2 tablets by mouth Daily for 7 days.     Dispense:  14 tablet     Refill:  0        ---------------------------------------------------------------------------------------------------------------------------          ASSESSMENT/PLAN:      Diagnosis Plan   1. Acute heart failure with preserved ejection fraction (HFpEF)  Basic Metabolic Panel    proBNP    Magnesium    Basic Metabolic Panel    Basic Metabolic Panel    proBNP    proBNP    Magnesium    Magnesium    ReDs Vest    Comprehensive Metabolic Panel    CK    Comprehensive Metabolic Panel    Comprehensive Metabolic Panel    CK    CK      2. Stage 3 chronic kidney disease, unspecified whether stage 3a or 3b CKD  Comprehensive Metabolic Panel    CK    Comprehensive Metabolic Panel    Comprehensive Metabolic Panel    CK    CK      3. Uncontrolled hypertension            acute on chronic diastolic heart failure. CHF.     NYHA stage Stage C: Structural heart disease is  present AND symptoms have occurredFC-Class III: Marked limitation of physical activity. Comfortable at rest. Less than ordinary activity causes fatigue, palpitation, or dyspnea. NYHA FC change: change is not known.     Today, Patient is mildly volume overloadedand with  Moderate perfusion. The patient's hemodynamics are currently unacceptable. HR is: normal and is at goal. BP/MAP was reviewed and there isroom for medication up-titration.  Clinical trajectory was assessed and haswaxed and waned.     CHF GOAL DIRECTED MEDICAL THERAPY FOR PATIENT ADDRESSED/ADJUSTED:     GDMT: HFpEF    Drug Class   Drug   Dose Last Dose Adjustment Notes   ACEi/ARB/ARNI Hydralizine/Imdur on board      Beta Blocker Coreg 25mg BID     MRA CKD III      SGLT2i Consider in future visit   N/A   Secondaries if applicable:          -CHF Specific BB:    Carvedilol  at dose of 25mg BID.   We discussed processes/benefits of HF clinic including nursing, pharmacist, and provider evaluation during each visit with ability for in office ReDS vest, labs, and ability to provide IV diuresis in the clinic with close outpatient monitoring.  Additionally, patient was educated about the availability of delivery of medications to patient's clinic room prior to leaving the building which assists with medication compliance and insures medications are in hands when changes are made (if patient opts for apothecary usage) with thorough guidance regarding changes and medication schedule provided.          -ACE/ARB/ARNi/alternative:   Increase Hydralazine 25 mg BID to 50mg BID for optimized BP control.  Imdur 120mg on board at present.     -MRA:   The patient is FC-NYHA Class III and MRA is indicated.  However, given CKD III, will hold on MRA addition at present.       -SGLT2 inhibitor therapy:   A BMP at initiation to verify GFR >30 was recommended, as was interval GFR surveillance.  The patient was advised to hold SGLT2I when PO intake is restricted due to a planned  surgery, or due to an underlying illness.    Will consider Farxiga in future visit; however, at present we are adjusting diuretics and patient has upcoming cardiac catheterization, thus will avoid renal fluctuations until post cardiac catheterization.   Pt was advised SEs, some severe, including hypersensitivity and Ai's; coupled with discussion regarding common side effects of UTIs and female genital mycotic infections were discussed. If you will be NPO, or are sick (poor PO intake, N&V) please hold the medication until you are back to a normal diet.     -Diuretic regimen:   ReDS Vest reading for. 11/28/23 is  34; ReDs Vest reading reviewed with patient.    IV Bumex 3mg in clinic today with 250mL out.   2mg daily oral to start on 11/29/23.  RTC in one week.   BMP, Mag, & ProBNP reviewed with patient.      -Fluid restriction/Sodium restriction:   Requested 2000 ml restriction  Patient has been asked to weigh daily and was provided with a printed diuretic strategy.  1,500 mg Na restriction was discussed.      -Acute vs. Chronic underlying conditions other than HF addressed during visit:   Uncontrolled HTN:   Increase Hydralazine to 50mg TID.  Patient reports he does sometimes forget afternoon dose of hydralazine.   Continue Imdur.     ASCVD:   Continue Brilinta.   Continue Pravastatin.   Continue ASA.   Current case request pending for cardiac cath.   RTC in one week with repeat labs.      IDDM type 2:   Continue management per primary.      CKD stage 3 likely 2/2 DM type 2, ID:   Continue f/u with Nephro.   Repeat BMP in one week given diuretic adjustment.   Slight bump in creatinine today possibly 2/2 recent Lasix.    Identifiable barriers to Heart Failure Self-care:   Medical Barriers:  Multiple medical comorbidities  Social Barriers:  No immediate known barriers.      -Sleep/Apnea:   Will address future sleep study if not recently performed at next visit.       --------------------------------------------------------------------------------        Class 3 Severe Obesity (BMI >=40). Obesity-related health conditions include the following: hypertension, coronary heart disease, diabetes mellitus, dyslipidemias, and GERD. Obesity is unchanged. BMI is is above average; BMI management plan is completed. We discussed portion control, increasing exercise, and heart failure dietary management strategies .              >45 minutes out of 60 minutes face to face spent counseling patient extensively on dietary Na+ intake, importance of activity, weight monitoring, compliance with medications in addition to importance of titration with goal directed medical therapy and follow up appointments.            This document has been electronically signed by Zuri Johnson PA-C  November 28, 2023 10:10 EST      Dictated Utilizing Dragon Dictation: Part of this note may be an electronic transcription/translation of spoken language to printed text using the Dragon Dictation System.    Follow-up appointment and medication changes provided in hand delivered After Visit Summary as well as reviewed in the room.

## 2023-11-28 NOTE — PROGRESS NOTES
Heart Failure Clinic  Pharmacist Note     Matthew Madrid is a 48 y.o. male seen in the Heart Failure Clinic for HFpEF (EF of 61-65% on 7/4/23).    Matthew Mdarid reports a good understanding of medications and denies any issue with adherence.  Accompanied by his wife who assists with his medications. Denies need for financial assistance at this time.     Currently sees NISA Nolasco with general cardiology (last seen 11/20/2023) who referred the patient to the Heart Failure Clinic.  Reports cardiology may be sending him for another heart cath but is unsure of when this may be.     Patient reports worsening shortness of breath and swelling. Reports he has noticeable weight gain of 30-40 pounds but is unable to tell for sure since their home scale is not the most accurate. Requesting scale at today's visit. Reports he was recently sent in a 5 day supply of Lasix (filled on 11/20) and has completed that course. Was also given a 5 day supply of bumex on 10/30.   States the lasix helped the first day but he did not notice a difference the rest of the course.     Patient brought in a detailed BP/HR log going back to July of 2023. BP has been elevated with readings 140s-200/70s-80s on average. Appears over the last few weeks his blood pressure has increased, which patient verbalizes as well. HR is around 60-80 on average.   Denies any dietary changes that could contribute to elevated BP and verbalizes he stays away from added salt.     Patient also reports increased mental confusion over the last couple of weeks. Denies any changes that may have contributed to this.   States he does check his blood sugar and denies any episodes of hypoglycemia. Reports his blood sugar ranges from 180-220 on average.   Reports he mentioned this to another provider but no work up was done at that time.     Verbalizes concern over letter he received from his insurance company stating they are no longer contracted with [facility].      Medication Use:   Hx of med intolerances: Lisinopril (discontinued at discharge in July 2023. Elevated CK and renal issues)   Retail Rx Management: [facility] in [place].     Past Medical History:   Diagnosis Date    ASCVD (arteriosclerotic cardiovascular disease) 09/13/2021    Diabetes mellitus     Elevated cholesterol     GERD (gastroesophageal reflux disease)     Hyperlipidemia     Hypertension      ALLERGIES: Tuberculin tests  Current Outpatient Medications   Medication Sig Dispense Refill    albuterol sulfate  (90 Base) MCG/ACT inhaler Inhale 2 puffs As Needed for Wheezing or Shortness of Air.      allopurinol (ZYLOPRIM) 300 MG tablet Take 1 tablet by mouth Daily.      ALPRAZolam (XANAX) 0.5 MG tablet Take 1 tablet by mouth 3 (Three) Times a Day As Needed for Anxiety.      amLODIPine (NORVASC) 5 MG tablet Take 1 tablet by mouth Daily.      aspirin 81 MG EC tablet Take 1 tablet by mouth Daily. 90 tablet 3    butalbital-acetaminophen  MG tablet tablet Take  by mouth Every 4 (Four) Hours As Needed.      carvedilol (COREG) 12.5 MG tablet Take 1 tablet by mouth 2 (Two) Times a Day. (Patient taking differently: Take 2 tablets by mouth 2 (Two) Times a Day.) 180 tablet 3    clotrimazole-betamethasone (LOTRISONE) 1-0.05 % cream Apply 1 application  topically to the appropriate area as directed 2 (Two) Times a Day.      Dulaglutide (Trulicity) 4.5 MG/0.5ML solution pen-injector Inject  under the skin into the appropriate area as directed 1 (One) Time Per Week.      ferrous sulfate 325 (65 FE) MG tablet Take 1 tablet by mouth 2 (Two) Times a Day.      FLUoxetine (PROzac) 20 MG capsule Take 2 capsules by mouth Daily.      gabapentin (NEURONTIN) 800 MG tablet Take 1 tablet by mouth 3 (Three) Times a Day.      hydrALAZINE (APRESOLINE) 25 MG tablet Take 1 tablet by mouth 3 (Three) Times a Day. 90 tablet 2    insulin NPH (humuLIN N,novoLIN N) 100 UNIT/ML injection Inject 15 Units under the skin into the  "appropriate area as directed 2 (Two) Times a Day Before Meals. (Patient taking differently: Inject 25 Units under the skin into the appropriate area as directed 2 (Two) Times a Day Before Meals.)  12    isosorbide mononitrate (IMDUR) 120 MG 24 hr tablet Take 1 tablet by mouth Daily. 90 tablet 3    levocetirizine (XYZAL) 5 MG tablet Take 1 tablet by mouth Every Evening.      Magnesium 500 MG capsule Take  by mouth Daily.      pantoprazole (Protonix) 40 MG EC tablet Take 1 tablet by mouth Daily. 30 tablet 11    pravastatin (PRAVACHOL) 10 MG tablet Take 1 tablet by mouth Daily. 90 tablet 3    promethazine (PHENERGAN) 12.5 MG tablet Take 1 tablet by mouth Every 6 (Six) Hours As Needed for Nausea or Vomiting.      ranolazine (RANEXA) 1000 MG 12 hr tablet Take 1 tablet by mouth Every 12 (Twelve) Hours. 60 tablet 2    ticagrelor (BRILINTA) 90 MG tablet tablet Take 1 tablet by mouth 2 (Two) Times a Day. 180 tablet 3    traZODone (DESYREL) 100 MG tablet Take 1 tablet by mouth Every Night.      vitamin D (ERGOCALCIFEROL) 1.25 MG (07423 UT) capsule capsule Take 1 capsule by mouth 1 (One) Time Per Week. 5 capsule 3    furosemide (LASIX) 40 MG tablet Take 1 tablet by mouth Daily. (Patient not taking: Reported on 11/28/2023) 5 tablet 0    QUEtiapine XR (SEROquel XR) 200 MG 24 hr tablet Take 2 tablets by mouth Every Night. (Patient not taking: Reported on 11/20/2023)       No current facility-administered medications for this encounter.       Vaccination History:   Pneumonia: Reports received - unsure when this was  Annual Influenza: UTD  Shingles: Currently not eligible    Objective  Vitals:    11/28/23 0844   BP: 156/78   BP Location: Left arm   Patient Position: Sitting   Cuff Size: Adult   Pulse: 68   SpO2: 97%   Weight: 117 kg (258 lb)   Height: 167.6 cm (66\")     Wt Readings from Last 3 Encounters:   11/28/23 117 kg (258 lb)   11/20/23 116 kg (256 lb 6.4 oz)   09/18/23 117 kg (258 lb 3.2 oz)         11/28/23  0844   Weight: " 117 kg (258 lb)     Lab Results   Component Value Date    GLUCOSE 196 (H) 11/07/2023    BUN 24 (H) 11/07/2023    CREATININE 1.38 (H) 11/07/2023    EGFRIFNONA 68 12/29/2021    BCR 17.4 11/07/2023    K 4.0 11/07/2023    CO2 20.9 (L) 11/07/2023    CALCIUM 8.0 (L) 11/07/2023    PROTENTOTREF 6.0 09/01/2023    ALBUMIN 3.4 (L) 11/07/2023    LABIL2 1.1 09/01/2023    AST 17 11/07/2023    ALT 17 11/07/2023     Lab Results   Component Value Date    WBC 6.33 09/07/2023    HGB 10.7 (L) 09/07/2023    HCT 31.8 (L) 09/07/2023    MCV 97.2 (H) 09/07/2023     09/07/2023     Lab Results   Component Value Date    CKTOTAL 252 (H) 11/07/2023    CKMB 2.40 07/25/2023    TROPONINT 18 (H) 09/07/2023     Lab Results   Component Value Date    PROBNP 900.9 (H) 09/07/2023     Results for orders placed during the hospital encounter of 07/04/23    Adult Transthoracic Echo Complete W/ Cont if Necessary Per Protocol    Interpretation Summary    Left ventricular systolic function is normal. Left ventricular ejection fraction appears to be 61 - 65%.    Left ventricular diastolic function is consistent with (grade I) impaired relaxation.    No significant valvular heart disease is present    Mild pulmonary hypertension is present    There is no evidence of pericardial effusion.         GDMT    Drug Class   Drug   Dose Last Dose Adjustment Additional Titration   Notes   ACEi/ARB/ARNI     Lisinopril D/C at discharge July 2023 - renal/ck    Beta Blocker Carvedilol 25 mg BID      MRA        SGLT2i    N/A     Imdur 120 mg QD       Hydralazine 25 mg TID       Ranexa 1 g BID          Drug Therapy Problems    Drug Interactions Screening: Multiple CNS depressants  Confusion  Drug-Disease Interactions Screening  GDMT/Hypertension/CK/Renal Function  Edema  Insurance Concerns  Needs Scale  Immunizations    Recommendations:     Patient is on multiple CNS depressant agents (Alprazolam/Gabapentin/Promethazine/Quetiapine/Trazodone)  Notified provider, Zuri, of  reported confusion.   Patient is on Alprazolam/Gabapentin/Promethazine/Quetiapine/Trazodone which could be contributing  Continue to monitor  Follow up as clinically necessary. Further work-up may be warranted  Unlikely this is related to hypoglycemia since patient reports BG levels of 180-220.   No significant drug-disease interactions noted at this time.  Patient would benefit from addition of SGLT2 inhibitor for HF benefit as well as renal benefit associated with use. Continue to titrate GDMT as clinically indicated based on BP/HR/renal function and patient tolerability.   Zuri to reassess renal function at next visit to determine if initiation of SGLT2 inhibitor is appropriate.   Carvedilol is at goal dose for HR indication  Previously on lisinopril which was discontinued for renal insufficiency/elevated CK. CK has improved and now within normal limits. Consider re-initiation of ACE inhibitor/ARB/ARNI when clinically appropriate.   Could increase hydralazine to better control blood pressure. Patient should closely monitor BP while on these medications. Call clinic with any hypotension or bradycardia.   Patient would benefit from IV diuresis today. Consider daily diuretic regimen.   Zuri to order 3 mg IV Bumex administered in clinic.   Discussed concerns with patient and his wife. Advised patient to contact insurance and ask for continuity of care. Pharmacy will continue to follow and assist how we are able.   Provided the patient with a scale at today's visit.   Patient is unsure when he received the pneumonia vaccination and is unsure of which vaccination he received. Did not show on the registry in [place]. Reassess at next visit to determine if patient is in need of additional pneumonia vaccination to complete his series.   Recommend Shringrix when patient reaches 50 years old. Currently not indicated based on the patient's age.       Patient was educated on heart failure medications and the importance of  medication adherence.   All questions were addressed and patient expressed understanding.         Thank you for allowing me to participate in the care of your patient,    Rachel Mcdaniel PharmD  Community PGY1 Pharmacy Resident  [facility]  11/28/23

## 2023-11-28 NOTE — ADDENDUM NOTE
Encounter addended by: Rachel Mcdaniel [facility] on: 11/28/2023 3:35 PM   Actions taken: Social Care Checklist section edited

## 2023-11-28 NOTE — PROGRESS NOTES
Heart Failure Clinic    Date: 11/28/23     Vitals:    11/28/23 0844   BP: 156/78   Pulse: 68   SpO2: 97%    Weight 258    Method of arrival: Ambulatory    Weighing self daily: Most days    Monitoring Heart Failure Zones: No    Today's HF Zone: Reviewed Zones-yellow    Taking medications as prescribed: Yes    Edema Yes- better today than has been    Shortness of Air: Yes    Number of pillows used at night:<2    Educational Materials given:  AVS, Heart Success patient booklet                                                                         ReDS Value: 34  25-35 Optimal Value Status      Dimitrios Maki RN 11/28/23 08:46 EST

## 2023-12-05 ENCOUNTER — HOSPITAL ENCOUNTER (OUTPATIENT)
Dept: CARDIOLOGY | Facility: HOSPITAL | Age: 48
Discharge: HOME | End: 2023-12-05
Admitting: PHYSICIAN ASSISTANT
Payer: MEDICARE

## 2023-12-05 VITALS
SYSTOLIC BLOOD PRESSURE: 123 MMHG | HEIGHT: 66 IN | OXYGEN SATURATION: 95 % | HEART RATE: 77 BPM | DIASTOLIC BLOOD PRESSURE: 57 MMHG | BODY MASS INDEX: 41.75 KG/M2 | WEIGHT: 259.8 LBS

## 2023-12-05 DIAGNOSIS — E11.8 DM (DIABETES MELLITUS), TYPE 2 WITH COMPLICATIONS: Chronic | ICD-10-CM

## 2023-12-05 DIAGNOSIS — I50.43 ACUTE ON CHRONIC COMBINED SYSTOLIC AND DIASTOLIC CHF (CONGESTIVE HEART FAILURE): Primary | ICD-10-CM

## 2023-12-05 DIAGNOSIS — I25.10 ASCVD (ARTERIOSCLEROTIC CARDIOVASCULAR DISEASE): Chronic | ICD-10-CM

## 2023-12-05 DIAGNOSIS — N17.9 ACUTE KIDNEY INJURY SUPERIMPOSED ON CKD: ICD-10-CM

## 2023-12-05 DIAGNOSIS — N18.9 ACUTE KIDNEY INJURY SUPERIMPOSED ON CKD: ICD-10-CM

## 2023-12-05 DIAGNOSIS — I10 ESSENTIAL HYPERTENSION: Chronic | ICD-10-CM

## 2023-12-05 LAB
ABSOLUTE LUNG FLUID CONTENT: 35 % (ref 20–35)
ANION GAP SERPL CALCULATED.3IONS-SCNC: 11.3 MMOL/L (ref 5–15)
BUN SERPL-MCNC: 40 MG/DL (ref 6–20)
BUN/CREAT SERPL: 18.3 (ref 7–25)
CALCIUM SPEC-SCNC: 8.5 MG/DL (ref 8.6–10.5)
CHLORIDE SERPL-SCNC: 101 MMOL/L (ref 98–107)
CO2 SERPL-SCNC: 23.7 MMOL/L (ref 22–29)
CREAT SERPL-MCNC: 2.18 MG/DL (ref 0.76–1.27)
EGFRCR SERPLBLD CKD-EPI 2021: 36.4 ML/MIN/1.73
GLUCOSE SERPL-MCNC: 301 MG/DL (ref 65–99)
MAGNESIUM SERPL-MCNC: 1.2 MG/DL (ref 1.6–2.6)
NT-PROBNP SERPL-MCNC: 139.1 PG/ML (ref 0–450)
POTASSIUM SERPL-SCNC: 4.4 MMOL/L (ref 3.5–5.2)
SODIUM SERPL-SCNC: 136 MMOL/L (ref 136–145)

## 2023-12-05 PROCEDURE — 94726 PLETHYSMOGRAPHY LUNG VOLUMES: CPT

## 2023-12-05 PROCEDURE — 83735 ASSAY OF MAGNESIUM: CPT | Performed by: PHYSICIAN ASSISTANT

## 2023-12-05 PROCEDURE — 83880 ASSAY OF NATRIURETIC PEPTIDE: CPT

## 2023-12-05 PROCEDURE — 80048 BASIC METABOLIC PNL TOTAL CA: CPT | Performed by: PHYSICIAN ASSISTANT

## 2023-12-05 PROCEDURE — 36415 COLL VENOUS BLD VENIPUNCTURE: CPT | Performed by: PHYSICIAN ASSISTANT

## 2023-12-05 RX ORDER — MAGNESIUM OXIDE 400 MG/1
800 TABLET ORAL DAILY
Qty: 60 TABLET | Refills: 2 | Status: SHIPPED | OUTPATIENT
Start: 2023-12-05 | End: 2024-01-04

## 2023-12-05 RX ORDER — CARVEDILOL 25 MG/1
25 TABLET ORAL 2 TIMES DAILY
Qty: 90 TABLET | Refills: 0 | Status: SHIPPED | OUTPATIENT
Start: 2023-12-05

## 2023-12-05 RX ORDER — TAMSULOSIN HYDROCHLORIDE 0.4 MG/1
1 CAPSULE ORAL DAILY
Qty: 30 CAPSULE | Refills: 3 | Status: SHIPPED | OUTPATIENT
Start: 2023-12-05

## 2023-12-05 RX ORDER — HYDRALAZINE HYDROCHLORIDE 25 MG/1
75 TABLET, FILM COATED ORAL 3 TIMES DAILY
Qty: 90 TABLET | Refills: 2 | Status: SHIPPED | OUTPATIENT
Start: 2023-12-05

## 2023-12-05 RX ORDER — BUMETANIDE 1 MG/1
1 TABLET ORAL DAILY
Qty: 30 TABLET | Refills: 0 | Status: SHIPPED | OUTPATIENT
Start: 2023-12-05 | End: 2024-01-04

## 2023-12-05 NOTE — PROGRESS NOTES
[facility]  NEREYDA Aly Louisa Elizabeth, APRN  [address]  [place] [address]    Thank you for asking me to see Matthew Madrid for congestive heart failure.    HPI:     This is a 48 y.o. male with known past medical history of:    ASCVD  [facility] September 2021 with distal RCA lesion 95% stenosed, proximal RCA lesion 60% stenosed, mid RCA lesion 70% stenosed with successful PCI to distal RCA prior to bifurcation with Xience drug-eluting stent placed and recommendation for dual antiplatelet therapy for 1 year  CKD stage III  Chronic HFpEF  July 2023 TTE with EF 61 to 65% and grade 1 diastolic dysfunction as well as mild pulmonary hypertension  Mild pulmonary hypertension  Insulin-dependent diabetes type 2  GERD  Anxiety/depression  Morbid obesity  History of hypotension secondary to GI losses from chronic diarrhea since prior cholecystectomy  Hyperlipidemia      Matthew Madrid presents for today for heart failure clinic evaluation.  The patient is typically seen by Susanna Johnson APRN.  Patient's primary cardiologist is Dr. Etienne & Bhupinder Graham PA-C.  Patient was referred here after being evaluated by Bhupinder Graham on November 20, 2023 with concern for worsening weight gain and heart failure exacerbation.  Please note office note was also reviewed with patient to soon undergo left heart catheterization for further ischemic evaluation given persistent angina.    Last known EF 61-65%.   Last known hospitalization and/or ED visit: July 2023 hospitalization with LEÓN and hypotension felt to be secondary to chronic GI losses and responsive to fluids during hospitalization.  Accompanied by: Wife          12/05/23 visit data/details regarding:   Dyspnea: Worsening shortness of breath   Lower extremity swelling: Improving swelling of the lower extremities  Abdominal swelling: Improving distension  Home weight: Weight monitoring booklet provided during  initial visit; Scale provided  Home BP: BP monitoring booklet provided during initial visit; BP book brought to appointment with BP well controlled.   Home heart rate: HR monitoring booklet provided during initial visit; Has monitoring device  Daily activities of living: Performing with wife's assistance   Pillows/lying flat:1-2 pillows   HF zone: Yellow  Mr. Madrid is doing well.  He is pending appointment for cardiac catheterization at present.     Somewhat still swollen in his bilateral lower extremities. Edema is still pitting.    Memory continues to decline. He reports this started after his prior heart attack.     He reports he is urinating frequently but in small amounts.   Cough has improved.   We discuss rise in renal function.        Specialists:   Cardiology: Bhupinder & Dr. Etienne with upcoming cath with Dr. Grey      Review of Systems - Review of Systems   Constitutional: Negative for decreased appetite, diaphoresis and fever.   HENT:  Negative for congestion and ear pain.    Eyes:  Negative for blurred vision, discharge and double vision.   Cardiovascular:  Positive for dyspnea on exertion and leg swelling.   Respiratory:  Positive for shortness of breath. Negative for cough and hemoptysis.    Endocrine: Negative for cold intolerance and heat intolerance.   Hematologic/Lymphatic: Negative for adenopathy and bleeding problem.   Skin:  Negative for dry skin and nail changes.   Musculoskeletal:  Negative for arthritis and falls.   Gastrointestinal:  Negative for bloating and anorexia.   Genitourinary:  Negative for bladder incontinence and dysuria.   Neurological:  Negative for aphonia and difficulty with concentration.   Psychiatric/Behavioral:  Negative for altered mental status and hallucinations.    Allergic/Immunologic: Negative for environmental allergies and HIV exposure.         All other systems were reviewed and were negative.    Patient Active Problem List   Diagnosis    NSTEMI, initial episode of  care    NSTEMI (non-ST elevated myocardial infarction)    ASCVD (arteriosclerotic cardiovascular disease)    Essential hypertension    Dyslipidemia    DM (diabetes mellitus), type 2 with complications    SOB (shortness of breath)    Severe obesity (BMI 35.0-39.9) with comorbidity    Diarrhea    Abdominal pain    Dysphagia    Acute renal failure, unspecified acute renal failure type       family history includes Heart attack in his paternal uncle; Hyperlipidemia in his father and mother.     reports that he quit smoking about 23 years ago. His smoking use included cigarettes. He has never used smokeless tobacco. He reports current alcohol use of about 6.0 standard drinks of alcohol per week. He reports that he does not use drugs.    Allergies   Allergen Reactions    Tuberculin Tests Rash         Current Outpatient Medications:     albuterol sulfate  (90 Base) MCG/ACT inhaler, Inhale 2 puffs As Needed for Wheezing or Shortness of Air., Disp: , Rfl:     allopurinol (ZYLOPRIM) 300 MG tablet, Take 1 tablet by mouth Daily., Disp: , Rfl:     ALPRAZolam (XANAX) 0.5 MG tablet, Take 1 tablet by mouth 3 (Three) Times a Day As Needed for Anxiety., Disp: , Rfl:     aspirin 81 MG EC tablet, Take 1 tablet by mouth Daily., Disp: 90 tablet, Rfl: 3    butalbital-acetaminophen  MG tablet tablet, Take  by mouth Every 4 (Four) Hours As Needed., Disp: , Rfl:     carvedilol (COREG) 25 MG tablet, Take 1 tablet by mouth 2 (Two) Times a Day., Disp: 90 tablet, Rfl: 0    clotrimazole-betamethasone (LOTRISONE) 1-0.05 % cream, Apply 1 application  topically to the appropriate area as directed 2 (Two) Times a Day., Disp: , Rfl:     Dulaglutide (Trulicity) 4.5 MG/0.5ML solution pen-injector, Inject  under the skin into the appropriate area as directed 1 (One) Time Per Week., Disp: , Rfl:     ferrous sulfate 325 (65 FE) MG tablet, Take 1 tablet by mouth 2 (Two) Times a Day., Disp: , Rfl:     FLUoxetine (PROzac) 20 MG capsule, Take 2  capsules by mouth Daily., Disp: , Rfl:     gabapentin (NEURONTIN) 800 MG tablet, Take 1 tablet by mouth 3 (Three) Times a Day., Disp: , Rfl:     hydrALAZINE (APRESOLINE) 25 MG tablet, Take 3 tablets by mouth 3 (Three) Times a Day., Disp: 90 tablet, Rfl: 2    insulin NPH (humuLIN N,novoLIN N) 100 UNIT/ML injection, Inject 15 Units under the skin into the appropriate area as directed 2 (Two) Times a Day Before Meals. (Patient taking differently: Inject 35 Units under the skin into the appropriate area as directed 2 (Two) Times a Day Before Meals.), Disp: , Rfl: 12    isosorbide mononitrate (IMDUR) 120 MG 24 hr tablet, Take 1 tablet by mouth Daily., Disp: 90 tablet, Rfl: 3    levocetirizine (XYZAL) 5 MG tablet, Take 1 tablet by mouth Every Evening., Disp: , Rfl:     pantoprazole (Protonix) 40 MG EC tablet, Take 1 tablet by mouth Daily., Disp: 30 tablet, Rfl: 11    pravastatin (PRAVACHOL) 10 MG tablet, Take 1 tablet by mouth Daily., Disp: 90 tablet, Rfl: 3    promethazine (PHENERGAN) 12.5 MG tablet, Take 1 tablet by mouth Every 6 (Six) Hours As Needed for Nausea or Vomiting., Disp: , Rfl:     QUEtiapine XR (SEROquel XR) 200 MG 24 hr tablet, Take 2 tablets by mouth At Night As Needed (sleep)., Disp: , Rfl:     ranolazine (RANEXA) 1000 MG 12 hr tablet, Take 1 tablet by mouth Every 12 (Twelve) Hours., Disp: 60 tablet, Rfl: 2    ticagrelor (BRILINTA) 90 MG tablet tablet, Take 1 tablet by mouth 2 (Two) Times a Day., Disp: 180 tablet, Rfl: 3    traZODone (DESYREL) 100 MG tablet, Take 1 tablet by mouth Every Night., Disp: , Rfl:     vitamin D (ERGOCALCIFEROL) 1.25 MG (45059 UT) capsule capsule, Take 1 capsule by mouth 1 (One) Time Per Week., Disp: 5 capsule, Rfl: 3    bumetanide (Bumex) 1 MG tablet, Take 1 tablet by mouth Daily for 30 days., Disp: 30 tablet, Rfl: 0    magnesium oxide (MAG-OX) 400 MG tablet, Take 2 tablets by mouth Daily for 30 days., Disp: 60 tablet, Rfl: 2    tamsulosin (FLOMAX) 0.4 MG capsule 24 hr capsule,  Take 1 capsule by mouth Daily., Disp: 30 capsule, Rfl: 3      Physical Exam:  I have reviewed the patient's current vital signs as documented in the patient's EMR.   Vitals:    23 08   BP: 123/57   Pulse: 77   SpO2: 95%     Body mass index is 41.93 kg/m².       23   Weight: 118 kg (259 lb 12.8 oz)      Physical Exam  Vitals and nursing note reviewed.   Constitutional:       General: He is awake.      Appearance: Normal appearance.   HENT:      Head: Normocephalic and atraumatic.   Eyes:      General: Lids are normal.      Conjunctiva/sclera:      Right eye: Right conjunctiva is not injected.      Left eye: Left conjunctiva is not injected.      Comments: Bilateral undereye swelling has improved to some degree.     Cardiovascular:      Rate and Rhythm: Normal rate and regular rhythm.      Heart sounds:       with a grade of 2/6.   Pulmonary:      Effort: No tachypnea, bradypnea or prolonged expiration.      Breath sounds: No wheezing, rhonchi or rales.   Abdominal:      General: There is distension.      Tenderness: There is no abdominal tenderness.   Musculoskeletal:      Right lower le+ Pitting Edema present.      Left lower le+ Pitting Edema present.   Skin:     General: Skin is warm and dry.   Neurological:      Mental Status: He is alert and oriented to person, place, and time.   Psychiatric:         Attention and Perception: Attention normal.         Mood and Affect: Mood normal.         Behavior: Behavior is cooperative.          JVP: Volume/Pulsation: Normal.        DATA REVIEWED:       ---------------------------------------------------  TTE/GAYLE:  Results for orders placed during the hospital encounter of 23    Adult Transthoracic Echo Complete W/ Cont if Necessary Per Protocol    Interpretation Summary    Left ventricular systolic function is normal. Left ventricular ejection fraction appears to be 61 - 65%.    Left ventricular diastolic function is consistent with (grade  I) impaired relaxation.    No significant valvular heart disease is present    Mild pulmonary hypertension is present    There is no evidence of pericardial effusion.        LAST HEART CATH/IF AVAILABLE:     Results for orders placed during the hospital encounter of 09/01/21    Cardiac Catheterization/Vascular Study    Narrative  · Dist RCA lesion is 95% stenosed.  · Prox RCA lesion is 60% stenosed.  · Mid RCA lesion is 70% stenosed on a sharp curve and it appears to be a kink in the vessel.  · Successfule PCI to distal RCA prior to bifurcation with 2.25x23 Xience post dilated with 2.5 NC from 99% to 0% with TMI flow 0-3      -----------------------------------------------------  CXR/Imaging:   Imaging Results (Most Recent)       None            I personally reviewed and interpreted the CXR.      -----------------------------------------------------  CT:   No radiology results for the last 30 days.  I personally reviewed the images of the CT scan.  My personal interpretation is below.      ----------------------------------------------------      --------------------------------------------------------------------------------------------------    Laboratory evaluations:    Lab Results   Component Value Date    GLUCOSE 301 (H) 12/05/2023    BUN 40 (H) 12/05/2023    CREATININE 2.18 (H) 12/05/2023    EGFRIFNONA 68 12/29/2021    BCR 18.3 12/05/2023    K 4.4 12/05/2023    CO2 23.7 12/05/2023    CALCIUM 8.5 (L) 12/05/2023    PROTENTOTREF 6.0 09/01/2023    ALBUMIN 3.9 11/28/2023    LABIL2 1.1 09/01/2023    AST 16 11/28/2023    ALT 15 11/28/2023     Lab Results   Component Value Date    WBC 6.33 09/07/2023    HGB 10.7 (L) 09/07/2023    HCT 31.8 (L) 09/07/2023    MCV 97.2 (H) 09/07/2023     09/07/2023     Lab Results   Component Value Date    CHOL 95 03/27/2023    TRIG 201 (H) 03/27/2023    HDL 29 (L) 03/27/2023    LDL 34 03/27/2023     Lab Results   Component Value Date    TSH 0.849 09/07/2023     Lab Results  "  Component Value Date    HGBA1C 5.70 (H) 07/04/2023     Lab Results   Component Value Date    ALT 15 11/28/2023     Lab Results   Component Value Date    HGBA1C 5.70 (H) 07/04/2023    HGBA1C 9.40 (H) 09/01/2021     Lab Results   Component Value Date    MICROALBUR 26.6 09/01/2023    CREATININE 2.18 (H) 12/05/2023     No results found for: \"IRON\", \"TIBC\", \"FERRITIN\"  Lab Results   Component Value Date    INR 1.06 09/02/2021    PROTIME 14.2 09/02/2021        Lab Results   Component Value Date    ABSOLUTELUNG 35 12/05/2023    ABSOLUTELUNG 34 11/28/2023       PAH RISK ASSESSMENT:      1. Acute on chronic combined systolic and diastolic CHF (congestive heart failure)    2. Acute kidney injury superimposed on CKD    3. Essential hypertension    4. DM (diabetes mellitus), type 2 with complications    5. ASCVD (arteriosclerotic cardiovascular disease)          ORDERS PLACED TODAY:  Orders Placed This Encounter   Procedures    Basic Metabolic Panel    BNP    Magnesium    ReDs Vest        Diagnoses and all orders for this visit:    1. Acute on chronic combined systolic and diastolic CHF (congestive heart failure) (Primary)  -     BNP  -     ReDs Vest    2. Acute kidney injury superimposed on CKD    3. Essential hypertension    4. DM (diabetes mellitus), type 2 with complications    5. ASCVD (arteriosclerotic cardiovascular disease)  Overview:  9/1/2021 TTE: LVEF 56 to 60%  9/2/2021 [facility] for non-STEMI: PCI MONTSE to distal RCA.  Proximal RCA 60% stenosed and mid RCA 70% stenosed      Other orders  -     Basic Metabolic Panel; Standing  -     Magnesium; Standing  -     Basic Metabolic Panel  -     Magnesium  -     tamsulosin (FLOMAX) 0.4 MG capsule 24 hr capsule; Take 1 capsule by mouth Daily.  Dispense: 30 capsule; Refill: 3  -     hydrALAZINE (APRESOLINE) 25 MG tablet; Take 3 tablets by mouth 3 (Three) Times a Day.  Dispense: 90 tablet; Refill: 2  -     bumetanide (Bumex) 1 MG tablet; Take 1 tablet by mouth Daily for 30 days.  " Dispense: 30 tablet; Refill: 0  -     carvedilol (COREG) 25 MG tablet; Take 1 tablet by mouth 2 (Two) Times a Day.  Dispense: 90 tablet; Refill: 0  -     magnesium oxide (MAG-OX) 400 MG tablet; Take 2 tablets by mouth Daily for 30 days.  Dispense: 60 tablet; Refill: 2             MEDS ORDERED TODAY:    New Medications Ordered This Visit   Medications    tamsulosin (FLOMAX) 0.4 MG capsule 24 hr capsule     Sig: Take 1 capsule by mouth Daily.     Dispense:  30 capsule     Refill:  3    hydrALAZINE (APRESOLINE) 25 MG tablet     Sig: Take 3 tablets by mouth 3 (Three) Times a Day.     Dispense:  90 tablet     Refill:  2    bumetanide (Bumex) 1 MG tablet     Sig: Take 1 tablet by mouth Daily for 30 days.     Dispense:  30 tablet     Refill:  0    carvedilol (COREG) 25 MG tablet     Sig: Take 1 tablet by mouth 2 (Two) Times a Day.     Dispense:  90 tablet     Refill:  0    magnesium oxide (MAG-OX) 400 MG tablet     Sig: Take 2 tablets by mouth Daily for 30 days.     Dispense:  60 tablet     Refill:  2        ---------------------------------------------------------------------------------------------------------------------------          ASSESSMENT/PLAN:      Diagnosis Plan   1. Acute on chronic combined systolic and diastolic CHF (congestive heart failure)  BNP    ReDs Vest      2. Acute kidney injury superimposed on CKD        3. Essential hypertension        4. DM (diabetes mellitus), type 2 with complications        5. ASCVD (arteriosclerotic cardiovascular disease)            acute on chronic diastolic heart failure. CHF.     NYHA stage Stage C: Structural heart disease is present AND symptoms have occurredFC-Class III: Marked limitation of physical activity. Comfortable at rest. Less than ordinary activity causes fatigue, palpitation, or dyspnea. NYHA FC change: change is not known.     Today, Patient is mildly volume overloadedand with  Moderate perfusion. The patient's hemodynamics are currently unacceptable. HR  is: normal and is at goal. BP/MAP was reviewed and there isroom for medication up-titration.  Clinical trajectory was assessed and haswaxed and waned.     CHF GOAL DIRECTED MEDICAL THERAPY FOR PATIENT ADDRESSED/ADJUSTED:     GDMT: HFpEF    Drug Class   Drug   Dose Last Dose Adjustment Notes   ACEi/ARB/ARNI Hydralizine/Imdur on board      Beta Blocker Coreg 25mg BID     MRA CKD III      SGLT2i Consider in future visit   N/A   Secondaries if applicable:          -CHF Specific BB:    Carvedilol  at dose of 25mg BID.   We discussed processes/benefits of HF clinic including nursing, pharmacist, and provider evaluation during each visit with ability for in office ReDS vest, labs, and ability to provide IV diuresis in the clinic with close outpatient monitoring.  Additionally, patient was educated about the availability of delivery of medications to patient's clinic room prior to leaving the building which assists with medication compliance and insures medications are in hands when changes are made (if patient opts for apothecary usage) with thorough guidance regarding changes and medication schedule provided.          -ACE/ARB/ARNi/alternative:   Increase Hydralazine 50mg BID to 75 mg BID for optimized BP control.  Imdur 120mg on board at present.     -MRA:   The patient is FC-NYHA Class III and MRA is indicated.  However, given CKD III, will hold on MRA addition at present.       -SGLT2 inhibitor therapy:   A BMP at initiation to verify GFR >30 was recommended, as was interval GFR surveillance.  The patient was advised to hold SGLT2I when PO intake is restricted due to a planned surgery, or due to an underlying illness.    Will consider Farxiga in future visit; however, at present we are adjusting diuretics and patient has upcoming cardiac catheterization, thus will avoid renal fluctuations until post cardiac catheterization.   Pt was advised SEs, some severe, including hypersensitivity and Ai's; coupled with  discussion regarding common side effects of UTIs and female genital mycotic infections were discussed. If you will be NPO, or are sick (poor PO intake, N&V) please hold the medication until you are back to a normal diet.     -Diuretic regimen:   ReDS Vest reading for. 12/05/23 is  35; ReDs Vest reading reviewed with patient.    BUmex decreased to 1mg qd due to rise in renal function.  RTC in 2 weeks.    Decrease lower extremity edema  See below regarding LEÓN on CKD.   BMP, Mag, & ProBNP reviewed with patient.      -Fluid restriction/Sodium restriction:   Requested 2000 ml restriction  Patient has been asked to weigh daily and was provided with a printed diuretic strategy.  1,500 mg Na restriction was discussed.      -Acute vs. Chronic underlying conditions other than HF addressed during visit:   Uncontrolled HTN:   Increase Hydralazine from  50mg TID to 75mg.  Patient reports he does sometimes forget afternoon dose of hydralazine.   Continue Imdur.   Stop Norvasc 2/2 leg swelling.     ASCVD:   Continue Brilinta.   Continue Pravastatin.   Continue ASA.   Current case request pending for cardiac cath.   RTC in one week with repeat labs.      IDDM type 2:   Continue management per primary.      LEÓN on CKD stage 3 likely 2/2 DM type 2, ID in combination with recent diuresis:   Given bump in creatinine and need for assistance with diuresis as patient remains volume overloaded with pitting edema, though also with worsening renal function, called office of his Nephrologist Dr. Olvera.  Spoke with staff with patient added on to next Tuesday at 12:30 at his [place] office.      Identifiable barriers to Heart Failure Self-care:   Medical Barriers:  Multiple medical comorbidities  Social Barriers:  No immediate known barriers.      -Sleep/Apnea:   Will address future sleep study if not recently performed at next visit.      --------------------------------------------------------------------------------        Class 3 Severe  Obesity (BMI >=40). Obesity-related health conditions include the following: hypertension, coronary heart disease, diabetes mellitus, dyslipidemias, and GERD. Obesity is unchanged. BMI is is above average; BMI management plan is completed. We discussed portion control, increasing exercise, and heart failure dietary management strategies .              >45 minutes out of 60 minutes face to face spent counseling patient extensively on dietary Na+ intake, importance of activity, weight monitoring, compliance with medications in addition to importance of titration with goal directed medical therapy and follow up appointments.            This document has been electronically signed by Zuri Johnson PA-C  December 5, 2023 14:46 EST      Dictated Utilizing Dragon Dictation: Part of this note may be an electronic transcription/translation of spoken language to printed text using the Dragon Dictation System.    Follow-up appointment and medication changes provided in hand delivered After Visit Summary as well as reviewed in the room.

## 2023-12-05 NOTE — PROGRESS NOTES
Heart Failure Clinic  Pharmacist Note     Matthew Madrid is a 48 y.o. male seen in the Heart Failure Clinic for HFpEF (EF of 61-65% on 7/4/23).    Matthew Madrid reports a fair understanding of medications and denies any issue with adherence.  Accompanied by his wife who assists with his medications. Denies need for financial assistance at this time.     Currently sees NISA Nolasco with general cardiology (last seen 11/20/2023) who referred the patient to the Heart Failure Clinic.  Reports cardiology may be sending him for another heart cath but is unsure of when this may be. This has still yet to be scheduled.     Patient reports consistent shortness of breath at his baseline and reports that his swelling is still present but has improved after he has been on the Bumex 2 mg daily the last 7 days prescribed by Zuri. Patient also received IV diuresis on last visit 11/28/23.    Patient brought in a detailed BP/HR log for the past week. Patient reports that BP readings are BEFORE medication is taken. BP has been elevated with readings 140s-161/70s-90s on average. Patents BP shows more control in clinic this AM. Patients HR averages 54-91. Patient denies any associated symptoms with HR readings.     Patient reports continued mental confusion and forgetfulness. Denies any changes that may have contributed to this.   States he does check his blood sugar and denies any episodes of hypoglycemia.Pt reports BG have been running higher. He plans to discuss this with PCP, Susanna Johnson, at follow-up this month.       Medication Use:   Hx of med intolerances: Lisinopril (discontinued at discharge in July 2023. Elevated CK and renal issues)   Retail Rx Management: [facility] in [place].     Past Medical History:   Diagnosis Date    ASCVD (arteriosclerotic cardiovascular disease) 09/13/2021    Diabetes mellitus     Elevated cholesterol     GERD (gastroesophageal reflux disease)     Hyperlipidemia     Hypertension       ALLERGIES: Tuberculin tests  Current Outpatient Medications   Medication Sig Dispense Refill    albuterol sulfate  (90 Base) MCG/ACT inhaler Inhale 2 puffs As Needed for Wheezing or Shortness of Air.      allopurinol (ZYLOPRIM) 300 MG tablet Take 1 tablet by mouth Daily.      ALPRAZolam (XANAX) 0.5 MG tablet Take 1 tablet by mouth 3 (Three) Times a Day As Needed for Anxiety.      amLODIPine (NORVASC) 5 MG tablet Take 1 tablet by mouth Daily.      aspirin 81 MG EC tablet Take 1 tablet by mouth Daily. 90 tablet 3    bumetanide (Bumex) 1 MG tablet Take 2 tablets by mouth Daily for 7 days. 14 tablet 0    butalbital-acetaminophen  MG tablet tablet Take  by mouth Every 4 (Four) Hours As Needed.      carvedilol (COREG) 12.5 MG tablet Take 1 tablet by mouth 2 (Two) Times a Day. (Patient taking differently: Take 2 tablets by mouth 2 (Two) Times a Day.) 180 tablet 3    clotrimazole-betamethasone (LOTRISONE) 1-0.05 % cream Apply 1 application  topically to the appropriate area as directed 2 (Two) Times a Day.      Dulaglutide (Trulicity) 4.5 MG/0.5ML solution pen-injector Inject  under the skin into the appropriate area as directed 1 (One) Time Per Week.      ferrous sulfate 325 (65 FE) MG tablet Take 1 tablet by mouth 2 (Two) Times a Day.      FLUoxetine (PROzac) 20 MG capsule Take 2 capsules by mouth Daily.      gabapentin (NEURONTIN) 800 MG tablet Take 1 tablet by mouth 3 (Three) Times a Day.      hydrALAZINE (APRESOLINE) 25 MG tablet Take 2 tablets by mouth 3 (Three) Times a Day. 90 tablet 2    insulin NPH (humuLIN N,novoLIN N) 100 UNIT/ML injection Inject 15 Units under the skin into the appropriate area as directed 2 (Two) Times a Day Before Meals. (Patient taking differently: Inject 35 Units under the skin into the appropriate area as directed 2 (Two) Times a Day Before Meals.)  12    isosorbide mononitrate (IMDUR) 120 MG 24 hr tablet Take 1 tablet by mouth Daily. 90 tablet 3    levocetirizine (XYZAL) 5  "MG tablet Take 1 tablet by mouth Every Evening.      Magnesium 500 MG capsule Take  by mouth Daily.      pantoprazole (Protonix) 40 MG EC tablet Take 1 tablet by mouth Daily. 30 tablet 11    pravastatin (PRAVACHOL) 10 MG tablet Take 1 tablet by mouth Daily. 90 tablet 3    promethazine (PHENERGAN) 12.5 MG tablet Take 1 tablet by mouth Every 6 (Six) Hours As Needed for Nausea or Vomiting.      QUEtiapine XR (SEROquel XR) 200 MG 24 hr tablet Take 2 tablets by mouth At Night As Needed (sleep).      ranolazine (RANEXA) 1000 MG 12 hr tablet Take 1 tablet by mouth Every 12 (Twelve) Hours. 60 tablet 2    ticagrelor (BRILINTA) 90 MG tablet tablet Take 1 tablet by mouth 2 (Two) Times a Day. 180 tablet 3    traZODone (DESYREL) 100 MG tablet Take 1 tablet by mouth Every Night.      vitamin D (ERGOCALCIFEROL) 1.25 MG (26371 UT) capsule capsule Take 1 capsule by mouth 1 (One) Time Per Week. 5 capsule 3     No current facility-administered medications for this encounter.       Vaccination History:   Pneumonia: Reports received - unsure when this was  Annual Influenza: UTD  Shingles: Currently not eligible    Objective  Vitals:    12/05/23 0821   BP: 123/57   BP Location: Left arm   Patient Position: Sitting   Cuff Size: Adult   Pulse: 77   SpO2: 95%   Weight: 118 kg (259 lb 12.8 oz)   Height: 167.6 cm (66\")     Wt Readings from Last 3 Encounters:   12/05/23 118 kg (259 lb 12.8 oz)   11/28/23 117 kg (258 lb)   11/20/23 116 kg (256 lb 6.4 oz)         12/05/23  0821   Weight: 118 kg (259 lb 12.8 oz)     Lab Results   Component Value Date    GLUCOSE 225 (H) 11/28/2023    GLUCOSE 223 (H) 11/28/2023    BUN 24 (H) 11/28/2023    BUN 23 (H) 11/28/2023    CREATININE 1.62 (H) 11/28/2023    CREATININE 1.61 (H) 11/28/2023    EGFRIFNONA 68 12/29/2021    BCR 14.8 11/28/2023    BCR 14.3 11/28/2023    K 4.9 11/28/2023    K 4.9 11/28/2023    CO2 21.9 (L) 11/28/2023    CO2 21.8 (L) 11/28/2023    CALCIUM 9.1 11/28/2023    CALCIUM 9.0 11/28/2023    " PROTENTOTREF 6.0 09/01/2023    ALBUMIN 3.9 11/28/2023    LABIL2 1.1 09/01/2023    AST 16 11/28/2023    ALT 15 11/28/2023     Lab Results   Component Value Date    WBC 6.33 09/07/2023    HGB 10.7 (L) 09/07/2023    HCT 31.8 (L) 09/07/2023    MCV 97.2 (H) 09/07/2023     09/07/2023     Lab Results   Component Value Date    CKTOTAL 126 11/28/2023    CKMB 2.40 07/25/2023    TROPONINT 18 (H) 09/07/2023     Lab Results   Component Value Date    PROBNP 109.6 11/28/2023     Results for orders placed during the hospital encounter of 07/04/23    Adult Transthoracic Echo Complete W/ Cont if Necessary Per Protocol    Interpretation Summary    Left ventricular systolic function is normal. Left ventricular ejection fraction appears to be 61 - 65%.    Left ventricular diastolic function is consistent with (grade I) impaired relaxation.    No significant valvular heart disease is present    Mild pulmonary hypertension is present    There is no evidence of pericardial effusion.         GDMT    Drug Class   Drug   Dose Last Dose Adjustment Additional Titration   Notes   ACEi/ARB/ARNI     Lisinopril D/C at discharge July 2023 - renal/ck    Beta Blocker Carvedilol 25 mg BID      MRA        SGLT2i    N/A     Imdur 120 mg QD       Hydralazine 25 mg TID       Ranexa 1 g BID          Drug Therapy Problems    Drug Interactions Screening: Multiple CNS depressants  Confusion  GDMT  Hypertension  Renal Function   Hypomagnesium   Immunizations    Recommendations:     Patient is on multiple CNS depressant agents (Alprazolam/Gabapentin/Promethazine/Quetiapine/Trazodone)  ProviderZuri, aware of reported confusion/forgetfulness.   Patient is on Alprazolam/Gabapentin/Promethazine/Quetiapine/Trazodone which could be contributing  Continue to monitor: Potentially could benefit from further work up.    PCP to address at F/U this month. Have encouraged patient to speak with her to see if any of the above medications can be eliminated.   GDMT:  Continue to titrate GDMT as clinically indicated based on BP/HR/renal function and patient tolerability.  Patient would benefit from addition of SGLT2 inhibitor, however, would defer today due to worsening SCr.   Would consider discontinuing Norvasc and titrating other BP medications to achieve tighter BP control.   Would consider decreasing loop diuretic with worsening renal function. Zuri has made F/U appt next week with nephrology to come on board to assist with diuretic use.   Would recommend low dose Magnesium supplementation with close monitoring of correction of labs.   Patient is unsure when he received the pneumonia vaccination and is unsure of which vaccination he received. Did not show on the registry in [place]. Reassess at next visit to determine if patient is in need of additional pneumonia vaccination to complete his series. Patient to speak with PCP at follow-up this month to see if she has records.   Recommend Shringrix when patient reaches 50 years old. Currently not indicated based on the patient's age.       Patient was educated on heart failure medications and the importance of medication adherence.   All questions were addressed and patient expressed understanding.         Thank you for allowing me to participate in the care of your patient,      Sapphire Teixeira. Gauri, PharmD  12/05/23  08:56 EST

## 2023-12-05 NOTE — PROGRESS NOTES
Heart Failure Clinic    Date: 12/05/23     Vitals:    12/05/23 0821   BP: 123/57   Pulse: 77   SpO2: 95%    Weight 259.2    Method of arrival: Ambulatory    Weighing self daily: Yes    Monitoring Heart Failure Zones: Yes    Today's HF Zone: Yellow     Taking medications as prescribed: Yes    Edema No    Shortness of Air: Yes    Number of pillows used at night:<2    Educational Materials given:  avs                                                                         ReDS Value: 35%  25-35 Optimal Value Status      Dimitrios Maki RN 12/05/23 08:22 EST

## 2023-12-06 ENCOUNTER — LAB (OUTPATIENT)
Dept: LAB | Facility: HOSPITAL | Age: 48
End: 2023-12-06
Payer: MEDICARE

## 2023-12-06 ENCOUNTER — TRANSCRIBE ORDERS (OUTPATIENT)
Dept: ADMINISTRATIVE | Facility: HOSPITAL | Age: 48
End: 2023-12-06
Payer: MEDICARE

## 2023-12-06 DIAGNOSIS — I25.10 ASCVD (ARTERIOSCLEROTIC CARDIOVASCULAR DISEASE): Chronic | ICD-10-CM

## 2023-12-06 DIAGNOSIS — I50.32 CHRONIC HEART FAILURE WITH PRESERVED EJECTION FRACTION (HFPEF): ICD-10-CM

## 2023-12-06 DIAGNOSIS — E87.5 HYPERKALEMIA: ICD-10-CM

## 2023-12-06 DIAGNOSIS — E87.5 HYPERKALEMIA: Primary | ICD-10-CM

## 2023-12-06 LAB
ALBUMIN SERPL-MCNC: 3.8 G/DL (ref 3.5–5.2)
ALBUMIN/GLOB SERPL: 1.5 G/DL
ALP SERPL-CCNC: 63 U/L (ref 39–117)
ALT SERPL W P-5'-P-CCNC: 25 U/L (ref 1–41)
ANION GAP SERPL CALCULATED.3IONS-SCNC: 9.9 MMOL/L (ref 5–15)
AST SERPL-CCNC: 28 U/L (ref 1–40)
BILIRUB SERPL-MCNC: 0.4 MG/DL (ref 0–1.2)
BUN SERPL-MCNC: 28 MG/DL (ref 6–20)
BUN/CREAT SERPL: 14.7 (ref 7–25)
CALCIUM SPEC-SCNC: 8.3 MG/DL (ref 8.6–10.5)
CHLORIDE SERPL-SCNC: 103 MMOL/L (ref 98–107)
CK SERPL-CCNC: 422 U/L (ref 20–200)
CO2 SERPL-SCNC: 25.1 MMOL/L (ref 22–29)
CREAT SERPL-MCNC: 1.9 MG/DL (ref 0.76–1.27)
EGFRCR SERPLBLD CKD-EPI 2021: 43 ML/MIN/1.73
GLOBULIN UR ELPH-MCNC: 2.6 GM/DL
GLUCOSE SERPL-MCNC: 203 MG/DL (ref 65–99)
MAGNESIUM SERPL-MCNC: 1.6 MG/DL (ref 1.6–2.6)
POTASSIUM SERPL-SCNC: 4.2 MMOL/L (ref 3.5–5.2)
PROT SERPL-MCNC: 6.4 G/DL (ref 6–8.5)
SODIUM SERPL-SCNC: 138 MMOL/L (ref 136–145)

## 2023-12-06 PROCEDURE — 82550 ASSAY OF CK (CPK): CPT

## 2023-12-06 PROCEDURE — 80053 COMPREHEN METABOLIC PANEL: CPT

## 2023-12-06 PROCEDURE — 36415 COLL VENOUS BLD VENIPUNCTURE: CPT

## 2023-12-06 PROCEDURE — 83735 ASSAY OF MAGNESIUM: CPT

## 2023-12-12 ENCOUNTER — TRANSCRIBE ORDERS (OUTPATIENT)
Dept: ADMINISTRATIVE | Facility: HOSPITAL | Age: 48
End: 2023-12-12
Payer: MEDICARE

## 2023-12-12 ENCOUNTER — LAB (OUTPATIENT)
Dept: LAB | Facility: HOSPITAL | Age: 48
End: 2023-12-12
Payer: MEDICARE

## 2023-12-12 DIAGNOSIS — N18.31 CHRONIC KIDNEY DISEASE, STAGE 3A: Primary | ICD-10-CM

## 2023-12-12 DIAGNOSIS — N18.31 CHRONIC KIDNEY DISEASE, STAGE 3A: ICD-10-CM

## 2023-12-12 PROCEDURE — 36415 COLL VENOUS BLD VENIPUNCTURE: CPT

## 2023-12-12 PROCEDURE — 80048 BASIC METABOLIC PNL TOTAL CA: CPT

## 2023-12-13 ENCOUNTER — TELEPHONE (OUTPATIENT)
Dept: PHARMACY | Facility: HOSPITAL | Age: 48
End: 2023-12-13
Payer: MEDICARE

## 2023-12-13 LAB
ANION GAP SERPL CALCULATED.3IONS-SCNC: 12.3 MMOL/L (ref 5–15)
BUN SERPL-MCNC: 25 MG/DL (ref 6–20)
BUN/CREAT SERPL: 15 (ref 7–25)
CALCIUM SPEC-SCNC: 9.2 MG/DL (ref 8.6–10.5)
CHLORIDE SERPL-SCNC: 103 MMOL/L (ref 98–107)
CO2 SERPL-SCNC: 22.7 MMOL/L (ref 22–29)
CREAT SERPL-MCNC: 1.67 MG/DL (ref 0.76–1.27)
EGFRCR SERPLBLD CKD-EPI 2021: 50.2 ML/MIN/1.73
GLUCOSE SERPL-MCNC: 108 MG/DL (ref 65–99)
POTASSIUM SERPL-SCNC: 4 MMOL/L (ref 3.5–5.2)
SODIUM SERPL-SCNC: 138 MMOL/L (ref 136–145)

## 2023-12-13 NOTE — TELEPHONE ENCOUNTER
We received a referral for Repatha. His most recent LDL was 34 on 3/27/23. Just wanted to reach out before starting him on Repatha to make sure you saw the recent lipid panel. Just let us know if you still want to proceed with medication.    Cher Ramirez, PharmD  12/13/2023  16:13 EST

## 2023-12-19 ENCOUNTER — TELEPHONE (OUTPATIENT)
Dept: CARDIOLOGY | Facility: CLINIC | Age: 48
End: 2023-12-19

## 2023-12-19 ENCOUNTER — HOSPITAL ENCOUNTER (OUTPATIENT)
Dept: CARDIOLOGY | Facility: HOSPITAL | Age: 48
Discharge: HOME | End: 2023-12-19
Admitting: PHYSICIAN ASSISTANT
Payer: MEDICARE

## 2023-12-19 VITALS
HEART RATE: 78 BPM | WEIGHT: 261 LBS | OXYGEN SATURATION: 96 % | BODY MASS INDEX: 41.95 KG/M2 | HEIGHT: 66 IN | SYSTOLIC BLOOD PRESSURE: 126 MMHG | DIASTOLIC BLOOD PRESSURE: 61 MMHG

## 2023-12-19 DIAGNOSIS — I25.10 ASCVD (ARTERIOSCLEROTIC CARDIOVASCULAR DISEASE): Chronic | ICD-10-CM

## 2023-12-19 DIAGNOSIS — I10 ESSENTIAL HYPERTENSION: Chronic | ICD-10-CM

## 2023-12-19 DIAGNOSIS — E83.42 HYPOMAGNESEMIA: ICD-10-CM

## 2023-12-19 DIAGNOSIS — N18.32 STAGE 3B CHRONIC KIDNEY DISEASE: ICD-10-CM

## 2023-12-19 DIAGNOSIS — I50.32 CHRONIC HEART FAILURE WITH PRESERVED EJECTION FRACTION (HFPEF): Primary | ICD-10-CM

## 2023-12-19 LAB
ABSOLUTE LUNG FLUID CONTENT: 32 % (ref 20–35)
ANION GAP SERPL CALCULATED.3IONS-SCNC: 10.3 MMOL/L (ref 5–15)
BUN SERPL-MCNC: 38 MG/DL (ref 6–20)
BUN/CREAT SERPL: 20 (ref 7–25)
CALCIUM SPEC-SCNC: 9.2 MG/DL (ref 8.6–10.5)
CHLORIDE SERPL-SCNC: 103 MMOL/L (ref 98–107)
CK SERPL-CCNC: 125 U/L (ref 20–200)
CO2 SERPL-SCNC: 21.7 MMOL/L (ref 22–29)
CREAT SERPL-MCNC: 1.9 MG/DL (ref 0.76–1.27)
EGFRCR SERPLBLD CKD-EPI 2021: 43 ML/MIN/1.73
GLUCOSE SERPL-MCNC: 259 MG/DL (ref 65–99)
MAGNESIUM SERPL-MCNC: 1.3 MG/DL (ref 1.6–2.6)
NT-PROBNP SERPL-MCNC: 125.9 PG/ML (ref 0–450)
POTASSIUM SERPL-SCNC: 4.3 MMOL/L (ref 3.5–5.2)
SODIUM SERPL-SCNC: 135 MMOL/L (ref 136–145)

## 2023-12-19 PROCEDURE — 83880 ASSAY OF NATRIURETIC PEPTIDE: CPT | Performed by: PHYSICIAN ASSISTANT

## 2023-12-19 PROCEDURE — 82550 ASSAY OF CK (CPK): CPT | Performed by: PHYSICIAN ASSISTANT

## 2023-12-19 PROCEDURE — 93005 ELECTROCARDIOGRAM TRACING: CPT | Performed by: PHYSICIAN ASSISTANT

## 2023-12-19 PROCEDURE — 94726 PLETHYSMOGRAPHY LUNG VOLUMES: CPT | Performed by: PHYSICIAN ASSISTANT

## 2023-12-19 PROCEDURE — 80048 BASIC METABOLIC PNL TOTAL CA: CPT | Performed by: PHYSICIAN ASSISTANT

## 2023-12-19 PROCEDURE — 83735 ASSAY OF MAGNESIUM: CPT | Performed by: PHYSICIAN ASSISTANT

## 2023-12-19 RX ORDER — HYDRALAZINE HYDROCHLORIDE 25 MG/1
50 TABLET, FILM COATED ORAL 3 TIMES DAILY
Qty: 90 TABLET | Refills: 2 | Status: SHIPPED | OUTPATIENT
Start: 2023-12-19

## 2023-12-19 NOTE — TELEPHONE ENCOUNTER
Caller: Matthew Madrid    Relationship: Self    Best call back number: [phone]    What is the best time to reach you: ANYTIME     Who are you requesting to speak with (clinical staff, provider,  specific staff member): ANYONE     What was the call regarding: PATIENT CALLING IN REGARDS TO GETTING HIS KIDNEYS ELEVATED PRIOR TO HEART CATH.

## 2023-12-19 NOTE — ADDENDUM NOTE
Encounter addended by: Zuri Johnson PA-C on: 12/19/2023 12:12 PM   Actions taken: Clinical Note Signed

## 2023-12-19 NOTE — PROGRESS NOTES
Heart Failure Clinic    Date: 12/19/23     Vitals:    12/19/23 0820   BP: 126/61   Pulse: 78   SpO2: 96%    Weight 261    Method of arrival: Ambulatory    Weighing self daily: Yes    Monitoring Heart Failure Zones: Yes    Today's HF Zone: Yellow     Taking medications as prescribed: Yes    Edema Yes    Shortness of Air: Yes    Number of pillows used at night:<2    Educational Materials given:  avs                                                                         ReDS Value: 32  25-35 Optimal Value Status      Dimitrios Maki RN 12/19/23 08:21 EST

## 2023-12-19 NOTE — TELEPHONE ENCOUNTER
Called and spoke to patient and he states that Bhupinder at Dr Etienne's office and his nephrologist have been discussing his kidney issues in regards to his heart cath. Patient states that were discussing admitting him prior to heart cath for prep.

## 2023-12-19 NOTE — PROGRESS NOTES
" Heart Failure Clinic  Pharmacist Note     Matthew Madrid is a 48 y.o. male seen in the Heart Failure Clinic for HFpEF (EF of 61-65% on 7/4/23).    Matthew Madrid reports a fair understanding of medications and denies any issue with adherence.    Accompanied by his wife who assists with his medications. Denies need for financial assistance at this time.     Currently sees NISA Nolasco with general cardiology (last seen 11/20/2023) who referred the patient to the Heart Failure Clinic.  Reports cardiology may be sending him for another heart cath. Reports this is supposed to be January 10th.   States his kidney doctor wanted him to come in a few days before the cath to get his \"kidneys in order.\"    Patient reports a nonproductive cough and chest tightness when he coughs. Reports SOB and swelling. Currently taking Bumex 1 mg QOD, which he reports was changed by another provider due to his kidney function. Also reports he coughs up a blood clot once to twice a week.   Denies any other signs of bleeding at this time (besides minimal blood when blowing his nose).     Patient brought in a detailed BP/HR log. BP has been elevated with readings 140s-160s/70s-90s on average. BP in clinic was 126/61 with HR of 79.     Patient reports continued mental confusion and forgetfulness. Denies any changes that may have contributed to this. States he is seeing a mental health provider this week to discuss medication changes.     States he does check his blood sugar and denies any episodes of hypoglycemia.Pt reports BG have been running higher, in the 200-300s.   He plans to discuss this with PCP, at follow-up this month    Patient states overall he just does not fill good and wishes to find out what is causing his symptoms.     States another provider discontinued his pravastatin.     Medication Use:   Hx of med intolerances: Lisinopril (discontinued at discharge in July 2023. Elevated CK and renal issues)   Retail Rx Management: " [facility] in [place].     Past Medical History:   Diagnosis Date    ASCVD (arteriosclerotic cardiovascular disease) 09/13/2021    Diabetes mellitus     Elevated cholesterol     GERD (gastroesophageal reflux disease)     Hyperlipidemia     Hypertension      ALLERGIES: Tuberculin tests  Current Outpatient Medications   Medication Sig Dispense Refill    albuterol sulfate  (90 Base) MCG/ACT inhaler Inhale 2 puffs As Needed for Wheezing or Shortness of Air.      allopurinol (ZYLOPRIM) 300 MG tablet Take 1 tablet by mouth Daily.      ALPRAZolam (XANAX) 0.5 MG tablet Take 1 tablet by mouth 3 (Three) Times a Day As Needed for Anxiety.      aspirin 81 MG EC tablet Take 1 tablet by mouth Daily. 90 tablet 3    bumetanide (Bumex) 1 MG tablet Take 1 tablet by mouth Daily for 30 days. 30 tablet 0    butalbital-acetaminophen  MG tablet tablet Take  by mouth Every 4 (Four) Hours As Needed.      carvedilol (COREG) 25 MG tablet Take 1 tablet by mouth 2 (Two) Times a Day. 90 tablet 0    clotrimazole-betamethasone (LOTRISONE) 1-0.05 % cream Apply 1 application  topically to the appropriate area as directed 2 (Two) Times a Day.      Dulaglutide (Trulicity) 4.5 MG/0.5ML solution pen-injector Inject  under the skin into the appropriate area as directed 1 (One) Time Per Week.      ferrous sulfate 325 (65 FE) MG tablet Take 1 tablet by mouth 2 (Two) Times a Day.      FLUoxetine (PROzac) 20 MG capsule Take 2 capsules by mouth Daily.      gabapentin (NEURONTIN) 800 MG tablet Take 1 tablet by mouth 3 (Three) Times a Day.      hydrALAZINE (APRESOLINE) 25 MG tablet Take 3 tablets by mouth 3 (Three) Times a Day. 90 tablet 2    insulin NPH (humuLIN N,novoLIN N) 100 UNIT/ML injection Inject 15 Units under the skin into the appropriate area as directed 2 (Two) Times a Day Before Meals. (Patient taking differently: Inject 35 Units under the skin into the appropriate area as directed 2 (Two) Times a Day Before Meals.)  12    isosorbide  "mononitrate (IMDUR) 120 MG 24 hr tablet Take 1 tablet by mouth Daily. 90 tablet 3    levocetirizine (XYZAL) 5 MG tablet Take 1 tablet by mouth Every Evening.      magnesium oxide (MAG-OX) 400 MG tablet Take 2 tablets by mouth Daily for 30 days. 60 tablet 2    pantoprazole (Protonix) 40 MG EC tablet Take 1 tablet by mouth Daily. 30 tablet 11    pravastatin (PRAVACHOL) 10 MG tablet Take 1 tablet by mouth Daily. 90 tablet 3    promethazine (PHENERGAN) 12.5 MG tablet Take 1 tablet by mouth Every 6 (Six) Hours As Needed for Nausea or Vomiting.      QUEtiapine XR (SEROquel XR) 200 MG 24 hr tablet Take 2 tablets by mouth At Night As Needed (sleep).      ranolazine (RANEXA) 1000 MG 12 hr tablet Take 1 tablet by mouth Every 12 (Twelve) Hours. 60 tablet 2    tamsulosin (FLOMAX) 0.4 MG capsule 24 hr capsule Take 1 capsule by mouth Daily. 30 capsule 3    ticagrelor (BRILINTA) 90 MG tablet tablet Take 1 tablet by mouth 2 (Two) Times a Day. 180 tablet 3    traZODone (DESYREL) 100 MG tablet Take 1 tablet by mouth Every Night.      vitamin D (ERGOCALCIFEROL) 1.25 MG (65288 UT) capsule capsule Take 1 capsule by mouth 1 (One) Time Per Week. 5 capsule 3     No current facility-administered medications for this encounter.       Vaccination History:   Pneumonia: Reports received - unsure when this was  Annual Influenza: UTD  Shingles: Currently not eligible    Objective  Vitals:    12/19/23 0820   BP: 126/61   BP Location: Left arm   Patient Position: Sitting   Cuff Size: Adult   Pulse: 78   SpO2: 96%   Weight: 118 kg (261 lb)   Height: 167.6 cm (66\")     Wt Readings from Last 3 Encounters:   12/19/23 118 kg (261 lb)   12/05/23 118 kg (259 lb 12.8 oz)   11/28/23 117 kg (258 lb)         12/19/23 0820   Weight: 118 kg (261 lb)     Lab Results   Component Value Date    GLUCOSE 108 (H) 12/12/2023    BUN 25 (H) 12/12/2023    CREATININE 1.67 (H) 12/12/2023    EGFRIFNONA 68 12/29/2021    BCR 15.0 12/12/2023    K 4.0 12/12/2023    CO2 22.7 " 12/12/2023    CALCIUM 9.2 12/12/2023    PROTENTOTREF 6.0 09/01/2023    ALBUMIN 3.8 12/06/2023    LABIL2 1.1 09/01/2023    AST 28 12/06/2023    ALT 25 12/06/2023     Lab Results   Component Value Date    WBC 6.33 09/07/2023    HGB 10.7 (L) 09/07/2023    HCT 31.8 (L) 09/07/2023    MCV 97.2 (H) 09/07/2023     09/07/2023     Lab Results   Component Value Date    CKTOTAL 422 (H) 12/06/2023    CKMB 2.40 07/25/2023    TROPONINT 18 (H) 09/07/2023     Lab Results   Component Value Date    PROBNP 139.1 12/05/2023     Results for orders placed during the hospital encounter of 07/04/23    Adult Transthoracic Echo Complete W/ Cont if Necessary Per Protocol    Interpretation Summary    Left ventricular systolic function is normal. Left ventricular ejection fraction appears to be 61 - 65%.    Left ventricular diastolic function is consistent with (grade I) impaired relaxation.    No significant valvular heart disease is present    Mild pulmonary hypertension is present    There is no evidence of pericardial effusion.         GDMT    Drug Class   Drug   Dose Last Dose Adjustment Additional Titration   Notes   ACEi/ARB/ARNI     Lisinopril D/C at discharge July 2023 - renal/ck    Beta Blocker Carvedilol 25 mg BID      MRA        SGLT2i    N/A     Imdur 120 mg QD       Hydralazine 25 mg TID       Ranexa 1 g BID          Drug Therapy Problems    Confusion  GDMT/Hypertension/Renal Function  Congestion/Cough/Blood Clots?  Edema  Hypomagnesium   Immunizations    Recommendations:     Patient is on multiple CNS depressant agents (Alprazolam/Gabapentin/Promethazine/Quetiapine/Trazodone)  Provider, Zuri, aware of reported confusion/forgetfulness.   Patient is on Alprazolam/Gabapentin/Promethazine/Quetiapine/Trazodone which could be contributing  Continue to monitor: Potentially could benefit from further work up.    PCP to address at F/U this month. Have encouraged patient to speak with her to see if any of the above medications can  be eliminated. Also recommended that patient discuss with mental health provider.   Note from Melanie's office mentioned dose adjustment of Gabapentin for renal function. Also noted that patient should discontinue quetiapine and trazodone until he is able to see [facility]. Patient will be discussing mental health medications at his visit with a mental health provider.   Can consider transition from gabapentin and fluoxetine to duloxetine, as duloxetine has benefits in anxiety/depression as well as peripheral neuropathy. Patient to discuss at mental health visit.   Continue to titrate GDMT as clinically indicated based on BP/HR/renal function and patient tolerability.    Patient would benefit from addition of SGLT2 inhibitor. Monitor renal function. [facility] no longer has eGFR limations for the HF indication. However, eGFR < 30 would have limited benefit for glucose control.   Patient's BP in clinic is within reasonable limits (< 130/80). Has higher readings at home. Would advise continued monitoring. Would avoid hypotension to minimize fall risk with numerous CNS depressants as well as antiplatelet therapy.   Notified provider, Zuri, of patients reported symptoms and concerns. Zuri to assess and discuss with patient.   Continue to follow with nephrology to determine appropriate diuretic dosage.   Would recommend continued Magnesium supplementation with close monitoring of labs.   Patient is unsure when he received the pneumonia vaccination and is unsure of which vaccination he received. Did not show on the registry in [place]. Reassess at next visit to determine if patient is in need of additional pneumonia vaccination to complete his series. Patient to discuss with his primary provider to determine what vaccination he received.   Recommend Shringrix when patient reaches 50 years old. Currently not indicated based on the patient's age.       Patient was educated on heart failure medications and the importance of  medication adherence.   All questions were addressed and patient expressed understanding.       Thank you for allowing me to participate in the care of your patient,      Rachel Mcdaniel PharmD  Community PGY1 Pharmacy Resident  [facility]  12/19/23

## 2023-12-19 NOTE — PROGRESS NOTES
[facility]  NEREYDA Aly Devin L, PA-C  [address] [facility] [place] [address]    Thank you for asking me to see Matthew Madrid for congestive heart failure.    HPI:     This is a 48 y.o. male with known past medical history of:    ASCVD  [facility] September 2021 with distal RCA lesion 95% stenosed, proximal RCA lesion 60% stenosed, mid RCA lesion 70% stenosed with successful PCI to distal RCA prior to bifurcation with Xience drug-eluting stent placed and recommendation for dual antiplatelet therapy for 1 year  CKD stage III  Chronic HFpEF  July 2023 TTE with EF 61 to 65% and grade 1 diastolic dysfunction as well as mild pulmonary hypertension  Mild pulmonary hypertension  Insulin-dependent diabetes type 2  GERD  Anxiety/depression  Morbid obesity  History of hypotension secondary to GI losses from chronic diarrhea since prior cholecystectomy  Hyperlipidemia      Matthew Madrid presents for today for heart failure clinic evaluation.  The patient is typically seen by Nannette Alvarado APRN.  Patient's primary cardiologist is Dr. Etienne & Bhupinder Graham PA-C.  Patient was referred here after being evaluated by Bhupinder Graham on November 20, 2023 with concern for worsening weight gain and heart failure exacerbation.  Please note office note was also reviewed with patient to soon undergo left heart catheterization for further ischemic evaluation given persistent angina.    Last known EF 61-65%.   Last known hospitalization and/or ED visit: July 2023 hospitalization with LEÓN and hypotension felt to be secondary to chronic GI losses and responsive to fluids during hospitalization.  Accompanied by: Wife          12/19/23 visit data/details regarding:   Dyspnea: Worsening shortness of breath   Lower extremity swelling: Improving swelling of the lower extremities  Abdominal swelling: Improving distension  Home weight: Weight monitoring booklet provided during initial visit; Scale  provided  Home BP: BP monitoring booklet provided during initial visit; BP book brought to appointment with BP well controlled.   Home heart rate: HR monitoring booklet provided during initial visit; Has monitoring device  Daily activities of living: Performing with wife's assistance   Pillows/lying flat:1-2 pillows   HF zone: Yellow  Mr. Madrid reports that since being evaluated here he did f/u with  Nephrology with Bumex decreased.  He reports he also got a new PCP.    He reports he is supposed to have cardiac catheterization on Jan 10th.   He reports some chest tightness this AM; EKG is obtained and is without acute ischemia. He reports the chest tightness only happens when he coughs. He reports cough to be dry.   We discuss proBNP & ReDSvest WNL.   He continues to have some edema, but does also have ongoing CKD.      Specialists:   Cardiology: Bhupinder & Dr. Etienne with upcoming cath with Dr. Grey      Review of Systems - Review of Systems   Constitutional: Negative for decreased appetite, diaphoresis and fever.   HENT:  Negative for congestion and ear pain.    Eyes:  Negative for blurred vision, discharge and double vision.   Cardiovascular:  Positive for dyspnea on exertion and leg swelling.   Respiratory:  Positive for shortness of breath. Negative for cough and hemoptysis.    Endocrine: Negative for cold intolerance and heat intolerance.   Hematologic/Lymphatic: Negative for adenopathy and bleeding problem.   Skin:  Negative for dry skin and nail changes.   Musculoskeletal:  Negative for arthritis and falls.   Gastrointestinal:  Negative for bloating and anorexia.   Genitourinary:  Negative for bladder incontinence and dysuria.   Neurological:  Negative for aphonia and difficulty with concentration.   Psychiatric/Behavioral:  Negative for altered mental status and hallucinations.    Allergic/Immunologic: Negative for environmental allergies and HIV exposure.         All other systems were reviewed and were  negative.    Patient Active Problem List   Diagnosis    NSTEMI, initial episode of care    NSTEMI (non-ST elevated myocardial infarction)    ASCVD (arteriosclerotic cardiovascular disease)    Essential hypertension    Dyslipidemia    DM (diabetes mellitus), type 2 with complications    SOB (shortness of breath)    Severe obesity (BMI 35.0-39.9) with comorbidity    Diarrhea    Abdominal pain    Dysphagia    Acute renal failure, unspecified acute renal failure type       family history includes Heart attack in his paternal uncle; Hyperlipidemia in his father and mother.     reports that he quit smoking about 23 years ago. His smoking use included cigarettes. He has never used smokeless tobacco. He reports current alcohol use of about 6.0 standard drinks of alcohol per week. He reports that he does not use drugs.    Allergies   Allergen Reactions    Tuberculin Tests Rash         Current Outpatient Medications:     albuterol sulfate  (90 Base) MCG/ACT inhaler, Inhale 2 puffs As Needed for Wheezing or Shortness of Air., Disp: , Rfl:     allopurinol (ZYLOPRIM) 300 MG tablet, Take 1 tablet by mouth Daily., Disp: , Rfl:     ALPRAZolam (XANAX) 0.5 MG tablet, Take 1 tablet by mouth 3 (Three) Times a Day As Needed for Anxiety., Disp: , Rfl:     aspirin 81 MG EC tablet, Take 1 tablet by mouth Daily., Disp: 90 tablet, Rfl: 3    bumetanide (Bumex) 1 MG tablet, Take 1 tablet by mouth Daily for 30 days. (Patient taking differently: Take 1 tablet by mouth Every Other Day.), Disp: 30 tablet, Rfl: 0    butalbital-acetaminophen  MG tablet tablet, Take  by mouth Every 4 (Four) Hours As Needed., Disp: , Rfl:     carvedilol (COREG) 25 MG tablet, Take 1 tablet by mouth 2 (Two) Times a Day., Disp: 90 tablet, Rfl: 0    Dulaglutide (Trulicity) 4.5 MG/0.5ML solution pen-injector, Inject  under the skin into the appropriate area as directed 1 (One) Time Per Week., Disp: , Rfl:     ferrous sulfate 325 (65 FE) MG tablet, Take 1  tablet by mouth 2 (Two) Times a Day., Disp: , Rfl:     FLUoxetine (PROzac) 20 MG capsule, Take 2 capsules by mouth Daily., Disp: , Rfl:     gabapentin (NEURONTIN) 600 MG tablet, Take 1 tablet by mouth 3 (Three) Times a Day., Disp: , Rfl:     hydrALAZINE (APRESOLINE) 25 MG tablet, Take 2 tablets by mouth 3 (Three) Times a Day., Disp: 90 tablet, Rfl: 2    insulin NPH (humuLIN N,novoLIN N) 100 UNIT/ML injection, Inject 15 Units under the skin into the appropriate area as directed 2 (Two) Times a Day Before Meals. (Patient taking differently: Inject 35 Units under the skin into the appropriate area as directed 2 (Two) Times a Day Before Meals.), Disp: , Rfl: 12    isosorbide mononitrate (IMDUR) 120 MG 24 hr tablet, Take 1 tablet by mouth Daily., Disp: 90 tablet, Rfl: 3    levocetirizine (XYZAL) 5 MG tablet, Take 1 tablet by mouth Every Evening., Disp: , Rfl:     magnesium oxide (MAG-OX) 400 MG tablet, Take 2 tablets by mouth Daily for 30 days., Disp: 60 tablet, Rfl: 2    pantoprazole (Protonix) 40 MG EC tablet, Take 1 tablet by mouth Daily., Disp: 30 tablet, Rfl: 11    promethazine (PHENERGAN) 12.5 MG tablet, Take 1 tablet by mouth Every 6 (Six) Hours As Needed for Nausea or Vomiting., Disp: , Rfl:     QUEtiapine XR (SEROquel XR) 200 MG 24 hr tablet, Take 2 tablets by mouth At Night As Needed (sleep)., Disp: , Rfl:     ranolazine (RANEXA) 1000 MG 12 hr tablet, Take 1 tablet by mouth Every 12 (Twelve) Hours., Disp: 60 tablet, Rfl: 2    tamsulosin (FLOMAX) 0.4 MG capsule 24 hr capsule, Take 1 capsule by mouth Daily., Disp: 30 capsule, Rfl: 3    ticagrelor (BRILINTA) 90 MG tablet tablet, Take 1 tablet by mouth 2 (Two) Times a Day., Disp: 180 tablet, Rfl: 3    traZODone (DESYREL) 100 MG tablet, Take 1 tablet by mouth Every Night., Disp: , Rfl:     vitamin D (ERGOCALCIFEROL) 1.25 MG (45079 UT) capsule capsule, Take 1 capsule by mouth 1 (One) Time Per Week., Disp: 5 capsule, Rfl: 3    clotrimazole-betamethasone (LOTRISONE)  1-0.05 % cream, Apply 1 application  topically to the appropriate area as directed 2 (Two) Times a Day., Disp: , Rfl:     pravastatin (PRAVACHOL) 10 MG tablet, Take 1 tablet by mouth Daily. (Patient not taking: Reported on 2023), Disp: 90 tablet, Rfl: 3      Physical Exam:  I have reviewed the patient's current vital signs as documented in the patient's EMR.   Vitals:    23 08   BP: 126/61   Pulse: 78   SpO2: 96%     Body mass index is 42.13 kg/m².       23   Weight: 118 kg (261 lb)      Physical Exam  Vitals and nursing note reviewed.   Constitutional:       General: He is awake.      Appearance: Normal appearance.   HENT:      Head: Normocephalic and atraumatic.   Eyes:      General: Lids are normal.      Conjunctiva/sclera:      Right eye: Right conjunctiva is not injected.      Left eye: Left conjunctiva is not injected.      Comments: Bilateral undereye swelling has improved to some degree.     Cardiovascular:      Rate and Rhythm: Normal rate and regular rhythm.      Heart sounds: Murmur heard.      Systolic murmur is present with a grade of 2/6.   Pulmonary:      Effort: No tachypnea, bradypnea or prolonged expiration.      Breath sounds: No wheezing, rhonchi or rales.   Abdominal:      General: There is distension.      Tenderness: There is no abdominal tenderness.   Musculoskeletal:      Right lower le+ Pitting Edema present.      Left lower le+ Pitting Edema present.   Skin:     General: Skin is warm and dry.   Neurological:      Mental Status: He is alert and oriented to person, place, and time.   Psychiatric:         Attention and Perception: Attention normal.         Mood and Affect: Mood normal.         Behavior: Behavior is cooperative.          JVP: Volume/Pulsation: Normal.        DATA REVIEWED:       ---------------------------------------------------  TTE/GAYLE:  Results for orders placed during the hospital encounter of 23    Adult Transthoracic Echo Complete  W/ Cont if Necessary Per Protocol    Interpretation Summary    Left ventricular systolic function is normal. Left ventricular ejection fraction appears to be 61 - 65%.    Left ventricular diastolic function is consistent with (grade I) impaired relaxation.    No significant valvular heart disease is present    Mild pulmonary hypertension is present    There is no evidence of pericardial effusion.        LAST HEART CATH/IF AVAILABLE:     Results for orders placed during the hospital encounter of 09/01/21    Cardiac Catheterization/Vascular Study    Narrative  · Dist RCA lesion is 95% stenosed.  · Prox RCA lesion is 60% stenosed.  · Mid RCA lesion is 70% stenosed on a sharp curve and it appears to be a kink in the vessel.  · Successfule PCI to distal RCA prior to bifurcation with 2.25x23 Xience post dilated with 2.5 NC from 99% to 0% with TMI flow 0-3      -----------------------------------------------------  CXR/Imaging:   Imaging Results (Most Recent)       None            I personally reviewed and interpreted the CXR.      -----------------------------------------------------  CT:   No radiology results for the last 30 days.  I personally reviewed the images of the CT scan.  My personal interpretation is below.      ----------------------------------------------------      --------------------------------------------------------------------------------------------------    Laboratory evaluations:    Lab Results   Component Value Date    GLUCOSE 259 (H) 12/19/2023    BUN 38 (H) 12/19/2023    CREATININE 1.90 (H) 12/19/2023    EGFRIFNONA 68 12/29/2021    BCR 20.0 12/19/2023    K 4.3 12/19/2023    CO2 21.7 (L) 12/19/2023    CALCIUM 9.2 12/19/2023    PROTENTOTREF 6.0 09/01/2023    ALBUMIN 3.8 12/06/2023    LABIL2 1.1 09/01/2023    AST 28 12/06/2023    ALT 25 12/06/2023     Lab Results   Component Value Date    WBC 6.33 09/07/2023    HGB 10.7 (L) 09/07/2023    HCT 31.8 (L) 09/07/2023    MCV 97.2 (H) 09/07/2023    PLT  "158 09/07/2023     Lab Results   Component Value Date    CHOL 95 03/27/2023    TRIG 201 (H) 03/27/2023    HDL 29 (L) 03/27/2023    LDL 34 03/27/2023     Lab Results   Component Value Date    TSH 0.849 09/07/2023     Lab Results   Component Value Date    HGBA1C 5.70 (H) 07/04/2023     Lab Results   Component Value Date    ALT 25 12/06/2023     Lab Results   Component Value Date    HGBA1C 5.70 (H) 07/04/2023    HGBA1C 9.40 (H) 09/01/2021     Lab Results   Component Value Date    MICROALBUR 26.6 09/01/2023    CREATININE 1.90 (H) 12/19/2023     No results found for: \"IRON\", \"TIBC\", \"FERRITIN\"  Lab Results   Component Value Date    INR 1.06 09/02/2021    PROTIME 14.2 09/02/2021        Lab Results   Component Value Date    ABSOLUTELUNG 32 12/19/2023    ABSOLUTELUNG 35 12/05/2023    ABSOLUTELUNG 34 11/28/2023       PAH RISK ASSESSMENT:      1. Chronic heart failure with preserved ejection fraction (HFpEF)    2. ASCVD (arteriosclerotic cardiovascular disease)    3. Stage 3b chronic kidney disease    4. Essential hypertension    5. Hypomagnesemia          ORDERS PLACED TODAY:  Orders Placed This Encounter   Procedures    Basic Metabolic Panel    proBNP    Magnesium    Basic Metabolic Panel    proBNP    Magnesium    CK    CK    ReDs Vest    ECG 12 Lead        Diagnoses and all orders for this visit:    1. Chronic heart failure with preserved ejection fraction (HFpEF) (Primary)  -     Basic Metabolic Panel; Future  -     proBNP; Future  -     Magnesium; Future  -     Basic Metabolic Panel; Standing  -     Basic Metabolic Panel  -     proBNP; Standing  -     proBNP  -     Magnesium; Standing  -     Magnesium  -     ReDs Vest    2. ASCVD (arteriosclerotic cardiovascular disease)  Overview:  9/1/2021 TTE: LVEF 56 to 60%  9/2/2021 [facility] for non-STEMI: PCI MONTSE to distal RCA.  Proximal RCA 60% stenosed and mid RCA 70% stenosed    Orders:  -     ECG 12 Lead; Future    3. Stage 3b chronic kidney disease  -     CK; Future  -     CK; " Standing  -     CK    4. Essential hypertension    5. Hypomagnesemia    Other orders  -     hydrALAZINE (APRESOLINE) 25 MG tablet; Take 2 tablets by mouth 3 (Three) Times a Day.  Dispense: 90 tablet; Refill: 2             MEDS ORDERED TODAY:    New Medications Ordered This Visit   Medications    hydrALAZINE (APRESOLINE) 25 MG tablet     Sig: Take 2 tablets by mouth 3 (Three) Times a Day.     Dispense:  90 tablet     Refill:  2        ---------------------------------------------------------------------------------------------------------------------------          ASSESSMENT/PLAN:      Diagnosis Plan   1. Chronic heart failure with preserved ejection fraction (HFpEF)  Basic Metabolic Panel    proBNP    Magnesium    Basic Metabolic Panel    Basic Metabolic Panel    proBNP    proBNP    Magnesium    Magnesium    ReDs Vest      2. ASCVD (arteriosclerotic cardiovascular disease)  ECG 12 Lead      3. Stage 3b chronic kidney disease  CK    CK    CK      4. Essential hypertension        5. Hypomagnesemia            acute on chronic diastolic heart failure. CHF.     NYHA stage Stage C: Structural heart disease is present AND symptoms have occurredFC-Class III: Marked limitation of physical activity. Comfortable at rest. Less than ordinary activity causes fatigue, palpitation, or dyspnea. NYHA FC change: change is not known.     Today, Patient is mildly volume overloadedand with  Moderate perfusion. The patient's hemodynamics are currently unacceptable. HR is: normal and is at goal. BP/MAP was reviewed and there isroom for medication up-titration.  Clinical trajectory was assessed and haswaxed and waned.     CHF GOAL DIRECTED MEDICAL THERAPY FOR PATIENT ADDRESSED/ADJUSTED:     GDMT: HFpEF    Drug Class   Drug   Dose Last Dose Adjustment Notes   ACEi/ARB/ARNI Hydralizine/Imdur on board      Beta Blocker Coreg 25mg BID     MRA CKD III      SGLT2i Consider in future visit   N/A   Secondaries if applicable:          -CHF Specific  BB:    Carvedilol  at dose of 25mg BID.   We discussed processes/benefits of HF clinic including nursing, pharmacist, and provider evaluation during each visit with ability for in office ReDS vest, labs, and ability to provide IV diuresis in the clinic with close outpatient monitoring.  Additionally, patient was educated about the availability of delivery of medications to patient's clinic room prior to leaving the building which assists with medication compliance and insures medications are in hands when changes are made (if patient opts for apothecary usage) with thorough guidance regarding changes and medication schedule provided.          -ACE/ARB/ARNi/alternative:   Hydralazine was previously advised at 75mg; patient has been taking 25mg.  I have asked him to increase to 50mg qd.    Imdur 120mg on board at present.     -MRA:   The patient is FC-NYHA Class III and MRA is indicated.  However, given CKD III, will hold on MRA addition at present.       -SGLT2 inhibitor therapy:   A BMP at initiation to verify GFR >30 was recommended, as was interval GFR surveillance.  The patient was advised to hold SGLT2I when PO intake is restricted due to a planned surgery, or due to an underlying illness.    Will consider Farxiga in future visit; however, at present we are adjusting diuretics and patient has upcoming cardiac catheterization, thus will avoid renal fluctuations until post cardiac catheterization.   Pt was advised SEs, some severe, including hypersensitivity and Ai's; coupled with discussion regarding common side effects of UTIs and female genital mycotic infections were discussed. If you will be NPO, or are sick (poor PO intake, N&V) please hold the medication until you are back to a normal diet.     -Diuretic regimen:   ReDS Vest reading for. 12/19/23 is  32; ReDs Vest reading reviewed with patient.    Continue Bumex as advised by Nephrology.     BMP, Mag, & ProBNP reviewed with patient.      -Fluid  restriction/Sodium restriction:   Requested 2000 ml restriction  Patient has been asked to weigh daily and was provided with a printed diuretic strategy.  1,500 mg Na restriction was discussed.      -Acute vs. Chronic underlying conditions other than HF addressed during visit:   Uncontrolled HTN:   Patient did not previously increase hydralazine as instructed.  Have advised again for him to increase hydralazine dose to 50mg.    Continue Imdur.   Stop Norvasc 2/2 leg swelling.     Acute hypomagnesemia:   Increase Mag 500mg daily to 1000mg daily.   ASCVD:   Continue Brilinta.   Pravastatin stopped by outside provider due to elevated CKD.    Continue ASA.   Cath planned for 01/10/23 per patient account.   EKG with NSR.      IDDM type 2:   Continue management per primary.      CKD stage 3b likely 2/2 DM type 2, ID in combination with recent diuresis:   Continue f/u with Dr. Olvera.      Identifiable barriers to Heart Failure Self-care:   Medical Barriers:  Multiple medical comorbidities  Social Barriers:  No immediate known barriers.      -Sleep/Apnea:   Will address future sleep study if not recently performed at next visit.      --------------------------------------------------------------------------------        Class 3 Severe Obesity (BMI >=40). Obesity-related health conditions include the following: hypertension, coronary heart disease, diabetes mellitus, dyslipidemias, and GERD. Obesity is unchanged. BMI is is above average; BMI management plan is completed. We discussed portion control, increasing exercise, and heart failure dietary management strategies .              >45 minutes out of 60 minutes face to face spent counseling patient extensively on dietary Na+ intake, importance of activity, weight monitoring, compliance with medications in addition to importance of titration with goal directed medical therapy and follow up appointments.            This document has been electronically signed by Zuri Mckenna  NEREYDA Johnson  December 19, 2023 12:12 EST      Dictated Utilizing Dragon Dictation: Part of this note may be an electronic transcription/translation of spoken language to printed text using the Dragon Dictation System.    Follow-up appointment and medication changes provided in hand delivered After Visit Summary as well as reviewed in the room.

## 2023-12-21 LAB
QT INTERVAL: 436 MS
QTC INTERVAL: 473 MS

## 2023-12-27 ENCOUNTER — TRANSCRIBE ORDERS (OUTPATIENT)
Dept: ADMINISTRATIVE | Facility: HOSPITAL | Age: 48
End: 2023-12-27
Payer: MEDICARE

## 2023-12-27 DIAGNOSIS — R41.9 LOSS OF ALERTNESS: Primary | ICD-10-CM

## 2023-12-28 ENCOUNTER — TELEPHONE (OUTPATIENT)
Dept: CARDIOLOGY | Facility: CLINIC | Age: 48
End: 2023-12-28
Payer: MEDICARE

## 2023-12-28 ENCOUNTER — TRANSCRIBE ORDERS (OUTPATIENT)
Dept: ADMINISTRATIVE | Facility: HOSPITAL | Age: 48
End: 2023-12-28
Payer: MEDICARE

## 2023-12-28 ENCOUNTER — LAB (OUTPATIENT)
Dept: LAB | Facility: HOSPITAL | Age: 48
End: 2023-12-28
Payer: MEDICARE

## 2023-12-28 DIAGNOSIS — N18.31 CHRONIC KIDNEY DISEASE (CKD) STAGE G3A/A1, MODERATELY DECREASED GLOMERULAR FILTRATION RATE (GFR) BETWEEN 45-59 ML/MIN/1.73 SQUARE METER AND ALBUMINURIA CREATININE RATIO LESS THAN 30 MG/G (CMS/H*: ICD-10-CM

## 2023-12-28 DIAGNOSIS — N18.31 CHRONIC KIDNEY DISEASE (CKD) STAGE G3A/A1, MODERATELY DECREASED GLOMERULAR FILTRATION RATE (GFR) BETWEEN 45-59 ML/MIN/1.73 SQUARE METER AND ALBUMINURIA CREATININE RATIO LESS THAN 30 MG/G (CMS/H*: Primary | ICD-10-CM

## 2023-12-28 LAB
ANION GAP SERPL CALCULATED.3IONS-SCNC: 7.5 MMOL/L (ref 5–15)
BUN SERPL-MCNC: 22 MG/DL (ref 6–20)
BUN/CREAT SERPL: 11.8 (ref 7–25)
CALCIUM SPEC-SCNC: 8.8 MG/DL (ref 8.6–10.5)
CHLORIDE SERPL-SCNC: 107 MMOL/L (ref 98–107)
CO2 SERPL-SCNC: 22.5 MMOL/L (ref 22–29)
CREAT SERPL-MCNC: 1.87 MG/DL (ref 0.76–1.27)
EGFRCR SERPLBLD CKD-EPI 2021: 43.8 ML/MIN/1.73
GLUCOSE SERPL-MCNC: 212 MG/DL (ref 65–99)
POTASSIUM SERPL-SCNC: 4.4 MMOL/L (ref 3.5–5.2)
SODIUM SERPL-SCNC: 137 MMOL/L (ref 136–145)

## 2023-12-28 PROCEDURE — 80048 BASIC METABOLIC PNL TOTAL CA: CPT

## 2023-12-28 PROCEDURE — 36415 COLL VENOUS BLD VENIPUNCTURE: CPT

## 2023-12-28 NOTE — TELEPHONE ENCOUNTER
Caller: Matthew Madrid    Relationship to patient: Self    Best call back number: [phone]     Chief complaint: BACK ON MEDS     Type of visit: F/U     Requested date: FIRST OF JAN      If rescheduling, when is the original appointment: NO APPT SCHD      Additional notes: PT WENT TO FAMILY DOCTOR ON 12/27, PT WAS PUT BACK ON cholesterol pills. WAS TOLD HE NEEDS TO GET BACK IN OFFICE BEFORE FEB 2024. WASN'T SURE ON MED CHANGE TIME FRAME F/U.   PT STATES HE WILL BE GOING WITH [facility] PPO AFTER THE NEW YEAR. PLEASE ADVISE PT ON DATE/ TIME OF APPT.

## 2024-01-02 ENCOUNTER — TELEPHONE (OUTPATIENT)
Dept: CARDIOLOGY | Facility: CLINIC | Age: 49
End: 2024-01-02

## 2024-01-02 ENCOUNTER — TELEPHONE (OUTPATIENT)
Dept: CARDIOLOGY | Facility: CLINIC | Age: 49
End: 2024-01-02
Payer: MEDICARE

## 2024-01-02 RX ORDER — BUMETANIDE 1 MG/1
1 TABLET ORAL DAILY
Qty: 30 TABLET | Refills: 0 | Status: SHIPPED | OUTPATIENT
Start: 2024-01-02 | End: 2024-02-01

## 2024-01-02 NOTE — TELEPHONE ENCOUNTER
Caller: Matthew Madrid    Relationship: Self    Best call back number: [phone]    What is the best time to reach you: ANYTIME     Who are you requesting to speak with (clinical staff, provider,  specific staff member): ANYONE     What was the call regarding: PATIENT HAS PROCEDURE SCHEDULED ON 1.10.24 AND WOULD LIKE TO KNOW IF HE IS TO STOP TAKING HIS BLOOD THINNER. WOULD ALSO LIKE TO INFORM THAT HE WOULD LIKE ALL ARTERIES/BLOCKAGES FIXED.

## 2024-01-02 NOTE — TELEPHONE ENCOUNTER
Caller: aMtthew Madrid    Relationship to patient: Self    Best call back number: [phone]    Patient is needing: TO HAVE A CALL BACK TO DISCUSS STEPS TO TAKE BEFORE THE HEART CATH ON 1.10.24, PLEASE ADVISE THANK YOU

## 2024-01-08 ENCOUNTER — TELEPHONE (OUTPATIENT)
Dept: CARDIOLOGY | Facility: CLINIC | Age: 49
End: 2024-01-08

## 2024-01-08 NOTE — TELEPHONE ENCOUNTER
Caller: Matthew Madrid    Relationship: Self    Best call back number: [phone]    What is the best time to reach you: ANY    Who are you requesting to speak with (clinical staff, provider,  specific staff member): JOHN OR ABHIJIT    What was the call regarding: PATIENT REQUESTING JOHN OR EMERALD CALL BACK PER HOSPITAL    Is it okay if the provider responds through MyChart: NO

## 2024-01-08 NOTE — TELEPHONE ENCOUNTER
Caller: Matthew Madrid    Relationship: Self    Best call back number: [phone]    What is the best time to reach you: ANY    Who are you requesting to speak with (clinical staff, provider,  specific staff member): ANY    What was the call regarding: PT IS SUPPOSE TO HAVE HEART CATH DONE WEDNESDAY. SAID CANDIDA WAS SUPPOSE TO CALL THE HEART DOCTOR FOR SOME TEST TO MAKE SURE HIS KIDNEYS ARE OKAY AND IS FOLLOWING UP WITH THAT.     Is it okay if the provider responds through MyChart: NO

## 2024-01-09 ENCOUNTER — HOSPITAL ENCOUNTER (INPATIENT)
Facility: HOSPITAL | Age: 49
LOS: 5 days | Discharge: HOME | End: 2024-01-16
Attending: INTERNAL MEDICINE | Admitting: INTERNAL MEDICINE
Payer: MEDICARE

## 2024-01-09 DIAGNOSIS — I25.10 ASCVD (ARTERIOSCLEROTIC CARDIOVASCULAR DISEASE): Primary | ICD-10-CM

## 2024-01-09 PROBLEM — R07.9 CHEST PAIN IN ADULT: Status: ACTIVE | Noted: 2024-01-09

## 2024-01-09 LAB
ALBUMIN SERPL-MCNC: 3.5 G/DL (ref 3.5–5.2)
ALBUMIN/GLOB SERPL: 1.2 G/DL
ALP SERPL-CCNC: 72 U/L (ref 39–117)
ALT SERPL W P-5'-P-CCNC: 19 U/L (ref 1–41)
AMPHET+METHAMPHET UR QL: NEGATIVE
AMPHETAMINES UR QL: NEGATIVE
ANION GAP SERPL CALCULATED.3IONS-SCNC: 9.2 MMOL/L (ref 5–15)
AST SERPL-CCNC: 20 U/L (ref 1–40)
BACTERIA UR QL AUTO: NORMAL /HPF
BARBITURATES UR QL SCN: NEGATIVE
BASOPHILS # BLD AUTO: 0.02 10*3/MM3 (ref 0–0.2)
BASOPHILS NFR BLD AUTO: 0.4 % (ref 0–1.5)
BENZODIAZ UR QL SCN: POSITIVE
BILIRUB SERPL-MCNC: 0.5 MG/DL (ref 0–1.2)
BILIRUB UR QL STRIP: NEGATIVE
BUN SERPL-MCNC: 27 MG/DL (ref 6–20)
BUN/CREAT SERPL: 14.6 (ref 7–25)
BUPRENORPHINE SERPL-MCNC: NEGATIVE NG/ML
CALCIUM SPEC-SCNC: 8.8 MG/DL (ref 8.6–10.5)
CANNABINOIDS SERPL QL: NEGATIVE
CHLORIDE SERPL-SCNC: 106 MMOL/L (ref 98–107)
CLARITY UR: CLEAR
CO2 SERPL-SCNC: 22.8 MMOL/L (ref 22–29)
COCAINE UR QL: NEGATIVE
COLOR UR: ABNORMAL
CREAT SERPL-MCNC: 1.85 MG/DL (ref 0.76–1.27)
DEPRECATED RDW RBC AUTO: 49.4 FL (ref 37–54)
EGFRCR SERPLBLD CKD-EPI 2021: 44.4 ML/MIN/1.73
EOSINOPHIL # BLD AUTO: 0.19 10*3/MM3 (ref 0–0.4)
EOSINOPHIL NFR BLD AUTO: 3.6 % (ref 0.3–6.2)
ERYTHROCYTE [DISTWIDTH] IN BLOOD BY AUTOMATED COUNT: 14 % (ref 12.3–15.4)
FENTANYL UR-MCNC: NEGATIVE NG/ML
GLOBULIN UR ELPH-MCNC: 3 GM/DL
GLUCOSE BLDC GLUCOMTR-MCNC: 238 MG/DL (ref 70–130)
GLUCOSE BLDC GLUCOMTR-MCNC: 247 MG/DL (ref 70–130)
GLUCOSE SERPL-MCNC: 245 MG/DL (ref 65–99)
GLUCOSE UR STRIP-MCNC: NEGATIVE MG/DL
HBA1C MFR BLD: 6.7 % (ref 4.8–5.6)
HCT VFR BLD AUTO: 31.4 % (ref 37.5–51)
HGB BLD-MCNC: 10.6 G/DL (ref 13–17.7)
HGB UR QL STRIP.AUTO: NEGATIVE
HYALINE CASTS UR QL AUTO: NORMAL /LPF
IMM GRANULOCYTES # BLD AUTO: 0.02 10*3/MM3 (ref 0–0.05)
IMM GRANULOCYTES NFR BLD AUTO: 0.4 % (ref 0–0.5)
KETONES UR QL STRIP: NEGATIVE
LEUKOCYTE ESTERASE UR QL STRIP.AUTO: NEGATIVE
LYMPHOCYTES # BLD AUTO: 1.41 10*3/MM3 (ref 0.7–3.1)
LYMPHOCYTES NFR BLD AUTO: 26.8 % (ref 19.6–45.3)
MCH RBC QN AUTO: 32.7 PG (ref 26.6–33)
MCHC RBC AUTO-ENTMCNC: 33.8 G/DL (ref 31.5–35.7)
MCV RBC AUTO: 96.9 FL (ref 79–97)
METHADONE UR QL SCN: NEGATIVE
MONOCYTES # BLD AUTO: 0.46 10*3/MM3 (ref 0.1–0.9)
MONOCYTES NFR BLD AUTO: 8.7 % (ref 5–12)
NEUTROPHILS NFR BLD AUTO: 3.17 10*3/MM3 (ref 1.7–7)
NEUTROPHILS NFR BLD AUTO: 60.1 % (ref 42.7–76)
NITRITE UR QL STRIP: NEGATIVE
NRBC BLD AUTO-RTO: 0 /100 WBC (ref 0–0.2)
OPIATES UR QL: NEGATIVE
OXYCODONE UR QL SCN: NEGATIVE
PCP UR QL SCN: NEGATIVE
PH UR STRIP.AUTO: 5.5 [PH] (ref 5–8)
PLATELET # BLD AUTO: 156 10*3/MM3 (ref 140–450)
PMV BLD AUTO: 10.6 FL (ref 6–12)
POTASSIUM SERPL-SCNC: 4.6 MMOL/L (ref 3.5–5.2)
PROT SERPL-MCNC: 6.5 G/DL (ref 6–8.5)
PROT UR QL STRIP: ABNORMAL
RBC # BLD AUTO: 3.24 10*6/MM3 (ref 4.14–5.8)
RBC # UR STRIP: NORMAL /HPF
REF LAB TEST METHOD: NORMAL
SODIUM SERPL-SCNC: 138 MMOL/L (ref 136–145)
SP GR UR STRIP: 1.01 (ref 1–1.03)
SQUAMOUS #/AREA URNS HPF: NORMAL /HPF
TRICYCLICS UR QL SCN: NEGATIVE
TSH SERPL DL<=0.05 MIU/L-ACNC: 1.44 UIU/ML (ref 0.27–4.2)
UROBILINOGEN UR QL STRIP: ABNORMAL
WBC # UR STRIP: NORMAL /HPF
WBC NRBC COR # BLD AUTO: 5.27 10*3/MM3 (ref 3.4–10.8)

## 2024-01-09 PROCEDURE — 25810000003 SODIUM CHLORIDE 0.9 % SOLUTION: Performed by: INTERNAL MEDICINE

## 2024-01-09 PROCEDURE — 63710000001 INSULIN GLARGINE PER 5 UNITS: Performed by: INTERNAL MEDICINE

## 2024-01-09 PROCEDURE — 80053 COMPREHEN METABOLIC PANEL: CPT | Performed by: INTERNAL MEDICINE

## 2024-01-09 PROCEDURE — 25010000002 BUMETANIDE PER 0.5 MG: Performed by: INTERNAL MEDICINE

## 2024-01-09 PROCEDURE — 84443 ASSAY THYROID STIM HORMONE: CPT | Performed by: INTERNAL MEDICINE

## 2024-01-09 PROCEDURE — 94761 N-INVAS EAR/PLS OXIMETRY MLT: CPT

## 2024-01-09 PROCEDURE — 82948 REAGENT STRIP/BLOOD GLUCOSE: CPT

## 2024-01-09 PROCEDURE — 83036 HEMOGLOBIN GLYCOSYLATED A1C: CPT | Performed by: INTERNAL MEDICINE

## 2024-01-09 PROCEDURE — 63710000001 INSULIN LISPRO (HUMAN) PER 5 UNITS: Performed by: INTERNAL MEDICINE

## 2024-01-09 PROCEDURE — 99222 1ST HOSP IP/OBS MODERATE 55: CPT | Performed by: INTERNAL MEDICINE

## 2024-01-09 PROCEDURE — 94799 UNLISTED PULMONARY SVC/PX: CPT

## 2024-01-09 PROCEDURE — 25010000002 HEPARIN (PORCINE) PER 1000 UNITS: Performed by: INTERNAL MEDICINE

## 2024-01-09 PROCEDURE — 81001 URINALYSIS AUTO W/SCOPE: CPT | Performed by: INTERNAL MEDICINE

## 2024-01-09 PROCEDURE — 85025 COMPLETE CBC W/AUTO DIFF WBC: CPT | Performed by: INTERNAL MEDICINE

## 2024-01-09 PROCEDURE — G0378 HOSPITAL OBSERVATION PER HR: HCPCS

## 2024-01-09 PROCEDURE — 99232 SBSQ HOSP IP/OBS MODERATE 35: CPT | Performed by: INTERNAL MEDICINE

## 2024-01-09 PROCEDURE — 80307 DRUG TEST PRSMV CHEM ANLYZR: CPT | Performed by: INTERNAL MEDICINE

## 2024-01-09 RX ORDER — KRILL/OM-3/DHA/EPA/PHOSPHO/AST 500-110 MG
1 CAPSULE ORAL DAILY
Status: DISCONTINUED | OUTPATIENT
Start: 2024-01-10 | End: 2024-01-09 | Stop reason: RX

## 2024-01-09 RX ORDER — TIZANIDINE 4 MG/1
4 TABLET ORAL 2 TIMES DAILY PRN
Status: DISCONTINUED | OUTPATIENT
Start: 2024-01-09 | End: 2024-01-16 | Stop reason: HOSPADM

## 2024-01-09 RX ORDER — SODIUM CHLORIDE 9 MG/ML
40 INJECTION, SOLUTION INTRAVENOUS AS NEEDED
Status: DISCONTINUED | OUTPATIENT
Start: 2024-01-09 | End: 2024-01-16 | Stop reason: HOSPADM

## 2024-01-09 RX ORDER — ATORVASTATIN CALCIUM 40 MG/1
40 TABLET, FILM COATED ORAL DAILY
Status: CANCELLED | OUTPATIENT
Start: 2024-01-09

## 2024-01-09 RX ORDER — CARVEDILOL 6.25 MG/1
12.5 TABLET ORAL 2 TIMES DAILY WITH MEALS
Status: CANCELLED | OUTPATIENT
Start: 2024-01-09

## 2024-01-09 RX ORDER — DEXTROSE MONOHYDRATE 25 G/50ML
25 INJECTION, SOLUTION INTRAVENOUS
Status: DISCONTINUED | OUTPATIENT
Start: 2024-01-09 | End: 2024-01-14

## 2024-01-09 RX ORDER — INSULIN LISPRO 100 [IU]/ML
2-9 INJECTION, SOLUTION INTRAVENOUS; SUBCUTANEOUS
Status: DISCONTINUED | OUTPATIENT
Start: 2024-01-09 | End: 2024-01-14

## 2024-01-09 RX ORDER — TIZANIDINE 4 MG/1
4 TABLET ORAL 2 TIMES DAILY PRN
COMMUNITY
End: 2024-01-22 | Stop reason: ALTCHOICE

## 2024-01-09 RX ORDER — ACETYLCYSTEINE 200 MG/ML
600 SOLUTION ORAL; RESPIRATORY (INHALATION) EVERY 12 HOURS SCHEDULED
Status: DISCONTINUED | OUTPATIENT
Start: 2024-01-09 | End: 2024-01-09

## 2024-01-09 RX ORDER — ACETYLCYSTEINE 200 MG/ML
600 SOLUTION ORAL; RESPIRATORY (INHALATION) EVERY 12 HOURS SCHEDULED
Status: DISCONTINUED | OUTPATIENT
Start: 2024-01-09 | End: 2024-01-10

## 2024-01-09 RX ORDER — KRILL/OM-3/DHA/EPA/PHOSPHO/AST 500-110 MG
1 CAPSULE ORAL DAILY
COMMUNITY

## 2024-01-09 RX ORDER — ALBUTEROL SULFATE 2.5 MG/3ML
2.5 SOLUTION RESPIRATORY (INHALATION) EVERY 6 HOURS PRN
Status: DISCONTINUED | OUTPATIENT
Start: 2024-01-09 | End: 2024-01-16 | Stop reason: HOSPADM

## 2024-01-09 RX ORDER — FLUOXETINE HYDROCHLORIDE 40 MG/1
40 CAPSULE ORAL DAILY
COMMUNITY

## 2024-01-09 RX ORDER — RANOLAZINE 500 MG/1
1000 TABLET, EXTENDED RELEASE ORAL EVERY 12 HOURS SCHEDULED
Status: DISCONTINUED | OUTPATIENT
Start: 2024-01-09 | End: 2024-01-16 | Stop reason: HOSPADM

## 2024-01-09 RX ORDER — GLUCAGON 1 MG/ML
1 KIT INJECTION
Status: DISCONTINUED | OUTPATIENT
Start: 2024-01-09 | End: 2024-01-14

## 2024-01-09 RX ORDER — ISOSORBIDE MONONITRATE 30 MG/1
120 TABLET, EXTENDED RELEASE ORAL DAILY
Status: CANCELLED | OUTPATIENT
Start: 2024-01-09

## 2024-01-09 RX ORDER — ATORVASTATIN CALCIUM 40 MG/1
40 TABLET, FILM COATED ORAL NIGHTLY
Status: DISCONTINUED | OUTPATIENT
Start: 2024-01-09 | End: 2024-01-16 | Stop reason: HOSPADM

## 2024-01-09 RX ORDER — PANTOPRAZOLE SODIUM 40 MG/1
40 TABLET, DELAYED RELEASE ORAL DAILY
Status: DISCONTINUED | OUTPATIENT
Start: 2024-01-09 | End: 2024-01-16 | Stop reason: HOSPADM

## 2024-01-09 RX ORDER — LANOLIN ALCOHOL/MO/W.PET/CERES
1000 CREAM (GRAM) TOPICAL DAILY
Status: DISCONTINUED | OUTPATIENT
Start: 2024-01-10 | End: 2024-01-16 | Stop reason: HOSPADM

## 2024-01-09 RX ORDER — SODIUM CHLORIDE 9 MG/ML
100 INJECTION, SOLUTION INTRAVENOUS CONTINUOUS
Status: DISCONTINUED | OUTPATIENT
Start: 2024-01-09 | End: 2024-01-09

## 2024-01-09 RX ORDER — AMOXICILLIN 250 MG
2 CAPSULE ORAL 2 TIMES DAILY
Status: DISCONTINUED | OUTPATIENT
Start: 2024-01-09 | End: 2024-01-16 | Stop reason: HOSPADM

## 2024-01-09 RX ORDER — ATORVASTATIN CALCIUM 40 MG/1
40 TABLET, FILM COATED ORAL DAILY
COMMUNITY

## 2024-01-09 RX ORDER — NITROGLYCERIN 0.4 MG/1
0.4 TABLET SUBLINGUAL
Status: DISCONTINUED | OUTPATIENT
Start: 2024-01-09 | End: 2024-01-16 | Stop reason: HOSPADM

## 2024-01-09 RX ORDER — MAGNESIUM OXIDE 400 MG/1
800 TABLET ORAL DAILY
COMMUNITY
End: 2024-01-22 | Stop reason: SDUPTHER

## 2024-01-09 RX ORDER — HEPARIN SODIUM 5000 [USP'U]/ML
5000 INJECTION, SOLUTION INTRAVENOUS; SUBCUTANEOUS EVERY 12 HOURS SCHEDULED
Status: DISCONTINUED | OUTPATIENT
Start: 2024-01-09 | End: 2024-01-15

## 2024-01-09 RX ORDER — BISACODYL 10 MG
10 SUPPOSITORY, RECTAL RECTAL DAILY PRN
Status: DISCONTINUED | OUTPATIENT
Start: 2024-01-09 | End: 2024-01-16 | Stop reason: HOSPADM

## 2024-01-09 RX ORDER — POLYETHYLENE GLYCOL 3350 17 G/17G
17 POWDER, FOR SOLUTION ORAL DAILY PRN
Status: DISCONTINUED | OUTPATIENT
Start: 2024-01-09 | End: 2024-01-16 | Stop reason: HOSPADM

## 2024-01-09 RX ORDER — SODIUM CHLORIDE 0.9 % (FLUSH) 0.9 %
10 SYRINGE (ML) INJECTION AS NEEDED
Status: DISCONTINUED | OUTPATIENT
Start: 2024-01-09 | End: 2024-01-16 | Stop reason: HOSPADM

## 2024-01-09 RX ORDER — VITAMIN B COMPLEX
1 TABLET ORAL DAILY
COMMUNITY

## 2024-01-09 RX ORDER — SODIUM CHLORIDE 0.9 % (FLUSH) 0.9 %
10 SYRINGE (ML) INJECTION EVERY 12 HOURS SCHEDULED
Status: DISCONTINUED | OUTPATIENT
Start: 2024-01-09 | End: 2024-01-16 | Stop reason: HOSPADM

## 2024-01-09 RX ORDER — HYDROXYZINE 50 MG/1
50 TABLET, FILM COATED ORAL ONCE
Qty: 1 TABLET | Refills: 0 | Status: COMPLETED | OUTPATIENT
Start: 2024-01-09 | End: 2024-01-09

## 2024-01-09 RX ORDER — LANOLIN ALCOHOL/MO/W.PET/CERES
1000 CREAM (GRAM) TOPICAL DAILY
COMMUNITY

## 2024-01-09 RX ORDER — BISACODYL 5 MG/1
5 TABLET, DELAYED RELEASE ORAL DAILY PRN
Status: DISCONTINUED | OUTPATIENT
Start: 2024-01-09 | End: 2024-01-16 | Stop reason: HOSPADM

## 2024-01-09 RX ORDER — HYDRALAZINE HYDROCHLORIDE 25 MG/1
50 TABLET, FILM COATED ORAL 3 TIMES DAILY
COMMUNITY
End: 2024-01-16 | Stop reason: HOSPADM

## 2024-01-09 RX ORDER — HYDRALAZINE HYDROCHLORIDE 50 MG/1
50 TABLET, FILM COATED ORAL 3 TIMES DAILY
Status: CANCELLED | OUTPATIENT
Start: 2024-01-09

## 2024-01-09 RX ORDER — NICOTINE POLACRILEX 4 MG
15 LOZENGE BUCCAL
Status: DISCONTINUED | OUTPATIENT
Start: 2024-01-09 | End: 2024-01-14

## 2024-01-09 RX ORDER — ASPIRIN 81 MG/1
81 TABLET ORAL DAILY
Status: DISCONTINUED | OUTPATIENT
Start: 2024-01-09 | End: 2024-01-16 | Stop reason: HOSPADM

## 2024-01-09 RX ORDER — CARVEDILOL 12.5 MG/1
25 TABLET ORAL 2 TIMES DAILY WITH MEALS
COMMUNITY
End: 2024-01-22

## 2024-01-09 RX ORDER — FLUOXETINE HYDROCHLORIDE 20 MG/1
40 CAPSULE ORAL DAILY
Status: DISCONTINUED | OUTPATIENT
Start: 2024-01-10 | End: 2024-01-16 | Stop reason: HOSPADM

## 2024-01-09 RX ORDER — BUMETANIDE 0.25 MG/ML
1 INJECTION INTRAMUSCULAR; INTRAVENOUS ONCE
Qty: 4 ML | Refills: 0 | Status: COMPLETED | OUTPATIENT
Start: 2024-01-09 | End: 2024-01-09

## 2024-01-09 RX ORDER — ISOSORBIDE MONONITRATE 120 MG/1
120 TABLET, EXTENDED RELEASE ORAL DAILY
COMMUNITY

## 2024-01-09 RX ADMIN — TICAGRELOR 90 MG: 90 TABLET ORAL at 20:49

## 2024-01-09 RX ADMIN — BUMETANIDE 1 MG: 0.25 INJECTION, SOLUTION INTRAMUSCULAR; INTRAVENOUS at 18:22

## 2024-01-09 RX ADMIN — INSULIN LISPRO 4 UNITS: 100 INJECTION, SOLUTION INTRAVENOUS; SUBCUTANEOUS at 22:03

## 2024-01-09 RX ADMIN — HEPARIN SODIUM 5000 UNITS: 5000 INJECTION INTRAVENOUS; SUBCUTANEOUS at 20:49

## 2024-01-09 RX ADMIN — ACETYLCYSTEINE 600 MG: 200 INHALANT RESPIRATORY (INHALATION) at 22:03

## 2024-01-09 RX ADMIN — TIZANIDINE 4 MG: 4 TABLET ORAL at 22:04

## 2024-01-09 RX ADMIN — ATORVASTATIN CALCIUM 40 MG: 40 TABLET, FILM COATED ORAL at 20:49

## 2024-01-09 RX ADMIN — PANTOPRAZOLE SODIUM 40 MG: 40 TABLET, DELAYED RELEASE ORAL at 18:23

## 2024-01-09 RX ADMIN — ASPIRIN 81 MG: 81 TABLET, COATED ORAL at 18:22

## 2024-01-09 RX ADMIN — HYDROXYZINE HYDROCHLORIDE 50 MG: 50 TABLET ORAL at 22:04

## 2024-01-09 RX ADMIN — SODIUM CHLORIDE 100 ML/HR: 9 INJECTION, SOLUTION INTRAVENOUS at 16:10

## 2024-01-09 RX ADMIN — INSULIN GLARGINE 15 UNITS: 100 INJECTION, SOLUTION SUBCUTANEOUS at 22:03

## 2024-01-09 RX ADMIN — RANOLAZINE 1000 MG: 500 TABLET, FILM COATED, EXTENDED RELEASE ORAL at 20:49

## 2024-01-09 NOTE — Clinical Note
A 4 fr sheath was  inserted using micropuncture technique with ultrasound guidance into the right femoral artery.

## 2024-01-09 NOTE — Clinical Note
First balloon inflation max pressure = 18 janine. First balloon inflation duration = 14 seconds. Second inflation of balloon - Max pressure = 18 janine. 2nd Inflation of balloon - Duration = 4 seconds.

## 2024-01-09 NOTE — PLAN OF CARE
Goal Outcome Evaluation:      Pt came to the floor as a direct admit from Dr. Grey's office. He is A&O, on room air, with pitting edema. Wife took valuable home but did leave a bag of clothes, cell phone, and glasses. IV placed. VSS. No new orders at this time. Ongoing plan of care.

## 2024-01-09 NOTE — H&P
Patient Identification:  Name:  Matthew Madrid  Age:  48 y.o.  Sex:  male  :  1975  MRN:  [id number]   Visit Number:  [id number]  Primary Care Physician:  Richie Ruelas PA-C    Chief complaint:   Chest pain  History of presenting illness:    Pt is a 48 y old male with hx of CAD s/p distal RCA PCI presenting with recurrent anginal symptoms. Reviewed angiogram with pts primary cardiology CARLOS Roe and noticed pts had more proximal and mid RCA stenosis and poor quality images of LCA, recommended coronary angiogram for his worsening anginal symptoms, as pt has hx of CKD 3B he was told by his Nephrologist to get pre admitted for IVF and Oral NAC prior to IV contrast administration. Pt also has IDDM, HTN, Dyslipidemia    ROS: All systems reviewed and negative except as mentioned above.     ---------------------------------------------------------------------------------------------------------------------   Past Medical History:   Diagnosis Date    ASCVD (arteriosclerotic cardiovascular disease) 2021    Chronic heart failure with preserved ejection fraction (HFpEF) 2023    Diabetes mellitus     Elevated cholesterol     GERD (gastroesophageal reflux disease)     Hyperlipidemia     Hypertension      Past Surgical History:   Procedure Laterality Date    CARDIAC CATHETERIZATION N/A 2021    Procedure: Left Heart Cath;  Surgeon: Vasile Moulton MD;  Location: [facility] CATH INVASIVE LOCATION;  Service: Cardiology;  Laterality: N/A;    CARDIAC CATHETERIZATION N/A 2021    Procedure: Percutaneous Coronary Intervention;  Surgeon: Vasile Moulton MD;  Location: [facility] CATH INVASIVE LOCATION;  Service: Cardiology;  Laterality: N/A;    COLONOSCOPY      COLONOSCOPY N/A 10/4/2022    Procedure: COLONOSCOPY;  Surgeon: Gianluca Rodríguez MD;  Location: [facility] OR;  Service: Gastroenterology;  Laterality: N/A;    ENDOSCOPY      ENDOSCOPY N/A 10/4/2022    Procedure: ESOPHAGOGASTRODUODENOSCOPY;   Surgeon: Gianluca Rodríguez MD;  Location: [facility];  Service: Gastroenterology;  Laterality: N/A;    LAPAROSCOPIC CHOLECYSTECTOMY       Family History   Problem Relation Age of Onset    Hyperlipidemia Mother     Hyperlipidemia Father     Heart attack Paternal Uncle      Social History     Socioeconomic History    Marital status:    Tobacco Use    Smoking status: Former     Types: Cigarettes     Quit date:      Years since quittin.0    Smokeless tobacco: Never   Vaping Use    Vaping Use: Never used   Substance and Sexual Activity    Alcohol use: Yes     Alcohol/week: 6.0 standard drinks of alcohol     Types: 6 Glasses of wine per week     Comment: Occasional    Drug use: Never    Sexual activity: Defer     ---------------------------------------------------------------------------------------------------------------------   Allergies:  Tuberculin tests  ---------------------------------------------------------------------------------------------------------------------   Prior to Admission Medications       Prescriptions Last Dose Informant Patient Reported? Taking?    albuterol sulfate  (90 Base) MCG/ACT inhaler  Self, Spouse/Significant Other Yes No    Inhale 2 puffs As Needed for Wheezing or Shortness of Air.    allopurinol (ZYLOPRIM) 300 MG tablet  Self, Spouse/Significant Other Yes No    Take 1 tablet by mouth Daily.    ALPRAZolam (XANAX) 0.5 MG tablet  Self, Spouse/Significant Other Yes No    Take 1 tablet by mouth 3 (Three) Times a Day As Needed for Anxiety.    aspirin 81 MG EC tablet   No No    Take 1 tablet by mouth Daily.    bumetanide (BUMEX) 1 MG tablet   No No    Take 1 tablet by mouth once daily for 30 days    butalbital-acetaminophen  MG tablet tablet   Yes No    Take  by mouth Every 4 (Four) Hours As Needed.    carvedilol (COREG) 25 MG tablet   No No    Take 1 tablet by mouth 2 (Two) Times a Day.    clotrimazole-betamethasone (LOTRISONE) 1-0.05 % cream  Self,  Spouse/Significant Other Yes No    Apply 1 application  topically to the appropriate area as directed 2 (Two) Times a Day.    Dulaglutide (Trulicity) 4.5 MG/0.5ML solution pen-injector   Yes No    Inject  under the skin into the appropriate area as directed 1 (One) Time Per Week.    ferrous sulfate 325 (65 FE) MG tablet  Self, Spouse/Significant Other Yes No    Take 1 tablet by mouth 2 (Two) Times a Day.    FLUoxetine (PROzac) 20 MG capsule  Self, Spouse/Significant Other Yes No    Take 2 capsules by mouth Daily.    gabapentin (NEURONTIN) 600 MG tablet   Yes No    Take 1 tablet by mouth 3 (Three) Times a Day.    hydrALAZINE (APRESOLINE) 25 MG tablet   No No    Take 2 tablets by mouth 3 (Three) Times a Day.    insulin NPH (humuLIN N,novoLIN N) 100 UNIT/ML injection   No No    Inject 15 Units under the skin into the appropriate area as directed 2 (Two) Times a Day Before Meals.    Patient taking differently:  Inject 35 Units under the skin into the appropriate area as directed 2 (Two) Times a Day Before Meals.    isosorbide mononitrate (IMDUR) 120 MG 24 hr tablet   No No    Take 1 tablet by mouth Daily.    levocetirizine (XYZAL) 5 MG tablet  Self, Spouse/Significant Other Yes No    Take 1 tablet by mouth Every Evening.    pantoprazole (Protonix) 40 MG EC tablet  Self, Spouse/Significant Other No No    Take 1 tablet by mouth Daily.    pravastatin (PRAVACHOL) 10 MG tablet   No No    Take 1 tablet by mouth Daily.    Patient not taking:  Reported on 12/19/2023    promethazine (PHENERGAN) 12.5 MG tablet   Yes No    Take 1 tablet by mouth Every 6 (Six) Hours As Needed for Nausea or Vomiting.    QUEtiapine XR (SEROquel XR) 200 MG 24 hr tablet  Self, Spouse/Significant Other Yes No    Take 2 tablets by mouth At Night As Needed (sleep).    ranolazine (RANEXA) 1000 MG 12 hr tablet   No No    Take 1 tablet by mouth Every 12 (Twelve) Hours.    tamsulosin (FLOMAX) 0.4 MG capsule 24 hr capsule   No No    Take 1 capsule by mouth  Daily.    ticagrelor (BRILINTA) 90 MG tablet tablet   No No    Take 1 tablet by mouth 2 (Two) Times a Day.    traZODone (DESYREL) 100 MG tablet   Yes No    Take 1 tablet by mouth Every Night.    vitamin D (ERGOCALCIFEROL) 1.25 MG (21616 UT) capsule capsule  Self, Spouse/Significant Other No No    Take 1 capsule by mouth 1 (One) Time Per Week.          Hospital Scheduled Meds:  acetylcysteine, 600 mg, Oral, Q12H  aspirin, 81 mg, Oral, Daily  heparin (porcine), 5,000 Units, Subcutaneous, Q12H  pantoprazole, 40 mg, Oral, Daily  ranolazine, 1,000 mg, Oral, Q12H  senna-docusate sodium, 2 tablet, Oral, BID  sodium chloride, 10 mL, Intravenous, Q12H  ticagrelor, 90 mg, Oral, BID      sodium chloride, 100 mL/hr, Last Rate: Stopped (01/09/24 1648)      ---------------------------------------------------------------------------------------------------------------------   Vital Signs:  Temp:  [97.6 °F (36.4 °C)] 97.6 °F (36.4 °C)  Heart Rate:  [73-80] 73  Resp:  [20] 20  BP: (129-131)/(58-59) 129/59      01/09/24  1255   Weight: 121 kg (266 lb 12.8 oz)     Body mass index is 43.06 kg/m².  ---------------------------------------------------------------------------------------------------------------------   Physical Exam:  Constitutional:  Well-developed and well-nourished.  No respiratory distress.      HENT:  Head: Normocephalic and atraumatic.  Mouth:  Moist mucous membranes.    Eyes:  Conjunctivae and EOM are normal.  Pupils are equal, round, and reactive to light.  No scleral icterus.  Neck:  Neck supple.  No JVD present.    Cardiovascular:  Normal rate, regular rhythm and normal heart sounds with no murmur.  Pulmonary/Chest:  No respiratory distress, no wheezes, no crackles, with normal breath sounds and good air movement.  Abdominal:  Soft.  Bowel sounds are normal.  No distension and no tenderness.   Musculoskeletal:  No edema, no tenderness, and no deformity.  No red or swollen joints anywhere.    Neurological:  Alert  "and oriented to person, place, and time.  No cranial nerve deficit.  No tongue deviation.  No facial droop.  No slurred speech.   Skin:  Skin is warm and dry.  No rash noted.  No pallor.   Psychiatric:  Normal mood and affect.  Behavior is normal.  Judgment and thought content normal.   Peripheral vascular:  No edema and strong pulses on all 4 extremities.  ---------------------------------------------------------------------------------------------------------------------    I have personally looked at both the EKG and the telemetry strips.  ---------------------------------------------------------------------------------------------------------------------   Results from last 7 days   Lab Units 01/09/24  1458   WBC 10*3/mm3 5.27   HEMOGLOBIN g/dL 10.6*   HEMATOCRIT % 31.4*   MCV fL 96.9   MCHC g/dL 33.8   PLATELETS 10*3/mm3 156         Results from last 7 days   Lab Units 01/09/24  1458   SODIUM mmol/L 138   POTASSIUM mmol/L 4.6   CHLORIDE mmol/L 106   CO2 mmol/L 22.8   BUN mg/dL 27*   CREATININE mg/dL 1.85*   CALCIUM mg/dL 8.8   GLUCOSE mg/dL 245*   ALBUMIN g/dL 3.5   BILIRUBIN mg/dL 0.5   ALK PHOS U/L 72   AST (SGOT) U/L 20   ALT (SGPT) U/L 19   Estimated Creatinine Clearance: 59.9 mL/min (A) (by C-G formula based on SCr of 1.85 mg/dL (H)).  No results found for: \"AMMONIA\"          Lab Results   Component Value Date    HGBA1C 5.70 (H) 07/04/2023     Lab Results   Component Value Date    TSH 0.849 09/07/2023     No results found for: \"PREGTESTUR\", \"PREGSERUM\", \"HCG\", \"HCGQUANT\"  Pain Management Panel  More data exists         Latest Ref Rng & Units 11/7/2023 9/1/2023   Pain Management Panel   Creatinine, Urine mg/dL 69.1  36.9  36.9      No results found for: \"BLOODCX\"  No results found for: \"URINECX\"  No results found for: \"WOUNDCX\"  No results found for: \"STOOLCX\"      ---------------------------------------------------------------------------------------------------------------------  Imaging Results (Last 7 " Days)       ** No results found for the last 168 hours. **            I have personally reviewed the radiology images and read the final radiology report.  ---------------------------------------------------------------------------------------------------------------------  Assessment and Plan:   Acute on chronic diastolic CHF NYHA 3-4 ACC C , Pt said over past 4-8 weeks he has gained almost 25 lbs weight with worsening orthopnea  CAD s/p distal RCA PCI  with recurrent anginal symptoms on maximally tolerated antianginal medication   CKD 3B  HTN  IDDM   Dyslipidemia    Pt need volume optimization prior to coronary angiogram, will give IV Bumex 1 mg now and watch response.   Consult hospitalist for evaluation and rx of his IDDM  Will hold antihypertensives as pt BP is on lower side   Will place hospitalist and Nephrologist consult   Pt also requested to be DNR/DNI, I did talk him about reversing his CODE status for the maxx- procedure period and he was agreeable for that.             Jose Raul Mejia MD, [facility]  Interventional Cardiology      01/09/24  16:49 EST

## 2024-01-10 PROBLEM — I50.33 ACUTE ON CHRONIC HEART FAILURE WITH PRESERVED EJECTION FRACTION (HFPEF): Status: ACTIVE | Noted: 2024-01-10

## 2024-01-10 LAB
ANION GAP SERPL CALCULATED.3IONS-SCNC: 11.8 MMOL/L (ref 5–15)
BUN SERPL-MCNC: 27 MG/DL (ref 6–20)
BUN/CREAT SERPL: 13.2 (ref 7–25)
CALCIUM SPEC-SCNC: 8.9 MG/DL (ref 8.6–10.5)
CHLORIDE SERPL-SCNC: 102 MMOL/L (ref 98–107)
CO2 SERPL-SCNC: 24.2 MMOL/L (ref 22–29)
CREAT SERPL-MCNC: 2.04 MG/DL (ref 0.76–1.27)
EGFRCR SERPLBLD CKD-EPI 2021: 39.5 ML/MIN/1.73
GLUCOSE BLDC GLUCOMTR-MCNC: 166 MG/DL (ref 70–130)
GLUCOSE BLDC GLUCOMTR-MCNC: 172 MG/DL (ref 70–130)
GLUCOSE BLDC GLUCOMTR-MCNC: 184 MG/DL (ref 70–130)
GLUCOSE BLDC GLUCOMTR-MCNC: 191 MG/DL (ref 70–130)
GLUCOSE SERPL-MCNC: 152 MG/DL (ref 65–99)
MAGNESIUM SERPL-MCNC: 1.3 MG/DL (ref 1.6–2.6)
NT-PROBNP SERPL-MCNC: 250.1 PG/ML (ref 0–450)
POTASSIUM SERPL-SCNC: 4.1 MMOL/L (ref 3.5–5.2)
SODIUM SERPL-SCNC: 138 MMOL/L (ref 136–145)

## 2024-01-10 PROCEDURE — 63710000001 INSULIN GLARGINE PER 5 UNITS: Performed by: INTERNAL MEDICINE

## 2024-01-10 PROCEDURE — 83735 ASSAY OF MAGNESIUM: CPT | Performed by: INTERNAL MEDICINE

## 2024-01-10 PROCEDURE — 94799 UNLISTED PULMONARY SVC/PX: CPT

## 2024-01-10 PROCEDURE — 94761 N-INVAS EAR/PLS OXIMETRY MLT: CPT

## 2024-01-10 PROCEDURE — 25010000002 HEPARIN (PORCINE) PER 1000 UNITS: Performed by: INTERNAL MEDICINE

## 2024-01-10 PROCEDURE — 80048 BASIC METABOLIC PNL TOTAL CA: CPT | Performed by: INTERNAL MEDICINE

## 2024-01-10 PROCEDURE — 25010000002 MAGNESIUM SULFATE 2 GM/50ML SOLUTION: Performed by: INTERNAL MEDICINE

## 2024-01-10 PROCEDURE — 82948 REAGENT STRIP/BLOOD GLUCOSE: CPT

## 2024-01-10 PROCEDURE — 99231 SBSQ HOSP IP/OBS SF/LOW 25: CPT | Performed by: INTERNAL MEDICINE

## 2024-01-10 PROCEDURE — 63710000001 INSULIN LISPRO (HUMAN) PER 5 UNITS: Performed by: INTERNAL MEDICINE

## 2024-01-10 PROCEDURE — G0378 HOSPITAL OBSERVATION PER HR: HCPCS

## 2024-01-10 PROCEDURE — 83880 ASSAY OF NATRIURETIC PEPTIDE: CPT | Performed by: NURSE PRACTITIONER

## 2024-01-10 RX ORDER — MAGNESIUM SULFATE HEPTAHYDRATE 40 MG/ML
2 INJECTION, SOLUTION INTRAVENOUS ONCE
Qty: 50 ML | Refills: 0 | Status: COMPLETED | OUTPATIENT
Start: 2024-01-10 | End: 2024-01-10

## 2024-01-10 RX ORDER — HYDRALAZINE HYDROCHLORIDE 25 MG/1
25 TABLET, FILM COATED ORAL EVERY 8 HOURS SCHEDULED
Status: DISCONTINUED | OUTPATIENT
Start: 2024-01-10 | End: 2024-01-11

## 2024-01-10 RX ADMIN — INSULIN LISPRO 2 UNITS: 100 INJECTION, SOLUTION INTRAVENOUS; SUBCUTANEOUS at 20:25

## 2024-01-10 RX ADMIN — TICAGRELOR 90 MG: 90 TABLET ORAL at 20:26

## 2024-01-10 RX ADMIN — TICAGRELOR 90 MG: 90 TABLET ORAL at 08:51

## 2024-01-10 RX ADMIN — FLUOXETINE HYDROCHLORIDE 40 MG: 20 CAPSULE ORAL at 08:51

## 2024-01-10 RX ADMIN — Medication 10 ML: at 08:51

## 2024-01-10 RX ADMIN — INSULIN LISPRO 2 UNITS: 100 INJECTION, SOLUTION INTRAVENOUS; SUBCUTANEOUS at 17:13

## 2024-01-10 RX ADMIN — MAGNESIUM GLUCONATE 500 MG ORAL TABLET 800 MG: 500 TABLET ORAL at 08:51

## 2024-01-10 RX ADMIN — TIZANIDINE 4 MG: 4 TABLET ORAL at 21:51

## 2024-01-10 RX ADMIN — HEPARIN SODIUM 5000 UNITS: 5000 INJECTION INTRAVENOUS; SUBCUTANEOUS at 20:24

## 2024-01-10 RX ADMIN — ATORVASTATIN CALCIUM 40 MG: 40 TABLET, FILM COATED ORAL at 20:25

## 2024-01-10 RX ADMIN — INSULIN LISPRO 2 UNITS: 100 INJECTION, SOLUTION INTRAVENOUS; SUBCUTANEOUS at 08:51

## 2024-01-10 RX ADMIN — PANTOPRAZOLE SODIUM 40 MG: 40 TABLET, DELAYED RELEASE ORAL at 08:51

## 2024-01-10 RX ADMIN — DOCUSATE SODIUM 50 MG AND SENNOSIDES 8.6 MG 2 TABLET: 8.6; 5 TABLET, FILM COATED ORAL at 08:51

## 2024-01-10 RX ADMIN — RANOLAZINE 1000 MG: 500 TABLET, FILM COATED, EXTENDED RELEASE ORAL at 20:26

## 2024-01-10 RX ADMIN — HYDRALAZINE HYDROCHLORIDE 25 MG: 25 TABLET ORAL at 17:13

## 2024-01-10 RX ADMIN — INSULIN LISPRO 2 UNITS: 100 INJECTION, SOLUTION INTRAVENOUS; SUBCUTANEOUS at 11:53

## 2024-01-10 RX ADMIN — ASPIRIN 81 MG: 81 TABLET, COATED ORAL at 08:51

## 2024-01-10 RX ADMIN — Medication 1000 MCG: at 08:51

## 2024-01-10 RX ADMIN — Medication 10 ML: at 20:26

## 2024-01-10 RX ADMIN — INSULIN GLARGINE 15 UNITS: 100 INJECTION, SOLUTION SUBCUTANEOUS at 20:25

## 2024-01-10 RX ADMIN — MAGNESIUM SULFATE HEPTAHYDRATE 2 G: 40 INJECTION, SOLUTION INTRAVENOUS at 10:25

## 2024-01-10 RX ADMIN — RANOLAZINE 1000 MG: 500 TABLET, FILM COATED, EXTENDED RELEASE ORAL at 08:51

## 2024-01-10 RX ADMIN — HYDRALAZINE HYDROCHLORIDE 25 MG: 25 TABLET ORAL at 21:51

## 2024-01-10 NOTE — PLAN OF CARE
Patient resting in the bed throughout the night. No s/s of distress noted. No complaints. Will continue to follow care plan.

## 2024-01-10 NOTE — PLAN OF CARE
Goal Outcome Evaluation:  Plan of Care Reviewed With: patient  Progress: no change   Pt resting in bed, watching television. Pt has tolerated all interventions. No complaints/concerns. No acute distress noted. Will continue to follow the plan of care.

## 2024-01-10 NOTE — PROGRESS NOTES
LOS: 1 day     Name: Matthew Madrid  Age/Sex: 48 y.o. male  :  1975        PCP: Richie Ruelas PA-C    Principal Problem:    Chest pain in adult      Admission Information: Matthew Madrid is a 48 y.o. male with coronary artery disease status post PCI of the distal RCA (2021), hypertension, hyperlipidemia, chronic kidney disease stage IIIb, type 2 diabetes-insulin-dependent, morbid obesity and former smoker.    Chief Complaint: Admitted for premedication for invasive coronary angiogram    Interval history: Admission labs revealed a creatinine of 1.85 that increased to 2.04 after receiving diuretics. Hospitalist and nephrologist are on board.    Subjective   Patient awake in bed and reports that his edema is slightly improved from yesterday.  He is not having active chest pain.      Vital Signs  Vital Signs (last 72 hrs)          0700   0659  0700   0659  0700  01/10 0659 01/10 0700  01/10 1427   Most Recent      Temp (°F)     97.6 -  98.6    98.1 -  98.2     98.2 (36.8) 01/10 1100    Heart Rate     69 -  80    74 -  84     75 01/10 1100    Resp     18 -  20    18 -  20     18 01/10 1100    BP     129/59 -  157/84    153/80 -  163/90     153/80 01/10 1100    SpO2 (%)     94 -  96    95 -  96     95 01/10 1100          Temp:  [98.1 °F (36.7 °C)-98.6 °F (37 °C)] 98.2 °F (36.8 °C)  Heart Rate:  [69-84] 75  Resp:  [18-20] 18  BP: (129-163)/(59-90) 153/80  Body mass index is 42.72 kg/m².      Intake/Output Summary (Last 24 hours) at 1/10/2024 1427  Last data filed at 1/10/2024 1300  Gross per 24 hour   Intake 420 ml   Output 950 ml   Net -530 ml       Vitals and nursing note reviewed.   Constitutional:       Appearance: Not in distress. Morbidly obese. Chronically ill-appearing.   Eyes:      Conjunctiva/sclera: Conjunctivae normal.   Pulmonary:      Effort: Pulmonary effort is normal.      Breath sounds: Normal breath sounds.   Cardiovascular:      Normal rate. Regular rhythm.       Murmurs: There is no murmur.   Edema:     Pretibial: bilateral 2+ edema of the pretibial area.     Ankle: bilateral 2+ edema of the ankle.     Feet: bilateral 2+ edema of the feet.  Skin:     General: Skin is warm and dry.   Neurological:      Mental Status: Alert.         Telemetry: Sinus 70s to 80s     Results Review:     Results from last 7 days   Lab Units 01/09/24  1458   WBC 10*3/mm3 5.27   HEMOGLOBIN g/dL 10.6*   PLATELETS 10*3/mm3 156     Results from last 7 days   Lab Units 01/10/24  0131 01/09/24  1458   SODIUM mmol/L 138 138   POTASSIUM mmol/L 4.1 4.6   CHLORIDE mmol/L 102 106   CO2 mmol/L 24.2 22.8   BUN mg/dL 27* 27*   CREATININE mg/dL 2.04* 1.85*   CALCIUM mg/dL 8.9 8.8   GLUCOSE mg/dL 152* 245*                 I reviewed the patient's new clinical results.  I reviewed the patient's new imaging results and agree with the interpretation.  I personally viewed and interpreted the patient's EKG/Telemetry data      Medication Review:   aspirin, 81 mg, Oral, Daily  atorvastatin, 40 mg, Oral, Nightly  FLUoxetine, 40 mg, Oral, Daily  heparin (porcine), 5,000 Units, Subcutaneous, Q12H  insulin glargine, 15 Units, Subcutaneous, Nightly  insulin lispro, 2-9 Units, Subcutaneous, 4x Daily AC & at Bedtime  magnesium oxide, 800 mg, Oral, Daily  pantoprazole, 40 mg, Oral, Daily  ranolazine, 1,000 mg, Oral, Q12H  senna-docusate sodium, 2 tablet, Oral, BID  sodium chloride, 10 mL, Intravenous, Q12H  ticagrelor, 90 mg, Oral, BID  vitamin B-12, 1,000 mcg, Oral, Daily           Assessment:  CAD s/p distal RCA PCI  with recurrent anginal symptoms on maximally tolerated antianginal medication   Acute on chronic diastolic CHF NYHA 3-4.  ACC C  CKD 3B  Hypertension  Hyperlipidemia  IDDM      Recommendations:  Planning for invasive coronary angiogram once renal function has recovered.  Potentially 01/12/2024.  Continue aspirin and Brilinta  Creatinine bump with single dose of IV Bumex.  Will hold for now.  Echo  tomorrow  Nephrology on board-appreciate their recommendations  Home antihypertensives had been held due to relatively low blood pressure.  Will resume home hydralazine as systolic has been 150s to 160s.  Continue atorvastatin  Management per hospitalist-appreciate their recommendations      Patient seen with Dr. Grey who discussed his recommendations with the patient.      Electronically signed by CARLOS Mills, 01/10/24, 2:43 PM EST.

## 2024-01-10 NOTE — CONSULTS
Nephrology Consult Note    Referring Provider: Dr. Harris  Reason for Consultation: Elevated creatinine    Subjective       History of present illness:  Matthew Madrid is a 48 y.o. male who presented to [facility] emergency department with chief complaint of not feeling well 20 pound weight gain bilateral lower extremity swelling.  Patient has a history of coronary artery disease and has been admitted with a plan to do heart cath.  Patient is known to have advanced chronic kidney disease follows outpatient nephrology clinic with baseline creatinine that appears to be around 1.7-1.9, he was admitted with a creatinine of 1.8.  Patient also has a history of subnephrotic proteinuria and was deemed to be high risk for contrast-induced nephropathy.  Patient received IV diuretics yesterday with a creatinine worsened slightly this morning.  Patient stated he has been getting fluid for past 4 to 5 weeks and he has gained almost 20 pounds.  Patient denied any overt shortness of breath  Patient denied any chest pain no rash on any part of the body.  Patient denied chronic NSAIDS use. Patient denies hematuria, dysuria, difficulty passing urine. No prior history of renal stones. No family history of renal disease    History  Past Medical History:   Diagnosis Date    ASCVD (arteriosclerotic cardiovascular disease) 09/13/2021    Chronic heart failure with preserved ejection fraction (HFpEF) 11/27/2023    Diabetes mellitus     Elevated cholesterol     GERD (gastroesophageal reflux disease)     Hyperlipidemia     Hypertension    ,   Past Surgical History:   Procedure Laterality Date    CARDIAC CATHETERIZATION N/A 09/02/2021    Procedure: Left Heart Cath;  Surgeon: Vasile Moulton MD;  Location: [facility] INVASIVE LOCATION;  Service: Cardiology;  Laterality: N/A;    CARDIAC CATHETERIZATION N/A 09/02/2021    Procedure: Percutaneous Coronary Intervention;  Surgeon: Vasile Moulton MD;  Location: [facility] INVASIVE  LOCATION;  Service: Cardiology;  Laterality: N/A;    COLONOSCOPY      COLONOSCOPY N/A 10/4/2022    Procedure: COLONOSCOPY;  Surgeon: Gianluca Rodríguez MD;  Location:  [facility] OR;  Service: Gastroenterology;  Laterality: N/A;    ENDOSCOPY      ENDOSCOPY N/A 10/4/2022    Procedure: ESOPHAGOGASTRODUODENOSCOPY;  Surgeon: Gianluca Rodríguez MD;  Location:  [facility] OR;  Service: Gastroenterology;  Laterality: N/A;    LAPAROSCOPIC CHOLECYSTECTOMY     ,   Family History   Problem Relation Age of Onset    Hyperlipidemia Mother     Hyperlipidemia Father     Heart attack Paternal Uncle    ,   Social History     Tobacco Use    Smoking status: Former     Packs/day: 1     Types: Cigarettes     Quit date:      Years since quittin.0    Smokeless tobacco: Never   Vaping Use    Vaping Use: Never used   Substance Use Topics    Alcohol use: Yes     Alcohol/week: 6.0 standard drinks of alcohol     Types: 6 Glasses of wine per week     Comment: Occasional    Drug use: Never   ,   Medications Prior to Admission   Medication Sig Dispense Refill Last Dose    allopurinol (ZYLOPRIM) 300 MG tablet Take 1 tablet by mouth Daily.   2024    aspirin 81 MG EC tablet Take 1 tablet by mouth Daily. 90 tablet 3 2024    atorvastatin (LIPITOR) 40 MG tablet Take 1 tablet by mouth Daily.   2024    B Complex Vitamins (Vitamin-B Complex) tablet Take 1 tablet by mouth Daily.   2024    bumetanide (BUMEX) 1 MG tablet Take 1 tablet by mouth once daily for 30 days 30 tablet 0 2024    carvedilol (COREG) 12.5 MG tablet Take 1 tablet by mouth 2 (Two) Times a Day With Meals.   2024    Dulaglutide (Trulicity) 4.5 MG/0.5ML solution pen-injector Inject  under the skin into the appropriate area as directed 1 (One) Time Per Week.   Past Month    ferrous sulfate 325 (65 FE) MG tablet Take 1 tablet by mouth 3 (Three) Times a Day With Meals.   2024    FLUoxetine (PROzac) 40 MG capsule Take 1 capsule by mouth Daily.   2024     gabapentin (NEURONTIN) 600 MG tablet Take 1 tablet by mouth 3 (Three) Times a Day.   1/9/2024    hydrALAZINE (APRESOLINE) 25 MG tablet Take 2 tablets by mouth 3 (Three) Times a Day.   1/9/2024    insulin NPH-insulin regular (humuLIN 70/30,novoLIN 70/30) (70-30) 100 UNIT/ML injection Inject 36 Units under the skin into the appropriate area as directed 2 (Two) Times a Day With Meals.   1/9/2024    isosorbide mononitrate (IMDUR) 120 MG 24 hr tablet Take 1 tablet by mouth Daily.   1/9/2024    Krill Oil (Omega-3) 500 MG capsule Take 1 capsule by mouth Daily.   1/9/2024    levocetirizine (XYZAL) 5 MG tablet Take 1 tablet by mouth Every Evening.   1/8/2024    magnesium oxide (MAG-OX) 400 MG tablet Take 2 tablets by mouth Daily.   1/9/2024    pantoprazole (Protonix) 40 MG EC tablet Take 1 tablet by mouth Daily. 30 tablet 11 1/9/2024    ranolazine (RANEXA) 1000 MG 12 hr tablet Take 1 tablet by mouth Every 12 (Twelve) Hours. 60 tablet 2 1/9/2024    tamsulosin (FLOMAX) 0.4 MG capsule 24 hr capsule Take 1 capsule by mouth Daily. 30 capsule 3 1/9/2024    ticagrelor (BRILINTA) 90 MG tablet tablet Take 1 tablet by mouth 2 (Two) Times a Day. 180 tablet 3 1/9/2024    vitamin B-12 (CYANOCOBALAMIN) 1000 MCG tablet Take 1 tablet by mouth Daily.   1/9/2024    vitamin D (ERGOCALCIFEROL) 1.25 MG (27600 UT) capsule capsule Take 1 capsule by mouth 1 (One) Time Per Week. 5 capsule 3 Past Month    albuterol sulfate  (90 Base) MCG/ACT inhaler Inhale 2 puffs As Needed for Wheezing or Shortness of Air.   Unknown    ALPRAZolam (XANAX) 0.5 MG tablet Take 1 tablet by mouth 3 (Three) Times a Day As Needed for Anxiety.   Unknown    promethazine (PHENERGAN) 12.5 MG tablet Take 1 tablet by mouth Every 6 (Six) Hours As Needed for Nausea or Vomiting.   Unknown    tiZANidine (ZANAFLEX) 4 MG tablet Take 1 tablet by mouth 2 (Two) Times a Day As Needed for Muscle Spasms.   Unknown    traZODone (DESYREL) 100 MG tablet Take 1 tablet by mouth At Night As  Needed for Sleep.   Unknown   , Scheduled Meds:  acetylcysteine, 600 mg, Oral, Q12H  aspirin, 81 mg, Oral, Daily  atorvastatin, 40 mg, Oral, Nightly  FLUoxetine, 40 mg, Oral, Daily  heparin (porcine), 5,000 Units, Subcutaneous, Q12H  insulin glargine, 15 Units, Subcutaneous, Nightly  insulin lispro, 2-9 Units, Subcutaneous, 4x Daily AC & at Bedtime  magnesium oxide, 800 mg, Oral, Daily  pantoprazole, 40 mg, Oral, Daily  ranolazine, 1,000 mg, Oral, Q12H  senna-docusate sodium, 2 tablet, Oral, BID  sodium chloride, 10 mL, Intravenous, Q12H  ticagrelor, 90 mg, Oral, BID  vitamin B-12, 1,000 mcg, Oral, Daily    , Continuous Infusions:   , PRN Meds:    albuterol    senna-docusate sodium **AND** polyethylene glycol **AND** bisacodyl **AND** bisacodyl    dextrose    dextrose    glucagon (human recombinant)    nitroglycerin    sodium chloride    sodium chloride    tiZANidine and Allergies:  Tuberculin tests    Review of Systems  More than 10 point review of systems was done. Pertinent items are noted in HPI, all other systems reviewed and negative    Objective     Vital Signs  Temp:  [97.6 °F (36.4 °C)-98.6 °F (37 °C)] 98.1 °F (36.7 °C)  Heart Rate:  [69-80] 74  Resp:  [18-20] 18  BP: (129-163)/(58-90) 163/90    Intake/Output                   01/09/24 0701 - 01/10/24 0700     [id number] Total              Intake    Total Intake -- -- --       Output    Urine  300  650 950    Total Output 300 650 950             Physical Examination:  General Appearance: not in acute distress  No JVD  Lungs: Bilateral decreased intensity of breath sounds, bibasilar crackles  Heart: Regular rhythm & normal rate, normal S1, S2, no murmur, no gallop, no rub   Abdomen: Normal bowel sounds, no masses and soft non-tender  Extremities: 2+ edema  Neurologic: Orientated to person, place, time, grossly no focal deficitis    Laboratory Data :      WBC WBC   Date Value Ref Range Status   01/09/2024 5.27 3.40 - 10.80 10*3/mm3 Final      HGB  "Hemoglobin   Date Value Ref Range Status   01/09/2024 10.6 (L) 13.0 - 17.7 g/dL Final      HCT Hematocrit   Date Value Ref Range Status   01/09/2024 31.4 (L) 37.5 - 51.0 % Final      Platlets No results found for: \"LABPLAT\"   MCV MCV   Date Value Ref Range Status   01/09/2024 96.9 79.0 - 97.0 fL Final          Sodium Sodium   Date Value Ref Range Status   01/10/2024 138 136 - 145 mmol/L Final   01/09/2024 138 136 - 145 mmol/L Final      Potassium Potassium   Date Value Ref Range Status   01/10/2024 4.1 3.5 - 5.2 mmol/L Final   01/09/2024 4.6 3.5 - 5.2 mmol/L Final      Chloride Chloride   Date Value Ref Range Status   01/10/2024 102 98 - 107 mmol/L Final   01/09/2024 106 98 - 107 mmol/L Final      CO2 CO2   Date Value Ref Range Status   01/10/2024 24.2 22.0 - 29.0 mmol/L Final   01/09/2024 22.8 22.0 - 29.0 mmol/L Final      BUN BUN   Date Value Ref Range Status   01/10/2024 27 (H) 6 - 20 mg/dL Final   01/09/2024 27 (H) 6 - 20 mg/dL Final      Creatinine Creatinine   Date Value Ref Range Status   01/10/2024 2.04 (H) 0.76 - 1.27 mg/dL Final   01/09/2024 1.85 (H) 0.76 - 1.27 mg/dL Final      Calcium Calcium   Date Value Ref Range Status   01/10/2024 8.9 8.6 - 10.5 mg/dL Final   01/09/2024 8.8 8.6 - 10.5 mg/dL Final      PO4 No results found for: \"CAPO4\"   Albumin Albumin   Date Value Ref Range Status   01/09/2024 3.5 3.5 - 5.2 g/dL Final      Magnesium Magnesium   Date Value Ref Range Status   01/10/2024 1.3 (L) 1.6 - 2.6 mg/dL Final      Uric Acid No results found for: \"URICACID\"     Radiology results :     Imaging Results (Last 72 Hours)       ** No results found for the last 72 hours. **              Medications:      acetylcysteine, 600 mg, Oral, Q12H  aspirin, 81 mg, Oral, Daily  atorvastatin, 40 mg, Oral, Nightly  FLUoxetine, 40 mg, Oral, Daily  heparin (porcine), 5,000 Units, Subcutaneous, Q12H  insulin glargine, 15 Units, Subcutaneous, Nightly  insulin lispro, 2-9 Units, Subcutaneous, 4x Daily AC & at " Bedtime  magnesium oxide, 800 mg, Oral, Daily  pantoprazole, 40 mg, Oral, Daily  ranolazine, 1,000 mg, Oral, Q12H  senna-docusate sodium, 2 tablet, Oral, BID  sodium chloride, 10 mL, Intravenous, Q12H  ticagrelor, 90 mg, Oral, BID  vitamin B-12, 1,000 mcg, Oral, Daily           Assessment & Plan       Chest pain in adult    -Chronic kidney disease G3 B A3 likely due to diabetic glomerulosclerosis and hypertensive ischemic sclerosis  - Coronary artery disease  - Essential hypertension  - Type 2 diabetes mellitus    Patient had significant fluid overload peripherally given sensitivity to the diuretics with advanced chronic kidney disease would continue cautious diuresis.  Given proteinuric CKD patient remains moderate risk for worsening chronic kidney disease in setting of contrast administration    Baseline creatinine appears to be around 1.7-1.9    -IV albumin  - Hold diuretics and resume when creatinine gets stable  - Iron panel plus ferritin  - Please avoid any nephrotoxic agents, hypotension and adjust medications according to estimated GFR    Thanks Dr pierce for the consult. Nephrology will follow the patient.   I discussed the patient's findings and my recommendations with patient and nursing staff    Shweta Naik MD  01/10/24  07:31 EST

## 2024-01-10 NOTE — ACP (ADVANCE CARE PLANNING)
assisted patient in completing Living Will naming his wife Flora Madrid as his primary Health Care Surrogate and nephew Harrison Zuniga as secondary Health Care Surrogate; as well as indicating his choices regarding possibility of life supporting treatment.

## 2024-01-10 NOTE — PROGRESS NOTES
[facility] HOSPITALIST PROGRESS NOTE     Patient Identification:  Name:  Matthew Madrid  Age:  48 y.o.  Sex:  male  :  1975  MRN:  [id number]  Visit Number:  [id number]  ROOM: [address] [place] [address]     Primary Care Provider:  Richie Ruelas PA-C    Length of stay in inpatient status:  1    Subjective     Chief Compliant:  No chief complaint on file.      History of Presenting Illness:    Patient reports feeling about the same. He notes urination significantly increased after bumex but has been similar since. Net negative 950 cc yesterday. No family bedside.     ROS:  Otherwise 10 point ROS negative other than documented above in HPI.     Objective     Current Hospital Meds:aspirin, 81 mg, Oral, Daily  atorvastatin, 40 mg, Oral, Nightly  FLUoxetine, 40 mg, Oral, Daily  heparin (porcine), 5,000 Units, Subcutaneous, Q12H  hydrALAZINE, 25 mg, Oral, Q8H  insulin glargine, 15 Units, Subcutaneous, Nightly  insulin lispro, 2-9 Units, Subcutaneous, 4x Daily AC & at Bedtime  magnesium oxide, 800 mg, Oral, Daily  pantoprazole, 40 mg, Oral, Daily  ranolazine, 1,000 mg, Oral, Q12H  senna-docusate sodium, 2 tablet, Oral, BID  sodium chloride, 10 mL, Intravenous, Q12H  ticagrelor, 90 mg, Oral, BID  vitamin B-12, 1,000 mcg, Oral, Daily         Current Antimicrobial Therapy:  Anti-Infectives (From admission, onward)      None          Current Diuretic Therapy:  Diuretics (From admission, onward)      Ordered     Dose/Rate Route Frequency Start Stop    24 1721  bumetanide (BUMEX) injection 1 mg        Ordering Provider: Jose Raul Mejia MD    1 mg Intravenous Once 24 1900 24 1822          ----------------------------------------------------------------------------------------------------------------------  Vital Signs:  Temp:  [98.1 °F (36.7 °C)-98.6 °F (37 °C)] 98.1 °F (36.7 °C)  Heart Rate:  [69-84] 72  Resp:  [18-20] 18  BP: (139-163)/(67-90) 152/71  SpO2:  [93 %-96 %] 93 %  on   ;    Device (Oxygen Therapy): room air  Body mass index is 42.72 kg/m².    Wt Readings from Last 3 Encounters:   01/10/24 120 kg (264 lb 11.2 oz)   12/19/23 118 kg (261 lb)   12/05/23 118 kg (259 lb 12.8 oz)     Intake & Output (last 3 days)         01/07 0701  01/08 0700 01/08 0701  01/09 0700 01/09 0701  01/10 0700 01/10 0701 01/11 0700    P.O.    420    Total Intake(mL/kg)    420 (3.5)    Urine (mL/kg/hr)   950     Total Output   950     Net   -950 +420                  Diet: Regular/House Diet, Renal Diets; Low Sodium (2-3g), Low Potassium, Low Phosphorus; Texture: Regular Texture (IDDSI 7); Fluid Consistency: Thin (IDDSI 0)  ----------------------------------------------------------------------------------------------------------------------  Physical exam:  Constitutional:  Well-developed and well-nourished.  No respiratory distress.      HENT:  Head:  Normocephalic and atraumatic.  Mouth:  Moist mucous membranes.    Eyes:  Conjunctivae and EOM are normal. No scleral icterus.    Neck:  Neck supple.  No JVD present.    Cardiovascular:  Normal rate, regular rhythm and normal heart sounds with no murmur.  Pulmonary/Chest:  No respiratory distress, no wheezes, no crackles, with normal breath sounds and good air movement.  Abdominal:  Soft.  Bowel sounds are normal.  No distension and no tenderness.   Musculoskeletal:  no tenderness, and no deformity.  No red or swollen joints anywhere.    Neurological:  Alert and oriented to person, place, and time.  No cranial nerve deficit.  No tongue deviation.  No facial droop.  No slurred speech.   Skin:  Skin is warm and dry. No rash noted. No pallor.   Peripheral vascular:  Pulses in all 4 extremities with no clubbing, no cyanosis, Mild non pitting edema  ----------------------------------------------------------------------------------------------------------------------  Tele:   "  ----------------------------------------------------------------------------------------------------------------------  Results from last 7 days   Lab Units 01/09/24  1458   WBC 10*3/mm3 5.27   HEMOGLOBIN g/dL 10.6*   HEMATOCRIT % 31.4*   MCV fL 96.9   MCHC g/dL 33.8   PLATELETS 10*3/mm3 156         Results from last 7 days   Lab Units 01/10/24  0131 01/09/24  1458   SODIUM mmol/L 138 138   POTASSIUM mmol/L 4.1 4.6   MAGNESIUM mg/dL 1.3*  --    CHLORIDE mmol/L 102 106   CO2 mmol/L 24.2 22.8   BUN mg/dL 27* 27*   CREATININE mg/dL 2.04* 1.85*   CALCIUM mg/dL 8.9 8.8   GLUCOSE mg/dL 152* 245*   ALBUMIN g/dL  --  3.5   BILIRUBIN mg/dL  --  0.5   ALK PHOS U/L  --  72   AST (SGOT) U/L  --  20   ALT (SGPT) U/L  --  19   Estimated Creatinine Clearance: 54.1 mL/min (A) (by C-G formula based on SCr of 2.04 mg/dL (H)).  No results found for: \"AMMONIA\"      Results from last 7 days   Lab Units 01/10/24  0131   PROBNP pg/mL 250.1         Hemoglobin A1C   Date/Time Value Ref Range Status   01/09/2024 1458 6.70 (H) 4.80 - 5.60 % Final     Glucose   Date/Time Value Ref Range Status   01/10/2024 1517 166 (H) 70 - 130 mg/dL Final   01/10/2024 1111 184 (H) 70 - 130 mg/dL Final   01/10/2024 0700 172 (H) 70 - 130 mg/dL Final   01/09/2024 2100 238 (H) 70 - 130 mg/dL Final   01/09/2024 1625 247 (H) 70 - 130 mg/dL Final     Lab Results   Component Value Date    TSH 1.440 01/09/2024     No results found for: \"PREGTESTUR\", \"PREGSERUM\", \"HCG\", \"HCGQUANT\"  Pain Management Panel  More data exists         Latest Ref Rng & Units 1/9/2024 11/7/2023   Pain Management Panel   Creatinine, Urine mg/dL - 69.1    Amphetamine, Urine Qual Negative Negative  -   Barbiturates Screen, Urine Negative Negative  -   Benzodiazepine Screen, Urine Negative Positive  -   Buprenorphine, Screen, Urine Negative Negative  -   Cocaine Screen, Urine Negative Negative  -   Fentanyl, Urine Negative Negative  -   Methadone Screen , Urine Negative Negative  - " "  Methamphetamine, Ur Negative Negative  -     Brief Urine Lab Results  (Last result in the past 365 days)        Color   Clarity   Blood   Leuk Est   Nitrite   Protein   CREAT   Urine HCG        01/09/24 1638 Dark Yellow   Clear   Negative   Negative   Negative   30 mg/dL (1+)                 No results found for: \"BLOODCX\"  No results found for: \"URINECX\"  No results found for: \"WOUNDCX\"  No results found for: \"STOOLCX\"  No results found for: \"RESPCX\"  No results found for: \"AFBCX\"        I have personally looked at the labs and they are summarized above.  ----------------------------------------------------------------------------------------------------------------------  Detailed radiology reports for the last 24 hours:    Imaging Results (Last 24 Hours)       ** No results found for the last 24 hours. **          Assessment & Plan      #Decompensated diastolic heart failure   #Hx of CAD  #HTN  #HLD  #Angina   - Managed by primary team and nephrology. 1 mg IV Bumex given on admission. Nephrology ordered Will monitor renal function and electrolytes as patient diuresis.     #Hypomagnesemia  - Replace per protocol      #CKD IIIb  - Creatinine around baseline at 1.85 on admission with slight bump today with diuresis   - Nephrology following     #DM II  - Patient takes 36 units BID 70/30 at home.   - A1C 6.7%. SSI. Will titrate as needed. Will start low dose basal insulin, will start low given likely NPO status in near future for procedures.      #Morbid obesity   - Complicates all aspects of care      Thank you for the consult. Medicine will continue to follow. Please call with any questions.     VTE Prophylaxis:   Mechanical Order History:       None          Pharmalogical Order History:        Ordered     Dose Route Frequency Stop    01/09/24 1445  heparin (porcine) 5000 UNIT/ML injection 5,000 Units         5,000 Units SC Every 12 Hours Scheduled --                        Ruben Harris MD  [facility]" Hospitalist  01/10/24  18:42 EST

## 2024-01-10 NOTE — CASE MANAGEMENT/SOCIAL WORK
Discharge Planning Assessment  [facility]     Patient Name: Matthew Madrid  MRN: [id number]  Today's Date: 1/10/2024    Admit Date: 1/9/2024    Plan: This CM met with pt at bedside to discuss discharge.  Per pt report, from home with spouse Flora in [place]; has BP cuff, glucometer, PO, 4 prong cane; no preference of DME provider; denies HH or need; utilizes [facility] in [place]; seeraul Ruelas; verified payor [facility] Medicare primary with [place] Medicaid secondary; discharge was discussed by physician.  Discharge plan is to return home with Flora whom, will provide transport once medically stable.   Discharge Needs Assessment       Row Name 01/10/24 1108       Living Environment    People in Home spouse    Name(s) of People in Home spouse Flora    Current Living Arrangements apartment    Primary Care Provided by self    Provides Primary Care For no one    Family Caregiver if Needed spouse    Family Caregiver Names Flora    Quality of Family Relationships unable to assess    Able to Return to Prior Arrangements yes       Resource/Environmental Concerns    Transportation Concerns none       Transition Planning    Patient/Family Anticipates Transition to home with family    Patient/Family Anticipated Services at Transition none    Transportation Anticipated family or friend will provide       Discharge Needs Assessment    Readmission Within the Last 30 Days no previous admission in last 30 days    Equipment Currently Used at Home bp cuff;pulse ox;glucometer;cane, quad tip    Concerns to be Addressed no discharge needs identified    Anticipated Changes Related to Illness none    Equipment Needed After Discharge none             Discharge Plan       Row Name 01/10/24 1343       Plan    Plan This CM met with pt at bedside to discuss discharge.  Per pt report, from home with spouse Flora in [facility]; has BP cuff, glucometer, PO, 4 prong cane; no preference of DME provider; denies HH or need; utilizes [facility] in [place]; cyndy  Richie Ruelas; verified payor [facility] Medicare primary with [place] Medicaid secondary; discharge was discussed by physician.  Discharge plan is to return home with Flora whom, will provide transport once medically stable.    Patient/Family in Agreement with Plan yes      Row Name 01/10/24 0856       Plan    Plan Comments Pt arrived to ED 1/9 with c/o CP; received iv bumex 1mg x1 in ED; to tele under OBS services; sat 95% RA; Cr 2.04 (baseline 1.85), Mg 1.3, UTS + benzos; unsure why pt isn't NPO for LHC, also no IVF for prep; nephro consult pending at time of review.          Rosalia Bahena RN

## 2024-01-11 ENCOUNTER — APPOINTMENT (OUTPATIENT)
Dept: CARDIOLOGY | Facility: HOSPITAL | Age: 49
End: 2024-01-11
Payer: MEDICARE

## 2024-01-11 PROBLEM — I50.43 CHF (CONGESTIVE HEART FAILURE), NYHA CLASS II, ACUTE ON CHRONIC, COMBINED: Status: ACTIVE | Noted: 2024-01-11

## 2024-01-11 LAB
ANION GAP SERPL CALCULATED.3IONS-SCNC: 10.2 MMOL/L (ref 5–15)
BH CV ECHO MEAS - AO MAX PG: 5.9 MMHG
BH CV ECHO MEAS - AO MEAN PG: 4 MMHG
BH CV ECHO MEAS - AO ROOT DIAM: 3.1 CM
BH CV ECHO MEAS - AO V2 MAX: 121 CM/SEC
BH CV ECHO MEAS - AO V2 VTI: 24.7 CM
BH CV ECHO MEAS - EDV(CUBED): 85.8 ML
BH CV ECHO MEAS - EDV(MOD-SP4): 63.9 ML
BH CV ECHO MEAS - EF(MOD-SP4): 40.8 %
BH CV ECHO MEAS - ESV(CUBED): 40.4 ML
BH CV ECHO MEAS - ESV(MOD-SP4): 37.8 ML
BH CV ECHO MEAS - FS: 22.2 %
BH CV ECHO MEAS - IVS/LVPW: 0.78 CM
BH CV ECHO MEAS - IVSD: 1.16 CM
BH CV ECHO MEAS - LA DIMENSION: 3.5 CM
BH CV ECHO MEAS - LAT PEAK E' VEL: 9.9 CM/SEC
BH CV ECHO MEAS - LV DIASTOLIC VOL/BSA (35-75): 28.5 CM2
BH CV ECHO MEAS - LV MASS(C)D: 220.8 GRAMS
BH CV ECHO MEAS - LV SYSTOLIC VOL/BSA (12-30): 16.8 CM2
BH CV ECHO MEAS - LVIDD: 4.4 CM
BH CV ECHO MEAS - LVIDS: 3.4 CM
BH CV ECHO MEAS - LVOT AREA: 3.5 CM2
BH CV ECHO MEAS - LVOT DIAM: 2.1 CM
BH CV ECHO MEAS - LVPWD: 1.48 CM
BH CV ECHO MEAS - MED PEAK E' VEL: 7.5 CM/SEC
BH CV ECHO MEAS - MV A MAX VEL: 112 CM/SEC
BH CV ECHO MEAS - MV E MAX VEL: 122 CM/SEC
BH CV ECHO MEAS - MV E/A: 1.09
BH CV ECHO MEAS - PA ACC TIME: 0.1 SEC
BH CV ECHO MEAS - SI(MOD-SP4): 11.6 ML/M2
BH CV ECHO MEAS - SV(MOD-SP4): 26.1 ML
BH CV ECHO MEASUREMENTS AVERAGE E/E' RATIO: 14.02
BUN SERPL-MCNC: 24 MG/DL (ref 6–20)
BUN/CREAT SERPL: 10 (ref 7–25)
CALCIUM SPEC-SCNC: 8.7 MG/DL (ref 8.6–10.5)
CHLORIDE SERPL-SCNC: 106 MMOL/L (ref 98–107)
CO2 SERPL-SCNC: 23.8 MMOL/L (ref 22–29)
CREAT SERPL-MCNC: 2.4 MG/DL (ref 0.76–1.27)
EGFRCR SERPLBLD CKD-EPI 2021: 32.5 ML/MIN/1.73
FERRITIN SERPL-MCNC: 117.6 NG/ML (ref 30–400)
GLUCOSE BLDC GLUCOMTR-MCNC: 143 MG/DL (ref 70–130)
GLUCOSE BLDC GLUCOMTR-MCNC: 185 MG/DL (ref 70–130)
GLUCOSE BLDC GLUCOMTR-MCNC: 218 MG/DL (ref 70–130)
GLUCOSE BLDC GLUCOMTR-MCNC: 224 MG/DL (ref 70–130)
GLUCOSE SERPL-MCNC: 140 MG/DL (ref 65–99)
IRON 24H UR-MRATE: 53 MCG/DL (ref 59–158)
IRON SATN MFR SERPL: 16 % (ref 20–50)
LEFT ATRIUM VOLUME INDEX: 16.7 ML/M2
MAGNESIUM SERPL-MCNC: 1.8 MG/DL (ref 1.6–2.6)
POTASSIUM SERPL-SCNC: 4.3 MMOL/L (ref 3.5–5.2)
SODIUM SERPL-SCNC: 140 MMOL/L (ref 136–145)
TIBC SERPL-MCNC: 325 MCG/DL (ref 298–536)
TRANSFERRIN SERPL-MCNC: 218 MG/DL (ref 200–360)

## 2024-01-11 PROCEDURE — 83735 ASSAY OF MAGNESIUM: CPT | Performed by: INTERNAL MEDICINE

## 2024-01-11 PROCEDURE — 94664 DEMO&/EVAL PT USE INHALER: CPT

## 2024-01-11 PROCEDURE — 82728 ASSAY OF FERRITIN: CPT | Performed by: INTERNAL MEDICINE

## 2024-01-11 PROCEDURE — 83540 ASSAY OF IRON: CPT | Performed by: INTERNAL MEDICINE

## 2024-01-11 PROCEDURE — 84466 ASSAY OF TRANSFERRIN: CPT | Performed by: INTERNAL MEDICINE

## 2024-01-11 PROCEDURE — 25810000003 SODIUM CHLORIDE 0.9 % SOLUTION 250 ML FLEX CONT: Performed by: INTERNAL MEDICINE

## 2024-01-11 PROCEDURE — 93306 TTE W/DOPPLER COMPLETE: CPT

## 2024-01-11 PROCEDURE — 63710000001 INSULIN LISPRO (HUMAN) PER 5 UNITS: Performed by: INTERNAL MEDICINE

## 2024-01-11 PROCEDURE — 25010000002 NA FERRIC GLUC CPLX PER 12.5 MG: Performed by: INTERNAL MEDICINE

## 2024-01-11 PROCEDURE — 63710000001 INSULIN GLARGINE PER 5 UNITS: Performed by: INTERNAL MEDICINE

## 2024-01-11 PROCEDURE — 93306 TTE W/DOPPLER COMPLETE: CPT | Performed by: INTERNAL MEDICINE

## 2024-01-11 PROCEDURE — 25010000002 HEPARIN (PORCINE) PER 1000 UNITS: Performed by: INTERNAL MEDICINE

## 2024-01-11 PROCEDURE — 94799 UNLISTED PULMONARY SVC/PX: CPT

## 2024-01-11 PROCEDURE — 82948 REAGENT STRIP/BLOOD GLUCOSE: CPT

## 2024-01-11 PROCEDURE — 80048 BASIC METABOLIC PNL TOTAL CA: CPT | Performed by: INTERNAL MEDICINE

## 2024-01-11 PROCEDURE — 99232 SBSQ HOSP IP/OBS MODERATE 35: CPT | Performed by: NURSE PRACTITIONER

## 2024-01-11 PROCEDURE — 99231 SBSQ HOSP IP/OBS SF/LOW 25: CPT | Performed by: INTERNAL MEDICINE

## 2024-01-11 PROCEDURE — 94761 N-INVAS EAR/PLS OXIMETRY MLT: CPT

## 2024-01-11 RX ORDER — HYDRALAZINE HYDROCHLORIDE 25 MG/1
37.5 TABLET, FILM COATED ORAL EVERY 8 HOURS SCHEDULED
Status: DISCONTINUED | OUTPATIENT
Start: 2024-01-11 | End: 2024-01-12

## 2024-01-11 RX ADMIN — TIZANIDINE 4 MG: 4 TABLET ORAL at 21:05

## 2024-01-11 RX ADMIN — TICAGRELOR 90 MG: 90 TABLET ORAL at 21:05

## 2024-01-11 RX ADMIN — FLUOXETINE HYDROCHLORIDE 40 MG: 20 CAPSULE ORAL at 09:47

## 2024-01-11 RX ADMIN — Medication 10 ML: at 09:48

## 2024-01-11 RX ADMIN — INSULIN LISPRO 4 UNITS: 100 INJECTION, SOLUTION INTRAVENOUS; SUBCUTANEOUS at 21:04

## 2024-01-11 RX ADMIN — SODIUM CHLORIDE 250 MG: 9 INJECTION, SOLUTION INTRAVENOUS at 09:52

## 2024-01-11 RX ADMIN — INSULIN GLARGINE 15 UNITS: 100 INJECTION, SOLUTION SUBCUTANEOUS at 21:04

## 2024-01-11 RX ADMIN — INSULIN LISPRO 4 UNITS: 100 INJECTION, SOLUTION INTRAVENOUS; SUBCUTANEOUS at 12:43

## 2024-01-11 RX ADMIN — HYDRALAZINE HYDROCHLORIDE 25 MG: 25 TABLET ORAL at 06:01

## 2024-01-11 RX ADMIN — Medication 10 ML: at 21:05

## 2024-01-11 RX ADMIN — HEPARIN SODIUM 5000 UNITS: 5000 INJECTION INTRAVENOUS; SUBCUTANEOUS at 09:48

## 2024-01-11 RX ADMIN — HEPARIN SODIUM 5000 UNITS: 5000 INJECTION INTRAVENOUS; SUBCUTANEOUS at 21:05

## 2024-01-11 RX ADMIN — ATORVASTATIN CALCIUM 40 MG: 40 TABLET, FILM COATED ORAL at 21:05

## 2024-01-11 RX ADMIN — RANOLAZINE 1000 MG: 500 TABLET, FILM COATED, EXTENDED RELEASE ORAL at 09:47

## 2024-01-11 RX ADMIN — RANOLAZINE 1000 MG: 500 TABLET, FILM COATED, EXTENDED RELEASE ORAL at 21:05

## 2024-01-11 RX ADMIN — TICAGRELOR 90 MG: 90 TABLET ORAL at 09:47

## 2024-01-11 RX ADMIN — PANTOPRAZOLE SODIUM 40 MG: 40 TABLET, DELAYED RELEASE ORAL at 09:47

## 2024-01-11 RX ADMIN — HYDRALAZINE HYDROCHLORIDE 37.5 MG: 25 TABLET ORAL at 14:27

## 2024-01-11 RX ADMIN — ASPIRIN 81 MG: 81 TABLET, COATED ORAL at 09:47

## 2024-01-11 RX ADMIN — Medication 1000 MCG: at 09:47

## 2024-01-11 RX ADMIN — MAGNESIUM GLUCONATE 500 MG ORAL TABLET 800 MG: 500 TABLET ORAL at 09:47

## 2024-01-11 RX ADMIN — HYDRALAZINE HYDROCHLORIDE 37.5 MG: 25 TABLET ORAL at 21:05

## 2024-01-11 RX ADMIN — INSULIN LISPRO 2 UNITS: 100 INJECTION, SOLUTION INTRAVENOUS; SUBCUTANEOUS at 09:47

## 2024-01-11 NOTE — PLAN OF CARE
Goal Outcome Evaluation:  Plan of Care Reviewed With: patient           Outcome Evaluation: Pt is resting in bed, respirations even and unlabored. No s/s of acute distress noted. No complaints verbalized at this time. Plan of care ongoing.

## 2024-01-11 NOTE — PROGRESS NOTES
LOS: 1 day     Name: Matthew Madrid  Age/Sex: 48 y.o. male  :  1975        PCP: Richie Ruelas PA-C    Principal Problem:    Chest pain in adult  Active Problems:    Acute on chronic heart failure with preserved ejection fraction (HFpEF)      Admission Information: Matthew Madrid is a 48 y.o. male with coronary artery disease status post PCI of the distal RCA (2021), hypertension, hyperlipidemia, chronic kidney disease stage IIIb, type 2 diabetes-insulin-dependent, morbid obesity and former smoker.    Chief Complaint: Admitted for premedication for invasive coronary angiogram    Interval history: Creatinine increased again to 2.4    Subjective   Patient awake in bed and reports continued improvement in his lower extremity edema.  He denies chest pain, palpitations, or other symptom of ACS    Vital Signs  Vital Signs (last 72 hrs)          0700   0659  0700   0659  0700  01/10 0659 01/10 0700  01/10 1427   Most Recent      Temp (°F)     97.6 -  98.6    98.1 -  98.2     98.2 (36.8) 01/10 1100    Heart Rate     69 -  80    74 -  84     75 01/10 1100    Resp     18 -  20    18 -  20     18 01/10 1100    BP     129/59 -  157/84    153/80 -  163/90     153/80 01/10 1100    SpO2 (%)     94 -  96    95 -  96     95 01/10 1100          Temp:  [97.8 °F (36.6 °C)-98.4 °F (36.9 °C)] 98 °F (36.7 °C)  Heart Rate:  [68-87] 68  Resp:  [18] 18  BP: (122-154)/(55-78) 153/70  Body mass index is 42.3 kg/m².      Intake/Output Summary (Last 24 hours) at 2024 1123  Last data filed at 2024 0800  Gross per 24 hour   Intake 480 ml   Output --   Net 480 ml       Vitals and nursing note reviewed.   Constitutional:       Appearance: Not in distress. Morbidly obese. Chronically ill-appearing.      Comments: RN at bedside   Eyes:      Conjunctiva/sclera: Conjunctivae normal.   Pulmonary:      Effort: Pulmonary effort is normal.      Breath sounds: Normal breath sounds.   Cardiovascular:       Normal rate. Regular rhythm.      Murmurs: There is no murmur.   Edema:     Pretibial: bilateral 1+ edema of the pretibial area.     Ankle: bilateral 1+ edema of the ankle.     Feet: bilateral 1+ edema of the feet.  Skin:     General: Skin is warm and dry.   Neurological:      Mental Status: Alert.         Telemetry: Sinus 70s to 80s     Results Review:     Results from last 7 days   Lab Units 01/09/24  1458   WBC 10*3/mm3 5.27   HEMOGLOBIN g/dL 10.6*   PLATELETS 10*3/mm3 156     Results from last 7 days   Lab Units 01/11/24  0055 01/10/24  0131 01/09/24  1458   SODIUM mmol/L 140 138 138   POTASSIUM mmol/L 4.3 4.1 4.6   CHLORIDE mmol/L 106 102 106   CO2 mmol/L 23.8 24.2 22.8   BUN mg/dL 24* 27* 27*   CREATININE mg/dL 2.40* 2.04* 1.85*   CALCIUM mg/dL 8.7 8.9 8.8   GLUCOSE mg/dL 140* 152* 245*                 I reviewed the patient's new clinical results.  I reviewed the patient's new imaging results and agree with the interpretation.  I personally viewed and interpreted the patient's EKG/Telemetry data      Medication Review:   aspirin, 81 mg, Oral, Daily  atorvastatin, 40 mg, Oral, Nightly  ferric gluconate, 250 mg, Intravenous, Once  FLUoxetine, 40 mg, Oral, Daily  heparin (porcine), 5,000 Units, Subcutaneous, Q12H  hydrALAZINE, 25 mg, Oral, Q8H  insulin glargine, 15 Units, Subcutaneous, Nightly  insulin lispro, 2-9 Units, Subcutaneous, 4x Daily AC & at Bedtime  magnesium oxide, 800 mg, Oral, Daily  pantoprazole, 40 mg, Oral, Daily  ranolazine, 1,000 mg, Oral, Q12H  senna-docusate sodium, 2 tablet, Oral, BID  sodium chloride, 10 mL, Intravenous, Q12H  ticagrelor, 90 mg, Oral, BID  vitamin B-12, 1,000 mcg, Oral, Daily           Assessment:  CAD s/p distal RCA PCI  with recurrent anginal symptoms on maximally tolerated antianginal medication   Acute on chronic diastolic CHF NYHA 3-4.  ACC C  CKD 3B  Hypertension  Hyperlipidemia  IDDM      Recommendations:  Planning for invasive coronary angiogram once renal function  has recovered.  Potentially 01/12/2024.  Continue aspirin and Brilinta  Repeat echo ordered for today.  Creatinine continues to increase.  We appreciate nephrology's recommendations  Still not at goal with home dose hydralazine.  Will increase   continue atorvastatin  Management per hospitalist-appreciate their recommendations      I have discussed my recommendations with the patient.    Further decision making based on Dr. Grey's assessment and recommendations.      Electronically signed by CARLOS Mills, 01/10/24, 2:43 PM EST.

## 2024-01-11 NOTE — PROGRESS NOTES
"Nephrology Progress Note      Subjective     Patient denies any chest pain, shortness of breath he thinks his bilateral lower extremity has improved significantly    Objective       Vital signs :     Temp:  [97.8 °F (36.6 °C)-98.4 °F (36.9 °C)] 98.1 °F (36.7 °C)  Heart Rate:  [68-87] 68  Resp:  [18] 18  BP: (122-154)/(55-80) 149/78    Intake/Output                         01/09/24 0701 - 01/10/24 0700 01/10/24 0701 - 01/11/24 0700     [id number] 8397-1475 Total 0812-0393 4765-2334 Total                 Intake    P.O.  --  -- --  420  -- 420    Total Intake -- -- -- 420 -- 420       Output    Urine  300  650 950  --  -- --    Total Output 300 650 950 -- -- --             Physical Exam:    General Appearance : Not in acute distress   lungs : clear to auscultation, respirations regular  Heart :  regular rhythm & normal rate, normal S1, S2 and no murmur, no rub  Abdomen : Soft, nondistended  Extremities : 1+ edema,   Neurologic :   orientated to person, place, time and situation, Grossly no focal deficits        Laboratory Data :     Albumin Albumin   Date Value Ref Range Status   01/09/2024 3.5 3.5 - 5.2 g/dL Final      Magnesium Magnesium   Date Value Ref Range Status   01/11/2024 1.8 1.6 - 2.6 mg/dL Final   01/10/2024 1.3 (L) 1.6 - 2.6 mg/dL Final          PTH               No results found for: \"PTH\"    CBC and coagulation:  Results from last 7 days   Lab Units 01/09/24  1458   WBC 10*3/mm3 5.27   HEMOGLOBIN g/dL 10.6*   HEMATOCRIT % 31.4*   MCV fL 96.9   MCHC g/dL 33.8   PLATELETS 10*3/mm3 156     Acid/base balance:      Renal and electrolytes:    Results from last 7 days   Lab Units 01/11/24  0055 01/10/24  0131 01/09/24  1458   SODIUM mmol/L 140 138 138   POTASSIUM mmol/L 4.3 4.1 4.6   MAGNESIUM mg/dL 1.8 1.3*  --    CHLORIDE mmol/L 106 102 106   CO2 mmol/L 23.8 24.2 22.8   BUN mg/dL 24* 27* 27*   CREATININE mg/dL 2.40* 2.04* 1.85*   CALCIUM mg/dL 8.7 8.9 8.8     Estimated Creatinine Clearance: 45.7 mL/min (A) (by " C-G formula based on SCr of 2.4 mg/dL (H)).  @GFRCG:3@   Liver and pancreatic function:  Results from last 7 days   Lab Units 01/09/24  1458   ALBUMIN g/dL 3.5   BILIRUBIN mg/dL 0.5   ALK PHOS U/L 72   AST (SGOT) U/L 20   ALT (SGPT) U/L 19         Cardiac:  Results from last 7 days   Lab Units 01/10/24  0131   PROBNP pg/mL 250.1     Liver and pancreatic function:  Results from last 7 days   Lab Units 01/09/24  1458   ALBUMIN g/dL 3.5   BILIRUBIN mg/dL 0.5   ALK PHOS U/L 72   AST (SGOT) U/L 20   ALT (SGPT) U/L 19       Medications :     aspirin, 81 mg, Oral, Daily  atorvastatin, 40 mg, Oral, Nightly  FLUoxetine, 40 mg, Oral, Daily  heparin (porcine), 5,000 Units, Subcutaneous, Q12H  hydrALAZINE, 25 mg, Oral, Q8H  insulin glargine, 15 Units, Subcutaneous, Nightly  insulin lispro, 2-9 Units, Subcutaneous, 4x Daily AC & at Bedtime  magnesium oxide, 800 mg, Oral, Daily  pantoprazole, 40 mg, Oral, Daily  ranolazine, 1,000 mg, Oral, Q12H  senna-docusate sodium, 2 tablet, Oral, BID  sodium chloride, 10 mL, Intravenous, Q12H  ticagrelor, 90 mg, Oral, BID  vitamin B-12, 1,000 mcg, Oral, Daily             Assessment & Plan     -Chronic kidney disease G3 B A3 likely due to diabetic glomerulosclerosis and hypertensive ischemic sclerosis  - Coronary artery disease  - Anemia with absolute iron deficiency  - Essential hypertension  - Type 2 diabetes mellitus    No improvement in creatinine, continues to get worse 2.4 from 2 today.  Clinically patient is significantly better volume standpoint.  Likely dealing with ATN but working diagnosis is hemodynamic changes induced LEÓN and creatinine is expected to be improving in the next 24 to 48 hours.  Would strongly recommend holding cardiac catheterization until creatinine gets stabilizes.  Will do bladder scan to rule out any urinary retention.    Given proteinuric CKD patient remains moderate risk for worsening chronic kidney disease in setting of contrast administration     Baseline  creatinine appears to be around 1.7-1.9     -IV albumin  - Hold diuretics and resume when creatinine gets stable  - IV ferric gluconate to 50 mg once  - Please avoid any nephrotoxic agents, hypotension and adjust medications according to estimated GFR      Shweta Naik MD  01/11/24  07:47 EST

## 2024-01-11 NOTE — PROGRESS NOTES
[facility] HOSPITALIST PROGRESS NOTE     Patient Identification:  Name:  Matthew Madrid  Age:  48 y.o.  Sex:  male  :  1975  MRN:  [id number]  Visit Number:  [id number]  ROOM: [address] [place] [address]     Primary Care Provider:  Richie Ruelas PA-C    Length of stay in inpatient status:  1    Subjective     Chief Compliant:  No chief complaint on file.      History of Presenting Illness:    Patient noted swelling improved and urination has been as good or better than average at home. Denies any new complaints. Outs not recorded but patient educated on using urinal for recording.     ROS:  Otherwise 10 point ROS negative other than documented above in HPI.     Objective     Current Hospital Meds:aspirin, 81 mg, Oral, Daily  atorvastatin, 40 mg, Oral, Nightly  FLUoxetine, 40 mg, Oral, Daily  heparin (porcine), 5,000 Units, Subcutaneous, Q12H  hydrALAZINE, 37.5 mg, Oral, Q8H  insulin glargine, 15 Units, Subcutaneous, Nightly  insulin lispro, 2-9 Units, Subcutaneous, 4x Daily AC & at Bedtime  magnesium oxide, 800 mg, Oral, Daily  pantoprazole, 40 mg, Oral, Daily  ranolazine, 1,000 mg, Oral, Q12H  senna-docusate sodium, 2 tablet, Oral, BID  sodium chloride, 10 mL, Intravenous, Q12H  ticagrelor, 90 mg, Oral, BID  vitamin B-12, 1,000 mcg, Oral, Daily         Current Antimicrobial Therapy:  Anti-Infectives (From admission, onward)      None          Current Diuretic Therapy:  Diuretics (From admission, onward)      Ordered     Dose/Rate Route Frequency Start Stop    24 1721  bumetanide (BUMEX) injection 1 mg        Ordering Provider: Jose Raul Mejia MD    1 mg Intravenous Once 24 1900 24 1822          ----------------------------------------------------------------------------------------------------------------------  Vital Signs:  Temp:  [97.8 °F (36.6 °C)-98.4 °F (36.9 °C)] 98.1 °F (36.7 °C)  Heart Rate:  [68-87] 68  Resp:  [18] 18  BP: (122-169)/(55-81) 169/81  SpO2:  [93 %-96  %] 96 %  on   ;   Device (Oxygen Therapy): room air  Body mass index is 42.3 kg/m².    Wt Readings from Last 3 Encounters:   01/11/24 119 kg (262 lb 1.6 oz)   12/19/23 118 kg (261 lb)   12/05/23 118 kg (259 lb 12.8 oz)     Intake & Output (last 3 days)         01/08 0701  01/09 0700 01/09 0701  01/10 0700 01/10 0701 01/11 0700 01/11 0701 01/12 0700    P.O.   420 580    Total Intake(mL/kg)   420 (3.5) 580 (4.9)    Urine (mL/kg/hr)  950  1200 (0.9)    Total Output  950  1200    Net  -950 +420 -620            Urine Unmeasured Occurrence   3 x           Diet: Regular/House Diet, Renal Diets; Low Sodium (2-3g), Low Potassium, Low Phosphorus; Texture: Regular Texture (IDDSI 7); Fluid Consistency: Thin (IDDSI 0)  ----------------------------------------------------------------------------------------------------------------------  Physical exam:  Constitutional:  Well-developed and well-nourished.  No respiratory distress.      HENT:  Head:  Normocephalic and atraumatic.  Mouth:  Moist mucous membranes.    Eyes:  Conjunctivae and EOM are normal. No scleral icterus.    Neck:  Neck supple.  No JVD present.    Cardiovascular:  Normal rate, regular rhythm and normal heart sounds with no murmur.  Pulmonary/Chest:  No respiratory distress, no wheezes, no crackles, with normal breath sounds and good air movement.  Abdominal:  Soft.  Bowel sounds are normal.  No distension and no tenderness.   Musculoskeletal:  No edema, no tenderness, and no deformity.  No red or swollen joints anywhere.    Neurological:  Alert and oriented to person, place, and time.  No cranial nerve deficit.  No tongue deviation.  No facial droop.  No slurred speech.   Skin:  Skin is warm and dry. No rash noted. No pallor.   Peripheral vascular:  Pulses in all 4 extremities with no clubbing, no cyanosis, no edema.  ----------------------------------------------------------------------------------------------------------------------  Tele:   "  ----------------------------------------------------------------------------------------------------------------------  Results from last 7 days   Lab Units 01/09/24  1458   WBC 10*3/mm3 5.27   HEMOGLOBIN g/dL 10.6*   HEMATOCRIT % 31.4*   MCV fL 96.9   MCHC g/dL 33.8   PLATELETS 10*3/mm3 156         Results from last 7 days   Lab Units 01/11/24  0055 01/10/24  0131 01/09/24  1458   SODIUM mmol/L 140 138 138   POTASSIUM mmol/L 4.3 4.1 4.6   MAGNESIUM mg/dL 1.8 1.3*  --    CHLORIDE mmol/L 106 102 106   CO2 mmol/L 23.8 24.2 22.8   BUN mg/dL 24* 27* 27*   CREATININE mg/dL 2.40* 2.04* 1.85*   CALCIUM mg/dL 8.7 8.9 8.8   GLUCOSE mg/dL 140* 152* 245*   ALBUMIN g/dL  --   --  3.5   BILIRUBIN mg/dL  --   --  0.5   ALK PHOS U/L  --   --  72   AST (SGOT) U/L  --   --  20   ALT (SGPT) U/L  --   --  19   Estimated Creatinine Clearance: 45.7 mL/min (A) (by C-G formula based on SCr of 2.4 mg/dL (H)).  No results found for: \"AMMONIA\"      Results from last 7 days   Lab Units 01/10/24  0131   PROBNP pg/mL 250.1         Hemoglobin A1C   Date/Time Value Ref Range Status   01/09/2024 1458 6.70 (H) 4.80 - 5.60 % Final     Glucose   Date/Time Value Ref Range Status   01/11/2024 1551 143 (H) 70 - 130 mg/dL Final   01/11/2024 1049 224 (H) 70 - 130 mg/dL Final   01/11/2024 0655 185 (H) 70 - 130 mg/dL Final   01/10/2024 1943 191 (H) 70 - 130 mg/dL Final   01/10/2024 1517 166 (H) 70 - 130 mg/dL Final   01/10/2024 1111 184 (H) 70 - 130 mg/dL Final   01/10/2024 0700 172 (H) 70 - 130 mg/dL Final   01/09/2024 2100 238 (H) 70 - 130 mg/dL Final     Lab Results   Component Value Date    TSH 1.440 01/09/2024     No results found for: \"PREGTESTUR\", \"PREGSERUM\", \"HCG\", \"HCGQUANT\"  Pain Management Panel  More data exists         Latest Ref Rng & Units 1/9/2024 11/7/2023   Pain Management Panel   Creatinine, Urine mg/dL - 69.1    Amphetamine, Urine Qual Negative Negative  -   Barbiturates Screen, Urine Negative Negative  -   Benzodiazepine Screen, " "Urine Negative Positive  -   Buprenorphine, Screen, Urine Negative Negative  -   Cocaine Screen, Urine Negative Negative  -   Fentanyl, Urine Negative Negative  -   Methadone Screen , Urine Negative Negative  -   Methamphetamine, Ur Negative Negative  -     Brief Urine Lab Results  (Last result in the past 365 days)        Color   Clarity   Blood   Leuk Est   Nitrite   Protein   CREAT   Urine HCG        01/09/24 1638 Dark Yellow   Clear   Negative   Negative   Negative   30 mg/dL (1+)                 No results found for: \"BLOODCX\"  No results found for: \"URINECX\"  No results found for: \"WOUNDCX\"  No results found for: \"STOOLCX\"  No results found for: \"RESPCX\"  No results found for: \"AFBCX\"        I have personally looked at the labs and they are summarized above.  ----------------------------------------------------------------------------------------------------------------------  Detailed radiology reports for the last 24 hours:    Imaging Results (Last 24 Hours)       ** No results found for the last 24 hours. **          Assessment & Plan    #Decompensated diastolic heart failure   #Hx of CAD  #HTN  #HLD  #Angina   - Managed by primary team and nephrology. 1 mg IV Bumex given on admission. Nephrology ordered albumin. Will monitor renal function and electrolytes.   - Volume status improved    #Hypomagnesemia  - Replace per protocol      #CKD IIIb  - Creatinine around baseline at 1.85 on admission with slight bump again today.   - Nephrology following     #DM II  - Patient takes 36 units BID 70/30 at home.   - A1C 6.7%. SSI. Will titrate as needed. Will start low dose basal insulin, will start low given likely NPO status in near future for procedures.   - Reasonably controlled      #Morbid obesity   - Complicates all aspects of care      Thank you for the consult. Medicine will continue to follow. Please call with any questions.        VTE Prophylaxis:   Mechanical Order History:       None          Pharmalogical " Order History:        Ordered     Dose Route Frequency Stop    01/09/24 1445  heparin (porcine) 5000 UNIT/ML injection 5,000 Units         5,000 Units SC Every 12 Hours Scheduled --                        Ruben Harris MD  [facility]  01/11/24  18:37 EST

## 2024-01-12 LAB
ANION GAP SERPL CALCULATED.3IONS-SCNC: 11.8 MMOL/L (ref 5–15)
BUN SERPL-MCNC: 23 MG/DL (ref 6–20)
BUN/CREAT SERPL: 12.6 (ref 7–25)
CALCIUM SPEC-SCNC: 9.3 MG/DL (ref 8.6–10.5)
CHLORIDE SERPL-SCNC: 104 MMOL/L (ref 98–107)
CO2 SERPL-SCNC: 21.2 MMOL/L (ref 22–29)
CREAT SERPL-MCNC: 1.82 MG/DL (ref 0.76–1.27)
EGFRCR SERPLBLD CKD-EPI 2021: 45.3 ML/MIN/1.73
GLUCOSE BLDC GLUCOMTR-MCNC: 165 MG/DL (ref 70–130)
GLUCOSE BLDC GLUCOMTR-MCNC: 169 MG/DL (ref 70–130)
GLUCOSE BLDC GLUCOMTR-MCNC: 182 MG/DL (ref 70–130)
GLUCOSE BLDC GLUCOMTR-MCNC: 192 MG/DL (ref 70–130)
GLUCOSE SERPL-MCNC: 183 MG/DL (ref 65–99)
MAGNESIUM SERPL-MCNC: 1.8 MG/DL (ref 1.6–2.6)
POTASSIUM SERPL-SCNC: 4.9 MMOL/L (ref 3.5–5.2)
SODIUM SERPL-SCNC: 137 MMOL/L (ref 136–145)

## 2024-01-12 PROCEDURE — 80048 BASIC METABOLIC PNL TOTAL CA: CPT | Performed by: INTERNAL MEDICINE

## 2024-01-12 PROCEDURE — 63710000001 INSULIN GLARGINE PER 5 UNITS: Performed by: INTERNAL MEDICINE

## 2024-01-12 PROCEDURE — 83735 ASSAY OF MAGNESIUM: CPT | Performed by: INTERNAL MEDICINE

## 2024-01-12 PROCEDURE — 99231 SBSQ HOSP IP/OBS SF/LOW 25: CPT | Performed by: INTERNAL MEDICINE

## 2024-01-12 PROCEDURE — 25010000002 HEPARIN (PORCINE) PER 1000 UNITS: Performed by: INTERNAL MEDICINE

## 2024-01-12 PROCEDURE — 94761 N-INVAS EAR/PLS OXIMETRY MLT: CPT

## 2024-01-12 PROCEDURE — 63710000001 INSULIN LISPRO (HUMAN) PER 5 UNITS: Performed by: INTERNAL MEDICINE

## 2024-01-12 PROCEDURE — 82948 REAGENT STRIP/BLOOD GLUCOSE: CPT

## 2024-01-12 PROCEDURE — 25810000003 SODIUM CHLORIDE 0.9 % SOLUTION 250 ML FLEX CONT: Performed by: INTERNAL MEDICINE

## 2024-01-12 PROCEDURE — 94799 UNLISTED PULMONARY SVC/PX: CPT

## 2024-01-12 PROCEDURE — 25010000002 NA FERRIC GLUC CPLX PER 12.5 MG: Performed by: INTERNAL MEDICINE

## 2024-01-12 PROCEDURE — 94664 DEMO&/EVAL PT USE INHALER: CPT

## 2024-01-12 RX ORDER — ACETYLCYSTEINE 200 MG/ML
600 SOLUTION ORAL; RESPIRATORY (INHALATION) EVERY 12 HOURS SCHEDULED
Status: COMPLETED | OUTPATIENT
Start: 2024-01-14 | End: 2024-01-16

## 2024-01-12 RX ORDER — HYDRALAZINE HYDROCHLORIDE 50 MG/1
50 TABLET, FILM COATED ORAL EVERY 8 HOURS SCHEDULED
Status: DISCONTINUED | OUTPATIENT
Start: 2024-01-12 | End: 2024-01-16 | Stop reason: HOSPADM

## 2024-01-12 RX ADMIN — INSULIN LISPRO 2 UNITS: 100 INJECTION, SOLUTION INTRAVENOUS; SUBCUTANEOUS at 12:53

## 2024-01-12 RX ADMIN — RANOLAZINE 1000 MG: 500 TABLET, FILM COATED, EXTENDED RELEASE ORAL at 21:44

## 2024-01-12 RX ADMIN — HYDRALAZINE HYDROCHLORIDE 50 MG: 25 TABLET ORAL at 21:44

## 2024-01-12 RX ADMIN — Medication 1000 MCG: at 10:17

## 2024-01-12 RX ADMIN — ATORVASTATIN CALCIUM 40 MG: 40 TABLET, FILM COATED ORAL at 21:44

## 2024-01-12 RX ADMIN — INSULIN LISPRO 2 UNITS: 100 INJECTION, SOLUTION INTRAVENOUS; SUBCUTANEOUS at 21:43

## 2024-01-12 RX ADMIN — TICAGRELOR 90 MG: 90 TABLET ORAL at 21:44

## 2024-01-12 RX ADMIN — INSULIN LISPRO 2 UNITS: 100 INJECTION, SOLUTION INTRAVENOUS; SUBCUTANEOUS at 10:18

## 2024-01-12 RX ADMIN — SODIUM CHLORIDE 250 MG: 9 INJECTION, SOLUTION INTRAVENOUS at 10:22

## 2024-01-12 RX ADMIN — HEPARIN SODIUM 5000 UNITS: 5000 INJECTION INTRAVENOUS; SUBCUTANEOUS at 10:17

## 2024-01-12 RX ADMIN — INSULIN GLARGINE 15 UNITS: 100 INJECTION, SOLUTION SUBCUTANEOUS at 21:42

## 2024-01-12 RX ADMIN — FLUOXETINE HYDROCHLORIDE 40 MG: 20 CAPSULE ORAL at 10:17

## 2024-01-12 RX ADMIN — INSULIN LISPRO 2 UNITS: 100 INJECTION, SOLUTION INTRAVENOUS; SUBCUTANEOUS at 17:54

## 2024-01-12 RX ADMIN — TICAGRELOR 90 MG: 90 TABLET ORAL at 10:17

## 2024-01-12 RX ADMIN — ASPIRIN 81 MG: 81 TABLET, COATED ORAL at 10:17

## 2024-01-12 RX ADMIN — MAGNESIUM GLUCONATE 500 MG ORAL TABLET 800 MG: 500 TABLET ORAL at 10:17

## 2024-01-12 RX ADMIN — TIZANIDINE 4 MG: 4 TABLET ORAL at 21:43

## 2024-01-12 RX ADMIN — HYDRALAZINE HYDROCHLORIDE 50 MG: 25 TABLET ORAL at 13:46

## 2024-01-12 RX ADMIN — RANOLAZINE 1000 MG: 500 TABLET, FILM COATED, EXTENDED RELEASE ORAL at 10:17

## 2024-01-12 RX ADMIN — HEPARIN SODIUM 5000 UNITS: 5000 INJECTION INTRAVENOUS; SUBCUTANEOUS at 21:44

## 2024-01-12 RX ADMIN — PANTOPRAZOLE SODIUM 40 MG: 40 TABLET, DELAYED RELEASE ORAL at 10:17

## 2024-01-12 RX ADMIN — Medication 10 ML: at 21:45

## 2024-01-12 RX ADMIN — Medication 10 ML: at 10:19

## 2024-01-12 NOTE — PROGRESS NOTES
[facility] HOSPITALIST PROGRESS NOTE     Patient Identification:  Name:  Matthew Madrid  Age:  48 y.o.  Sex:  male  :  1975  MRN:  [id number]  Visit Number:  [id number]  ROOM: [address] [place] [address]     Primary Care Provider:  Richie Ruelas PA-C    Length of stay in inpatient status:  2    Subjective     Chief Compliant:  No chief complaint on file.      History of Presenting Illness:    Patient notes feeling about the same. He notes he still feels he is urinating more than normal. He is disappointed his is not getting heart cath today.     ROS:  Otherwise 10 point ROS negative other than documented above in HPI.     Objective     Current Hospital Meds:aspirin, 81 mg, Oral, Daily  atorvastatin, 40 mg, Oral, Nightly  FLUoxetine, 40 mg, Oral, Daily  heparin (porcine), 5,000 Units, Subcutaneous, Q12H  hydrALAZINE, 50 mg, Oral, Q8H  insulin glargine, 15 Units, Subcutaneous, Nightly  insulin lispro, 2-9 Units, Subcutaneous, 4x Daily AC & at Bedtime  magnesium oxide, 800 mg, Oral, Daily  pantoprazole, 40 mg, Oral, Daily  ranolazine, 1,000 mg, Oral, Q12H  senna-docusate sodium, 2 tablet, Oral, BID  sodium chloride, 10 mL, Intravenous, Q12H  ticagrelor, 90 mg, Oral, BID  vitamin B-12, 1,000 mcg, Oral, Daily         Current Antimicrobial Therapy:  Anti-Infectives (From admission, onward)      None          Current Diuretic Therapy:  Diuretics (From admission, onward)      Ordered     Dose/Rate Route Frequency Start Stop    24 1721  bumetanide (BUMEX) injection 1 mg        Ordering Provider: Jose Raul Mejia MD    1 mg Intravenous Once 24 1900 24 1822          ----------------------------------------------------------------------------------------------------------------------  Vital Signs:  Temp:  [97.9 °F (36.6 °C)-98.6 °F (37 °C)] 98.1 °F (36.7 °C)  Heart Rate:  [67-78] 67  Resp:  [16-20] 18  BP: (131-157)/(58-90) 157/80  SpO2:  [95 %-98 %] 95 %  on   ;   Device (Oxygen Therapy):  room air  Body mass index is 41.14 kg/m².    Wt Readings from Last 3 Encounters:   01/12/24 116 kg (254 lb 14.4 oz)   12/19/23 118 kg (261 lb)   12/05/23 118 kg (259 lb 12.8 oz)     Intake & Output (last 3 days)         01/09 0701  01/10 0700 01/10 0701  01/11 0700 01/11 0701 01/12 0700 01/12 0701 01/13 0700    P.O.  420 580 600    Total Intake(mL/kg)  420 (3.5) 580 (5) 600 (5.2)    Urine (mL/kg/hr) 950  3350 (1.2) 990 (0.9)    Total Output 950  3350 990    Net -950 +420 -2770 -390            Urine Unmeasured Occurrence  3 x            Diet: Cardiac Diets, Renal Diets; Healthy Heart (2-3 Na+); Low Sodium (2-3g); Texture: Regular Texture (IDDSI 7); Fluid Consistency: Thin (IDDSI 0)  ----------------------------------------------------------------------------------------------------------------------  Physical exam:  Constitutional:  Well-developed and well-nourished.  No respiratory distress. Obese.       HENT:  Head:  Normocephalic and atraumatic.  Mouth:  Moist mucous membranes.    Eyes:  Conjunctivae and EOM are normal. No scleral icterus.    Neck:  Neck supple.  No JVD present.    Cardiovascular:  Normal rate, regular rhythm and normal heart sounds with no murmur.  Pulmonary/Chest:  No respiratory distress, no wheezes, no crackles, with normal breath sounds and good air movement.  Abdominal:  Soft.  Bowel sounds are normal.  No distension and no tenderness.   Musculoskeletal:  No edema, no tenderness, and no deformity.  No red or swollen joints anywhere.    Neurological:  Alert and oriented to person, place, and time.  No cranial nerve deficit.  No tongue deviation.  No facial droop.  No slurred speech.   Skin:  Skin is warm and dry. No rash noted. No pallor.   Peripheral vascular:  Pulses in all 4 extremities with no clubbing, no cyanosis, no edema.  ----------------------------------------------------------------------------------------------------------------------  Tele:   "  ----------------------------------------------------------------------------------------------------------------------  Results from last 7 days   Lab Units 01/09/24  1458   WBC 10*3/mm3 5.27   HEMOGLOBIN g/dL 10.6*   HEMATOCRIT % 31.4*   MCV fL 96.9   MCHC g/dL 33.8   PLATELETS 10*3/mm3 156         Results from last 7 days   Lab Units 01/12/24  0808 01/11/24  0055 01/10/24  0131 01/09/24  1458   SODIUM mmol/L 137 140 138 138   POTASSIUM mmol/L 4.9 4.3 4.1 4.6   MAGNESIUM mg/dL 1.8 1.8 1.3*  --    CHLORIDE mmol/L 104 106 102 106   CO2 mmol/L 21.2* 23.8 24.2 22.8   BUN mg/dL 23* 24* 27* 27*   CREATININE mg/dL 1.82* 2.40* 2.04* 1.85*   CALCIUM mg/dL 9.3 8.7 8.9 8.8   GLUCOSE mg/dL 183* 140* 152* 245*   ALBUMIN g/dL  --   --   --  3.5   BILIRUBIN mg/dL  --   --   --  0.5   ALK PHOS U/L  --   --   --  72   AST (SGOT) U/L  --   --   --  20   ALT (SGPT) U/L  --   --   --  19   Estimated Creatinine Clearance: 59.5 mL/min (A) (by C-G formula based on SCr of 1.82 mg/dL (H)).  No results found for: \"AMMONIA\"      Results from last 7 days   Lab Units 01/10/24  0131   PROBNP pg/mL 250.1         Glucose   Date/Time Value Ref Range Status   01/12/2024 1055 192 (H) 70 - 130 mg/dL Final   01/12/2024 0724 182 (H) 70 - 130 mg/dL Final   01/11/2024 2008 218 (H) 70 - 130 mg/dL Final   01/11/2024 1551 143 (H) 70 - 130 mg/dL Final   01/11/2024 1049 224 (H) 70 - 130 mg/dL Final   01/11/2024 0655 185 (H) 70 - 130 mg/dL Final   01/10/2024 1943 191 (H) 70 - 130 mg/dL Final   01/10/2024 1517 166 (H) 70 - 130 mg/dL Final     Lab Results   Component Value Date    TSH 1.440 01/09/2024     No results found for: \"PREGTESTUR\", \"PREGSERUM\", \"HCG\", \"HCGQUANT\"  Pain Management Panel  More data exists         Latest Ref Rng & Units 1/9/2024 11/7/2023   Pain Management Panel   Creatinine, Urine mg/dL - 69.1    Amphetamine, Urine Qual Negative Negative  -   Barbiturates Screen, Urine Negative Negative  -   Benzodiazepine Screen, Urine Negative Positive " " -   Buprenorphine, Screen, Urine Negative Negative  -   Cocaine Screen, Urine Negative Negative  -   Fentanyl, Urine Negative Negative  -   Methadone Screen , Urine Negative Negative  -   Methamphetamine, Ur Negative Negative  -     Brief Urine Lab Results  (Last result in the past 365 days)        Color   Clarity   Blood   Leuk Est   Nitrite   Protein   CREAT   Urine HCG        01/09/24 1638 Dark Yellow   Clear   Negative   Negative   Negative   30 mg/dL (1+)                 No results found for: \"BLOODCX\"  No results found for: \"URINECX\"  No results found for: \"WOUNDCX\"  No results found for: \"STOOLCX\"  No results found for: \"RESPCX\"  No results found for: \"AFBCX\"        I have personally looked at the labs and they are summarized above.  ----------------------------------------------------------------------------------------------------------------------  Detailed radiology reports for the last 24 hours:    Imaging Results (Last 24 Hours)       ** No results found for the last 24 hours. **          Assessment & Plan    #Decompensated diastolic heart failure   #Hx of CAD  #HTN  #HLD  #Angina   - Managed by primary team and nephrology. 1 mg IV Bumex given on admission. Nephrology ordered albumin. Will monitor renal function and electrolytes.   - Volume status improved and Renal function appears to be back to baseline.      #Hypomagnesemia  - Replace per protocol      #CKD IIIb  - Creatinine around baseline at 1.85 on admission with slight bump following diuresis and back to baseline today.      #DM II  - Patient takes 36 units BID 70/30 at home.   - A1C 6.7%. SSI. Will titrate as needed. Will start low dose basal insulin, will start low given likely NPO status in near future for procedures.   - Reasonably controlled      #Morbid obesity   - Complicates all aspects of care      Thank you for the consult. Medicine will continue to follow. Please call with any questions.        VTE Prophylaxis:   Mechanical Order " History:       None          Pharmalogical Order History:        Ordered     Dose Route Frequency Stop    01/09/24 1445  heparin (porcine) 5000 UNIT/ML injection 5,000 Units         5,000 Units SC Every 12 Hours Scheduled --                    Ruben Harris MD  [facility] Emergency Roomist  01/12/24  16:24 EST

## 2024-01-12 NOTE — PROGRESS NOTES
LOS: 2 days     Name: Matthew Madrid  Age/Sex: 48 y.o. male  :  1975        PCP: Richie Ruelas PA-C    Principal Problem:    Chest pain in adult  Active Problems:    Acute on chronic heart failure with preserved ejection fraction (HFpEF)    CHF (congestive heart failure), NYHA class II, acute on chronic, combined      Admission Information: Matthew Madrid is a 48 y.o. male with coronary artery disease status post PCI of the distal RCA (2021), hypertension, hyperlipidemia, chronic kidney disease stage IIIb, type 2 diabetes-insulin-dependent, morbid obesity and former smoker.    Chief Complaint: Admitted for premedication for invasive coronary angiogram    Interval history: Creatinine improved to 1.8 today    Subjective   Patient is awake and sitting on the edge of the bed.  His wife is at bedside.  He denies current chest pain, palpitations, or shortness of air.    Vital Signs  Vital Signs (last 72 hrs)          0700   0659  0700   0659  0700  01/10 0659 01/10 0700  01/10 1427   Most Recent      Temp (°F)     97.6 -  98.6    98.1 -  98.2     98.2 (36.8) 01/10 1100    Heart Rate     69 -  80    74 -  84     75 01/10 1100    Resp     18 -  20    18 -  20     18 01/10 1100    BP     129/59 -  157/84    153/80 -  163/90     153/80 01/10 1100    SpO2 (%)     94 -  96    95 -  96     95 01/10 1100          Temp:  [97.9 °F (36.6 °C)-98.6 °F (37 °C)] 98.1 °F (36.7 °C)  Heart Rate:  [67-78] 67  Resp:  [16-20] 18  BP: (131-169)/(58-90) 157/80  Body mass index is 41.14 kg/m².      Intake/Output Summary (Last 24 hours) at 2024 1141  Last data filed at 2024 1053  Gross per 24 hour   Intake 560 ml   Output 4340 ml   Net -3780 ml       Vitals and nursing note reviewed.   Constitutional:       Appearance: Not in distress. Morbidly obese. Chronically ill-appearing.      Comments: RN at bedside   Eyes:      Conjunctiva/sclera: Conjunctivae normal.   Pulmonary:      Effort:  Pulmonary effort is normal.      Breath sounds: Normal breath sounds.   Cardiovascular:      Normal rate. Regular rhythm.      Murmurs: There is no murmur.   Edema:     Pretibial: bilateral 1+ edema of the pretibial area.     Ankle: bilateral 1+ edema of the ankle.     Feet: bilateral 1+ edema of the feet.  Skin:     General: Skin is warm and dry.   Neurological:      Mental Status: Alert.         Telemetry: Sinus 60s to 70s      Results Review:     Results from last 7 days   Lab Units 01/09/24  1458   WBC 10*3/mm3 5.27   HEMOGLOBIN g/dL 10.6*   PLATELETS 10*3/mm3 156     Results from last 7 days   Lab Units 01/12/24  0808 01/11/24  0055 01/10/24  0131 01/09/24  1458   SODIUM mmol/L 137 140 138 138   POTASSIUM mmol/L 4.9 4.3 4.1 4.6   CHLORIDE mmol/L 104 106 102 106   CO2 mmol/L 21.2* 23.8 24.2 22.8   BUN mg/dL 23* 24* 27* 27*   CREATININE mg/dL 1.82* 2.40* 2.04* 1.85*   CALCIUM mg/dL 9.3 8.7 8.9 8.8   GLUCOSE mg/dL 183* 140* 152* 245*                 I reviewed the patient's new clinical results.  I reviewed the patient's new imaging results and agree with the interpretation.  I personally viewed and interpreted the patient's EKG/Telemetry data      Medication Review:   aspirin, 81 mg, Oral, Daily  atorvastatin, 40 mg, Oral, Nightly  ferric gluconate, 250 mg, Intravenous, Once  FLUoxetine, 40 mg, Oral, Daily  heparin (porcine), 5,000 Units, Subcutaneous, Q12H  hydrALAZINE, 37.5 mg, Oral, Q8H  insulin glargine, 15 Units, Subcutaneous, Nightly  insulin lispro, 2-9 Units, Subcutaneous, 4x Daily AC & at Bedtime  magnesium oxide, 800 mg, Oral, Daily  pantoprazole, 40 mg, Oral, Daily  ranolazine, 1,000 mg, Oral, Q12H  senna-docusate sodium, 2 tablet, Oral, BID  sodium chloride, 10 mL, Intravenous, Q12H  ticagrelor, 90 mg, Oral, BID  vitamin B-12, 1,000 mcg, Oral, Daily           Assessment:  CAD s/p distal RCA PCI  with recurrent anginal symptoms on maximally tolerated antianginal medication   Acute on chronic diastolic  CHF NYHA 3-4.  ACC C  CKD 3B  Hypertension  Hyperlipidemia  IDDM      Recommendations:  Planning for invasive coronary angiogram once renal function has recovered.    Continue aspirin and Brilinta  Would benefit from diuresis-leaving that to nephrology.  Echo revealed normal EF but grade 2 diastolic dysfunction - will need GDMT however we will wait for his creatinine to stabilize  Being managed by nephrology.  Complicating all decision making.  Will increase dose of hydralazine again today  continue atorvastatin  Management per hospitalist-appreciate their recommendations      I have discussed my recommendations with the patient.    Case discussed with Dr. Grey and plan of care reflects his recommendations      Electronically signed by CARLOS Mills, 01/12/24, 11:48 AM EST.

## 2024-01-12 NOTE — PROGRESS NOTES
"Nephrology Progress Note      Subjective     No chest pain, shortness of breath.  Lying in bed comfortably    Objective       Vital signs :     Temp:  [97.9 °F (36.6 °C)-98.6 °F (37 °C)] 98.1 °F (36.7 °C)  Heart Rate:  [64-78] 64  Resp:  [16-20] 18  BP: (131-161)/(58-90) 161/80    Intake/Output                               01/10/24 0701 - 01/11/24 0700 01/11/24 0701 - 01/12/24 0700 01/12/24 0701 - 01/13/24 0700     1117-4390 6078-7344 Total 4852-7311 5494-6821 Total 3021-1863 8860-7014 Total                    Intake    P.O.  420  -- 420  580  -- 580  600  -- 600    Total Intake 420 -- 420 580 -- 580 600 -- 600       Output    Urine  --  -- --  1200  2150 3350  1590  -- 1590    Total Output -- -- -- 1200 2150 3350 1590 -- 1590             Physical Exam:    General Appearance : Not in acute distress   lungs : clear to auscultation, respirations regular, no crackles  Heart :  regular rhythm & normal rate, normal S1, S2 and no murmur, no rub  Abdomen : Soft, nondistended  Extremities : 1+ edema,   Neurologic :   orientated to person, place, time and situation, Grossly no focal deficits        Laboratory Data :     Albumin No results found for: \"ALBUMIN\"     Magnesium Magnesium   Date Value Ref Range Status   01/12/2024 1.8 1.6 - 2.6 mg/dL Final   01/11/2024 1.8 1.6 - 2.6 mg/dL Final   01/10/2024 1.3 (L) 1.6 - 2.6 mg/dL Final          PTH               No results found for: \"PTH\"    CBC and coagulation:  Results from last 7 days   Lab Units 01/09/24  1458   WBC 10*3/mm3 5.27   HEMOGLOBIN g/dL 10.6*   HEMATOCRIT % 31.4*   MCV fL 96.9   MCHC g/dL 33.8   PLATELETS 10*3/mm3 156     Acid/base balance:      Renal and electrolytes:    Results from last 7 days   Lab Units 01/12/24  0808 01/11/24  0055 01/10/24  0131 01/09/24  1458   SODIUM mmol/L 137 140 138 138   POTASSIUM mmol/L 4.9 4.3 4.1 4.6   MAGNESIUM mg/dL 1.8 1.8 1.3*  --    CHLORIDE mmol/L 104 106 102 106   CO2 mmol/L 21.2* 23.8 24.2 22.8   BUN mg/dL 23* 24* 27* 27* "   CREATININE mg/dL 1.82* 2.40* 2.04* 1.85*   CALCIUM mg/dL 9.3 8.7 8.9 8.8     Estimated Creatinine Clearance: 59.5 mL/min (A) (by C-G formula based on SCr of 1.82 mg/dL (H)).  @GFRCG:3@   Liver and pancreatic function:  Results from last 7 days   Lab Units 01/09/24  1458   ALBUMIN g/dL 3.5   BILIRUBIN mg/dL 0.5   ALK PHOS U/L 72   AST (SGOT) U/L 20   ALT (SGPT) U/L 19         Cardiac:  Results from last 7 days   Lab Units 01/10/24  0131   PROBNP pg/mL 250.1     Liver and pancreatic function:  Results from last 7 days   Lab Units 01/09/24  1458   ALBUMIN g/dL 3.5   BILIRUBIN mg/dL 0.5   ALK PHOS U/L 72   AST (SGOT) U/L 20   ALT (SGPT) U/L 19       Medications :     aspirin, 81 mg, Oral, Daily  atorvastatin, 40 mg, Oral, Nightly  FLUoxetine, 40 mg, Oral, Daily  heparin (porcine), 5,000 Units, Subcutaneous, Q12H  hydrALAZINE, 50 mg, Oral, Q8H  insulin glargine, 15 Units, Subcutaneous, Nightly  insulin lispro, 2-9 Units, Subcutaneous, 4x Daily AC & at Bedtime  magnesium oxide, 800 mg, Oral, Daily  pantoprazole, 40 mg, Oral, Daily  ranolazine, 1,000 mg, Oral, Q12H  senna-docusate sodium, 2 tablet, Oral, BID  sodium chloride, 10 mL, Intravenous, Q12H  ticagrelor, 90 mg, Oral, BID  vitamin B-12, 1,000 mcg, Oral, Daily           Assessment & Plan     -Chronic kidney disease G3 B A3 likely due to diabetic glomerulosclerosis and hypertensive ischemic sclerosis with baseline creatinine 1.7-1.9  - Coronary artery disease  - Anemia with absolute iron deficiency  - Essential hypertension  - Type 2 diabetes mellitus    Creatinine has improved, down to 1.8 that is baseline.  Discussed in detail with the patient about risks and benefits of contrast-induced nephropathy and risks of needing dialysis post PCI.  Patient would like to proceed to the procedure understanding the risks.  - Would recommend N-acetylcysteine 600 mg twice a day to be started 1 day before of planned cardiac catheterization.    -Also to start on normal saline 6  hours before and 6 hours after the contrast followed by Lasix IV 60 mg once  - Please avoid any nephrotoxic agents, hypotension and adjust medications according to estimated GFR    Discussed the case with cardiology patient is likely being planned for cardiac cath on Monday.  I have written N-acetylcysteine to be started on Sunday  I will sign off, please call if any questions.  Follow-up in clinic after discharge in 2 weeks    Shweta Naik MD  01/12/24  16:58 EST

## 2024-01-12 NOTE — CASE MANAGEMENT/SOCIAL WORK
Continued Stay Note   [place]     Patient Name: Matthew Madrid  MRN: [id number]  Today's Date: 1/12/2024    Admit Date: 1/9/2024    Plan: This CM met with pt at bedside; verified discharge plan remains unchanged, return home with Flora mendieta, will transport once medically stable; no d/c needs identified at this time.    Rosalia Bahena RN

## 2024-01-12 NOTE — PLAN OF CARE
Goal Outcome Evaluation:            No acute events overnight. Patient has been NPO since midnight in case kidney function has improved and heart cath can proceed. Will continue to follow plan of care

## 2024-01-13 LAB
ANION GAP SERPL CALCULATED.3IONS-SCNC: 10.3 MMOL/L (ref 5–15)
BUN SERPL-MCNC: 25 MG/DL (ref 6–20)
BUN/CREAT SERPL: 13.6 (ref 7–25)
CALCIUM SPEC-SCNC: 9.5 MG/DL (ref 8.6–10.5)
CHLORIDE SERPL-SCNC: 103 MMOL/L (ref 98–107)
CO2 SERPL-SCNC: 21.7 MMOL/L (ref 22–29)
CREAT SERPL-MCNC: 1.84 MG/DL (ref 0.76–1.27)
EGFRCR SERPLBLD CKD-EPI 2021: 44.7 ML/MIN/1.73
GLUCOSE BLDC GLUCOMTR-MCNC: 137 MG/DL (ref 70–130)
GLUCOSE BLDC GLUCOMTR-MCNC: 169 MG/DL (ref 70–130)
GLUCOSE BLDC GLUCOMTR-MCNC: 173 MG/DL (ref 70–130)
GLUCOSE BLDC GLUCOMTR-MCNC: 235 MG/DL (ref 70–130)
GLUCOSE SERPL-MCNC: 199 MG/DL (ref 65–99)
POTASSIUM SERPL-SCNC: 5.1 MMOL/L (ref 3.5–5.2)
SODIUM SERPL-SCNC: 135 MMOL/L (ref 136–145)

## 2024-01-13 PROCEDURE — 63710000001 INSULIN LISPRO (HUMAN) PER 5 UNITS: Performed by: INTERNAL MEDICINE

## 2024-01-13 PROCEDURE — 93010 ELECTROCARDIOGRAM REPORT: CPT | Performed by: SPECIALIST

## 2024-01-13 PROCEDURE — 63710000001 INSULIN GLARGINE PER 5 UNITS: Performed by: INTERNAL MEDICINE

## 2024-01-13 PROCEDURE — 99232 SBSQ HOSP IP/OBS MODERATE 35: CPT | Performed by: INTERNAL MEDICINE

## 2024-01-13 PROCEDURE — 93005 ELECTROCARDIOGRAM TRACING: CPT | Performed by: INTERNAL MEDICINE

## 2024-01-13 PROCEDURE — 94761 N-INVAS EAR/PLS OXIMETRY MLT: CPT

## 2024-01-13 PROCEDURE — 25010000002 HEPARIN (PORCINE) PER 1000 UNITS: Performed by: INTERNAL MEDICINE

## 2024-01-13 PROCEDURE — 94799 UNLISTED PULMONARY SVC/PX: CPT

## 2024-01-13 PROCEDURE — 82948 REAGENT STRIP/BLOOD GLUCOSE: CPT

## 2024-01-13 PROCEDURE — 99231 SBSQ HOSP IP/OBS SF/LOW 25: CPT | Performed by: INTERNAL MEDICINE

## 2024-01-13 PROCEDURE — 80048 BASIC METABOLIC PNL TOTAL CA: CPT | Performed by: INTERNAL MEDICINE

## 2024-01-13 RX ADMIN — FLUOXETINE HYDROCHLORIDE 40 MG: 20 CAPSULE ORAL at 08:11

## 2024-01-13 RX ADMIN — ASPIRIN 81 MG: 81 TABLET, COATED ORAL at 08:11

## 2024-01-13 RX ADMIN — INSULIN GLARGINE 15 UNITS: 100 INJECTION, SOLUTION SUBCUTANEOUS at 20:48

## 2024-01-13 RX ADMIN — TIZANIDINE 4 MG: 4 TABLET ORAL at 20:49

## 2024-01-13 RX ADMIN — INSULIN LISPRO 2 UNITS: 100 INJECTION, SOLUTION INTRAVENOUS; SUBCUTANEOUS at 08:19

## 2024-01-13 RX ADMIN — MAGNESIUM GLUCONATE 500 MG ORAL TABLET 800 MG: 500 TABLET ORAL at 08:19

## 2024-01-13 RX ADMIN — PANTOPRAZOLE SODIUM 40 MG: 40 TABLET, DELAYED RELEASE ORAL at 08:11

## 2024-01-13 RX ADMIN — HEPARIN SODIUM 5000 UNITS: 5000 INJECTION INTRAVENOUS; SUBCUTANEOUS at 20:48

## 2024-01-13 RX ADMIN — HEPARIN SODIUM 5000 UNITS: 5000 INJECTION INTRAVENOUS; SUBCUTANEOUS at 08:11

## 2024-01-13 RX ADMIN — TICAGRELOR 90 MG: 90 TABLET ORAL at 20:49

## 2024-01-13 RX ADMIN — Medication 10 ML: at 08:19

## 2024-01-13 RX ADMIN — INSULIN LISPRO 2 UNITS: 100 INJECTION, SOLUTION INTRAVENOUS; SUBCUTANEOUS at 12:42

## 2024-01-13 RX ADMIN — Medication 10 ML: at 20:49

## 2024-01-13 RX ADMIN — HYDRALAZINE HYDROCHLORIDE 50 MG: 25 TABLET ORAL at 20:48

## 2024-01-13 RX ADMIN — RANOLAZINE 1000 MG: 500 TABLET, FILM COATED, EXTENDED RELEASE ORAL at 20:49

## 2024-01-13 RX ADMIN — RANOLAZINE 1000 MG: 500 TABLET, FILM COATED, EXTENDED RELEASE ORAL at 08:11

## 2024-01-13 RX ADMIN — TICAGRELOR 90 MG: 90 TABLET ORAL at 08:11

## 2024-01-13 RX ADMIN — Medication 1000 MCG: at 08:11

## 2024-01-13 RX ADMIN — INSULIN LISPRO 4 UNITS: 100 INJECTION, SOLUTION INTRAVENOUS; SUBCUTANEOUS at 20:48

## 2024-01-13 RX ADMIN — HYDRALAZINE HYDROCHLORIDE 50 MG: 25 TABLET ORAL at 05:28

## 2024-01-13 RX ADMIN — HYDRALAZINE HYDROCHLORIDE 50 MG: 25 TABLET ORAL at 15:11

## 2024-01-13 RX ADMIN — ATORVASTATIN CALCIUM 40 MG: 40 TABLET, FILM COATED ORAL at 20:49

## 2024-01-13 NOTE — PLAN OF CARE
Goal Outcome Evaluation:   Patient had non eventful day. VSS at this time. He is resting in bed at this time. Plan of care ongoing.

## 2024-01-13 NOTE — PLAN OF CARE
Goal Outcome Evaluation:  Plan of Care Reviewed With: patient  Progress: no change     Patient resting in bed. No acute signs or symptoms of distress. No complaints at this time. Patient awaiting possible heart cath on Monday 1/17. No acute events overnight. Will continue to follow with plan of care.

## 2024-01-13 NOTE — PROGRESS NOTES
[facility] HOSPITALIST PROGRESS NOTE     Patient Identification:  Name:  Matthew Madrid  Age:  48 y.o.  Sex:  male  :  1975  MRN:  [id number]  Visit Number:  [id number]  ROOM: [address] [place] [address]     Primary Care Provider:  Richie Ruelas PA-C    Length of stay in inpatient status:  3    Subjective     Chief Compliant:  No chief complaint on file.      History of Presenting Illness:    Patient denies any new complaints. Reports he is still making good urine output. No family bedside.     ROS:  Otherwise 10 point ROS negative other than documented above in HPI.     Objective     Current Hospital Meds:[START ON 2024] acetylcysteine, 600 mg, Oral, Q12H  aspirin, 81 mg, Oral, Daily  atorvastatin, 40 mg, Oral, Nightly  FLUoxetine, 40 mg, Oral, Daily  heparin (porcine), 5,000 Units, Subcutaneous, Q12H  hydrALAZINE, 50 mg, Oral, Q8H  insulin glargine, 15 Units, Subcutaneous, Nightly  insulin lispro, 2-9 Units, Subcutaneous, 4x Daily AC & at Bedtime  magnesium oxide, 800 mg, Oral, Daily  pantoprazole, 40 mg, Oral, Daily  ranolazine, 1,000 mg, Oral, Q12H  senna-docusate sodium, 2 tablet, Oral, BID  sodium chloride, 10 mL, Intravenous, Q12H  ticagrelor, 90 mg, Oral, BID  vitamin B-12, 1,000 mcg, Oral, Daily         Current Antimicrobial Therapy:  Anti-Infectives (From admission, onward)      None          Current Diuretic Therapy:  Diuretics (From admission, onward)      Ordered     Dose/Rate Route Frequency Start Stop    24 1721  bumetanide (BUMEX) injection 1 mg        Ordering Provider: Jose Raul Mejia MD    1 mg Intravenous Once 24 1900 24 1822          ----------------------------------------------------------------------------------------------------------------------  Vital Signs:  Temp:  [97.8 °F (36.6 °C)-98.4 °F (36.9 °C)] 98.4 °F (36.9 °C)  Heart Rate:  [64-78] 67  Resp:  [18-20] 18  BP: (153-172)/(74-86) 162/84  SpO2:  [95 %-98 %] 98 %  on   ;   Device (Oxygen  Therapy): room air  Body mass index is 41.74 kg/m².    Wt Readings from Last 3 Encounters:   01/13/24 117 kg (258 lb 9.6 oz)   12/19/23 118 kg (261 lb)   12/05/23 118 kg (259 lb 12.8 oz)     Intake & Output (last 3 days)         01/10 0701  01/11 0700 01/11 0701  01/12 0700 01/12 0701 01/13 0700 01/13 0701 01/14 0700    P.O.  240    Total Intake(mL/kg) 420 (3.5) 580 (5) 1240 (10.6) 240 (2.1)    Urine (mL/kg/hr)  3350 (1.2) 3140 (1.1) 1000 (1.2)    Total Output  3350 3140 1000    Net +420 -2770 -1900 -760            Urine Unmeasured Occurrence 3 x             Diet: Cardiac Diets, Renal Diets, Diabetic Diets; Healthy Heart (2-3 Na+); Consistent Carbohydrate; Low Sodium (2-3g); Texture: Regular Texture (IDDSI 7); Fluid Consistency: Thin (IDDSI 0)  ----------------------------------------------------------------------------------------------------------------------  Physical exam:  Constitutional:  Well-developed and well-nourished.  No respiratory distress.      HENT:  Head:  Normocephalic and atraumatic.  Mouth:  Moist mucous membranes.    Eyes:  Conjunctivae and EOM are normal. No scleral icterus.    Neck:  Neck supple.  No JVD present.    Cardiovascular:  Normal rate, regular rhythm and normal heart sounds with no murmur.  Pulmonary/Chest:  No respiratory distress, no wheezes, no crackles, with normal breath sounds and good air movement.  Abdominal:  Soft.  Bowel sounds are normal.  No distension and no tenderness.   Musculoskeletal:  No edema, no tenderness, and no deformity.  No red or swollen joints anywhere.    Neurological:  Alert and oriented to person, place, and time.  No cranial nerve deficit.  No tongue deviation.  No facial droop.  No slurred speech.   Skin:  Skin is warm and dry. No rash noted. No pallor.   Peripheral vascular:  Pulses in all 4 extremities with no clubbing, no cyanosis, no  "edema.  ----------------------------------------------------------------------------------------------------------------------  Tele:    ----------------------------------------------------------------------------------------------------------------------  Results from last 7 days   Lab Units 01/09/24  1458   WBC 10*3/mm3 5.27   HEMOGLOBIN g/dL 10.6*   HEMATOCRIT % 31.4*   MCV fL 96.9   MCHC g/dL 33.8   PLATELETS 10*3/mm3 156         Results from last 7 days   Lab Units 01/13/24  1044 01/12/24  0808 01/11/24  0055 01/10/24  0131 01/09/24  1458   SODIUM mmol/L 135* 137 140 138 138   POTASSIUM mmol/L 5.1 4.9 4.3 4.1 4.6   MAGNESIUM mg/dL  --  1.8 1.8 1.3*  --    CHLORIDE mmol/L 103 104 106 102 106   CO2 mmol/L 21.7* 21.2* 23.8 24.2 22.8   BUN mg/dL 25* 23* 24* 27* 27*   CREATININE mg/dL 1.84* 1.82* 2.40* 2.04* 1.85*   CALCIUM mg/dL 9.5 9.3 8.7 8.9 8.8   GLUCOSE mg/dL 199* 183* 140* 152* 245*   ALBUMIN g/dL  --   --   --   --  3.5   BILIRUBIN mg/dL  --   --   --   --  0.5   ALK PHOS U/L  --   --   --   --  72   AST (SGOT) U/L  --   --   --   --  20   ALT (SGPT) U/L  --   --   --   --  19   Estimated Creatinine Clearance: 59.1 mL/min (A) (by C-G formula based on SCr of 1.84 mg/dL (H)).  No results found for: \"AMMONIA\"      Results from last 7 days   Lab Units 01/10/24  0131   PROBNP pg/mL 250.1         Glucose   Date/Time Value Ref Range Status   01/13/2024 1138 173 (H) 70 - 130 mg/dL Final   01/13/2024 0701 169 (H) 70 - 130 mg/dL Final   01/12/2024 1918 165 (H) 70 - 130 mg/dL Final   01/12/2024 1622 169 (H) 70 - 130 mg/dL Final   01/12/2024 1055 192 (H) 70 - 130 mg/dL Final   01/12/2024 0724 182 (H) 70 - 130 mg/dL Final   01/11/2024 2008 218 (H) 70 - 130 mg/dL Final   01/11/2024 1551 143 (H) 70 - 130 mg/dL Final     Lab Results   Component Value Date    TSH 1.440 01/09/2024     No results found for: \"PREGTESTUR\", \"PREGSERUM\", \"HCG\", \"HCGQUANT\"  Pain Management Panel  More data exists         Latest Ref Rng & Units " "1/9/2024 11/7/2023   Pain Management Panel   Creatinine, Urine mg/dL - 69.1    Amphetamine, Urine Qual Negative Negative  -   Barbiturates Screen, Urine Negative Negative  -   Benzodiazepine Screen, Urine Negative Positive  -   Buprenorphine, Screen, Urine Negative Negative  -   Cocaine Screen, Urine Negative Negative  -   Fentanyl, Urine Negative Negative  -   Methadone Screen , Urine Negative Negative  -   Methamphetamine, Ur Negative Negative  -     Brief Urine Lab Results  (Last result in the past 365 days)        Color   Clarity   Blood   Leuk Est   Nitrite   Protein   CREAT   Urine HCG        01/09/24 1638 Dark Yellow   Clear   Negative   Negative   Negative   30 mg/dL (1+)                 No results found for: \"BLOODCX\"  No results found for: \"URINECX\"  No results found for: \"WOUNDCX\"  No results found for: \"STOOLCX\"  No results found for: \"RESPCX\"  No results found for: \"AFBCX\"        I have personally looked at the labs and they are summarized above.  ----------------------------------------------------------------------------------------------------------------------  Detailed radiology reports for the last 24 hours:    Imaging Results (Last 24 Hours)       ** No results found for the last 24 hours. **          Assessment & Plan    #Decompensated diastolic heart failure   #Hx of CAD  #HTN  #HLD  #Angina   - Managed by primary team and nephrology. 1 mg IV Bumex given on admission. Nephrology ordered albumin after creatinine bumped. Will monitor renal function and electrolytes.   - Volume status improved and Renal function appears to be back to baseline.      #Hypomagnesemia  - Replace per protocol      #CKD IIIb  - Creatinine around baseline at 1.85 on admission with slight bump following diuresis and back to baseline today.      #DM II  - Patient takes 36 units BID 70/30 at home.   - A1C 6.7%. SSI. Will titrate as needed. Will start low dose basal insulin, will start low given likely NPO status in near " future for procedures.   - Reasonably controlled      #Morbid obesity   - Complicates all aspects of care      Thank you for the consult. Medicine will continue to follow. Please call with any questions.     VTE Prophylaxis:   Mechanical Order History:       None          Pharmalogical Order History:        Ordered     Dose Route Frequency Stop    01/09/24 1445  heparin (porcine) 5000 UNIT/ML injection 5,000 Units         5,000 Units SC Every 12 Hours Scheduled --                      Ruben Harris MD  [facility]  01/13/24  14:08 EST

## 2024-01-13 NOTE — PROGRESS NOTES
LOS: 3 days     Name: Matthew Madrid  Age/Sex: 48 y.o. male  :  1975        PCP: Richie Ruelas PA-C    Principal Problem:    Chest pain in adult  Active Problems:    Acute on chronic heart failure with preserved ejection fraction (HFpEF)    CHF (congestive heart failure), NYHA class II, acute on chronic, combined      Admission Information: Matthew Madrid is a 48 y.o. male with coronary artery disease status post PCI of the distal RCA (2021), hypertension, hyperlipidemia, chronic kidney disease stage IIIb, type 2 diabetes-insulin-dependent, morbid obesity and former smoker.    Chief Complaint: Admitted for premedication for invasive coronary angiogram    Interval history: Creatinine improved to 1.8 today    Subjective   Patient is awake and sitting on the edge of the bed.  His wife is at bedside.  He denies current chest pain, palpitations, or shortness of air.    Vital Signs  Vital Signs (last 72 hrs)          0700   0659  0700   0659  0700  01/10 0659 01/10 0700  01/10 1427   Most Recent      Temp (°F)     97.6 -  98.6    98.1 -  98.2     98.2 (36.8) 01/10 1100    Heart Rate     69 -  80    74 -  84     75 01/10 1100    Resp     18 -  20    18 -  20     18 01/10 1100    BP     129/59 -  157/84    153/80 -  163/90     153/80 01/10 1100    SpO2 (%)     94 -  96    95 -  96     95 01/10 1100          Temp:  [97.8 °F (36.6 °C)-98.4 °F (36.9 °C)] 98.4 °F (36.9 °C)  Heart Rate:  [64-78] 67  Resp:  [18-20] 18  BP: (153-172)/(74-86) 162/84  Body mass index is 41.74 kg/m².      Intake/Output Summary (Last 24 hours) at 2024 1231  Last data filed at 2024 1135  Gross per 24 hour   Intake 880 ml   Output 3150 ml   Net -2270 ml       Vitals and nursing note reviewed.   Constitutional:       Appearance: Not in distress. Morbidly obese. Chronically ill-appearing.      Comments: RN at bedside   Eyes:      Conjunctiva/sclera: Conjunctivae normal.   Pulmonary:      Effort:  Pulmonary effort is normal.      Breath sounds: Normal breath sounds.   Cardiovascular:      Normal rate. Regular rhythm.      Murmurs: There is no murmur.   Edema:     Pretibial: bilateral 1+ edema of the pretibial area.     Ankle: bilateral 1+ edema of the ankle.     Feet: bilateral 1+ edema of the feet.  Skin:     General: Skin is warm and dry.   Neurological:      Mental Status: Alert.         Telemetry: Sinus 60s to 70s      Results Review:     Results from last 7 days   Lab Units 01/09/24  1458   WBC 10*3/mm3 5.27   HEMOGLOBIN g/dL 10.6*   PLATELETS 10*3/mm3 156     Results from last 7 days   Lab Units 01/13/24  1044 01/12/24  0808 01/11/24  0055 01/10/24  0131 01/09/24  1458   SODIUM mmol/L 135* 137 140 138 138   POTASSIUM mmol/L 5.1 4.9 4.3 4.1 4.6   CHLORIDE mmol/L 103 104 106 102 106   CO2 mmol/L 21.7* 21.2* 23.8 24.2 22.8   BUN mg/dL 25* 23* 24* 27* 27*   CREATININE mg/dL 1.84* 1.82* 2.40* 2.04* 1.85*   CALCIUM mg/dL 9.5 9.3 8.7 8.9 8.8   GLUCOSE mg/dL 199* 183* 140* 152* 245*                 I reviewed the patient's new clinical results.  I reviewed the patient's new imaging results and agree with the interpretation.  I personally viewed and interpreted the patient's EKG/Telemetry data      Medication Review:   [START ON 1/14/2024] acetylcysteine, 600 mg, Oral, Q12H  aspirin, 81 mg, Oral, Daily  atorvastatin, 40 mg, Oral, Nightly  FLUoxetine, 40 mg, Oral, Daily  heparin (porcine), 5,000 Units, Subcutaneous, Q12H  hydrALAZINE, 50 mg, Oral, Q8H  insulin glargine, 15 Units, Subcutaneous, Nightly  insulin lispro, 2-9 Units, Subcutaneous, 4x Daily AC & at Bedtime  magnesium oxide, 800 mg, Oral, Daily  pantoprazole, 40 mg, Oral, Daily  ranolazine, 1,000 mg, Oral, Q12H  senna-docusate sodium, 2 tablet, Oral, BID  sodium chloride, 10 mL, Intravenous, Q12H  ticagrelor, 90 mg, Oral, BID  vitamin B-12, 1,000 mcg, Oral, Daily           Assessment:  CAD s/p distal RCA PCI  with recurrent anginal symptoms on maximally  tolerated antianginal medication   Acute on chronic diastolic CHF NYHA 3-4.  ACC C  CKD 3B  Hypertension  Hyperlipidemia  IDDM      Recommendations:  Plan for cath on Monday, per Nephrology pt to get Mucomyst and IVF tomorrow.    Continue aspirin and Brilinta  Would benefit from diuresis-leaving that to nephrology.  Echo revealed normal EF but grade 2 diastolic dysfunction - will need GDMT however we will wait for his creatinine to stabilize  Being managed by nephrology.  Complicating all decision making.  Will increase dose of hydralazine again today  continue atorvastatin  Management per hospitalist-appreciate their recommendations      I have discussed my recommendations with the patient.    Case discussed with Dr. Grey and plan of care reflects his recommendations      Electronically signed by CARLOS Mills, 01/12/24, 11:48 AM EST.

## 2024-01-14 LAB
ANION GAP SERPL CALCULATED.3IONS-SCNC: 11.2 MMOL/L (ref 5–15)
BUN SERPL-MCNC: 28 MG/DL (ref 6–20)
BUN/CREAT SERPL: 15.4 (ref 7–25)
CALCIUM SPEC-SCNC: 9.4 MG/DL (ref 8.6–10.5)
CHLORIDE SERPL-SCNC: 104 MMOL/L (ref 98–107)
CO2 SERPL-SCNC: 21.8 MMOL/L (ref 22–29)
CREAT SERPL-MCNC: 1.82 MG/DL (ref 0.76–1.27)
EGFRCR SERPLBLD CKD-EPI 2021: 45.3 ML/MIN/1.73
GLUCOSE BLDC GLUCOMTR-MCNC: 149 MG/DL (ref 70–130)
GLUCOSE BLDC GLUCOMTR-MCNC: 154 MG/DL (ref 70–130)
GLUCOSE BLDC GLUCOMTR-MCNC: 167 MG/DL (ref 70–130)
GLUCOSE BLDC GLUCOMTR-MCNC: 242 MG/DL (ref 70–130)
GLUCOSE SERPL-MCNC: 164 MG/DL (ref 65–99)
POTASSIUM SERPL-SCNC: 5 MMOL/L (ref 3.5–5.2)
QT INTERVAL: 428 MS
QTC INTERVAL: 445 MS
SODIUM SERPL-SCNC: 137 MMOL/L (ref 136–145)

## 2024-01-14 PROCEDURE — 63710000001 INSULIN LISPRO (HUMAN) PER 5 UNITS: Performed by: INTERNAL MEDICINE

## 2024-01-14 PROCEDURE — 99232 SBSQ HOSP IP/OBS MODERATE 35: CPT | Performed by: INTERNAL MEDICINE

## 2024-01-14 PROCEDURE — 63710000001 INSULIN GLARGINE PER 5 UNITS: Performed by: INTERNAL MEDICINE

## 2024-01-14 PROCEDURE — 94799 UNLISTED PULMONARY SVC/PX: CPT

## 2024-01-14 PROCEDURE — 99231 SBSQ HOSP IP/OBS SF/LOW 25: CPT | Performed by: INTERNAL MEDICINE

## 2024-01-14 PROCEDURE — 82948 REAGENT STRIP/BLOOD GLUCOSE: CPT

## 2024-01-14 PROCEDURE — 25010000002 HEPARIN (PORCINE) PER 1000 UNITS: Performed by: INTERNAL MEDICINE

## 2024-01-14 PROCEDURE — 80048 BASIC METABOLIC PNL TOTAL CA: CPT | Performed by: INTERNAL MEDICINE

## 2024-01-14 RX ORDER — INSULIN LISPRO 100 [IU]/ML
3-14 INJECTION, SOLUTION INTRAVENOUS; SUBCUTANEOUS
Status: DISCONTINUED | OUTPATIENT
Start: 2024-01-14 | End: 2024-01-16 | Stop reason: HOSPADM

## 2024-01-14 RX ORDER — DEXTROSE MONOHYDRATE 25 G/50ML
25 INJECTION, SOLUTION INTRAVENOUS
Status: DISCONTINUED | OUTPATIENT
Start: 2024-01-14 | End: 2024-01-16 | Stop reason: HOSPADM

## 2024-01-14 RX ORDER — SODIUM CHLORIDE 9 MG/ML
75 INJECTION, SOLUTION INTRAVENOUS CONTINUOUS
Status: DISCONTINUED | OUTPATIENT
Start: 2024-01-15 | End: 2024-01-15

## 2024-01-14 RX ORDER — GLUCAGON 1 MG/ML
1 KIT INJECTION
Status: DISCONTINUED | OUTPATIENT
Start: 2024-01-14 | End: 2024-01-16 | Stop reason: HOSPADM

## 2024-01-14 RX ORDER — NICOTINE POLACRILEX 4 MG
15 LOZENGE BUCCAL
Status: DISCONTINUED | OUTPATIENT
Start: 2024-01-14 | End: 2024-01-16 | Stop reason: HOSPADM

## 2024-01-14 RX ADMIN — TIZANIDINE 4 MG: 4 TABLET ORAL at 20:25

## 2024-01-14 RX ADMIN — ASPIRIN 81 MG: 81 TABLET, COATED ORAL at 08:19

## 2024-01-14 RX ADMIN — INSULIN LISPRO 3 UNITS: 100 INJECTION, SOLUTION INTRAVENOUS; SUBCUTANEOUS at 08:18

## 2024-01-14 RX ADMIN — TICAGRELOR 90 MG: 90 TABLET ORAL at 08:18

## 2024-01-14 RX ADMIN — TICAGRELOR 90 MG: 90 TABLET ORAL at 20:25

## 2024-01-14 RX ADMIN — HYDRALAZINE HYDROCHLORIDE 50 MG: 25 TABLET ORAL at 05:09

## 2024-01-14 RX ADMIN — ACETYLCYSTEINE 600 MG: 200 SOLUTION ORAL; RESPIRATORY (INHALATION) at 12:44

## 2024-01-14 RX ADMIN — INSULIN GLARGINE 15 UNITS: 100 INJECTION, SOLUTION SUBCUTANEOUS at 20:26

## 2024-01-14 RX ADMIN — FLUOXETINE HYDROCHLORIDE 40 MG: 20 CAPSULE ORAL at 08:18

## 2024-01-14 RX ADMIN — INSULIN LISPRO 3 UNITS: 100 INJECTION, SOLUTION INTRAVENOUS; SUBCUTANEOUS at 13:06

## 2024-01-14 RX ADMIN — MAGNESIUM GLUCONATE 500 MG ORAL TABLET 800 MG: 500 TABLET ORAL at 08:24

## 2024-01-14 RX ADMIN — INSULIN LISPRO 5 UNITS: 100 INJECTION, SOLUTION INTRAVENOUS; SUBCUTANEOUS at 17:42

## 2024-01-14 RX ADMIN — HEPARIN SODIUM 5000 UNITS: 5000 INJECTION INTRAVENOUS; SUBCUTANEOUS at 20:27

## 2024-01-14 RX ADMIN — Medication 10 ML: at 08:19

## 2024-01-14 RX ADMIN — ATORVASTATIN CALCIUM 40 MG: 40 TABLET, FILM COATED ORAL at 20:26

## 2024-01-14 RX ADMIN — DOCUSATE SODIUM 50 MG AND SENNOSIDES 8.6 MG 2 TABLET: 8.6; 5 TABLET, FILM COATED ORAL at 20:26

## 2024-01-14 RX ADMIN — RANOLAZINE 1000 MG: 500 TABLET, FILM COATED, EXTENDED RELEASE ORAL at 20:26

## 2024-01-14 RX ADMIN — Medication 1000 MCG: at 08:18

## 2024-01-14 RX ADMIN — HYDRALAZINE HYDROCHLORIDE 50 MG: 25 TABLET ORAL at 20:25

## 2024-01-14 RX ADMIN — ACETYLCYSTEINE 600 MG: 200 SOLUTION ORAL; RESPIRATORY (INHALATION) at 20:26

## 2024-01-14 RX ADMIN — HEPARIN SODIUM 5000 UNITS: 5000 INJECTION INTRAVENOUS; SUBCUTANEOUS at 08:18

## 2024-01-14 RX ADMIN — RANOLAZINE 1000 MG: 500 TABLET, FILM COATED, EXTENDED RELEASE ORAL at 08:18

## 2024-01-14 RX ADMIN — HYDRALAZINE HYDROCHLORIDE 50 MG: 25 TABLET ORAL at 15:56

## 2024-01-14 RX ADMIN — PANTOPRAZOLE SODIUM 40 MG: 40 TABLET, DELAYED RELEASE ORAL at 08:18

## 2024-01-14 NOTE — PROGRESS NOTES
LOS: 4 days     Name: Matthew Madrid  Age/Sex: 48 y.o. male  :  1975        PCP: Richie Ruelas PA-C    Principal Problem:    Chest pain in adult  Active Problems:    Acute on chronic heart failure with preserved ejection fraction (HFpEF)    CHF (congestive heart failure), NYHA class II, acute on chronic, combined      Admission Information: Matthew Madrid is a 48 y.o. male with coronary artery disease status post PCI of the distal RCA (2021), hypertension, hyperlipidemia, chronic kidney disease stage IIIb, type 2 diabetes-insulin-dependent, morbid obesity and former smoker.    Chief Complaint: Admitted for premedication for invasive coronary angiogram    Interval history: Creatinine improved to 1.8 today    Subjective   Patient is awake and sitting on the edge of the bed.  His wife is at bedside.  He denies current chest pain, palpitations, or shortness of air.    Vital Signs  Vital Signs (last 72 hrs)          0700   0659  0700   0659  0700  01/10 0659 01/10 0700  01/10 1427   Most Recent      Temp (°F)     97.6 -  98.6    98.1 -  98.2     98.2 (36.8) 01/10 1100    Heart Rate     69 -  80    74 -  84     75 01/10 1100    Resp     18 -  20    18 -  20     18 01/10 1100    BP     129/59 -  157/84    153/80 -  163/90     153/80 01/10 1100    SpO2 (%)     94 -  96    95 -  96     95 01/10 1100          Temp:  [98.1 °F (36.7 °C)-98.5 °F (36.9 °C)] 98.3 °F (36.8 °C)  Heart Rate:  [65-80] 67  Resp:  [18] 18  BP: (135-172)/(69-88) 168/69  Body mass index is 41.01 kg/m².      Intake/Output Summary (Last 24 hours) at 2024 1650  Last data filed at 2024 1135  Gross per 24 hour   Intake 360 ml   Output 1500 ml   Net -1140 ml       Vitals and nursing note reviewed.   Constitutional:       Appearance: Not in distress. Morbidly obese. Chronically ill-appearing.      Comments: RN at bedside   Eyes:      Conjunctiva/sclera: Conjunctivae normal.   Pulmonary:      Effort: Pulmonary  effort is normal.      Breath sounds: Normal breath sounds.   Cardiovascular:      Normal rate. Regular rhythm.      Murmurs: There is no murmur.   Edema:     Pretibial: bilateral 1+ edema of the pretibial area.     Ankle: bilateral 1+ edema of the ankle.     Feet: bilateral 1+ edema of the feet.  Skin:     General: Skin is warm and dry.   Neurological:      Mental Status: Alert.         Telemetry: Sinus 60s to 70s      Results Review:     Results from last 7 days   Lab Units 01/09/24  1458   WBC 10*3/mm3 5.27   HEMOGLOBIN g/dL 10.6*   PLATELETS 10*3/mm3 156     Results from last 7 days   Lab Units 01/14/24  0703 01/13/24  1044 01/12/24  0808 01/11/24  0055 01/10/24  0131 01/09/24  1458   SODIUM mmol/L 137 135* 137 140 138 138   POTASSIUM mmol/L 5.0 5.1 4.9 4.3 4.1 4.6   CHLORIDE mmol/L 104 103 104 106 102 106   CO2 mmol/L 21.8* 21.7* 21.2* 23.8 24.2 22.8   BUN mg/dL 28* 25* 23* 24* 27* 27*   CREATININE mg/dL 1.82* 1.84* 1.82* 2.40* 2.04* 1.85*   CALCIUM mg/dL 9.4 9.5 9.3 8.7 8.9 8.8   GLUCOSE mg/dL 164* 199* 183* 140* 152* 245*                 I reviewed the patient's new clinical results.  I reviewed the patient's new imaging results and agree with the interpretation.  I personally viewed and interpreted the patient's EKG/Telemetry data      Medication Review:   acetylcysteine, 600 mg, Oral, Q12H  aspirin, 81 mg, Oral, Daily  atorvastatin, 40 mg, Oral, Nightly  FLUoxetine, 40 mg, Oral, Daily  heparin (porcine), 5,000 Units, Subcutaneous, Q12H  hydrALAZINE, 50 mg, Oral, Q8H  insulin glargine, 15 Units, Subcutaneous, Nightly  insulin lispro, 3-14 Units, Subcutaneous, 4x Daily AC & at Bedtime  magnesium oxide, 800 mg, Oral, Daily  pantoprazole, 40 mg, Oral, Daily  ranolazine, 1,000 mg, Oral, Q12H  senna-docusate sodium, 2 tablet, Oral, BID  sodium chloride, 10 mL, Intravenous, Q12H  ticagrelor, 90 mg, Oral, BID  vitamin B-12, 1,000 mcg, Oral, Daily           Assessment:  CAD s/p distal RCA PCI  with recurrent anginal  symptoms on maximally tolerated antianginal medication   Acute on chronic diastolic CHF NYHA 3-4.  ACC C  CKD 3B  Hypertension  Hyperlipidemia  IDDM      Recommendations:  Plan for cath tomorrow, patient already received Mucomyst,, will start IV fluids at tonight, patient did receive IV fluids excess postprocedure and also Lasix 60 IV per nephro recommendation,    continue aspirin and Brilinta  Would benefit from diuresis-leaving that to nephrology.  Echo revealed normal EF but grade 2 diastolic dysfunction - will need GDMT however we will wait for his creatinine to stabilize  Being managed by nephrology.  Complicating all decision making.  Will increase dose of hydralazine again today  continue atorvastatin  Management per hospitalist-appreciate their recommendations      I have discussed my recommendations with the patient.    Case discussed with Dr. Grey and plan of care reflects his recommendations      Electronically signed by CARLOS Mills, 01/12/24, 11:48 AM EST.

## 2024-01-14 NOTE — PLAN OF CARE
Goal Outcome Evaluation:      Patient denies chest pain and SOA. Will be NPO at midnight for cath in AM. Will continue to follow plan of care.

## 2024-01-14 NOTE — PLAN OF CARE
Goal Outcome Evaluation:  Plan of Care Reviewed With: patient  Progress: no change     Patient resting in bed. No acute signs or symptoms of distress. No complaints at this time. No acute events this shift. Plan for heart cath on Monday 1/15 and home on discharge. Will continue to follow with plan of care.

## 2024-01-14 NOTE — PROGRESS NOTES
[facility] HOSPITALIST PROGRESS NOTE     Patient Identification:  Name:  Matthew Madrid  Age:  48 y.o.  Sex:  male  :  1975  MRN:  [id number]  Visit Number:  [id number]  ROOM: [address] [place] [address]     Primary Care Provider:  Richie Ruelas PA-C    Length of stay in inpatient status:  4    Subjective     Chief Compliant:  No chief complaint on file.      History of Presenting Illness:    Patient denies any new complaints. Continues to report good urination. Father supportive bedside.     ROS:  Otherwise 10 point ROS negative other than documented above in HPI.     Objective     Current Hospital Meds:acetylcysteine, 600 mg, Oral, Q12H  aspirin, 81 mg, Oral, Daily  atorvastatin, 40 mg, Oral, Nightly  FLUoxetine, 40 mg, Oral, Daily  heparin (porcine), 5,000 Units, Subcutaneous, Q12H  hydrALAZINE, 50 mg, Oral, Q8H  insulin glargine, 15 Units, Subcutaneous, Nightly  insulin lispro, 3-14 Units, Subcutaneous, 4x Daily AC & at Bedtime  magnesium oxide, 800 mg, Oral, Daily  pantoprazole, 40 mg, Oral, Daily  ranolazine, 1,000 mg, Oral, Q12H  senna-docusate sodium, 2 tablet, Oral, BID  sodium chloride, 10 mL, Intravenous, Q12H  ticagrelor, 90 mg, Oral, BID  vitamin B-12, 1,000 mcg, Oral, Daily    [START ON 1/15/2024] sodium chloride, 75 mL/hr        Current Antimicrobial Therapy:  Anti-Infectives (From admission, onward)      None          Current Diuretic Therapy:  Diuretics (From admission, onward)      Ordered     Dose/Rate Route Frequency Start Stop    24 1721  bumetanide (BUMEX) injection 1 mg        Ordering Provider: Jose Raul Mejia MD    1 mg Intravenous Once 24 1900 24 1822          ----------------------------------------------------------------------------------------------------------------------  Vital Signs:  Temp:  [98.1 °F (36.7 °C)-98.5 °F (36.9 °C)] 98.3 °F (36.8 °C)  Heart Rate:  [65-80] 67  Resp:  [18] 18  BP: (135-172)/(69-88) 168/69  SpO2:  [95 %-98 %] 97 %  on    ;   Device (Oxygen Therapy): room air  Body mass index is 41.01 kg/m².    Wt Readings from Last 3 Encounters:   01/14/24 115 kg (254 lb 1.6 oz)   12/19/23 118 kg (261 lb)   12/05/23 118 kg (259 lb 12.8 oz)     Intake & Output (last 3 days)         01/11 0701  01/12 0700 01/12 0701 01/13 0700 01/13 0701 01/14 0700 01/14 0701  01/15 0700    P.O. 580 1240 960     Total Intake(mL/kg) 580 (5) 1240 (10.6) 960 (8.3)     Urine (mL/kg/hr) 3350 (1.2) 3140 (1.1) 2500 (0.9) 900 (0.8)    Stool   0     Total Output 3350 3140 2500 900    Net -4772 -0100 -4120 -900            Urine Unmeasured Occurrence   750 x     Stool Unmeasured Occurrence   1 x           Diet: Cardiac Diets, Renal Diets, Diabetic Diets; Healthy Heart (2-3 Na+); Consistent Carbohydrate; Low Sodium (2-3g); Texture: Regular Texture (IDDSI 7); Fluid Consistency: Thin (IDDSI 0)  ----------------------------------------------------------------------------------------------------------------------  Physical exam:  Constitutional:  Well-developed and well-nourished.  No respiratory distress.      HENT:  Head:  Normocephalic and atraumatic.  Mouth:  Moist mucous membranes.    Eyes:  Conjunctivae and EOM are normal. No scleral icterus.    Neck:  Neck supple.  No JVD present.    Cardiovascular:  Normal rate, regular rhythm and normal heart sounds with no murmur.  Pulmonary/Chest:  No respiratory distress, no wheezes, no crackles, with normal breath sounds and good air movement.  Abdominal:  Soft.  Bowel sounds are normal.  No distension and no tenderness.   Musculoskeletal:  No edema, no tenderness, and no deformity.  No red or swollen joints anywhere.    Neurological:  Alert and oriented to person, place, and time.  No cranial nerve deficit.  No tongue deviation.  No facial droop.  No slurred speech.   Skin:  Skin is warm and dry. No rash noted. No pallor.   Peripheral vascular:  Pulses in all 4 extremities with no clubbing, no cyanosis, no  "edema.  ----------------------------------------------------------------------------------------------------------------------  Tele:    ----------------------------------------------------------------------------------------------------------------------  Results from last 7 days   Lab Units 01/09/24  1458   WBC 10*3/mm3 5.27   HEMOGLOBIN g/dL 10.6*   HEMATOCRIT % 31.4*   MCV fL 96.9   MCHC g/dL 33.8   PLATELETS 10*3/mm3 156         Results from last 7 days   Lab Units 01/14/24  0703 01/13/24  1044 01/12/24  0808 01/11/24  0055 01/10/24  0131 01/09/24  1458   SODIUM mmol/L 137 135* 137 140 138 138   POTASSIUM mmol/L 5.0 5.1 4.9 4.3 4.1 4.6   MAGNESIUM mg/dL  --   --  1.8 1.8 1.3*  --    CHLORIDE mmol/L 104 103 104 106 102 106   CO2 mmol/L 21.8* 21.7* 21.2* 23.8 24.2 22.8   BUN mg/dL 28* 25* 23* 24* 27* 27*   CREATININE mg/dL 1.82* 1.84* 1.82* 2.40* 2.04* 1.85*   CALCIUM mg/dL 9.4 9.5 9.3 8.7 8.9 8.8   GLUCOSE mg/dL 164* 199* 183* 140* 152* 245*   ALBUMIN g/dL  --   --   --   --   --  3.5   BILIRUBIN mg/dL  --   --   --   --   --  0.5   ALK PHOS U/L  --   --   --   --   --  72   AST (SGOT) U/L  --   --   --   --   --  20   ALT (SGPT) U/L  --   --   --   --   --  19   Estimated Creatinine Clearance: 59.2 mL/min (A) (by C-G formula based on SCr of 1.82 mg/dL (H)).  No results found for: \"AMMONIA\"      Results from last 7 days   Lab Units 01/10/24  0131   PROBNP pg/mL 250.1         Glucose   Date/Time Value Ref Range Status   01/14/2024 1134 167 (H) 70 - 130 mg/dL Final   01/14/2024 0720 154 (H) 70 - 130 mg/dL Final   01/13/2024 1952 235 (H) 70 - 130 mg/dL Final   01/13/2024 1642 137 (H) 70 - 130 mg/dL Final   01/13/2024 1138 173 (H) 70 - 130 mg/dL Final   01/13/2024 0701 169 (H) 70 - 130 mg/dL Final   01/12/2024 1918 165 (H) 70 - 130 mg/dL Final   01/12/2024 1622 169 (H) 70 - 130 mg/dL Final     Lab Results   Component Value Date    TSH 1.440 01/09/2024     No results found for: \"PREGTESTUR\", \"PREGSERUM\", \"HCG\", " "\"HCGQUANT\"  Pain Management Panel  More data exists         Latest Ref Rng & Units 1/9/2024 11/7/2023   Pain Management Panel   Creatinine, Urine mg/dL - 69.1    Amphetamine, Urine Qual Negative Negative  -   Barbiturates Screen, Urine Negative Negative  -   Benzodiazepine Screen, Urine Negative Positive  -   Buprenorphine, Screen, Urine Negative Negative  -   Cocaine Screen, Urine Negative Negative  -   Fentanyl, Urine Negative Negative  -   Methadone Screen , Urine Negative Negative  -   Methamphetamine, Ur Negative Negative  -     Brief Urine Lab Results  (Last result in the past 365 days)        Color   Clarity   Blood   Leuk Est   Nitrite   Protein   CREAT   Urine HCG        01/09/24 1638 Dark Yellow   Clear   Negative   Negative   Negative   30 mg/dL (1+)                 No results found for: \"BLOODCX\"  No results found for: \"URINECX\"  No results found for: \"WOUNDCX\"  No results found for: \"STOOLCX\"  No results found for: \"RESPCX\"  No results found for: \"AFBCX\"        I have personally looked at the labs and they are summarized above.  ----------------------------------------------------------------------------------------------------------------------  Detailed radiology reports for the last 24 hours:    Imaging Results (Last 24 Hours)       ** No results found for the last 24 hours. **          Assessment & Plan    #Decompensated diastolic heart failure   #Hx of CAD  #HTN  #HLD  #Angina   - Managed by primary team and nephrology. 1 mg IV Bumex given on admission. Nephrology ordered albumin after creatinine bumped. Will monitor renal function and electrolytes.   - Volume status improved and Renal function appears to be back to baseline.      #Hypomagnesemia  - Replace per protocol      #CKD IIIb  - Creatinine around baseline at 1.85 on admission with slight bump following diuresis and back to baseline now.       #DM II  - Patient takes 36 units BID 70/30 at home.   - A1C 6.7%. SSI. Will titrate as needed. " Will start low dose basal insulin, will start low and titrate up given likely NPO status in near future for procedures.   - Reasonably controlled today.      #Morbid obesity   - Complicates all aspects of care      Thank you for the consult. Medicine will continue to follow. Please call with any questions    VTE Prophylaxis:   Mechanical Order History:       None          Pharmalogical Order History:        Ordered     Dose Route Frequency Stop    01/09/24 1445  heparin (porcine) 5000 UNIT/ML injection 5,000 Units         5,000 Units SC Every 12 Hours Scheduled --                    Ruben Harris MD  [facility] Emergency Roomist  01/14/24  17:04 EST

## 2024-01-15 LAB
GLUCOSE BLDC GLUCOMTR-MCNC: 135 MG/DL (ref 70–130)
GLUCOSE BLDC GLUCOMTR-MCNC: 150 MG/DL (ref 70–130)
GLUCOSE BLDC GLUCOMTR-MCNC: 155 MG/DL (ref 70–130)
GLUCOSE BLDC GLUCOMTR-MCNC: 167 MG/DL (ref 70–130)
QT INTERVAL: 434 MS
QTC INTERVAL: 426 MS

## 2024-01-15 PROCEDURE — 4A023N7 MEASUREMENT OF CARDIAC SAMPLING AND PRESSURE, LEFT HEART, PERCUTANEOUS APPROACH: ICD-10-PCS | Performed by: INTERNAL MEDICINE

## 2024-01-15 PROCEDURE — 25010000002 HEPARIN (PORCINE) PER 1000 UNITS: Performed by: INTERNAL MEDICINE

## 2024-01-15 PROCEDURE — 63710000001 INSULIN LISPRO (HUMAN) PER 5 UNITS: Performed by: INTERNAL MEDICINE

## 2024-01-15 PROCEDURE — B2111ZZ FLUOROSCOPY OF MULTIPLE CORONARY ARTERIES USING LOW OSMOLAR CONTRAST: ICD-10-PCS | Performed by: INTERNAL MEDICINE

## 2024-01-15 PROCEDURE — B2151ZZ FLUOROSCOPY OF LEFT HEART USING LOW OSMOLAR CONTRAST: ICD-10-PCS | Performed by: INTERNAL MEDICINE

## 2024-01-15 PROCEDURE — C1874 STENT, COATED/COV W/DEL SYS: HCPCS | Performed by: INTERNAL MEDICINE

## 2024-01-15 PROCEDURE — 93799 UNLISTED CV SVC/PROCEDURE: CPT | Performed by: INTERNAL MEDICINE

## 2024-01-15 PROCEDURE — 25010000002 FENTANYL CITRATE (PF) 50 MCG/ML SOLUTION: Performed by: INTERNAL MEDICINE

## 2024-01-15 PROCEDURE — 63710000001 INSULIN GLARGINE PER 5 UNITS: Performed by: INTERNAL MEDICINE

## 2024-01-15 PROCEDURE — 25010000002 MIDAZOLAM PER 1 MG: Performed by: INTERNAL MEDICINE

## 2024-01-15 PROCEDURE — C1769 GUIDE WIRE: HCPCS | Performed by: INTERNAL MEDICINE

## 2024-01-15 PROCEDURE — 25510000001 IOPAMIDOL PER 1 ML: Performed by: INTERNAL MEDICINE

## 2024-01-15 PROCEDURE — C1760 CLOSURE DEV, VASC: HCPCS | Performed by: INTERNAL MEDICINE

## 2024-01-15 PROCEDURE — C1894 INTRO/SHEATH, NON-LASER: HCPCS

## 2024-01-15 PROCEDURE — 99232 SBSQ HOSP IP/OBS MODERATE 35: CPT | Performed by: INTERNAL MEDICINE

## 2024-01-15 PROCEDURE — 82948 REAGENT STRIP/BLOOD GLUCOSE: CPT

## 2024-01-15 PROCEDURE — 93010 ELECTROCARDIOGRAM REPORT: CPT | Performed by: SPECIALIST

## 2024-01-15 PROCEDURE — 93454 CORONARY ARTERY ANGIO S&I: CPT | Performed by: INTERNAL MEDICINE

## 2024-01-15 PROCEDURE — 92928 PRQ TCAT PLMT NTRAC ST 1 LES: CPT | Performed by: INTERNAL MEDICINE

## 2024-01-15 PROCEDURE — 93571 IV DOP VEL&/PRESS C FLO 1ST: CPT | Performed by: INTERNAL MEDICINE

## 2024-01-15 PROCEDURE — 93005 ELECTROCARDIOGRAM TRACING: CPT | Performed by: INTERNAL MEDICINE

## 2024-01-15 PROCEDURE — 25810000003 SODIUM CHLORIDE 0.9 % SOLUTION: Performed by: INTERNAL MEDICINE

## 2024-01-15 PROCEDURE — C9600 PERC DRUG-EL COR STENT SING: HCPCS | Performed by: INTERNAL MEDICINE

## 2024-01-15 PROCEDURE — C1887 CATHETER, GUIDING: HCPCS | Performed by: INTERNAL MEDICINE

## 2024-01-15 PROCEDURE — 027034Z DILATION OF CORONARY ARTERY, ONE ARTERY WITH DRUG-ELUTING INTRALUMINAL DEVICE, PERCUTANEOUS APPROACH: ICD-10-PCS | Performed by: INTERNAL MEDICINE

## 2024-01-15 PROCEDURE — C1894 INTRO/SHEATH, NON-LASER: HCPCS | Performed by: INTERNAL MEDICINE

## 2024-01-15 DEVICE — XIENCE SKYPOINT™ EVEROLIMUS ELUTING CORONARY STENT SYSTEM 2.50 MM X 15 MM / RAPID-EXCHANGE
Type: IMPLANTABLE DEVICE | Status: FUNCTIONAL
Brand: XIENCE SKYPOINT™

## 2024-01-15 DEVICE — XIENCE SKYPOINT™ EVEROLIMUS ELUTING CORONARY STENT SYSTEM 2.50 MM X 12 MM / RAPID-EXCHANGE
Type: IMPLANTABLE DEVICE | Status: FUNCTIONAL
Brand: XIENCE SKYPOINT™

## 2024-01-15 RX ORDER — LIDOCAINE HYDROCHLORIDE 20 MG/ML
INJECTION, SOLUTION INFILTRATION; PERINEURAL
Status: DISCONTINUED | OUTPATIENT
Start: 2024-01-15 | End: 2024-01-15 | Stop reason: HOSPADM

## 2024-01-15 RX ORDER — HEPARIN SODIUM 1000 [USP'U]/ML
INJECTION, SOLUTION INTRAVENOUS; SUBCUTANEOUS
Status: DISCONTINUED | OUTPATIENT
Start: 2024-01-15 | End: 2024-01-15 | Stop reason: HOSPADM

## 2024-01-15 RX ORDER — HEPARIN SODIUM 5000 [USP'U]/ML
5000 INJECTION, SOLUTION INTRAVENOUS; SUBCUTANEOUS EVERY 8 HOURS SCHEDULED
Status: DISCONTINUED | OUTPATIENT
Start: 2024-01-15 | End: 2024-01-16 | Stop reason: HOSPADM

## 2024-01-15 RX ORDER — FENTANYL CITRATE 50 UG/ML
INJECTION, SOLUTION INTRAMUSCULAR; INTRAVENOUS
Status: DISCONTINUED | OUTPATIENT
Start: 2024-01-15 | End: 2024-01-15 | Stop reason: HOSPADM

## 2024-01-15 RX ORDER — MIDAZOLAM HYDROCHLORIDE 1 MG/ML
INJECTION INTRAMUSCULAR; INTRAVENOUS
Status: DISCONTINUED | OUTPATIENT
Start: 2024-01-15 | End: 2024-01-15 | Stop reason: HOSPADM

## 2024-01-15 RX ORDER — SODIUM CHLORIDE 9 MG/ML
1 INJECTION, SOLUTION INTRAVENOUS CONTINUOUS
Status: ACTIVE | OUTPATIENT
Start: 2024-01-15 | End: 2024-01-15

## 2024-01-15 RX ORDER — ACETAMINOPHEN 325 MG/1
650 TABLET ORAL EVERY 4 HOURS PRN
Status: DISCONTINUED | OUTPATIENT
Start: 2024-01-15 | End: 2024-01-16 | Stop reason: HOSPADM

## 2024-01-15 RX ORDER — SODIUM CHLORIDE 9 MG/ML
INJECTION, SOLUTION INTRAVENOUS
Status: COMPLETED | OUTPATIENT
Start: 2024-01-15 | End: 2024-01-15

## 2024-01-15 RX ADMIN — ASPIRIN 81 MG: 81 TABLET, COATED ORAL at 12:20

## 2024-01-15 RX ADMIN — ATORVASTATIN CALCIUM 40 MG: 40 TABLET, FILM COATED ORAL at 21:04

## 2024-01-15 RX ADMIN — INSULIN LISPRO 3 UNITS: 100 INJECTION, SOLUTION INTRAVENOUS; SUBCUTANEOUS at 17:08

## 2024-01-15 RX ADMIN — TICAGRELOR 90 MG: 90 TABLET ORAL at 21:04

## 2024-01-15 RX ADMIN — INSULIN LISPRO 3 UNITS: 100 INJECTION, SOLUTION INTRAVENOUS; SUBCUTANEOUS at 12:21

## 2024-01-15 RX ADMIN — INSULIN LISPRO 3 UNITS: 100 INJECTION, SOLUTION INTRAVENOUS; SUBCUTANEOUS at 21:04

## 2024-01-15 RX ADMIN — Medication 1000 MCG: at 12:20

## 2024-01-15 RX ADMIN — INSULIN GLARGINE 15 UNITS: 100 INJECTION, SOLUTION SUBCUTANEOUS at 21:04

## 2024-01-15 RX ADMIN — HEPARIN SODIUM 5000 UNITS: 5000 INJECTION INTRAVENOUS; SUBCUTANEOUS at 21:04

## 2024-01-15 RX ADMIN — SODIUM CHLORIDE 1 ML/KG/HR: 9 INJECTION, SOLUTION INTRAVENOUS at 12:46

## 2024-01-15 RX ADMIN — FLUOXETINE HYDROCHLORIDE 40 MG: 20 CAPSULE ORAL at 12:19

## 2024-01-15 RX ADMIN — HYDRALAZINE HYDROCHLORIDE 50 MG: 25 TABLET ORAL at 21:03

## 2024-01-15 RX ADMIN — TICAGRELOR 90 MG: 90 TABLET ORAL at 12:20

## 2024-01-15 RX ADMIN — HYDRALAZINE HYDROCHLORIDE 50 MG: 25 TABLET ORAL at 15:18

## 2024-01-15 RX ADMIN — SODIUM CHLORIDE 75 ML/HR: 9 INJECTION, SOLUTION INTRAVENOUS at 00:13

## 2024-01-15 RX ADMIN — HEPARIN SODIUM 5000 UNITS: 5000 INJECTION INTRAVENOUS; SUBCUTANEOUS at 08:48

## 2024-01-15 RX ADMIN — MAGNESIUM GLUCONATE 500 MG ORAL TABLET 800 MG: 500 TABLET ORAL at 12:46

## 2024-01-15 RX ADMIN — ACETYLCYSTEINE 600 MG: 200 SOLUTION ORAL; RESPIRATORY (INHALATION) at 12:19

## 2024-01-15 RX ADMIN — Medication 10 ML: at 21:04

## 2024-01-15 RX ADMIN — TIZANIDINE 4 MG: 4 TABLET ORAL at 21:11

## 2024-01-15 RX ADMIN — RANOLAZINE 1000 MG: 500 TABLET, FILM COATED, EXTENDED RELEASE ORAL at 12:19

## 2024-01-15 RX ADMIN — PANTOPRAZOLE SODIUM 40 MG: 40 TABLET, DELAYED RELEASE ORAL at 12:20

## 2024-01-15 RX ADMIN — Medication 10 ML: at 08:48

## 2024-01-15 RX ADMIN — RANOLAZINE 1000 MG: 500 TABLET, FILM COATED, EXTENDED RELEASE ORAL at 21:04

## 2024-01-15 NOTE — PLAN OF CARE
Goal Outcome Evaluation:      Patient had cath this shift. R femoral dressing is clean, dry, and intact. No signs of bleeding or hematoma noted. Will continue to follow plan of care.

## 2024-01-15 NOTE — NURSING NOTE
Order received for Cardiac Rehab Consultation.     CR staff will follow up with patient  Patient stated he is doing PT at this time for his legs. He stated he wouls like for us to ccall back in about 4 weeks and see if he has completed his PT     Information discussed with: Patient        Educated on: Benefits of Exercise,  Educated on Cardiac Rehab and Program Protocol, Brochure and/or educational material provided, Contact information given, and Teach Back Verified        Thank you for the referral. Please contact the Cardiac Rehab Dept. (ext. [phone]) with any further questions or concerns.

## 2024-01-15 NOTE — PROGRESS NOTES
[facility] HOSPITALIST PROGRESS NOTE    Subjective     History:   Matthew Madrid is a 48 y.o. male admitted on 1/9/2024 secondary to Chest pain in adult     Procedures:   1/15/24: C    1st Mrg lesion is 30% stenosed.    Prox RCA-1 lesion is 60% stenosed.    Prox RCA-2 lesion is 60% stenosed.    Mid RCA lesion is 70% stenosed.     1.  Successful IFR guided PTCA and stent placement of the right coronary artery.  Dual antiplatelet therapy to continue.  Medical management for coronary artery disease will be advised.     CC: Follow up DM    Patient seen and examined. Awake and alert with family present at bedside. States he feels better today. No reported CP, dyspnea or palpitations. No reported vomiting. No acute events overnight per RN.     History taken from: patient, chart, and RN.      Objective     Vital Signs  Temp:  [97.6 °F (36.4 °C)-98.4 °F (36.9 °C)] 98.1 °F (36.7 °C)  Heart Rate:  [63-76] 70  Resp:  [18-20] 20  BP: (117-178)/(60-93) 136/72    Intake/Output Summary (Last 24 hours) at 1/15/2024 1821  Last data filed at 1/15/2024 1600  Gross per 24 hour   Intake 433.75 ml   Output 2450 ml   Net -2016.25 ml         Physical Exam:  General:    Awake, alert, in no acute distress   Heart:      Normal S1 and S2. Regular rate and rhythm. No significant murmur, rubs or gallops appreciated.   Lungs:     Respirations regular, even and unlabored. Lungs clear to auscultation B/L. No wheezes, rales or rhonchi.   Abdomen:   Soft and nontender. No guarding, rebound tenderness or  organomegaly noted. Bowel sounds present x 4.   Extremities:  No clubbing, cyanosis or edema noted. Moves UE and LE equally B/L.     Results Review:    Results from last 7 days   Lab Units 01/09/24  1458   WBC 10*3/mm3 5.27   HEMOGLOBIN g/dL 10.6*   PLATELETS 10*3/mm3 156     Results from last 7 days   Lab Units 01/14/24  0703 01/13/24  1044 01/12/24  0808 01/11/24  0055 01/10/24  0131 01/09/24  1458   SODIUM mmol/L 137 135* 137  140 138 138   POTASSIUM mmol/L 5.0 5.1 4.9 4.3 4.1 4.6   CHLORIDE mmol/L 104 103 104 106 102 106   CO2 mmol/L 21.8* 21.7* 21.2* 23.8 24.2 22.8   BUN mg/dL 28* 25* 23* 24* 27* 27*   CREATININE mg/dL 1.82* 1.84* 1.82* 2.40* 2.04* 1.85*   CALCIUM mg/dL 9.4 9.5 9.3 8.7 8.9 8.8   GLUCOSE mg/dL 164* 199* 183* 140* 152* 245*     Results from last 7 days   Lab Units 01/09/24  1458   BILIRUBIN mg/dL 0.5   ALK PHOS U/L 72   AST (SGOT) U/L 20   ALT (SGPT) U/L 19     Results from last 7 days   Lab Units 01/12/24  0808 01/11/24  0055 01/10/24  0131   MAGNESIUM mg/dL 1.8 1.8 1.3*               Imaging Results (Last 24 Hours)       ** No results found for the last 24 hours. **              Medications:  acetylcysteine, 600 mg, Oral, Q12H  aspirin, 81 mg, Oral, Daily  atorvastatin, 40 mg, Oral, Nightly  FLUoxetine, 40 mg, Oral, Daily  hydrALAZINE, 50 mg, Oral, Q8H  insulin glargine, 15 Units, Subcutaneous, Nightly  insulin lispro, 3-14 Units, Subcutaneous, 4x Daily AC & at Bedtime  magnesium oxide, 800 mg, Oral, Daily  pantoprazole, 40 mg, Oral, Daily  ranolazine, 1,000 mg, Oral, Q12H  senna-docusate sodium, 2 tablet, Oral, BID  sodium chloride, 10 mL, Intravenous, Q12H  ticagrelor, 90 mg, Oral, BID  vitamin B-12, 1,000 mcg, Oral, Daily               Assessment & Plan   Recurrent angina: Echo reveals an EF of 51-55% and grade II diastolic dysfunction. S/P LHC today with PCI to RCA. Cont ASA, Brilinta, statin and Ranexa per primary team. Monitor on telemetry.     Acute on chronic diastolic CHF: Echo as above. Improved with diuresis. IVF's and albumin later given in the setting of CKD and planned LHC. Monitor volume status.     CKD IIIb: Cr improved. Cont IVF's and repeat labs in the AM.     DM II, insulin dependent with hyperglycemia: HgbA1c 6.7. BG stable today. Cont current insulin regimen and monitor.     Morbid obesity by BMI: Complicates all aspects of care.    DVT PPX: Add SQ heparin    Disposition Per primary team.      Marcus Fernandez,   01/15/24  18:21 EST

## 2024-01-16 ENCOUNTER — READMISSION MANAGEMENT (OUTPATIENT)
Dept: CALL CENTER | Facility: HOSPITAL | Age: 49
End: 2024-01-16
Payer: MEDICARE

## 2024-01-16 VITALS
SYSTOLIC BLOOD PRESSURE: 174 MMHG | RESPIRATION RATE: 20 BRPM | DIASTOLIC BLOOD PRESSURE: 78 MMHG | WEIGHT: 250.1 LBS | BODY MASS INDEX: 40.19 KG/M2 | OXYGEN SATURATION: 95 % | HEIGHT: 66 IN | TEMPERATURE: 97.7 F | HEART RATE: 76 BPM

## 2024-01-16 LAB
ALBUMIN SERPL-MCNC: 3.8 G/DL (ref 3.5–5.2)
ALBUMIN/GLOB SERPL: 1.3 G/DL
ALP SERPL-CCNC: 70 U/L (ref 39–117)
ALT SERPL W P-5'-P-CCNC: 24 U/L (ref 1–41)
ANION GAP SERPL CALCULATED.3IONS-SCNC: 10.9 MMOL/L (ref 5–15)
AST SERPL-CCNC: 20 U/L (ref 1–40)
BASOPHILS # BLD AUTO: 0.04 10*3/MM3 (ref 0–0.2)
BASOPHILS NFR BLD AUTO: 0.6 % (ref 0–1.5)
BILIRUB SERPL-MCNC: 0.7 MG/DL (ref 0–1.2)
BUN SERPL-MCNC: 30 MG/DL (ref 6–20)
BUN/CREAT SERPL: 14.5 (ref 7–25)
CALCIUM SPEC-SCNC: 9 MG/DL (ref 8.6–10.5)
CHLORIDE SERPL-SCNC: 103 MMOL/L (ref 98–107)
CO2 SERPL-SCNC: 20.1 MMOL/L (ref 22–29)
CREAT SERPL-MCNC: 2.07 MG/DL (ref 0.76–1.27)
DEPRECATED RDW RBC AUTO: 48.4 FL (ref 37–54)
EGFRCR SERPLBLD CKD-EPI 2021: 38.8 ML/MIN/1.73
EOSINOPHIL # BLD AUTO: 0.14 10*3/MM3 (ref 0–0.4)
EOSINOPHIL NFR BLD AUTO: 2 % (ref 0.3–6.2)
ERYTHROCYTE [DISTWIDTH] IN BLOOD BY AUTOMATED COUNT: 13.5 % (ref 12.3–15.4)
GLOBULIN UR ELPH-MCNC: 2.9 GM/DL
GLUCOSE BLDC GLUCOMTR-MCNC: 138 MG/DL (ref 70–130)
GLUCOSE BLDC GLUCOMTR-MCNC: 197 MG/DL (ref 70–130)
GLUCOSE SERPL-MCNC: 133 MG/DL (ref 65–99)
HCT VFR BLD AUTO: 36 % (ref 37.5–51)
HGB BLD-MCNC: 11.9 G/DL (ref 13–17.7)
IMM GRANULOCYTES # BLD AUTO: 0.05 10*3/MM3 (ref 0–0.05)
IMM GRANULOCYTES NFR BLD AUTO: 0.7 % (ref 0–0.5)
LYMPHOCYTES # BLD AUTO: 1.5 10*3/MM3 (ref 0.7–3.1)
LYMPHOCYTES NFR BLD AUTO: 21.8 % (ref 19.6–45.3)
MCH RBC QN AUTO: 31.7 PG (ref 26.6–33)
MCHC RBC AUTO-ENTMCNC: 33.1 G/DL (ref 31.5–35.7)
MCV RBC AUTO: 96 FL (ref 79–97)
MONOCYTES # BLD AUTO: 0.75 10*3/MM3 (ref 0.1–0.9)
MONOCYTES NFR BLD AUTO: 10.9 % (ref 5–12)
NEUTROPHILS NFR BLD AUTO: 4.39 10*3/MM3 (ref 1.7–7)
NEUTROPHILS NFR BLD AUTO: 64 % (ref 42.7–76)
NRBC BLD AUTO-RTO: 0 /100 WBC (ref 0–0.2)
PLATELET # BLD AUTO: 147 10*3/MM3 (ref 140–450)
PMV BLD AUTO: 10.1 FL (ref 6–12)
POTASSIUM SERPL-SCNC: 4.3 MMOL/L (ref 3.5–5.2)
PROT SERPL-MCNC: 6.7 G/DL (ref 6–8.5)
RBC # BLD AUTO: 3.75 10*6/MM3 (ref 4.14–5.8)
SODIUM SERPL-SCNC: 134 MMOL/L (ref 136–145)
WBC NRBC COR # BLD AUTO: 6.87 10*3/MM3 (ref 3.4–10.8)

## 2024-01-16 PROCEDURE — 82948 REAGENT STRIP/BLOOD GLUCOSE: CPT

## 2024-01-16 PROCEDURE — 25010000002 HEPARIN (PORCINE) PER 1000 UNITS: Performed by: INTERNAL MEDICINE

## 2024-01-16 PROCEDURE — 99239 HOSP IP/OBS DSCHRG MGMT >30: CPT | Performed by: INTERNAL MEDICINE

## 2024-01-16 PROCEDURE — 85025 COMPLETE CBC W/AUTO DIFF WBC: CPT | Performed by: INTERNAL MEDICINE

## 2024-01-16 PROCEDURE — 80053 COMPREHEN METABOLIC PANEL: CPT | Performed by: INTERNAL MEDICINE

## 2024-01-16 PROCEDURE — 63710000001 INSULIN LISPRO (HUMAN) PER 5 UNITS: Performed by: INTERNAL MEDICINE

## 2024-01-16 RX ORDER — BUMETANIDE 1 MG/1
TABLET ORAL
Qty: 30 TABLET | Refills: 0 | Status: SHIPPED | OUTPATIENT
Start: 2024-01-16 | End: 2024-01-22 | Stop reason: ALTCHOICE

## 2024-01-16 RX ORDER — NITROGLYCERIN 0.4 MG/1
0.4 TABLET SUBLINGUAL
Qty: 30 TABLET | Refills: 0 | Status: CANCELLED | OUTPATIENT
Start: 2024-01-16

## 2024-01-16 RX ORDER — HYDRALAZINE HYDROCHLORIDE 50 MG/1
50 TABLET, FILM COATED ORAL EVERY 8 HOURS SCHEDULED
Qty: 90 TABLET | Refills: 1 | Status: SHIPPED | OUTPATIENT
Start: 2024-01-16 | End: 2024-01-18 | Stop reason: SDUPTHER

## 2024-01-16 RX ORDER — HYDRALAZINE HYDROCHLORIDE 50 MG/1
50 TABLET, FILM COATED ORAL EVERY 8 HOURS SCHEDULED
Qty: 30 TABLET | Refills: 1 | Status: CANCELLED | OUTPATIENT
Start: 2024-01-16

## 2024-01-16 RX ORDER — ASPIRIN 81 MG/1
81 TABLET ORAL DAILY
Qty: 30 TABLET | Refills: 1 | Status: CANCELLED | OUTPATIENT
Start: 2024-01-17

## 2024-01-16 RX ORDER — NITROGLYCERIN 0.4 MG/1
0.4 TABLET SUBLINGUAL
Qty: 25 TABLET | Refills: 0 | Status: SHIPPED | OUTPATIENT
Start: 2024-01-16

## 2024-01-16 RX ADMIN — INSULIN LISPRO 3 UNITS: 100 INJECTION, SOLUTION INTRAVENOUS; SUBCUTANEOUS at 12:57

## 2024-01-16 RX ADMIN — HEPARIN SODIUM 5000 UNITS: 5000 INJECTION INTRAVENOUS; SUBCUTANEOUS at 05:38

## 2024-01-16 RX ADMIN — HYDRALAZINE HYDROCHLORIDE 50 MG: 25 TABLET ORAL at 05:16

## 2024-01-16 RX ADMIN — PANTOPRAZOLE SODIUM 40 MG: 40 TABLET, DELAYED RELEASE ORAL at 08:10

## 2024-01-16 RX ADMIN — ASPIRIN 81 MG: 81 TABLET, COATED ORAL at 08:10

## 2024-01-16 RX ADMIN — Medication 10 ML: at 08:11

## 2024-01-16 RX ADMIN — RANOLAZINE 1000 MG: 500 TABLET, FILM COATED, EXTENDED RELEASE ORAL at 08:10

## 2024-01-16 RX ADMIN — Medication 1000 MCG: at 08:10

## 2024-01-16 RX ADMIN — TICAGRELOR 90 MG: 90 TABLET ORAL at 08:10

## 2024-01-16 RX ADMIN — FLUOXETINE HYDROCHLORIDE 40 MG: 20 CAPSULE ORAL at 08:10

## 2024-01-16 RX ADMIN — MAGNESIUM GLUCONATE 500 MG ORAL TABLET 800 MG: 500 TABLET ORAL at 08:10

## 2024-01-16 RX ADMIN — ACETYLCYSTEINE 600 MG: 200 SOLUTION ORAL; RESPIRATORY (INHALATION) at 00:09

## 2024-01-16 NOTE — PLAN OF CARE
Goal Outcome Evaluation:   Pt has been resting this shift. No signs and symptoms of acute distress noted. No complaints of chest pain or SOB reported.

## 2024-01-17 NOTE — OUTREACH NOTE
Prep Survey      Flowsheet Row Responses   [facility] patient discharged from? [facility]   Is LACE score < 7 ? No   Eligibility Readm Mgmt   Discharge diagnosis Chest pain in adult   Does the patient have one of the following disease processes/diagnoses(primary or secondary)? CHF   Does the patient have Home health ordered? No   Is there a DME ordered? No   Medication alerts for this patient see AVS   Prep survey completed? Yes            Kari MOODY - Registered Nurse

## 2024-01-17 NOTE — PAYOR COMM NOTE
"CONTACT:  RADHA TAY RN  UTILIZATION MANAGEMENT DEPT.   [facility]   [address] [facility], [address]   PHONE:  [phone]   FAX: [phone]       DC NOTIFICATION HOME 1/16/2024---AUTH PENDING    REF# [id number]        Matthew Madrid (48 y.o. Male)       Date of Birth   1975    Social Security Number       Address   [address] [place] [address] [address] [facility] [address]    Home Phone   [phone]    MRN   [id number]       [place]   [facility]    Marital Status                               Admission Date   1/9/24    Admission Type   Urgent    Admitting Provider   Jose Raul Mejia MD    Attending Provider       Department, Room/Bed   [facility] 3 [facility], 3315/2S       Discharge Date   1/16/2024    Discharge Disposition   Home or Self Care    Discharge Destination   Home                              Attending Provider: (none)   Allergies: Tuberculin Tests    Isolation: None   Infection: None   Code Status: Prior    Ht: 167.6 cm (66\")   Wt: 113 kg (250 lb 1.6 oz)    Admission Cmt: None   Principal Problem: Chest pain in adult [R07.9]                   Active Insurance as of 1/9/2024       Primary Coverage       Payor Plan Insurance Group Employer/Plan Group    [facility] MEDICARE REPLACEMENT [facility] MED ADV SNP [place]PO [id number]       Payor Plan Address Payor Plan Phone Number Payor Plan Fax Number Effective Dates    [address] [address]   1/1/2024 - None Entered    [facility] [id number]         Subscriber Name Subscriber Birth Date Member ID       MATTHEW MADRID 1975 [id number]               Secondary Coverage       Payor Plan Insurance Group Employer/Plan Group    [id number]        Payor Plan Address Payor Plan Phone Number Payor Plan Fax Number Effective Dates    [address]   10/1/2022 - None Entered    [place] [address]         Subscriber Name Subscriber Birth Date Member ID       MATTHEW AMDRID 1975 [id number]          " "           Emergency Contacts        (Rel.) Home Phone Work Phone Mobile Phone    Flora Madrid (Spouse) [phone] -- [phone]    Harrison Zuniga (Relative) -- -- --                 Discharge Summary        Anant Bennett MD at 24 0942          Patient Identification  Name:  Matthew Madrid  Age:  48 y.o.  Sex:  male  :  1975  MRN:  [id number]  Visit Number:  [id number]    Date of Admission: 2024  Date of Discharge:     PCP: Richie Ruelas PA-C    DISCHARGE DIAGNOSIS  ASCVD status post stent of the distal RCA 2021 and 01/15/2024  Acute on chronic HFpEF NYHA class II combined systolic and diastolic      CONSULTS   Hospitalist for diabetes management  Nephrology for CKD    PROCEDURES PERFORMED      HOSPITAL COURSE  Patient is a 48 y.o. male with  coronary artery disease status post PCI of the distal RCA (2021), hypertension, hyperlipidemia, chronic kidney disease stage IIIb, type 2 diabetes-insulin-dependent, morbid obesity and former smoker.  Please see the admitting history and physical for further details.      CONDITION ON DISCHARGE  Stable.    VITAL SIGNS  /62 (BP Location: Right arm, Patient Position: Lying)   Pulse 83   Temp 98.5 °F (36.9 °C) (Oral)   Resp 20   Ht 167.6 cm (66\")   Wt 113 kg (250 lb 1.6 oz)   SpO2 97%   BMI 40.37 kg/m²     DISCHARGE PHYSICAL EXAM  Vitals and nursing note reviewed.   Constitutional:       Appearance: Not in distress. Morbidly obese. Chronically ill-appearing.      Comments: Not wearing oxygen at the time of my exam   Eyes:      Conjunctiva/sclera: Conjunctivae normal.   Pulmonary:      Effort: Pulmonary effort is normal.      Breath sounds: Normal breath sounds.   Cardiovascular:      Normal rate. Regular rhythm.      Murmurs: There is no murmur.   Edema:     Pretibial: bilateral trace edema of the pretibial area.     Ankle: bilateral trace edema of the ankle.     Feet: bilateral trace edema of the feet.  Skin:     " General: Skin is warm and dry.      Comments: Right femoral cath access site dressing clean, dry, and intact.  No evidence of ecchymosis or hematoma.   Neurological:      Mental Status: Alert.         RESULTS REVIEW  I reviewed the patient's new clinical results.  Results for orders placed during the hospital encounter of 01/09/24    Cardiac Catheterization/Vascular Study    Narrative    1st Mrg lesion is 30% stenosed.    Prox RCA-1 lesion is 60% stenosed.    Prox RCA-2 lesion is 60% stenosed.    Mid RCA lesion is 70% stenosed.    1.  Successful IFR guided PTCA and stent placement of the right coronary artery.  Dual antiplatelet therapy to continue.  Medical management for coronary artery disease will be advised.            Results from last 7 days   Lab Units 01/16/24  0254 01/09/24  1458   WBC 10*3/mm3 6.87 5.27   HEMOGLOBIN g/dL 11.9* 10.6*   PLATELETS 10*3/mm3 147 156     Results from last 7 days   Lab Units 01/16/24  0254 01/14/24  0703 01/13/24  1044 01/12/24  0808 01/11/24  0055 01/10/24  0131 01/09/24  1458   SODIUM mmol/L 134* 137 135* 137 140 138 138   POTASSIUM mmol/L 4.3 5.0 5.1 4.9 4.3 4.1 4.6   CHLORIDE mmol/L 103 104 103 104 106 102 106   CO2 mmol/L 20.1* 21.8* 21.7* 21.2* 23.8 24.2 22.8   BUN mg/dL 30* 28* 25* 23* 24* 27* 27*   CREATININE mg/dL 2.07* 1.82* 1.84* 1.82* 2.40* 2.04* 1.85*   CALCIUM mg/dL 9.0 9.4 9.5 9.3 8.7 8.9 8.8   GLUCOSE mg/dL 133* 164* 199* 183* 140* 152* 245*   ALT (SGPT) U/L 24  --   --   --   --   --  19   AST (SGOT) U/L 20  --   --   --   --   --  20     Lab Results   Component Value Date    INR 1.06 09/02/2021     Lab Results   Component Value Date    MG 1.8 01/12/2024    MG 1.8 01/11/2024    MG 1.3 (L) 01/10/2024     Lab Results   Component Value Date    TSH 1.440 01/09/2024    TRIG 201 (H) 03/27/2023    HDL 29 (L) 03/27/2023    LDL 34 03/27/2023      Lab Results   Component Value Date    PROBNP 250.1 01/10/2024    PROBNP 125.9 12/19/2023    PROBNP 139.1 12/05/2023        DISCHARGE DISPOSITION   Home    DISCHARGE MEDICATIONS     Discharge Medications        New Medications        Instructions Start Date   nitroglycerin 0.4 MG SL tablet  Commonly known as: NITROSTAT   0.4 mg, Sublingual, Every 5 Minutes PRN, Take no more than 3 doses in 15 minutes.             Changes to Medications        Instructions Start Date   bumetanide 1 MG tablet  Commonly known as: BUMEX  What changed:   how much to take  how to take this  when to take this  additional instructions   Take one tablet as needed for weight gain more than 2 pounds in two days      hydrALAZINE 50 MG tablet  Commonly known as: APRESOLINE  What changed:   medication strength  when to take this   50 mg, Oral, Every 8 Hours Scheduled             Continue These Medications        Instructions Start Date   albuterol sulfate  (90 Base) MCG/ACT inhaler  Commonly known as: PROVENTIL HFA;VENTOLIN HFA;PROAIR HFA   2 puffs, Inhalation, As Needed      allopurinol 300 MG tablet  Commonly known as: ZYLOPRIM   300 mg, Oral, Daily      ALPRAZolam 0.5 MG tablet  Commonly known as: XANAX   0.5 mg, Oral, 3 Times Daily PRN      aspirin 81 MG EC tablet   81 mg, Oral, Daily      atorvastatin 40 MG tablet  Commonly known as: LIPITOR   40 mg, Oral, Daily      carvedilol 12.5 MG tablet  Commonly known as: COREG   12.5 mg, Oral, 2 Times Daily With Meals      ferrous sulfate 325 (65 FE) MG tablet   325 mg, Oral, 3 Times Daily With Meals      FLUoxetine 40 MG capsule  Commonly known as: PROzac   40 mg, Oral, Daily      gabapentin 600 MG tablet  Commonly known as: NEURONTIN   600 mg, Oral, 3 Times Daily      insulin NPH-insulin regular (70-30) 100 UNIT/ML injection  Commonly known as: humuLIN 70/30,novoLIN 70/30   36 Units, Subcutaneous, 2 Times Daily With Meals      isosorbide mononitrate 120 MG 24 hr tablet  Commonly known as: IMDUR   120 mg, Oral, Daily      levocetirizine 5 MG tablet  Commonly known as: XYZAL   5 mg, Oral, Every Evening       magnesium oxide 400 MG tablet  Commonly known as: MAG-OX   800 mg, Oral, Daily      Omega-3 500 MG capsule   1 capsule, Oral, Daily      pantoprazole 40 MG EC tablet  Commonly known as: Protonix   40 mg, Oral, Daily      promethazine 12.5 MG tablet  Commonly known as: PHENERGAN   12.5 mg, Oral, Every 6 Hours PRN      ranolazine 1000 MG 12 hr tablet  Commonly known as: RANEXA   1,000 mg, Oral, Every 12 Hours Scheduled      tamsulosin 0.4 MG capsule 24 hr capsule  Commonly known as: FLOMAX   0.4 mg, Oral, Daily      ticagrelor 90 MG tablet tablet  Commonly known as: BRILINTA   90 mg, Oral, 2 Times Daily      tiZANidine 4 MG tablet  Commonly known as: ZANAFLEX   4 mg, Oral, 2 Times Daily PRN      traZODone 100 MG tablet  Commonly known as: DESYREL   100 mg, Oral, Nightly PRN      Trulicity 4.5 MG/0.5ML solution pen-injector  Generic drug: Dulaglutide   Subcutaneous, Weekly      vitamin B-12 1000 MCG tablet  Commonly known as: CYANOCOBALAMIN   1,000 mcg, Oral, Daily      vitamin D 1.25 MG (80455 UT) capsule capsule  Commonly known as: ERGOCALCIFEROL   50,000 Units, Oral, Weekly      Vitamin-B Complex tablet   1 tablet, Oral, Daily                        Your Scheduled Appointments      Jan 18, 2024  9:00 AM  [facility]  [facility] ([place]) [address] [facility] [id number] [phone]   Arrive 15 minutes prior to appointment.         Jan 19, 2024  9:00 AM  Follow Up with Bhupinder Graham PA-C  [facility] ([place]) [address] [place] [address] [facility] [id number] [phone]   -Bring photo ID, insurance card, and list of medications to appointment  -If testing was completed outside of [facility] then patient must bring images on a disc  -Copay will be collected at time of appointment  -Established patients should arrive 10 minutes prior to appointment                BMP ordered for in 5 days.  Follow-up as planned with heart failure  clinic and primary cardiologist.    TEST  RESULTS PENDING AT DISCHARGE  None       Electronically signed by CARLOS Mills, 01/16/24, 10:39 AM EST.      Time: greater than 30 minutes.    .Electronically signed by Daimen Bennett MD, 01/16/24, 12:03 PM EST.        Electronically signed by Damien Bennett MD at 01/16/24 1203       Discharge Order (From admission, onward)       Start     Ordered    01/16/24 1203  Discharge patient  Once        Expected Discharge Date: 01/16/24   Discharge Disposition: Home or Self Care   Physician of Record for Attribution - Please select from Treatment Team: DAMIEN BENNETT [[id number]]   Review needed by CMO to determine Physician of Record: No      Question Answer Comment   Physician of Record for Attribution - Please select from Treatment Team DAMIEN BENNETT    Review needed by CMO to determine Physician of Record No        01/16/24 4758

## 2024-01-18 ENCOUNTER — HOSPITAL ENCOUNTER (OUTPATIENT)
Dept: CARDIOLOGY | Facility: HOSPITAL | Age: 49
Discharge: HOME | End: 2024-01-18
Admitting: PHYSICIAN ASSISTANT
Payer: MEDICAID

## 2024-01-18 ENCOUNTER — TELEPHONE (OUTPATIENT)
Dept: TELEMETRY | Facility: HOSPITAL | Age: 49
End: 2024-01-18
Payer: MEDICARE

## 2024-01-18 VITALS
DIASTOLIC BLOOD PRESSURE: 55 MMHG | SYSTOLIC BLOOD PRESSURE: 114 MMHG | HEART RATE: 71 BPM | WEIGHT: 263 LBS | OXYGEN SATURATION: 96 % | BODY MASS INDEX: 42.27 KG/M2 | HEIGHT: 66 IN

## 2024-01-18 DIAGNOSIS — I50.33 ACUTE ON CHRONIC HEART FAILURE WITH PRESERVED EJECTION FRACTION (HFPEF): Primary | ICD-10-CM

## 2024-01-18 DIAGNOSIS — N18.30 STAGE 3 CHRONIC KIDNEY DISEASE, UNSPECIFIED WHETHER STAGE 3A OR 3B CKD: ICD-10-CM

## 2024-01-18 DIAGNOSIS — I25.10 ASCVD (ARTERIOSCLEROTIC CARDIOVASCULAR DISEASE): Chronic | ICD-10-CM

## 2024-01-18 DIAGNOSIS — I50.32 CHRONIC HEART FAILURE WITH PRESERVED EJECTION FRACTION (HFPEF): ICD-10-CM

## 2024-01-18 LAB
ABSOLUTE LUNG FLUID CONTENT: 32 % (ref 20–35)
ANION GAP SERPL CALCULATED.3IONS-SCNC: 10.2 MMOL/L (ref 5–15)
BUN SERPL-MCNC: 37 MG/DL (ref 6–20)
BUN/CREAT SERPL: 16.2 (ref 7–25)
CALCIUM SPEC-SCNC: 8.8 MG/DL (ref 8.6–10.5)
CHLORIDE SERPL-SCNC: 107 MMOL/L (ref 98–107)
CO2 SERPL-SCNC: 18.8 MMOL/L (ref 22–29)
CREAT SERPL-MCNC: 2.28 MG/DL (ref 0.76–1.27)
EGFRCR SERPLBLD CKD-EPI 2021: 34.5 ML/MIN/1.73
GLUCOSE SERPL-MCNC: 234 MG/DL (ref 65–99)
MAGNESIUM SERPL-MCNC: 1.8 MG/DL (ref 1.6–2.6)
NT-PROBNP SERPL-MCNC: 226.3 PG/ML (ref 0–450)
POTASSIUM SERPL-SCNC: 5.2 MMOL/L (ref 3.5–5.2)
SODIUM SERPL-SCNC: 136 MMOL/L (ref 136–145)

## 2024-01-18 PROCEDURE — 94726 PLETHYSMOGRAPHY LUNG VOLUMES: CPT | Performed by: PHYSICIAN ASSISTANT

## 2024-01-18 PROCEDURE — 83735 ASSAY OF MAGNESIUM: CPT | Performed by: PHYSICIAN ASSISTANT

## 2024-01-18 PROCEDURE — 80048 BASIC METABOLIC PNL TOTAL CA: CPT | Performed by: PHYSICIAN ASSISTANT

## 2024-01-18 PROCEDURE — 83880 ASSAY OF NATRIURETIC PEPTIDE: CPT | Performed by: PHYSICIAN ASSISTANT

## 2024-01-18 RX ORDER — AMLODIPINE BESYLATE 5 MG/1
5 TABLET ORAL DAILY
COMMUNITY
End: 2024-01-22 | Stop reason: ALTCHOICE

## 2024-01-18 RX ORDER — BUTALBITAL, ACETAMINOPHEN AND CAFFEINE 50; 325; 40 MG/1; MG/1; MG/1
1 TABLET ORAL 2 TIMES DAILY
COMMUNITY
End: 2024-01-22 | Stop reason: ALTCHOICE

## 2024-01-18 RX ORDER — HYDRALAZINE HYDROCHLORIDE 50 MG/1
50 TABLET, FILM COATED ORAL EVERY 8 HOURS SCHEDULED
Qty: 90 TABLET | Refills: 1 | Status: SHIPPED | OUTPATIENT
Start: 2024-01-18

## 2024-01-18 NOTE — PROGRESS NOTES
Heart Failure Clinic  Pharmacist Note     Matthew Madrid is a 48 y.o. male seen in the Heart Failure Clinic for HFpEF (EF of 61-65% on 7/4/23).  Patient recently had a heart cath on 1/15 and new stents were placed on 1/15/24. Patient also reports that he got a back injection 2 days ago as well.     Matthew Madrid reports a poor understanding of medications and denies any issue with adherence.    Accompanied by his wife who assists with his medications and had a list of medication written out that she reports that she follows to give him his medications every day.     Patient reports that he was told to not take any Bumex since being discharged from his Heart Cath and he has not taken any. He reports some swelling in his lower legs, but reports that it has been improving. He denies any SOB at this time. He has been weighing himself and reports gaining weight overnight from 249-256lbs.     He has a logs of his BP's and before his hospitalization, his BP were all above 140's, but the past two days they were in the 140's/70's with HR in the 70's. He reports that he is able to take his hydralazine tid and reports adherence to his medications. He recently got a new Rx for Hydralazine 50mg tid and have picked it up, but have not started it.     Patient reports that he has had blood clots coming from his nose every day since his stent. He denies having to try to stop any nose bleeds, as they are just clots that falls out.     Patient denies any mental confusion and forgetfulness at this time, but reports that he has only been home for 2 days...and that he had not noticed any during that time.      Medication Use:   Hx of med intolerances: Lisinopril (discontinued at discharge in July 2023. Elevated CK and renal issues)   Retail Rx Management: [facility] in [place].     Past Medical History:   Diagnosis Date    ASCVD (arteriosclerotic cardiovascular disease) 09/13/2021    Chronic heart failure with preserved ejection fraction  (HFpEF) 11/27/2023    Diabetes mellitus     Elevated cholesterol     GERD (gastroesophageal reflux disease)     Hyperlipidemia     Hypertension      ALLERGIES: Tuberculin tests  Current Outpatient Medications   Medication Sig Dispense Refill    albuterol sulfate  (90 Base) MCG/ACT inhaler Inhale 2 puffs As Needed for Wheezing or Shortness of Air.      allopurinol (ZYLOPRIM) 300 MG tablet Take 1 tablet by mouth Daily.      ALPRAZolam (XANAX) 0.5 MG tablet Take 1 tablet by mouth 3 (Three) Times a Day As Needed for Anxiety.      amLODIPine (NORVASC) 5 MG tablet Take 1 tablet by mouth Daily. He never stopped taking it      aspirin 81 MG EC tablet Take 1 tablet by mouth Daily. 90 tablet 3    atorvastatin (LIPITOR) 40 MG tablet Take 1 tablet by mouth Daily.      B Complex Vitamins (Vitamin-B Complex) tablet Take 1 tablet by mouth Daily.      carvedilol (COREG) 12.5 MG tablet Take 2 tablets by mouth 2 (Two) Times a Day With Meals.      Dulaglutide (Trulicity) 4.5 MG/0.5ML solution pen-injector Inject  under the skin into the appropriate area as directed 1 (One) Time Per Week.      ferrous sulfate 325 (65 FE) MG tablet Take 1 tablet by mouth 3 (Three) Times a Day With Meals.      FLUoxetine (PROzac) 40 MG capsule Take 1 capsule by mouth Daily.      gabapentin (NEURONTIN) 600 MG tablet Take 1 tablet by mouth 3 (Three) Times a Day.      hydrALAZINE (APRESOLINE) 50 MG tablet Take 1 tablet by mouth Every 8 (Eight) Hours. 90 tablet 1    insulin NPH-insulin regular (humuLIN 70/30,novoLIN 70/30) (70-30) 100 UNIT/ML injection Inject 35 Units under the skin into the appropriate area as directed 2 (Two) Times a Day With Meals.      isosorbide mononitrate (IMDUR) 120 MG 24 hr tablet Take 1 tablet by mouth Daily.      Krill Oil (Omega-3) 500 MG capsule Take 1 capsule by mouth Daily.      levocetirizine (XYZAL) 5 MG tablet Take 1 tablet by mouth Every Evening.      magnesium oxide (MAG-OX) 400 MG tablet Take 2 tablets by mouth  "Daily.      nitroglycerin (NITROSTAT) 0.4 MG SL tablet Place 1 tablet under the tongue Every 5 (Five) Minutes As Needed for Chest Pain (Only if SBP Greater Than 100). Take no more than 3 doses in 15 minutes. If no relief, seek medical attention. 25 tablet 0    pantoprazole (Protonix) 40 MG EC tablet Take 1 tablet by mouth Daily. 30 tablet 11    promethazine (PHENERGAN) 12.5 MG tablet Take 1 tablet by mouth Every 6 (Six) Hours As Needed for Nausea or Vomiting.      ranolazine (RANEXA) 1000 MG 12 hr tablet Take 1 tablet by mouth Every 12 (Twelve) Hours. 60 tablet 2    tamsulosin (FLOMAX) 0.4 MG capsule 24 hr capsule Take 1 capsule by mouth Daily. 30 capsule 3    ticagrelor (BRILINTA) 90 MG tablet tablet Take 1 tablet by mouth 2 (Two) Times a Day. 180 tablet 3    tiZANidine (ZANAFLEX) 4 MG tablet Take 1 tablet by mouth 2 (Two) Times a Day As Needed for Muscle Spasms.      traZODone (DESYREL) 100 MG tablet Take 1 tablet by mouth At Night As Needed for Sleep.      vitamin B-12 (CYANOCOBALAMIN) 1000 MCG tablet Take 1 tablet by mouth Daily.      vitamin D (ERGOCALCIFEROL) 1.25 MG (57013 UT) capsule capsule Take 1 capsule by mouth 1 (One) Time Per Week. 5 capsule 3    bumetanide (BUMEX) 1 MG tablet Take 1 tablet by mouth as directed by prescriber as needed for weight gain (more than 2 pounds in two days). (Patient not taking: Reported on 1/18/2024) 30 tablet 0    butalbital-acetaminophen-caffeine (FIORICET, ESGIC) -40 MG per tablet Take 1 tablet by mouth 2 (Two) Times a Day.       No current facility-administered medications for this encounter.       Vaccination History:   Pneumonia: Reports received - unsure when this was; likely a candidate for Prevnar 20  Annual Influenza: UTD  Shingles: Currently not eligible    Objective  Vitals:    01/18/24 0901   BP: 114/55   BP Location: Left arm   Patient Position: Sitting   Cuff Size: Adult   Pulse: 71   SpO2: 96%   Weight: 119 kg (263 lb)   Height: 167.6 cm (66\")     Wt " Readings from Last 3 Encounters:   24 119 kg (263 lb)   24 113 kg (250 lb 1.6 oz)   23 118 kg (261 lb)         24  09   Weight: 119 kg (263 lb)     Lab Results   Component Value Date    GLUCOSE 234 (H) 2024    BUN 37 (H) 2024    CREATININE 2.28 (H) 2024    EGFRIFNONA 68 2021    BCR 16.2 2024    K 5.2 2024    CO2 18.8 (L) 2024    CALCIUM 8.8 2024    PROTENTOTREF 6.0 2023    ALBUMIN 3.8 2024    LABIL2 1.1 2023    AST 20 2024    ALT 24 2024     Lab Results   Component Value Date    WBC 6.87 2024    HGB 11.9 (L) 2024    HCT 36.0 (L) 2024    MCV 96.0 2024     2024     Lab Results   Component Value Date    CKTOTAL 125 2023    CKMB 2.40 2023    TROPONINT 18 (H) 2023     Lab Results   Component Value Date    PROBNP 226.3 2024     Results for orders placed during the hospital encounter of 24    Adult Transthoracic Echo Complete W/ Cont if Necessary Per Protocol    Interpretation Summary    Left ventricular ejection fraction appears to be 51 - 55%.    Left ventricular diastolic function is consistent with (grade II w/high LAP) pseudonormalization.    There is no evidence of pericardial effusion         GDMT    Drug Class   Drug   Dose Last Dose Adjustment Additional Titration   Notes   ACEi/ARB/ARNI     Lisinopril D/C at discharge 2023 - renal/ck    Beta Blocker Carvedilol 25 mg BID      MRA        SGLT2i    N/A     Imdur 120 mg QD       Hydralazine 75 mg TID       Ranexa 1 g BID          Drug Therapy Problems    GDMT/Renal Function  Blood Clots from nose  Edema  Drug-drug interaction- Tizanidine and Fioricet- CY Inhibitors (Weak) may increase the serum concentration of TiZANidine.  Recently picked up Amlodipine 5mg on 24 from [facility] in [place]-   Patient would like to know what medications he could come off of, as he reports that he is on too  many.       Recommendations:     1- Continue to titrate GDMT as clinically indicated based on BP/HR/renal function and patient tolerability. BP looks the best that it has in awhile. Continue current regimen.  Patient would benefit from addition of SGLT2 inhibitor (Farxiga and Jardiance have a $0 copay). [facility] no longer has eGFR limations for the HF indication.   Notified provider, Zuri, of patients reported concerns.  Continue to follow with nephrology to determine appropriate diuretic dosage. He follows- up with nephrology in February  Avoid the use of tizanidine with weak CY inhibitors when possible. If combined use cannot be avoided, initiate tizanidine at an adult dose of 2 mg and increase in 2 to 4 mg increments based on patient response. Monitor for increased effects of tizanidine, including adverse reactions (eg, hypotension, bradycardia, drowsiness). Patient is on 4mg bid- but reports that he has not used it at all. I counseled him on this interaction and he agrees to use both sparingly. Advised him to split tablet and take only as needed. Patient also reports only using Fioricet prn for headaches and reports that it is not often that he uses it.   Zuri to continue amlodipine at this time- as BP looks great.   Went over his AVS with him, adding amlodipine and highlighting the medications that he MUST take and letting him know of the ones that he could try to wean off of or stop using. Counseled him to use 75mg of Hydralazine tid, which could be 1.5 tablets of 50mg or 3 tablets of 25mg.       Patient was educated on heart failure medications and the importance of medication adherence.   All questions were addressed and patient expressed understanding.       Thank you for allowing me to participate in the care of your patient,    Chata Hou, [facility]  24  10:09 EST

## 2024-01-18 NOTE — PROGRESS NOTES
Heart Failure Clinic    Date: 01/18/24     Vitals:    01/18/24 0901   BP: 114/55   Pulse: 71   SpO2: 96%    Weight 263    Method of arrival: Ambulatory    Weighing self daily: Yes    Monitoring Heart Failure Zones: Yes    Today's HF Zone: Yellow     Taking medications as prescribed: Has questions regarding medications    Edema Yes improved    Shortness of Air: No    Number of pillows used at night:<2    Educational Materials given:  avs                                                                         ReDS Value: 32  25-35 Optimal Value Status      Dimitrios Maki RN 01/18/24 09:04 EST

## 2024-01-18 NOTE — PROGRESS NOTES
[facility]  NEREYDA Aly Nannetteswapna Clemens, APRN  [address] [facility] – [place] [address]    Thank you for asking me to see Matthew Madrid for congestive heart failure.    HPI:     This is a 48 y.o. male with known past medical history of:    ASCVD  [facility] September 2021 with distal RCA lesion 95% stenosed, proximal RCA lesion 60% stenosed, mid RCA lesion 70% stenosed with successful PCI to distal RCA prior to bifurcation with Xience drug-eluting stent placed and recommendation for dual antiplatelet therapy for 1 year  [facility] 01/15/24 with successful IFR guided PTCA & stent placement of RCA with DAP to continue.  1st margion lesion was noted to be 30% stenosed; prox RCA-1 lesion 60% stenosed.  Prox RCA-2 lesion 60% stenosed.  Mid RCA lesion 70% stenosed.   CKD stage III  Chronic HFpEF  July 2023 TTE with EF 61 to 65% and grade 1 diastolic dysfunction as well as mild pulmonary hypertension  Mild pulmonary hypertension  Insulin-dependent diabetes type 2  GERD  Anxiety/depression  Morbid obesity  History of hypotension secondary to GI losses from chronic diarrhea since prior cholecystectomy  Hyperlipidemia      Matthew Madrid presents for today for heart failure clinic evaluation.  The patient is typically seen by Richie Ruelas PA-C.  Patient's primary cardiologist is Dr. Etienne & Bhupinder Graham PA-C.  Patient was referred here after being evaluated by Bhupinder Graham on November 20, 2023 with concern for worsening weight gain and heart failure exacerbation.  Please note office note was also reviewed with patient to soon undergo left heart catheterization for further ischemic evaluation given persistent angina.    Last known EF 61-65%.   Last known hospitalization and/or ED visit: Patient was hospitalized from January 9, 2024 through January 15, 2024 secondary to need for [facility] with known CKD.  Nephrology followed along to assist with renal function.   He did undergo LHC with RCA stent as outlined in cardiac cath report within this document.   DAP to continue with medical management.    Accompanied by: Wife          01/18/24 visit data/details regarding:   Dyspnea: Worsening shortness of breath   Lower extremity swelling: Improving swelling of the lower extremities  Abdominal swelling: Improving distension  Home weight: Weight monitoring booklet provided during initial visit; Scale provided  Home BP: BP monitoring booklet provided during initial visit; BP book brought to appointment with BP well controlled.   Home heart rate: HR monitoring booklet provided during initial visit; Has monitoring device  Daily activities of living: Performing with wife's assistance   Pillows/lying flat:1-2 pillows   HF zone: Yellow  Mr. Madrid reports he is slightly sore post-cath but is otherwise doing well.  He is chest pain free.  He reports he is breathing better since his intervention.    He does still have some leg swelling.   Please see pharmacist note as well regarding med list discussions.    Fluid restrictions discussed at length.      Specialists:   Cardiology: Bhupinder & Dr. Etienne       Review of Systems - Review of Systems   Constitutional: Negative for decreased appetite, diaphoresis and fever.   HENT:  Negative for congestion and ear pain.    Eyes:  Negative for blurred vision, discharge and double vision.   Cardiovascular:  Positive for dyspnea on exertion and leg swelling.   Respiratory:  Positive for shortness of breath. Negative for cough and hemoptysis.    Endocrine: Negative for cold intolerance and heat intolerance.   Hematologic/Lymphatic: Negative for adenopathy and bleeding problem.   Skin:  Negative for dry skin and nail changes.   Musculoskeletal:  Negative for arthritis and falls.   Gastrointestinal:  Negative for bloating and anorexia.   Genitourinary:  Negative for bladder incontinence and dysuria.   Neurological:  Negative for aphonia and difficulty with  concentration.   Psychiatric/Behavioral:  Negative for altered mental status and hallucinations.    Allergic/Immunologic: Negative for environmental allergies and HIV exposure.         All other systems were reviewed and were negative.    Patient Active Problem List   Diagnosis    NSTEMI, initial episode of care    NSTEMI (non-ST elevated myocardial infarction)    ASCVD (arteriosclerotic cardiovascular disease)    Essential hypertension    Dyslipidemia    DM (diabetes mellitus), type 2 with complications    SOB (shortness of breath)    Severe obesity (BMI 35.0-39.9) with comorbidity    Diarrhea    Abdominal pain    Dysphagia    Acute renal failure, unspecified acute renal failure type    Chronic heart failure with preserved ejection fraction (HFpEF)    Chest pain in adult    Acute on chronic heart failure with preserved ejection fraction (HFpEF)    CHF (congestive heart failure), NYHA class II, acute on chronic, combined       family history includes Heart attack in his paternal uncle; Hyperlipidemia in his father and mother.     reports that he quit smoking about 24 years ago. His smoking use included cigarettes. He smoked an average of 1 pack per day. He has never used smokeless tobacco. He reports current alcohol use of about 6.0 standard drinks of alcohol per week. He reports that he does not use drugs.    Allergies   Allergen Reactions    Tuberculin Tests Rash         Current Outpatient Medications:     albuterol sulfate  (90 Base) MCG/ACT inhaler, Inhale 2 puffs As Needed for Wheezing or Shortness of Air., Disp: , Rfl:     allopurinol (ZYLOPRIM) 300 MG tablet, Take 1 tablet by mouth Daily., Disp: , Rfl:     ALPRAZolam (XANAX) 0.5 MG tablet, Take 1 tablet by mouth 3 (Three) Times a Day As Needed for Anxiety., Disp: , Rfl:     aspirin 81 MG EC tablet, Take 1 tablet by mouth Daily., Disp: 90 tablet, Rfl: 3    atorvastatin (LIPITOR) 40 MG tablet, Take 1 tablet by mouth Daily., Disp: , Rfl:     B Complex  Vitamins (Vitamin-B Complex) tablet, Take 1 tablet by mouth Daily., Disp: , Rfl:     carvedilol (COREG) 12.5 MG tablet, Take 2 tablets by mouth 2 (Two) Times a Day With Meals., Disp: , Rfl:     Dulaglutide (Trulicity) 4.5 MG/0.5ML solution pen-injector, Inject  under the skin into the appropriate area as directed 1 (One) Time Per Week., Disp: , Rfl:     ferrous sulfate 325 (65 FE) MG tablet, Take 1 tablet by mouth 3 (Three) Times a Day With Meals., Disp: , Rfl:     FLUoxetine (PROzac) 40 MG capsule, Take 1 capsule by mouth Daily., Disp: , Rfl:     gabapentin (NEURONTIN) 600 MG tablet, Take 1 tablet by mouth 3 (Three) Times a Day., Disp: , Rfl:     hydrALAZINE (APRESOLINE) 50 MG tablet, Take 1 tablet by mouth Every 8 (Eight) Hours., Disp: 90 tablet, Rfl: 1    insulin NPH-insulin regular (humuLIN 70/30,novoLIN 70/30) (70-30) 100 UNIT/ML injection, Inject 35 Units under the skin into the appropriate area as directed 2 (Two) Times a Day With Meals., Disp: , Rfl:     isosorbide mononitrate (IMDUR) 120 MG 24 hr tablet, Take 1 tablet by mouth Daily., Disp: , Rfl:     Krill Oil (Omega-3) 500 MG capsule, Take 1 capsule by mouth Daily., Disp: , Rfl:     levocetirizine (XYZAL) 5 MG tablet, Take 1 tablet by mouth Every Evening., Disp: , Rfl:     magnesium oxide (MAG-OX) 400 MG tablet, Take 2 tablets by mouth Daily., Disp: , Rfl:     nitroglycerin (NITROSTAT) 0.4 MG SL tablet, Place 1 tablet under the tongue Every 5 (Five) Minutes As Needed for Chest Pain (Only if SBP Greater Than 100). Take no more than 3 doses in 15 minutes. If no relief, seek medical attention., Disp: 25 tablet, Rfl: 0    pantoprazole (Protonix) 40 MG EC tablet, Take 1 tablet by mouth Daily., Disp: 30 tablet, Rfl: 11    promethazine (PHENERGAN) 12.5 MG tablet, Take 1 tablet by mouth Every 6 (Six) Hours As Needed for Nausea or Vomiting., Disp: , Rfl:     ranolazine (RANEXA) 1000 MG 12 hr tablet, Take 1 tablet by mouth Every 12 (Twelve) Hours., Disp: 60 tablet,  Rfl: 2    tamsulosin (FLOMAX) 0.4 MG capsule 24 hr capsule, Take 1 capsule by mouth Daily., Disp: 30 capsule, Rfl: 3    ticagrelor (BRILINTA) 90 MG tablet tablet, Take 1 tablet by mouth 2 (Two) Times a Day., Disp: 180 tablet, Rfl: 3    tiZANidine (ZANAFLEX) 4 MG tablet, Take 1 tablet by mouth 2 (Two) Times a Day As Needed for Muscle Spasms., Disp: , Rfl:     traZODone (DESYREL) 100 MG tablet, Take 1 tablet by mouth At Night As Needed for Sleep., Disp: , Rfl:     vitamin B-12 (CYANOCOBALAMIN) 1000 MCG tablet, Take 1 tablet by mouth Daily., Disp: , Rfl:     vitamin D (ERGOCALCIFEROL) 1.25 MG (05511 UT) capsule capsule, Take 1 capsule by mouth 1 (One) Time Per Week., Disp: 5 capsule, Rfl: 3    bumetanide (BUMEX) 1 MG tablet, Take 1 tablet by mouth as directed by prescriber as needed for weight gain (more than 2 pounds in two days). (Patient not taking: Reported on 1/18/2024), Disp: 30 tablet, Rfl: 0    butalbital-acetaminophen-caffeine (FIORICET, ESGIC) -40 MG per tablet, Take 1 tablet by mouth 2 (Two) Times a Day., Disp: , Rfl:       Physical Exam:  I have reviewed the patient's current vital signs as documented in the patient's EMR.   Vitals:    01/18/24 0901   BP: 114/55   Pulse: 71   SpO2: 96%     Body mass index is 42.45 kg/m².       01/18/24  0901   Weight: 119 kg (263 lb)      Physical Exam  Vitals and nursing note reviewed.   Constitutional:       General: He is awake.      Appearance: Normal appearance.   HENT:      Head: Normocephalic and atraumatic.   Eyes:      General: Lids are normal.      Conjunctiva/sclera:      Right eye: Right conjunctiva is not injected.      Left eye: Left conjunctiva is not injected.      Comments: Bilateral undereye swelling has improved to some degree.     Cardiovascular:      Rate and Rhythm: Normal rate and regular rhythm.      Heart sounds: Murmur heard.      Systolic murmur is present with a grade of 2/6.   Pulmonary:      Effort: No tachypnea, bradypnea or prolonged  expiration.      Breath sounds: No wheezing, rhonchi or rales.   Abdominal:      General: There is distension.      Tenderness: There is no abdominal tenderness.   Musculoskeletal:      Right lower le+ Pitting Edema present.      Left lower le+ Pitting Edema present.   Skin:     General: Skin is warm and dry.   Neurological:      Mental Status: He is alert and oriented to person, place, and time.   Psychiatric:         Attention and Perception: Attention normal.         Mood and Affect: Mood normal.         Behavior: Behavior is cooperative.            JVP: Volume/Pulsation: Normal.        DATA REVIEWED:       ---------------------------------------------------  TTE/GAYLE:  Results for orders placed during the hospital encounter of 24    Adult Transthoracic Echo Complete W/ Cont if Necessary Per Protocol    Interpretation Summary    Left ventricular ejection fraction appears to be 51 - 55%.    Left ventricular diastolic function is consistent with (grade II w/high LAP) pseudonormalization.    There is no evidence of pericardial effusion        LAST HEART CATH/IF AVAILABLE:     Results for orders placed during the hospital encounter of 21    Cardiac Catheterization/Vascular Study    Narrative  · Dist RCA lesion is 95% stenosed.  · Prox RCA lesion is 60% stenosed.  · Mid RCA lesion is 70% stenosed on a sharp curve and it appears to be a kink in the vessel.  · Successfule PCI to distal RCA prior to bifurcation with 2.25x23 Xience post dilated with 2.5 NC from 99% to 0% with TMI flow 0-3      -----------------------------------------------------  CXR/Imaging:   Imaging Results (Most Recent)       None            I personally reviewed and interpreted the CXR.      -----------------------------------------------------  CT:   No radiology results for the last 30 days.  I personally reviewed the images of the CT scan.  My personal interpretation is below.       ----------------------------------------------------      --------------------------------------------------------------------------------------------------    Laboratory evaluations:    Lab Results   Component Value Date    GLUCOSE 234 (H) 01/18/2024    BUN 37 (H) 01/18/2024    CREATININE 2.28 (H) 01/18/2024    EGFRIFNONA 68 12/29/2021    BCR 16.2 01/18/2024    K 5.2 01/18/2024    CO2 18.8 (L) 01/18/2024    CALCIUM 8.8 01/18/2024    PROTENTOTREF 6.0 09/01/2023    ALBUMIN 3.8 01/16/2024    LABIL2 1.1 09/01/2023    AST 20 01/16/2024    ALT 24 01/16/2024     Lab Results   Component Value Date    WBC 6.87 01/16/2024    HGB 11.9 (L) 01/16/2024    HCT 36.0 (L) 01/16/2024    MCV 96.0 01/16/2024     01/16/2024     Lab Results   Component Value Date    CHOL 95 03/27/2023    TRIG 201 (H) 03/27/2023    HDL 29 (L) 03/27/2023    LDL 34 03/27/2023     Lab Results   Component Value Date    TSH 1.440 01/09/2024     Lab Results   Component Value Date    HGBA1C 6.70 (H) 01/09/2024     Lab Results   Component Value Date    ALT 24 01/16/2024     Lab Results   Component Value Date    HGBA1C 6.70 (H) 01/09/2024    HGBA1C 5.70 (H) 07/04/2023    HGBA1C 9.40 (H) 09/01/2021     Lab Results   Component Value Date    MICROALBUR 26.6 09/01/2023    CREATININE 2.28 (H) 01/18/2024     Lab Results   Component Value Date    IRON 53 (L) 01/11/2024    TIBC 325 01/11/2024    FERRITIN 117.60 01/11/2024     Lab Results   Component Value Date    INR 1.06 09/02/2021    PROTIME 14.2 09/02/2021        Lab Results   Component Value Date    ABSOLUTELUNG 32 01/18/2024    ABSOLUTELUNG 32 12/19/2023    ABSOLUTELUNG 35 12/05/2023       PAH RISK ASSESSMENT:      1. Acute on chronic heart failure with preserved ejection fraction (HFpEF)    2. Chronic heart failure with preserved ejection fraction (HFpEF)    3. Stage 3 chronic kidney disease, unspecified whether stage 3a or 3b CKD          ORDERS PLACED TODAY:  Orders Placed This Encounter   Procedures     Basic Metabolic Panel    proBNP    Magnesium    Basic Metabolic Panel    proBNP    Magnesium    Basic Metabolic Panel    ReDs Vest        Diagnoses and all orders for this visit:    1. Acute on chronic heart failure with preserved ejection fraction (HFpEF) (Primary)  -     Basic Metabolic Panel; Future  -     proBNP; Future  -     Magnesium; Future  -     Basic Metabolic Panel; Standing  -     Basic Metabolic Panel  -     proBNP; Standing  -     proBNP  -     Magnesium; Standing  -     Magnesium    2. Chronic heart failure with preserved ejection fraction (HFpEF)  -     ReDs Vest    3. Stage 3 chronic kidney disease, unspecified whether stage 3a or 3b CKD  -     Basic Metabolic Panel; Future  -     Basic Metabolic Panel; Standing  -     Basic Metabolic Panel  -     Basic Metabolic Panel; Future    Other orders  -     hydrALAZINE (APRESOLINE) 50 MG tablet; Take 1 tablet by mouth Every 8 (Eight) Hours.  Dispense: 90 tablet; Refill: 1             MEDS ORDERED TODAY:    New Medications Ordered This Visit   Medications    hydrALAZINE (APRESOLINE) 50 MG tablet     Sig: Take 1 tablet by mouth Every 8 (Eight) Hours.     Dispense:  90 tablet     Refill:  1        ---------------------------------------------------------------------------------------------------------------------------          ASSESSMENT/PLAN:      Diagnosis Plan   1. Acute on chronic heart failure with preserved ejection fraction (HFpEF)  Basic Metabolic Panel    proBNP    Magnesium    Basic Metabolic Panel    Basic Metabolic Panel    proBNP    proBNP    Magnesium    Magnesium      2. Chronic heart failure with preserved ejection fraction (HFpEF)  ReDs Vest      3. Stage 3 chronic kidney disease, unspecified whether stage 3a or 3b CKD  Basic Metabolic Panel    Basic Metabolic Panel    Basic Metabolic Panel    Basic Metabolic Panel          not acutely decompensated chronic diastolic heart failure. CHF.     NYHA stage Stage C: Structural heart disease is  present AND symptoms have occurredFC-Class III: Marked limitation of physical activity. Comfortable at rest. Less than ordinary activity causes fatigue, palpitation, or dyspnea. NYHA FC change: change is not known.     Today, Patient is mildly volume overloadedand with  Moderate perfusion. The patient's hemodynamics are currently unacceptable. HR is: normal and is at goal. BP/MAP was reviewed and there isroom for medication up-titration.  Clinical trajectory was assessed and haswaxed and waned.     CHF GOAL DIRECTED MEDICAL THERAPY FOR PATIENT ADDRESSED/ADJUSTED:     GDMT: HFpEF    Drug Class   Drug   Dose Last Dose Adjustment Notes   ACEi/ARB/ARNI Hydralizine/Imdur on board 75mg TID/120mg qd     Beta Blocker Coreg 25mg BID     MRA CKD III      SGLT2i Consider in future visit   N/A   Secondaries if applicable:          -CHF Specific BB:    Carvedilol  at dose of 25mg BID.   We discussed processes/benefits of HF clinic including nursing, pharmacist, and provider evaluation during each visit with ability for in office ReDS vest, labs, and ability to provide IV diuresis in the clinic with close outpatient monitoring.  Additionally, patient was educated about the availability of delivery of medications to patient's clinic room prior to leaving the building which assists with medication compliance and insures medications are in hands when changes are made (if patient opts for apothecary usage) with thorough guidance regarding changes and medication schedule provided.          -ACE/ARB/ARNi/alternative:   Hydralazine was previously advised at 75mg; He did not on prior visit, but he is now taking 75mg TID as previously instructed.    Imdur 120mg on board at present.     -MRA:   The patient is FC-NYHA Class III and MRA is indicated.  However, given CKD III, will hold on MRA addition at present.       -SGLT2 inhibitor therapy:   A BMP at initiation to verify GFR >30 was recommended, as was interval GFR surveillance.  The  patient was advised to hold SGLT2I when PO intake is restricted due to a planned surgery, or due to an underlying illness.    Will consider Farxiga in future visit; however, at present we are adjusting diuretics and patient has upcoming cardiac catheterization, thus will avoid renal fluctuations until post cardiac catheterization.   Pt was advised SEs, some severe, including hypersensitivity and Ai's; coupled with discussion regarding common side effects of UTIs and female genital mycotic infections were discussed. If you will be NPO, or are sick (poor PO intake, N&V) please hold the medication until you are back to a normal diet.     -Diuretic regimen:   ReDS Vest reading for. 01/18/24 is 32; ReDs Vest reading reviewed with patient.    PRN Bumex at present per Nephro dosing.  Continue Nephro f/u.   BMP, Mag, & ProBNP reviewed with patient.      -Fluid restriction/Sodium restriction:   Requested 2000 ml restriction  Patient has been asked to weigh daily and was provided with a printed diuretic strategy.  1,500 mg Na restriction was discussed.      -Acute vs. Chronic underlying conditions other than HF addressed during visit:   Essential HTN:   Continue Hydralazine 75mg TID.    Continue Imdur.   Stopped Norvasc  in prior visit 2/2 leg swelling; however, patient may have started taking again.  Advised to continue taking if he is presently taking.      ASCVD:   Continue Brilinta & ASA.   Continue atorvastatin.   [facility] report from Jan 2024 reviewed and available within document.    EKG with NSR.      IDDM type 2:   Continue management per primary.      CKD stage 3b likely 2/2 DM type 2, ID in combination with recent diuresis:   Continue f/u with Dr. Olvera.    Baseline per January 2024 Nephro inpatient documentation is around 1.8.    BMP today with creatinine with slight hit after cath dye. Repeat BMP already ordered per Susan Coates NP.        Identifiable barriers to Heart Failure Self-care:   Medical Barriers:   Multiple medical comorbidities  Social Barriers:  No immediate known barriers.      -Sleep/Apnea:   Will refer for outpatient sleep study evaluation.      --------------------------------------------------------------------------------        Class 3 Severe Obesity (BMI >=40). Obesity-related health conditions include the following: hypertension, coronary heart disease, diabetes mellitus, dyslipidemias, and GERD. Obesity is unchanged. BMI is is above average; BMI management plan is completed. We discussed portion control, increasing exercise, and heart failure dietary management strategies .              >45 minutes out of 60 minutes face to face spent counseling patient extensively on dietary Na+ intake, importance of activity, weight monitoring, compliance with medications in addition to importance of titration with goal directed medical therapy and follow up appointments.            This document has been electronically signed by Zuri Johnson PA-C  January 18, 2024 09:59 EST      Dictated Utilizing Dragon Dictation: Part of this note may be an electronic transcription/translation of spoken language to printed text using the Dragon Dictation System.    Follow-up appointment and medication changes provided in hand delivered After Visit Summary as well as reviewed in the room.

## 2024-01-19 ENCOUNTER — READMISSION MANAGEMENT (OUTPATIENT)
Dept: CALL CENTER | Facility: HOSPITAL | Age: 49
End: 2024-01-19
Payer: MEDICARE

## 2024-01-19 ENCOUNTER — OFFICE VISIT (OUTPATIENT)
Dept: CARDIOLOGY | Facility: CLINIC | Age: 49
End: 2024-01-19
Payer: MEDICARE

## 2024-01-19 VITALS
OXYGEN SATURATION: 97 % | DIASTOLIC BLOOD PRESSURE: 63 MMHG | WEIGHT: 271.6 LBS | HEART RATE: 74 BPM | BODY MASS INDEX: 43.65 KG/M2 | HEIGHT: 66 IN | SYSTOLIC BLOOD PRESSURE: 112 MMHG

## 2024-01-19 DIAGNOSIS — I25.10 ASCVD (ARTERIOSCLEROTIC CARDIOVASCULAR DISEASE): Primary | ICD-10-CM

## 2024-01-19 PROCEDURE — 99213 OFFICE O/P EST LOW 20 MIN: CPT | Performed by: PHYSICIAN ASSISTANT

## 2024-01-19 PROCEDURE — 3074F SYST BP LT 130 MM HG: CPT | Performed by: PHYSICIAN ASSISTANT

## 2024-01-19 PROCEDURE — 3078F DIAST BP <80 MM HG: CPT | Performed by: PHYSICIAN ASSISTANT

## 2024-01-19 NOTE — PROGRESS NOTES
Richie Ruelas PA-C  Matthew Madrid  1975  01/19/2024    Patient Active Problem List   Diagnosis    NSTEMI, initial episode of care    NSTEMI (non-ST elevated myocardial infarction)    ASCVD (arteriosclerotic cardiovascular disease)    Essential hypertension    Dyslipidemia    DM (diabetes mellitus), type 2 with complications    SOB (shortness of breath)    Severe obesity (BMI 35.0-39.9) with comorbidity    Diarrhea    Abdominal pain    Dysphagia    Acute renal failure, unspecified acute renal failure type    Chronic heart failure with preserved ejection fraction (HFpEF)    Chest pain in adult    Acute on chronic heart failure with preserved ejection fraction (HFpEF)    CHF (congestive heart failure), NYHA class II, acute on chronic, combined       Dear Richie Ruelas PA-C:    Subjective     History of Present Illness:    Chief Complaint   Patient presents with    Follow-up     POST CATH       Matthew Madrid is a pleasant 48 y.o. male with a past medical history significant for CAD with history of acute NSTEMI with LHC on 9/2/2021 with subsequent stenting in the proximal RCA. He recently had a repeat LHC on 1/15/2024 with stenting on the mid RCA.  He also has history of CKD, essential hypertension, diabetes mellitus, dyslipidemia.  And does not smoke tobacco.  He comes in today for cardiology follow-up.     Since Matthew was last seen he was directly admitted to the hospital for consideration of coronary angiography for persistent anginal symptoms.  Although was delayed due to his renal function he ultimately was able to have this done and did receive stent in the mid to distal RCA.  He reports since stenting his chest pains have resolved he does still report some groin pain and bruising where left heart catheterization was performed.  He does still report some dyspnea but improves with his inhaler.  Renal function is elevated with creatinine at 2.28 following closely with nephrology.    Allergies    Allergen Reactions    Tuberculin Tests Rash   :      Current Outpatient Medications:     albuterol sulfate  (90 Base) MCG/ACT inhaler, Inhale 2 puffs As Needed for Wheezing or Shortness of Air., Disp: , Rfl:     allopurinol (ZYLOPRIM) 300 MG tablet, Take 1 tablet by mouth Daily., Disp: , Rfl:     ALPRAZolam (XANAX) 0.5 MG tablet, Take 1 tablet by mouth 3 (Three) Times a Day As Needed for Anxiety., Disp: , Rfl:     aspirin 81 MG EC tablet, Take 1 tablet by mouth Daily., Disp: 90 tablet, Rfl: 3    atorvastatin (LIPITOR) 40 MG tablet, Take 1 tablet by mouth Daily., Disp: , Rfl:     B Complex Vitamins (Vitamin-B Complex) tablet, Take 1 tablet by mouth Daily., Disp: , Rfl:     carvedilol (COREG) 12.5 MG tablet, Take 2 tablets by mouth 2 (Two) Times a Day With Meals., Disp: , Rfl:     Dulaglutide (Trulicity) 4.5 MG/0.5ML solution pen-injector, Inject  under the skin into the appropriate area as directed 1 (One) Time Per Week., Disp: , Rfl:     ferrous sulfate 325 (65 FE) MG tablet, Take 1 tablet by mouth 3 (Three) Times a Day With Meals., Disp: , Rfl:     FLUoxetine (PROzac) 40 MG capsule, Take 1 capsule by mouth Daily., Disp: , Rfl:     gabapentin (NEURONTIN) 600 MG tablet, Take 1 tablet by mouth 3 (Three) Times a Day., Disp: , Rfl:     hydrALAZINE (APRESOLINE) 50 MG tablet, Take 1 tablet by mouth Every 8 (Eight) Hours., Disp: 90 tablet, Rfl: 1    insulin NPH-insulin regular (humuLIN 70/30,novoLIN 70/30) (70-30) 100 UNIT/ML injection, Inject 35 Units under the skin into the appropriate area as directed 2 (Two) Times a Day With Meals., Disp: , Rfl:     isosorbide mononitrate (IMDUR) 120 MG 24 hr tablet, Take 1 tablet by mouth Daily., Disp: , Rfl:     Krill Oil (Omega-3) 500 MG capsule, Take 1 capsule by mouth Daily., Disp: , Rfl:     levocetirizine (XYZAL) 5 MG tablet, Take 1 tablet by mouth Every Evening., Disp: , Rfl:     magnesium oxide (MAG-OX) 400 MG tablet, Take 2 tablets by mouth Daily., Disp: , Rfl:      nitroglycerin (NITROSTAT) 0.4 MG SL tablet, Place 1 tablet under the tongue Every 5 (Five) Minutes As Needed for Chest Pain (Only if SBP Greater Than 100). Take no more than 3 doses in 15 minutes. If no relief, seek medical attention., Disp: 25 tablet, Rfl: 0    pantoprazole (Protonix) 40 MG EC tablet, Take 1 tablet by mouth Daily., Disp: 30 tablet, Rfl: 11    promethazine (PHENERGAN) 12.5 MG tablet, Take 1 tablet by mouth Every 6 (Six) Hours As Needed for Nausea or Vomiting., Disp: , Rfl:     ranolazine (RANEXA) 1000 MG 12 hr tablet, Take 1 tablet by mouth Every 12 (Twelve) Hours., Disp: 60 tablet, Rfl: 2    tamsulosin (FLOMAX) 0.4 MG capsule 24 hr capsule, Take 1 capsule by mouth Daily., Disp: 30 capsule, Rfl: 3    ticagrelor (BRILINTA) 90 MG tablet tablet, Take 1 tablet by mouth 2 (Two) Times a Day., Disp: 180 tablet, Rfl: 3    traZODone (DESYREL) 100 MG tablet, Take 1 tablet by mouth At Night As Needed for Sleep., Disp: , Rfl:     vitamin B-12 (CYANOCOBALAMIN) 1000 MCG tablet, Take 1 tablet by mouth Daily., Disp: , Rfl:     vitamin D (ERGOCALCIFEROL) 1.25 MG (84496 UT) capsule capsule, Take 1 capsule by mouth 1 (One) Time Per Week., Disp: 5 capsule, Rfl: 3    amLODIPine (NORVASC) 5 MG tablet, Take 1 tablet by mouth Daily. He never stopped taking it (Patient not taking: Reported on 1/19/2024), Disp: , Rfl:     bumetanide (BUMEX) 1 MG tablet, Take 1 tablet by mouth as directed by prescriber as needed for weight gain (more than 2 pounds in two days). (Patient not taking: Reported on 1/19/2024), Disp: 30 tablet, Rfl: 0    butalbital-acetaminophen-caffeine (FIORICET, ESGIC) -40 MG per tablet, Take 1 tablet by mouth 2 (Two) Times a Day. (Patient not taking: Reported on 1/19/2024), Disp: , Rfl:     tiZANidine (ZANAFLEX) 4 MG tablet, Take 1 tablet by mouth 2 (Two) Times a Day As Needed for Muscle Spasms. (Patient not taking: Reported on 1/19/2024), Disp: , Rfl:     The following portions of the patient's history  "were reviewed and updated as appropriate: allergies, current medications, past family history, past medical history, past social history, past surgical history and problem list.    Social History     Tobacco Use    Smoking status: Former     Packs/day: 1     Types: Cigarettes     Quit date:      Years since quittin.0    Smokeless tobacco: Never   Vaping Use    Vaping Use: Never used   Substance Use Topics    Alcohol use: Yes     Alcohol/week: 6.0 standard drinks of alcohol     Types: 6 Glasses of wine per week     Comment: Occasional    Drug use: Never         Objective   Vitals:    24 1026   BP: 112/63   Pulse: 74   SpO2: 97%   Weight: 123 kg (271 lb 9.6 oz)   Height: 167.6 cm (66\")     Body mass index is 43.84 kg/m².    ROS    Constitutional:       General: Not in acute distress.     Appearance: Healthy appearance. Well-developed and not in distress. Not diaphoretic.   Eyes:      Conjunctiva/sclera: Conjunctivae normal.      Pupils: Pupils are equal, round, and reactive to light.   HENT:      Head: Normocephalic and atraumatic.   Neck:      Vascular: No carotid bruit or JVD.   Pulmonary:      Effort: Pulmonary effort is normal. No respiratory distress.      Breath sounds: Normal breath sounds.   Cardiovascular:      Normal rate. Regular rhythm.      Murmurs: There is a grade 2/6 harsh midsystolic murmur at the URSB, radiating to the neck.   Edema:     Peripheral edema absent.   Skin:     General: Skin is cool.   Neurological:      Mental Status: Alert, oriented to person, place, and time and oriented to person, place and time.         Lab Results   Component Value Date     2024    K 5.2 2024     2024    CO2 18.8 (L) 2024    BUN 37 (H) 2024    CREATININE 2.28 (H) 2024    GLUCOSE 234 (H) 2024    CALCIUM 8.8 2024    AST 20 2024    ALT 24 2024    ALKPHOS 70 2024    LABIL2 1.1 2023     Lab Results   Component Value Date " "   CKTOTAL 125 12/19/2023     Lab Results   Component Value Date    WBC 6.87 01/16/2024    HGB 11.9 (L) 01/16/2024    HCT 36.0 (L) 01/16/2024     01/16/2024     Lab Results   Component Value Date    INR 1.06 09/02/2021     Lab Results   Component Value Date    MG 1.8 01/18/2024     Lab Results   Component Value Date    TSH 1.440 01/09/2024    TRIG 201 (H) 03/27/2023    HDL 29 (L) 03/27/2023    LDL 34 03/27/2023      No results found for: \"BNP\"    During this visit the following were done:  Labs Reviewed []    Labs Ordered []    Radiology Reports Reviewed []    Radiology Ordered []    PCP Records Reviewed []    Referring Provider Records Reviewed []    ER Records Reviewed []    Hospital Records Reviewed []    History Obtained From Family []    Radiology Images Reviewed []    Other Reviewed []    Records Requested []       Procedures    Assessment & Plan    Diagnosis Plan   1. ASCVD (arteriosclerotic cardiovascular disease)  Ambulatory Referral to Cardiac Rehab               Recommendations:  Coronary artery disease  Symptom resolution since PCI of the RCA patient doing well tolerating both aspirin and Brilinta well without any bleeding issues emphasized the importance of continuing DAPT therapy without any missed dosages he expressed understanding.  Continue Lipitor, Coreg, Imdur, Ranexa    CKD  Following closely with nephrology has an appointment with them in a couple weeks.  Essential hypertension  He did bring in a blood pressure log where blood pressure is borderline elevated however is excellently controlled today I did ask him to bring his blood pressure device in the office to confirm accuracy.  For now we will continue current regimen.    No follow-ups on file.    As always, I appreciate very much the opportunity to participate in the cardiovascular care of your patients.      With Best Regards,    Bhupinder Graham PA-C          "

## 2024-01-19 NOTE — OUTREACH NOTE
CHF Week 1 Survey      Flowsheet Row Responses   [facility] patient discharged from? [place]   Does the patient have one of the following disease processes/diagnoses(primary or secondary)? CHF   CHF Week 1 attempt successful? Yes   Call start time 0921   Call end time 0929   Discharge diagnosis ASCVD status post stent of the distal RCA 09/20/2021 and 01/15/2024  Acute on chronic HFpEF NYHA class II combined systolic and diastolic   Person spoke with today (if not patient) and relationship Patient   Meds reviewed with patient/caregiver? Yes   Does the patient have all medications ordered at discharge? Yes   Is the patient taking all medications as directed (includes completed medication regime)? No   What is preventing the patient from taking all medications as directed? Other  [Patient reports that he has not taken the diuretic ordered because the kidney Dr told him not to take them]   Nursing Interventions Advised patient to call provider   Does the patient have a primary care provider?  Yes   Does the patient have an appointment with their PCP within 7 days of discharge? No   Comments regarding PCP PCP Alonzo Ruelas   What is preventing the patient from scheduling follow up appointments within 7 days of discharge? --  [Patient seen at [facility] Failure [facility] yesterday]   Has the patient kept scheduled appointments due by today? Yes   Comments Patient had cardiology appt today 1/19  1030am. Patient reports will try to go, may have to reschedule if can't get there because of snow.   Has home health visited the patient within 72 hours of discharge? N/A   Pulse Ox monitoring None   Psychosocial issues? No   Did the patient receive a copy of their discharge instructions? Yes   Nursing interventions Reviewed instructions with patient   What is the patient's perception of their health status since discharge? Same   Is the patient able to teach back signs and symptoms of worsening condition? (i.e. weight gain,  shortness of air, etc.) Yes   If the patient is a current smoker, are they able to teach back resources for cessation? Not a smoker   Is the patient/caregiver able to teach back the hierarchy of who to call/visit for symptoms/problems? PCP, Specialist, Home health nurse, Urgent Care, ED, 911 Yes   CHF Zone this Call Yellow Zone   Yellow Zone Sudden weight gain of more than 2-3 lbs in a 24 hour period (or 5 lbs in a week)  [Reports 4# gain overnight.]   Yellow Zone Interventions Consider contacting your doctor or health care team, Consider asking your health care team about a change in medications  [Advised to contact heart failure clinic and report weight gain]    CHF Week 1 call completed? Yes   Is the patient interested in additional calls from an ambulatory ? No   Would this patient benefit from a Referral to [facility] Social Work? No   Call end time 0929            LEXA RIVERA - Registered Nurse

## 2024-01-22 ENCOUNTER — LAB (OUTPATIENT)
Dept: LAB | Facility: HOSPITAL | Age: 49
End: 2024-01-22
Payer: MEDICARE

## 2024-01-22 ENCOUNTER — TELEPHONE (OUTPATIENT)
Dept: CARDIOLOGY | Facility: CLINIC | Age: 49
End: 2024-01-22
Payer: MEDICARE

## 2024-01-22 ENCOUNTER — HOSPITAL ENCOUNTER (OUTPATIENT)
Dept: PULMONOLOGY | Facility: HOSPITAL | Age: 49
Discharge: HOME | End: 2024-01-22
Payer: MEDICARE

## 2024-01-22 VITALS
DIASTOLIC BLOOD PRESSURE: 76 MMHG | HEART RATE: 81 BPM | WEIGHT: 266 LBS | OXYGEN SATURATION: 98 % | HEIGHT: 66 IN | BODY MASS INDEX: 42.75 KG/M2 | SYSTOLIC BLOOD PRESSURE: 158 MMHG

## 2024-01-22 DIAGNOSIS — E66.01 MORBID OBESITY WITH BMI OF 40.0-44.9, ADULT: ICD-10-CM

## 2024-01-22 DIAGNOSIS — I27.20 PULMONARY HYPERTENSION: ICD-10-CM

## 2024-01-22 DIAGNOSIS — I25.10 ASCVD (ARTERIOSCLEROTIC CARDIOVASCULAR DISEASE): ICD-10-CM

## 2024-01-22 DIAGNOSIS — N18.30 STAGE 3 CHRONIC KIDNEY DISEASE, UNSPECIFIED WHETHER STAGE 3A OR 3B CKD: ICD-10-CM

## 2024-01-22 DIAGNOSIS — E87.5 HYPERKALEMIA: Primary | ICD-10-CM

## 2024-01-22 DIAGNOSIS — R06.02 SOB (SHORTNESS OF BREATH): Primary | ICD-10-CM

## 2024-01-22 DIAGNOSIS — G47.19 EXCESSIVE DAYTIME SLEEPINESS: ICD-10-CM

## 2024-01-22 DIAGNOSIS — R06.02 SOB (SHORTNESS OF BREATH): ICD-10-CM

## 2024-01-22 LAB
ANION GAP SERPL CALCULATED.3IONS-SCNC: 9.1 MMOL/L (ref 5–15)
BUN SERPL-MCNC: 35 MG/DL (ref 6–20)
BUN/CREAT SERPL: 13.2 (ref 7–25)
CALCIUM SPEC-SCNC: 8.8 MG/DL (ref 8.6–10.5)
CHLORIDE SERPL-SCNC: 107 MMOL/L (ref 98–107)
CO2 SERPL-SCNC: 22.9 MMOL/L (ref 22–29)
CREAT SERPL-MCNC: 2.65 MG/DL (ref 0.76–1.27)
EGFRCR SERPLBLD CKD-EPI 2021: 28.8 ML/MIN/1.73
GLUCOSE SERPL-MCNC: 122 MG/DL (ref 65–99)
POTASSIUM SERPL-SCNC: 5.6 MMOL/L (ref 3.5–5.2)
SODIUM SERPL-SCNC: 139 MMOL/L (ref 136–145)

## 2024-01-22 PROCEDURE — 80048 BASIC METABOLIC PNL TOTAL CA: CPT

## 2024-01-22 PROCEDURE — 94010 BREATHING CAPACITY TEST: CPT

## 2024-01-22 PROCEDURE — 99205 OFFICE O/P NEW HI 60 MIN: CPT | Performed by: PHYSICIAN ASSISTANT

## 2024-01-22 PROCEDURE — 36415 COLL VENOUS BLD VENIPUNCTURE: CPT

## 2024-01-22 RX ORDER — CARVEDILOL 25 MG/1
25 TABLET ORAL 2 TIMES DAILY WITH MEALS
COMMUNITY

## 2024-01-22 RX ORDER — SODIUM ZIRCONIUM CYCLOSILICATE 10 G/10G
10 POWDER, FOR SUSPENSION ORAL DAILY
Qty: 7 PACKET | Refills: 0 | Status: SHIPPED | OUTPATIENT
Start: 2024-01-22

## 2024-01-22 RX ORDER — ALBUTEROL SULFATE 2.5 MG/3ML
2.5 SOLUTION RESPIRATORY (INHALATION) EVERY 6 HOURS PRN
Qty: 360 ML | Refills: 2 | Status: SHIPPED | OUTPATIENT
Start: 2024-01-22

## 2024-01-22 RX ORDER — ALBUTEROL SULFATE 2.5 MG/3ML
2.5 SOLUTION RESPIRATORY (INHALATION) EVERY 8 HOURS PRN
COMMUNITY
Start: 2024-01-18 | End: 2024-01-22 | Stop reason: SDUPTHER

## 2024-01-22 RX ORDER — MAGNESIUM GLUCONATE 27 MG(500)
1 TABLET ORAL DAILY
COMMUNITY
Start: 2024-01-18

## 2024-01-22 NOTE — PROGRESS NOTES
"    NAME:___Matthew Madrid  :_1975_  APPOINTMENT DATE/TIME:___________24___17:04 EST___________    COPD Education Evaluation            COPD Education Completed (See Note) Yes     COPD Clinic encounter: 1st    Referring/consulting Provider: Zuri Johnson PA-C     Reason for Consultation:  Suspected COPD   COPD Diagnosis Length Unknown     Current Outpatient Pulmonologist     NO           Subjective            PFT History      Spirometry Completed YES     Fev1 75     Fev1/FVC 77     GOLD Stage FEV!/FVC greater that 70%, therefore cannot stage for COPD.           Classification of Airflow Limitation Severity in COPD (Based on Post-Bronchodilator FEV1)           Gold 1: Mild FEV1 ? 80% predicted      Gold 2:  Moderate 50% ? FEV1 < 80% predicted   Gold 3: Severe 30% ? FEV1 < 50% predicted   Gold 4: Very Severe FEV1 < 30% predicted            Dyspnea:        Do you have Dyspnea? With Exertion.       DME Equipment Used:              Home O2? NO     Home O2 device? N/a     Nebulizer YES     NIPPV None      Objective:    Home Medications:  (Not in a hospital admission)      Barriers to Learning? No    Discussed:             COPD education given via the booklet \"A Patient's Guide to COPD\".     COPD Zones: (Green/yellow/Red): YES     Exacerbation or Flare up signs and symptoms: YES       COPD Medication Non-Compliance YES       Managing Medications: YES     Patient understands use of Rescue Medications: YES     Type of Rescue inhaler patient currently has: Albuterol   Patient understands use of Maintenance Medications: Does not have COPD maintenance     Type of Maintenance inhaler patient currently has: N/A    Proper MDI technique (w/wo Spacer): YES     Provided patient a Spacer: YES       Breathing Techniques:            Purse-lipped breathing YES          Diaphragmatic breathing NO     Oxygen therapy SAFETY:  YES           Action Plan            Aerobika for sputum mobilization: NO     Rescue Inhaler " ordered: NO     COPD Maintenance Inhaler ordered: NO     COPD/[facility] follow up scheduled: NO     Education Minutes with patient: YES and 30 minutes         Comments: Mr. Madrid presented to the COPD Clinic for a initial evaluation, to be evaluated for possible COPD. Spirometry was completed but per my opinion did not show evidence of COPD based on Spirometry results. Results are shown above. His FEV1/FVC result was greater than 70, per GOLD Guidelines, FEV1/FVC must be 70 or less to stage for COPD. He did state he has some wheezing on occation and uses Albuterol MDI to control symptoms as well as Albuterol nebulizer treatments.     Today, Spirometry was completed, Alpha 1 Genotype sample was collected and placed in the mail, and we discussed the topics listed above.     Thanks for allowing me to participate in his care.       CARLOS Salazar, RRT  COPD Navigator

## 2024-01-22 NOTE — PROGRESS NOTES
Please call him to tell him that his kidney function has worsened.  He needs to see his nephrologist as soon as possible.  Please fax most recent labs to their office.

## 2024-01-22 NOTE — PROGRESS NOTES
COPD CLINIC Questionnaire      APPOINTMENT DATE/TIME:____24___15:00 EST___________  NAME:___Matthew Madrid  :_1975_  Dx_n/a       LENGTH OF DX:n/a  PULMONOLOGIST:_none  LAST OUTPATIENT PULMONARY VISIT:_none   DO YOU USE NIPPV:_No_  Device:__None  RECENT WEIGHT LOSS:   _No __  # OF PILLOWS DURING SLEEP:___2__  FLAT OR INCLINED BED:__Flat bed_  DO YOU HAVE DYSPNEA:__Yes_  DYSPNEA DURING:_Rest_  SMOKE HX:former smoker, quit in   #_OF HOSPITAL STAYS IN THE LAST YEAR DUE TO LUNG DISEASE:_none_  # OF ER VISITS IN THE LAST YEAR DUE TO SOB:__none          mMRC Dyspnea Scale        mMRC SCORE: 4         STOP BANG Screening Questionnaire       Snoring?   Do you snore loudly (loud enough to be heard through closed doors or that your bed partner elbows you for snoring at night)?     yes  Tired?   Do you often feel tired, fatigued, or sleepy during the daytime (such as falling asleep during driving or talking to someone)?     yes  Observed?   Has anyone observed you stop breathing or choking/gasping during your sleep?    no  Pressure?   Do you have or are you being treated for high blood pressure?           yes  Body mass index (BMI) more than 35 kg/m2?       yes  Age older than 50?      no  Neck size large (measured around Kaiden's apple)? Is your shirt collar 16 inches (40 cm) or larger?   yes  Gender (biologic sex): male?      yes  STOP BANG Score:____6_____          STOP BANG Interpretation     Risk category Risk factors    Low risk             Yes to 0 to 2 of the questions    Intermediate risk Yes to 3 to 4 questions    High risk  Yes to 5 to 8 questions    High risk  Yes to 2 or more of 4 STOP questions and BMI >35 kg/m2    High risk  Yes to 2 or more of 4 STOP questions and neck circumference ?16 inches (?40 cm)    High risk  Yes to 2 or more of 4 STOP questions and male gender (biologic sex)               Grade: B    Comments:

## 2024-01-22 NOTE — TELEPHONE ENCOUNTER
Spoke to patient regarding instructions of taking Lokelma per Susan's request. Patient is understanding and will follow directions and keep appointment with Dr Naik

## 2024-01-22 NOTE — PROGRESS NOTES
Subjective      Chief Complaint  COPD (Pt states he is not on oxygen at home)    Subjective      History of Present Illness  Matthew Madrid is a 48 y.o. male who presents today to [facility] with past medical history of ASCVD s/p stenting, chronic HFpEF, hyperlipidemia, CKD stage III, insulin-dependent type II diabetes mellitus, GERD, anxiety/depression, and former smoker who presents today for COPD (Pt states he is not on oxygen at home). This visit is a initial evaluation  appointment.     COPD (Pt states he is not on oxygen at home):  Patient follows with heart failure clinic and was referred for further evaluation of COPD and ALIA. Patient has never been evaluated for COPD or asthma in the past. He states he has had a sleep study in the past around 8-9 years ago but can't remember the results of it.     He states that he has shortness of breath with exertion. He states that he gets short of breath performing his ADL's like getting dressed. He has a daily cough with mostly clear sputum production. He reports that he has some occasional wheezing. He has an albuterol inhaler and states that he gets some relief, but it doesn't help much.    He reports former history of smoking. He states that he smoked 1 pack per day for 4 years and achieved cessation in [id number]. He is exposed to second hand smoke exposure through his wife.     He has been told that he snores. He has woke up from sleep gasping for air and feeling short of breath. He feels tired and fatigued throughout the day. He gets an average of 6 hours of sleep per night.       Current Outpatient Medications:     albuterol (PROVENTIL) (2.5 MG/3ML) 0.083% nebulizer solution, Take 2.5 mg by nebulization Every 6 (Six) Hours As Needed for Shortness of Air., Disp: 360 mL, Rfl: 2    albuterol sulfate  (90 Base) MCG/ACT inhaler, Inhale 2 puffs As Needed for Wheezing or Shortness of Air., Disp: , Rfl:     allopurinol (ZYLOPRIM)  300 MG tablet, Take 1 tablet by mouth Daily., Disp: , Rfl:     ALPRAZolam (XANAX) 0.5 MG tablet, Take 1 tablet by mouth 3 (Three) Times a Day As Needed for Anxiety., Disp: , Rfl:     aspirin 81 MG EC tablet, Take 1 tablet by mouth Daily., Disp: 90 tablet, Rfl: 3    atorvastatin (LIPITOR) 40 MG tablet, Take 1 tablet by mouth Daily., Disp: , Rfl:     B Complex Vitamins (Vitamin-B Complex) tablet, Take 1 tablet by mouth Daily., Disp: , Rfl:     carvedilol (COREG) 25 MG tablet, Take 1 tablet by mouth 2 (Two) Times a Day With Meals., Disp: , Rfl:     Dulaglutide (Trulicity) 4.5 MG/0.5ML solution pen-injector, Inject  under the skin into the appropriate area as directed 1 (One) Time Per Week., Disp: , Rfl:     ferrous sulfate 325 (65 FE) MG tablet, Take 1 tablet by mouth 3 (Three) Times a Day With Meals., Disp: , Rfl:     FLUoxetine (PROzac) 40 MG capsule, Take 1 capsule by mouth Daily., Disp: , Rfl:     gabapentin (NEURONTIN) 800 MG tablet, Take 1 tablet by mouth 3 (Three) Times a Day., Disp: , Rfl:     hydrALAZINE (APRESOLINE) 50 MG tablet, Take 1 tablet by mouth Every 8 (Eight) Hours., Disp: 90 tablet, Rfl: 1    insulin NPH-insulin regular (humuLIN 70/30,novoLIN 70/30) (70-30) 100 UNIT/ML injection, Inject 36 Units under the skin into the appropriate area as directed 2 (Two) Times a Day With Meals., Disp: , Rfl:     isosorbide mononitrate (IMDUR) 120 MG 24 hr tablet, Take 1 tablet by mouth Daily., Disp: , Rfl:     Krill Oil (Omega-3) 500 MG capsule, Take 1 capsule by mouth Daily., Disp: , Rfl:     levocetirizine (XYZAL) 5 MG tablet, Take 1 tablet by mouth Every Evening., Disp: , Rfl:     Mag-G 500 (27 Mg) MG tablet, Take 1 tablet by mouth Daily., Disp: , Rfl:     nitroglycerin (NITROSTAT) 0.4 MG SL tablet, Place 1 tablet under the tongue Every 5 (Five) Minutes As Needed for Chest Pain (Only if SBP Greater Than 100). Take no more than 3 doses in 15 minutes. If no relief, seek medical attention., Disp: 25 tablet, Rfl:  "0    pantoprazole (Protonix) 40 MG EC tablet, Take 1 tablet by mouth Daily., Disp: 30 tablet, Rfl: 11    promethazine (PHENERGAN) 12.5 MG tablet, Take 1 tablet by mouth 2 (Two) Times a Day As Needed for Nausea or Vomiting., Disp: , Rfl:     ranolazine (RANEXA) 1000 MG 12 hr tablet, Take 1 tablet by mouth Every 12 (Twelve) Hours., Disp: 60 tablet, Rfl: 2    tamsulosin (FLOMAX) 0.4 MG capsule 24 hr capsule, Take 1 capsule by mouth Daily., Disp: 30 capsule, Rfl: 3    ticagrelor (BRILINTA) 90 MG tablet tablet, Take 1 tablet by mouth 2 (Two) Times a Day., Disp: 180 tablet, Rfl: 3    traZODone (DESYREL) 100 MG tablet, Take 1 tablet by mouth At Night As Needed for Sleep., Disp: , Rfl:     vitamin B-12 (CYANOCOBALAMIN) 1000 MCG tablet, Take 1 tablet by mouth Daily., Disp: , Rfl:     vitamin D (ERGOCALCIFEROL) 1.25 MG (02402 UT) capsule capsule, Take 1 capsule by mouth 1 (One) Time Per Week., Disp: 5 capsule, Rfl: 3    Pneumococcal 20-Beata Conj Vacc (Prevnar 20) 0.5 ML suspension prefilled syringe vaccine, Inject 0.5 mL into the appropriate muscle as directed by prescriber 1 (One) Time for 1 dose., Disp: 0.5 mL, Rfl: 0    sodium zirconium cyclosilicate (Lokelma) 10 g pack, Take 10 g by mouth Daily. Take 10 g by mouth daily for 3 days.  Then take 10 g every Monday until seen by nephrology, Disp: 7 packet, Rfl: 0      Allergies   Allergen Reactions    Tuberculin Tests Rash       Objective     Objective   Vital Signs:  /76   Pulse 81   Ht 167.6 cm (66\")   Wt 121 kg (266 lb)   SpO2 98%   BMI 42.93 kg/m²   Estimated body mass index is 42.93 kg/m² as calculated from the following:    Height as of this encounter: 167.6 cm (66\").    Weight as of this encounter: 121 kg (266 lb).          Past Medical History:   Diagnosis Date    ASCVD (arteriosclerotic cardiovascular disease) 09/13/2021    Chronic heart failure with preserved ejection fraction (HFpEF) 11/27/2023    Diabetes mellitus     Elevated cholesterol     GERD " (gastroesophageal reflux disease)     Hyperlipidemia     Hypertension      Past Surgical History:   Procedure Laterality Date    CARDIAC CATHETERIZATION N/A 2021    Procedure: Left Heart Cath;  Surgeon: Vasile Moulton MD;  Location: [facility] CATH INVASIVE LOCATION;  Service: Cardiology;  Laterality: N/A;    CARDIAC CATHETERIZATION N/A 2021    Procedure: Percutaneous Coronary Intervention;  Surgeon: Vasile Moulton MD;  Location: [facility] CATH INVASIVE LOCATION;  Service: Cardiology;  Laterality: N/A;    CARDIAC CATHETERIZATION N/A 1/15/2024    Procedure: Coronary angiography;  Surgeon: Anant Bennett MD;  [facility] CATH INVASIVE [facility];  Service: Cardiology;  Laterality: N/A;    COLONOSCOPY      COLONOSCOPY N/A 10/4/2022    Procedure: COLONOSCOPY;  Surgeon: Gianluca Rodríguez MD;  Location: [facility] OR;  Service: Gastroenterology;  Laterality: N/A;    ENDOSCOPY      ENDOSCOPY N/A 10/4/2022    Procedure: ESOPHAGOGASTRODUODENOSCOPY;  Surgeon: Gianluac Rodríguez MD;  Location: [facility] OR;  Service: Gastroenterology;  Laterality: N/A;    LAPAROSCOPIC CHOLECYSTECTOMY       Social History     Socioeconomic History    Marital status:    Tobacco Use    Smoking status: Former     Packs/day: 1     Types: Cigarettes     Quit date: 2000     Years since quittin.0    Smokeless tobacco: Never   Vaping Use    Vaping Use: Never used   Substance and Sexual Activity    Alcohol use: Yes     Alcohol/week: 6.0 standard drinks of alcohol     Types: 6 Glasses of wine per week     Comment: Occasional    Drug use: Never    Sexual activity: Defer      Physical Exam  Constitutional:       General: He is awake.      Appearance: Normal appearance. He is morbidly obese.   HENT:      Head: Normocephalic and atraumatic.      Nose: Nose normal.      Mouth/Throat:      Mouth: Mucous membranes are moist.      Pharynx: Oropharynx is clear.   Eyes:      Conjunctiva/sclera: Conjunctivae normal.      Pupils: Pupils are  "equal, round, and reactive to light.   Cardiovascular:      Rate and Rhythm: Normal rate and regular rhythm.      Pulses: Normal pulses.      Heart sounds: Normal heart sounds. No murmur heard.     No friction rub. No gallop.   Pulmonary:      Effort: Pulmonary effort is normal. No tachypnea, accessory muscle usage or respiratory distress.      Breath sounds: Normal breath sounds. No decreased breath sounds, wheezing, rhonchi or rales.   Musculoskeletal:         General: Normal range of motion.      Cervical back: Full passive range of motion without pain and normal range of motion.   Skin:     General: Skin is warm and dry.   Neurological:      General: No focal deficit present.      Mental Status: He is alert. Mental status is at baseline.   Psychiatric:         Mood and Affect: Mood normal.         Behavior: Behavior normal. Behavior is cooperative.         Thought Content: Thought content normal.        Result Review :  The following labs and radiology results have been reviewed.    Lab Review:   No results found for: \"FEV1\", \"FVC\", \"AZG2CAS\", \"TLC\", \"DLCO\"  Lab on 01/22/2024   Component Date Value Ref Range Status    Glucose 01/22/2024 122 (H)  65 - 99 mg/dL Final    BUN 01/22/2024 35 (H)  6 - 20 mg/dL Final    Creatinine 01/22/2024 2.65 (H)  0.76 - 1.27 mg/dL Final    Sodium 01/22/2024 139  136 - 145 mmol/L Final    Potassium 01/22/2024 5.6 (H)  3.5 - 5.2 mmol/L Final    Chloride 01/22/2024 107  98 - 107 mmol/L Final    CO2 01/22/2024 22.9  22.0 - 29.0 mmol/L Final    Calcium 01/22/2024 8.8  8.6 - 10.5 mg/dL Final    BUN/Creatinine Ratio 01/22/2024 13.2  7.0 - 25.0 Final    Anion Gap 01/22/2024 9.1  5.0 - 15.0 mmol/L Final    eGFR 01/22/2024 28.8 (L)  >60.0 mL/min/1.73 Final   Hospital Outpatient Visit on 01/18/2024   Component Date Value Ref Range Status    Absolute Lung Fluid Content 01/18/2024 32  20 - 35 % Final    Glucose 01/18/2024 234 (H)  65 - 99 mg/dL Final    BUN 01/18/2024 37 (H)  6 - 20 mg/dL Final "    Creatinine 01/18/2024 2.28 (H)  0.76 - 1.27 mg/dL Final    Sodium 01/18/2024 136  136 - 145 mmol/L Final    Potassium 01/18/2024 5.2  3.5 - 5.2 mmol/L Final    Slight hemolysis detected by analyzer. Result may be falsely elevated.    Chloride 01/18/2024 107  98 - 107 mmol/L Final    CO2 01/18/2024 18.8 (L)  22.0 - 29.0 mmol/L Final    Calcium 01/18/2024 8.8  8.6 - 10.5 mg/dL Final    BUN/Creatinine Ratio 01/18/2024 16.2  7.0 - 25.0 Final    Anion Gap 01/18/2024 10.2  5.0 - 15.0 mmol/L Final    eGFR 01/18/2024 34.5 (L)  >60.0 mL/min/1.73 Final    proBNP 01/18/2024 226.3  0.0 - 450.0 pg/mL Final    Magnesium 01/18/2024 1.8  1.6 - 2.6 mg/dL Final   No results displayed because visit has over 200 results.      Lab on 12/28/2023   Component Date Value Ref Range Status    Glucose 12/28/2023 212 (H)  65 - 99 mg/dL Final    BUN 12/28/2023 22 (H)  6 - 20 mg/dL Final    Creatinine 12/28/2023 1.87 (H)  0.76 - 1.27 mg/dL Final    Sodium 12/28/2023 137  136 - 145 mmol/L Final    Potassium 12/28/2023 4.4  3.5 - 5.2 mmol/L Final    Chloride 12/28/2023 107  98 - 107 mmol/L Final    CO2 12/28/2023 22.5  22.0 - 29.0 mmol/L Final    Calcium 12/28/2023 8.8  8.6 - 10.5 mg/dL Final    BUN/Creatinine Ratio 12/28/2023 11.8  7.0 - 25.0 Final    Anion Gap 12/28/2023 7.5  5.0 - 15.0 mmol/L Final    eGFR 12/28/2023 43.8 (L)  >60.0 mL/min/1.73 Final   Hospital Outpatient Visit on 12/19/2023   Component Date Value Ref Range Status    Glucose 12/19/2023 259 (H)  65 - 99 mg/dL Final    BUN 12/19/2023 38 (H)  6 - 20 mg/dL Final    Creatinine 12/19/2023 1.90 (H)  0.76 - 1.27 mg/dL Final    Sodium 12/19/2023 135 (L)  136 - 145 mmol/L Final    Potassium 12/19/2023 4.3  3.5 - 5.2 mmol/L Final    Chloride 12/19/2023 103  98 - 107 mmol/L Final    CO2 12/19/2023 21.7 (L)  22.0 - 29.0 mmol/L Final    Calcium 12/19/2023 9.2  8.6 - 10.5 mg/dL Final    BUN/Creatinine Ratio 12/19/2023 20.0  7.0 - 25.0 Final    Anion Gap 12/19/2023 10.3  5.0 - 15.0 mmol/L  Final    eGFR 12/19/2023 43.0 (L)  >60.0 mL/min/1.73 Final    proBNP 12/19/2023 125.9  0.0 - 450.0 pg/mL Final    Magnesium 12/19/2023 1.3 (L)  1.6 - 2.6 mg/dL Final    Absolute Lung Fluid Content 12/19/2023 32  20 - 35 % Final    Creatine Kinase 12/19/2023 125  20 - 200 U/L Final    QT Interval 12/19/2023 436  ms Final    QTC Interval 12/19/2023 473  ms Final   Lab on 12/12/2023   Component Date Value Ref Range Status    Glucose 12/12/2023 108 (H)  65 - 99 mg/dL Final    BUN 12/12/2023 25 (H)  6 - 20 mg/dL Final    Creatinine 12/12/2023 1.67 (H)  0.76 - 1.27 mg/dL Final    Sodium 12/12/2023 138  136 - 145 mmol/L Final    Potassium 12/12/2023 4.0  3.5 - 5.2 mmol/L Final    Chloride 12/12/2023 103  98 - 107 mmol/L Final    CO2 12/12/2023 22.7  22.0 - 29.0 mmol/L Final    Calcium 12/12/2023 9.2  8.6 - 10.5 mg/dL Final    BUN/Creatinine Ratio 12/12/2023 15.0  7.0 - 25.0 Final    Anion Gap 12/12/2023 12.3  5.0 - 15.0 mmol/L Final    eGFR 12/12/2023 50.2 (L)  >60.0 mL/min/1.73 Final   Lab on 12/06/2023   Component Date Value Ref Range Status    Glucose 12/06/2023 203 (H)  65 - 99 mg/dL Final    BUN 12/06/2023 28 (H)  6 - 20 mg/dL Final    Creatinine 12/06/2023 1.90 (H)  0.76 - 1.27 mg/dL Final    Sodium 12/06/2023 138  136 - 145 mmol/L Final    Potassium 12/06/2023 4.2  3.5 - 5.2 mmol/L Final    Chloride 12/06/2023 103  98 - 107 mmol/L Final    CO2 12/06/2023 25.1  22.0 - 29.0 mmol/L Final    Calcium 12/06/2023 8.3 (L)  8.6 - 10.5 mg/dL Final    Total Protein 12/06/2023 6.4  6.0 - 8.5 g/dL Final    Albumin 12/06/2023 3.8  3.5 - 5.2 g/dL Final    ALT (SGPT) 12/06/2023 25  1 - 41 U/L Final    AST (SGOT) 12/06/2023 28  1 - 40 U/L Final    Alkaline Phosphatase 12/06/2023 63  39 - 117 U/L Final    Total Bilirubin 12/06/2023 0.4  0.0 - 1.2 mg/dL Final    Globulin 12/06/2023 2.6  gm/dL Final    A/G Ratio 12/06/2023 1.5  g/dL Final    BUN/Creatinine Ratio 12/06/2023 14.7  7.0 - 25.0 Final    Anion Gap 12/06/2023 9.9  5.0 - 15.0  mmol/L Final    eGFR 12/06/2023 43.0 (L)  >60.0 mL/min/1.73 Final    Creatine Kinase 12/06/2023 422 (H)  20 - 200 U/L Final    Magnesium 12/06/2023 1.6  1.6 - 2.6 mg/dL Final   Hospital Outpatient Visit on 12/05/2023   Component Date Value Ref Range Status    proBNP 12/05/2023 139.1  0.0 - 450.0 pg/mL Final    Absolute Lung Fluid Content 12/05/2023 35  20 - 35 % Final    Glucose 12/05/2023 301 (H)  65 - 99 mg/dL Final    BUN 12/05/2023 40 (H)  6 - 20 mg/dL Final    Creatinine 12/05/2023 2.18 (H)  0.76 - 1.27 mg/dL Final    Sodium 12/05/2023 136  136 - 145 mmol/L Final    Potassium 12/05/2023 4.4  3.5 - 5.2 mmol/L Final    Chloride 12/05/2023 101  98 - 107 mmol/L Final    CO2 12/05/2023 23.7  22.0 - 29.0 mmol/L Final    Calcium 12/05/2023 8.5 (L)  8.6 - 10.5 mg/dL Final    BUN/Creatinine Ratio 12/05/2023 18.3  7.0 - 25.0 Final    Anion Gap 12/05/2023 11.3  5.0 - 15.0 mmol/L Final    eGFR 12/05/2023 36.4 (L)  >60.0 mL/min/1.73 Final    Magnesium 12/05/2023 1.2 (L)  1.6 - 2.6 mg/dL Final   Hospital Outpatient Visit on 11/28/2023   Component Date Value Ref Range Status    Glucose 11/28/2023 225 (H)  65 - 99 mg/dL Final    BUN 11/28/2023 24 (H)  6 - 20 mg/dL Final    Creatinine 11/28/2023 1.62 (H)  0.76 - 1.27 mg/dL Final    Sodium 11/28/2023 138  136 - 145 mmol/L Final    Potassium 11/28/2023 4.9  3.5 - 5.2 mmol/L Final    Chloride 11/28/2023 106  98 - 107 mmol/L Final    CO2 11/28/2023 21.9 (L)  22.0 - 29.0 mmol/L Final    Calcium 11/28/2023 9.1  8.6 - 10.5 mg/dL Final    BUN/Creatinine Ratio 11/28/2023 14.8  7.0 - 25.0 Final    Anion Gap 11/28/2023 10.1  5.0 - 15.0 mmol/L Final    eGFR 11/28/2023 52.0 (L)  >60.0 mL/min/1.73 Final    proBNP 11/28/2023 109.6  0.0 - 450.0 pg/mL Final    Magnesium 11/28/2023 1.5 (L)  1.6 - 2.6 mg/dL Final    Absolute Lung Fluid Content 11/28/2023 34  20 - 35 % Final    Glucose 11/28/2023 223 (H)  65 - 99 mg/dL Final    BUN 11/28/2023 23 (H)  6 - 20 mg/dL Final    Creatinine 11/28/2023  1.61 (H)  0.76 - 1.27 mg/dL Final    Sodium 11/28/2023 138  136 - 145 mmol/L Final    Potassium 11/28/2023 4.9  3.5 - 5.2 mmol/L Final    Chloride 11/28/2023 107  98 - 107 mmol/L Final    CO2 11/28/2023 21.8 (L)  22.0 - 29.0 mmol/L Final    Calcium 11/28/2023 9.0  8.6 - 10.5 mg/dL Final    Total Protein 11/28/2023 6.7  6.0 - 8.5 g/dL Final    Albumin 11/28/2023 3.9  3.5 - 5.2 g/dL Final    ALT (SGPT) 11/28/2023 15  1 - 41 U/L Final    AST (SGOT) 11/28/2023 16  1 - 40 U/L Final    Alkaline Phosphatase 11/28/2023 77  39 - 117 U/L Final    Total Bilirubin 11/28/2023 0.4  0.0 - 1.2 mg/dL Final    Globulin 11/28/2023 2.8  gm/dL Final    A/G Ratio 11/28/2023 1.4  g/dL Final    BUN/Creatinine Ratio 11/28/2023 14.3  7.0 - 25.0 Final    Anion Gap 11/28/2023 9.2  5.0 - 15.0 mmol/L Final    eGFR 11/28/2023 52.4 (L)  >60.0 mL/min/1.73 Final    Creatine Kinase 11/28/2023 126  20 - 200 U/L Final   Lab on 11/07/2023   Component Date Value Ref Range Status    Color, UA 11/07/2023 Yellow  Yellow, Straw Final    Appearance, UA 11/07/2023 Clear  Clear Final    pH, UA 11/07/2023 6.0  5.0 - 8.0 Final    Specific [place], UA 11/07/2023 1.014  1.005 - 1.030 Final    Glucose, UA 11/07/2023 Negative  Negative Final    Ketones, UA 11/07/2023 Negative  Negative Final    Bilirubin, UA 11/07/2023 Negative  Negative Final    Blood, UA 11/07/2023 Negative  Negative Final    Protein, UA 11/07/2023 100 mg/dL (2+) (A)  Negative Final    Leuk Esterase, UA 11/07/2023 Negative  Negative Final    Nitrite, UA 11/07/2023 Negative  Negative Final    Urobilinogen, UA 11/07/2023 0.2 E.U./dL  0.2 - 1.0 E.U./dL Final    Uric Acid 11/07/2023 7.0  3.4 - 7.0 mg/dL Final    Creatine Kinase 11/07/2023 252 (H)  20 - 200 U/L Final    Protein/Creatinine Ratio, Urine 11/07/2023 1,196.8 (H)  0.0 - 200.0 mg/G Crea Final    Creatinine, Urine 11/07/2023 69.1  mg/dL Final    Total Protein, Urine 11/07/2023 82.7  mg/dL Final    Glucose 11/07/2023 196 (H)  65 - 99 mg/dL Final     BUN 11/07/2023 24 (H)  6 - 20 mg/dL Final    Creatinine 11/07/2023 1.38 (H)  0.76 - 1.27 mg/dL Final    Sodium 11/07/2023 139  136 - 145 mmol/L Final    Potassium 11/07/2023 4.0  3.5 - 5.2 mmol/L Final    Chloride 11/07/2023 104  98 - 107 mmol/L Final    CO2 11/07/2023 20.9 (L)  22.0 - 29.0 mmol/L Final    Calcium 11/07/2023 8.0 (L)  8.6 - 10.5 mg/dL Final    Total Protein 11/07/2023 6.3  6.0 - 8.5 g/dL Final    Albumin 11/07/2023 3.4 (L)  3.5 - 5.2 g/dL Final    ALT (SGPT) 11/07/2023 17  1 - 41 U/L Final    AST (SGOT) 11/07/2023 17  1 - 40 U/L Final    Alkaline Phosphatase 11/07/2023 70  39 - 117 U/L Final    Total Bilirubin 11/07/2023 0.3  0.0 - 1.2 mg/dL Final    Globulin 11/07/2023 2.9  gm/dL Final    A/G Ratio 11/07/2023 1.2  g/dL Final    BUN/Creatinine Ratio 11/07/2023 17.4  7.0 - 25.0 Final    Anion Gap 11/07/2023 14.1  5.0 - 15.0 mmol/L Final    eGFR 11/07/2023 63.1  >60.0 mL/min/1.73 Final    Extra Tube 11/07/2023 Hold for add-ons.   Final    Auto resulted.    RBC, UA 11/07/2023 0-2  None Seen, 0-2 /HPF Final    WBC, UA 11/07/2023 0-2  None Seen, 0-2 /HPF Final    Bacteria, UA 11/07/2023 None Seen  None Seen /HPF Final    Squamous Epithelial Cells, UA 11/07/2023 0-2  None Seen, 0-2 /HPF Final    Hyaline Casts, UA 11/07/2023 0-2  None Seen /LPF Final    Methodology 11/07/2023 Automated Microscopy   Final   There may be more visits with results that are not included.      Reviewed CT chest without contrast from 09/2021     Wet Read   Interpretation per radiologist   Interpreted by Ruben Polanco PA on 9/1/2021  1:01 PM EDT   Narrative & Impression   EXAM:    CT Chest Without Intravenous Contrast     EXAM DATE:    9/1/2021 12:13 PM     CLINICAL HISTORY:    chest pain     TECHNIQUE:    Axial computed tomography images of the chest without intravenous  contrast.  Sagittal and coronal reformatted images were created and  reviewed.  This CT exam was performed using one or more of the following  dose  reduction techniques:  automated exposure control, adjustment of  the mA and/or kV according to patient size, and/or use of iterative  reconstruction technique.     COMPARISON:    No relevant prior studies available.     FINDINGS:    LUNGS:  Unremarkable.  No mass.  No consolidation.    PLEURAL SPACE:  Unremarkable.  No pneumothorax.  No significant  effusion.    HEART:  Unremarkable.  No cardiomegaly.  No significant pericardial  effusion.    BONES/JOINTS:  Unremarkable.  No acute fracture.  No dislocation.    SOFT TISSUES:  Unremarkable.    VASCULATURE:  Unremarkable.  No thoracic aortic aneurysm.    LYMPH NODES:  Unremarkable.  No enlarged lymph nodes.     IMPRESSION:    No acute findings in the chest.     This report was finalized on 9/1/2021 12:41 PM by Dr. Ron Wharton MD.        Reviewed spirometry performed during visit    Assessment / Plan         Assessment   Diagnoses and all orders for this visit:    1. SOB (shortness of breath) (Primary)  Spirometry performed during visit which was normal without evidence of true obstruction (FEV1/FVC 77% and FEV1 75%).   Symptoms of shortness of breath likely related to underlying heart failure and body habitus.   Can continue albuterol inhaler and nebulizer treatments as needed.   Requesting new nebulizer machine, sent order to [facility].   Advised to continue follow-up with heart failure clinic for continued management.    -     Spirometry; Future  -     albuterol (PROVENTIL) (2.5 MG/3ML) 0.083% nebulizer solution; Take 2.5 mg by nebulization Every 6 (Six) Hours As Needed for Shortness of Air.  Dispense: 360 mL; Refill: 2    2. Excessive daytime sleepiness  3. Morbid obesity with BMI of 40.0-44.9, adult  4. Pulmonary hypertension  Previous echocardiogram from 2023 reviewed and notable for mild pulmonary hypertension.   Will order home sleep study to evaluate for underlying ALIA.   If sleep study is notable for ALIA will order CPAP and will make appointment  with pulmonology office.    -     Home Sleep Study; Future    Management obstructive sleep apnea also includes lifestyle modifications including weight loss, avoidance of alcohol, sedated, caffeine especially before bedtime, allowing adequate sleep time, and body position during sleep such as side versus back. 10% weight loss can result in a 50% improvement of the apnea-hypopnea index. Untreated sleep apnea can lead to cardiovascular/metabolic disorder, neurocognitive deficit, daytime sleepiness, motor vehicle accidents, depression, mood disorders and reduced quality of life.  Patient understands the risk of untreated obstructive sleep apnea and benefit benefits of the treatment. Recommended at least 4 hours of use per night for 70% of every month (approximately 21 out of 30 days) per CMS guidelines.      New Medications Ordered This Visit   Medications    albuterol (PROVENTIL) (2.5 MG/3ML) 0.083% nebulizer solution     Sig: Take 2.5 mg by nebulization Every 6 (Six) Hours As Needed for Shortness of Air.     Dispense:  360 mL     Refill:  2     I spent >/=40 minutes caring for Matthew on this date of service. This time includes time spent by me in the following activities:preparing for the visit, reviewing tests, obtaining and/or reviewing a separately obtained history, performing a medically appropriate examination and/or evaluation , counseling and educating the patient/family/caregiver, ordering medications, tests, or procedures, referring and communicating with other health care professionals , documenting information in the medical record, independently interpreting results and communicating that information with the patient/family/caregiver, and care coordination    Follow Up   No additional follow-ups at COPD clinic needed. After sleep study results reviewed will contact patient and if notable for ALIA will make appointment with pulmonology office.     Patient was given instructions and counseling regarding his  condition or for health maintenance advice. Please see specific information pulled into the AVS if appropriate.       This document has been electronically signed by Whitney Smith PA-C   January 22, 2024 17:09 EST    Dictated Utilizing Dragon Dictation: Part of this note may be an electronic transcription/translation of spoken language to printed text using the Dragon Dictation System.

## 2024-01-22 NOTE — PROGRESS NOTES
[facility]  COPD Management       Matthew Madrid is a 48 y.o. male seen in the [facility] for SOB.  He reports using an albuterol inhaler every 4 hours.  He doesn't have any type of maintenance inhaler.   He is not a smoker but is exposed to second hand smoke at home.      Past Medical History:   Diagnosis Date    ASCVD (arteriosclerotic cardiovascular disease) 2021    Chronic heart failure with preserved ejection fraction (HFpEF) 2023    Diabetes mellitus     Elevated cholesterol     GERD (gastroesophageal reflux disease)     Hyperlipidemia     Hypertension      Social History     Socioeconomic History    Marital status:    Tobacco Use    Smoking status: Former     Packs/day: 1     Types: Cigarettes     Quit date:      Years since quittin.0    Smokeless tobacco: Never   Vaping Use    Vaping Use: Never used   Substance and Sexual Activity    Alcohol use: Yes     Alcohol/week: 6.0 standard drinks of alcohol     Types: 6 Glasses of wine per week     Comment: Occasional    Drug use: Never    Sexual activity: Defer     Tuberculin tests    Current Outpatient Medications:     albuterol (PROVENTIL) (2.5 MG/3ML) 0.083% nebulizer solution, Take 2.5 mg by nebulization Every 8 (Eight) Hours As Needed for Shortness of Air., Disp: , Rfl:     albuterol sulfate  (90 Base) MCG/ACT inhaler, Inhale 2 puffs As Needed for Wheezing or Shortness of Air., Disp: , Rfl:     allopurinol (ZYLOPRIM) 300 MG tablet, Take 1 tablet by mouth Daily., Disp: , Rfl:     ALPRAZolam (XANAX) 0.5 MG tablet, Take 1 tablet by mouth 3 (Three) Times a Day As Needed for Anxiety., Disp: , Rfl:     aspirin 81 MG EC tablet, Take 1 tablet by mouth Daily., Disp: 90 tablet, Rfl: 3    atorvastatin (LIPITOR) 40 MG tablet, Take 1 tablet by mouth Daily., Disp: , Rfl:     B Complex Vitamins (Vitamin-B Complex) tablet, Take 1 tablet by mouth Daily., Disp: , Rfl:     carvedilol (COREG) 25 MG tablet, Take  1 tablet by mouth 2 (Two) Times a Day With Meals., Disp: , Rfl:     Dulaglutide (Trulicity) 4.5 MG/0.5ML solution pen-injector, Inject  under the skin into the appropriate area as directed 1 (One) Time Per Week., Disp: , Rfl:     ferrous sulfate 325 (65 FE) MG tablet, Take 1 tablet by mouth 3 (Three) Times a Day With Meals., Disp: , Rfl:     FLUoxetine (PROzac) 40 MG capsule, Take 1 capsule by mouth Daily., Disp: , Rfl:     gabapentin (NEURONTIN) 800 MG tablet, Take 1 tablet by mouth 3 (Three) Times a Day., Disp: , Rfl:     hydrALAZINE (APRESOLINE) 50 MG tablet, Take 1 tablet by mouth Every 8 (Eight) Hours., Disp: 90 tablet, Rfl: 1    insulin NPH-insulin regular (humuLIN 70/30,novoLIN 70/30) (70-30) 100 UNIT/ML injection, Inject 36 Units under the skin into the appropriate area as directed 2 (Two) Times a Day With Meals., Disp: , Rfl:     isosorbide mononitrate (IMDUR) 120 MG 24 hr tablet, Take 1 tablet by mouth Daily., Disp: , Rfl:     Krill Oil (Omega-3) 500 MG capsule, Take 1 capsule by mouth Daily., Disp: , Rfl:     levocetirizine (XYZAL) 5 MG tablet, Take 1 tablet by mouth Every Evening., Disp: , Rfl:     Mag-G 500 (27 Mg) MG tablet, Take 1 tablet by mouth Daily., Disp: , Rfl:     nitroglycerin (NITROSTAT) 0.4 MG SL tablet, Place 1 tablet under the tongue Every 5 (Five) Minutes As Needed for Chest Pain (Only if SBP Greater Than 100). Take no more than 3 doses in 15 minutes. If no relief, seek medical attention., Disp: 25 tablet, Rfl: 0    pantoprazole (Protonix) 40 MG EC tablet, Take 1 tablet by mouth Daily., Disp: 30 tablet, Rfl: 11    promethazine (PHENERGAN) 12.5 MG tablet, Take 1 tablet by mouth 2 (Two) Times a Day As Needed for Nausea or Vomiting., Disp: , Rfl:     ranolazine (RANEXA) 1000 MG 12 hr tablet, Take 1 tablet by mouth Every 12 (Twelve) Hours., Disp: 60 tablet, Rfl: 2    tamsulosin (FLOMAX) 0.4 MG capsule 24 hr capsule, Take 1 capsule by mouth Daily., Disp: 30 capsule, Rfl: 3    ticagrelor  (BRILINTA) 90 MG tablet tablet, Take 1 tablet by mouth 2 (Two) Times a Day., Disp: 180 tablet, Rfl: 3    traZODone (DESYREL) 100 MG tablet, Take 1 tablet by mouth At Night As Needed for Sleep., Disp: , Rfl:     vitamin B-12 (CYANOCOBALAMIN) 1000 MCG tablet, Take 1 tablet by mouth Daily., Disp: , Rfl:     vitamin D (ERGOCALCIFEROL) 1.25 MG (53643 UT) capsule capsule, Take 1 capsule by mouth 1 (One) Time Per Week., Disp: 5 capsule, Rfl: 3    Pneumococcal 20-Beata Conj Vacc (Prevnar 20) 0.5 ML suspension prefilled syringe vaccine, Inject 0.5 mL into the appropriate muscle as directed by prescriber 1 (One) Time for 1 dose., Disp: 0.5 mL, Rfl: 0    Vaccination Status   COVID 19: Declines  Influenza: UTD  Pneumococcal: Needs, given today 1/22/24  Zoster: Indicated @ age 50     Drug-Drug Interactions:     Drug-Disease Interactions (non-cardioselective beta blockers): Carvedilol    Patient Assistance: None Requested     Inhaler Technique Observed? No  If yes, notes:     Treatment Goals: Risk Reduction and Symptom Control     Medication Assessment & Plan:     Recommended vaccinations: Prevnar given today.     Will re-assess inhaler regimen at next visit if he is diagnosed with COPD.     Thank you for allowing me to participate in the care of your patient,    Ericka Wei, PharmD  1/22/2024  15:54 EST

## 2024-01-23 PROCEDURE — 94010 BREATHING CAPACITY TEST: CPT | Performed by: INTERNAL MEDICINE

## 2024-01-24 DIAGNOSIS — Z95.5 STENTED CORONARY ARTERY: Primary | ICD-10-CM

## 2024-01-26 ENCOUNTER — READMISSION MANAGEMENT (OUTPATIENT)
Dept: CALL CENTER | Facility: HOSPITAL | Age: 49
End: 2024-01-26
Payer: MEDICARE

## 2024-01-26 NOTE — OUTREACH NOTE
CHF Week 2 Survey      Flowsheet Row Responses   [facility] patient discharged from? [facility]   Does the patient have one of the following disease processes/diagnoses(primary or secondary)? CHF   Week 2 attempt successful? Yes   Call start time 1619   Call end time 1639   Discharge diagnosis ASCVD status post stent of the distal RCA 09/20/2021 and 01/15/2024  Acute on chronic HFpEF NYHA class II combined systolic and diastolic   Medication alerts for this patient Bumex 1mg every other day per Zuri at Cardiology   Meds reviewed with patient/caregiver? Yes   Is the patient taking all medications as directed (includes completed medication regime)? Yes   Has the patient kept scheduled appointments due by today? Yes   Pulse Ox monitoring Intermittent   Pulse Ox device source Patient   O2 Sat comments 98% RA   O2 Sat: education provided Sat levels, Monitoring frequency, When to seek care   Comments Pt reports doing well, not having chest pain but pt reports bilat LE edema has increased. Pt has gained 7lbs last night, he reports. Nurse advised to call HF clinic prior to close, gave pt number to call, pt v/u.Pt denies LE redness or temp changes. Pt having SOA randomly, inhaler helps, pt reports.Nurse attempted to call HF clinic and was unable to reach. Contacted pt back, who reports unable to reach HF clinic. Pt plans to attempt to contact PCP and/or Cardiology office, if he is unable to contact a provider pt v/u that he needs to go to ER r/t the 7 pound wt gain overnight, LE edema increase and SOA exacerbation.   What is the patient's perception of their health status since discharge? Worsening   Nursing interventions Nurse provided patient education, Advised patient to call provider   Is the patient/caregiver able to teach back the hierarchy of who to call/visit for symptoms/problems? PCP, Specialist, Home health nurse, Urgent Care, ED, 911 Yes   CHF Zone this Call Red Zone   Red Zone Sudden weight gain of more than  2-3 lbs in a 24-hour period (or 5 lbs in a week), Shortness of breath at rest, Increased discomfort or swelling in the lower body   Red Zone Interventions Advised to call healthcare provider  [pt calling provider and v/u to go to ER if unable to reach provider r/t 7 pound wt gain overnight.]   CHF Week 2 call completed? Yes   Call end time [id number]            Tami MUKHERJEE - Registered Nurse

## 2024-02-01 ENCOUNTER — DOCUMENTATION (OUTPATIENT)
Dept: CARDIAC REHAB | Facility: HOSPITAL | Age: 49
End: 2024-02-01
Payer: MEDICARE

## 2024-02-01 NOTE — PROGRESS NOTES
Patient was no show for his appointment. When contacted he stated he decided not ot attend. He is aware he can call back if he changes his mind

## 2024-02-03 ENCOUNTER — TRANSCRIBE ORDERS (OUTPATIENT)
Dept: ADMINISTRATIVE | Facility: HOSPITAL | Age: 49
End: 2024-02-03
Payer: MEDICARE

## 2024-02-03 ENCOUNTER — LAB (OUTPATIENT)
Dept: LAB | Facility: HOSPITAL | Age: 49
End: 2024-02-03
Payer: MEDICARE

## 2024-02-03 DIAGNOSIS — N18.31 CHRONIC KIDNEY DISEASE (CKD) STAGE G3A/A1, MODERATELY DECREASED GLOMERULAR FILTRATION RATE (GFR) BETWEEN 45-59 ML/MIN/1.73 SQUARE METER AND ALBUMINURIA CREATININE RATIO LESS THAN 30 MG/G (CMS/H*: Primary | ICD-10-CM

## 2024-02-03 DIAGNOSIS — N18.31 CHRONIC KIDNEY DISEASE (CKD) STAGE G3A/A1, MODERATELY DECREASED GLOMERULAR FILTRATION RATE (GFR) BETWEEN 45-59 ML/MIN/1.73 SQUARE METER AND ALBUMINURIA CREATININE RATIO LESS THAN 30 MG/G (CMS/H*: ICD-10-CM

## 2024-02-03 PROCEDURE — 82570 ASSAY OF URINE CREATININE: CPT

## 2024-02-03 PROCEDURE — 36415 COLL VENOUS BLD VENIPUNCTURE: CPT

## 2024-02-03 PROCEDURE — 84156 ASSAY OF PROTEIN URINE: CPT

## 2024-02-03 PROCEDURE — 80053 COMPREHEN METABOLIC PANEL: CPT

## 2024-02-03 PROCEDURE — 81003 URINALYSIS AUTO W/O SCOPE: CPT | Performed by: INTERNAL MEDICINE

## 2024-02-04 LAB
ALBUMIN SERPL-MCNC: 4.1 G/DL (ref 3.5–5.2)
ALBUMIN/GLOB SERPL: 1.4 G/DL
ALP SERPL-CCNC: 64 U/L (ref 39–117)
ALT SERPL W P-5'-P-CCNC: 27 U/L (ref 1–41)
ANION GAP SERPL CALCULATED.3IONS-SCNC: 15 MMOL/L (ref 5–15)
AST SERPL-CCNC: 27 U/L (ref 1–40)
BILIRUB SERPL-MCNC: 0.4 MG/DL (ref 0–1.2)
BILIRUB UR QL STRIP: NEGATIVE
BUN SERPL-MCNC: 17 MG/DL (ref 6–20)
BUN/CREAT SERPL: 9.5 (ref 7–25)
CALCIUM SPEC-SCNC: 8.6 MG/DL (ref 8.6–10.5)
CHLORIDE SERPL-SCNC: 99 MMOL/L (ref 98–107)
CLARITY UR: CLEAR
CO2 SERPL-SCNC: 22 MMOL/L (ref 22–29)
COLOR UR: YELLOW
CREAT SERPL-MCNC: 1.79 MG/DL (ref 0.76–1.27)
CREAT UR-MCNC: 27.3 MG/DL
EGFRCR SERPLBLD CKD-EPI 2021: 46.2 ML/MIN/1.73
GLOBULIN UR ELPH-MCNC: 2.9 GM/DL
GLUCOSE SERPL-MCNC: 81 MG/DL (ref 65–99)
GLUCOSE UR STRIP-MCNC: NEGATIVE MG/DL
HGB UR QL STRIP.AUTO: NEGATIVE
HOLD SPECIMEN: NORMAL
KETONES UR QL STRIP: NEGATIVE
LEUKOCYTE ESTERASE UR QL STRIP.AUTO: NEGATIVE
NITRITE UR QL STRIP: NEGATIVE
PH UR STRIP.AUTO: 6 [PH] (ref 5–8)
POTASSIUM SERPL-SCNC: 4 MMOL/L (ref 3.5–5.2)
PROT ?TM UR-MCNC: 17.2 MG/DL
PROT SERPL-MCNC: 7 G/DL (ref 6–8.5)
PROT UR QL STRIP: ABNORMAL
PROT/CREAT UR: 630 MG/G CREA (ref 0–200)
SODIUM SERPL-SCNC: 136 MMOL/L (ref 136–145)
SP GR UR STRIP: 1.01 (ref 1–1.03)
UROBILINOGEN UR QL STRIP: ABNORMAL

## 2024-02-06 ENCOUNTER — LAB (OUTPATIENT)
Dept: LAB | Facility: HOSPITAL | Age: 49
End: 2024-02-06
Payer: MEDICARE

## 2024-02-06 ENCOUNTER — TRANSCRIBE ORDERS (OUTPATIENT)
Dept: ADMINISTRATIVE | Facility: HOSPITAL | Age: 49
End: 2024-02-06
Payer: MEDICARE

## 2024-02-06 DIAGNOSIS — H47.10 PAPILLEDEMA: ICD-10-CM

## 2024-02-06 DIAGNOSIS — H47.10 PAPILLEDEMA: Primary | ICD-10-CM

## 2024-02-06 LAB
BASOPHILS # BLD AUTO: 0.02 10*3/MM3 (ref 0–0.2)
BASOPHILS NFR BLD AUTO: 0.4 % (ref 0–1.5)
CRP SERPL-MCNC: 2.57 MG/DL (ref 0–0.5)
DEPRECATED RDW RBC AUTO: 53.4 FL (ref 37–54)
EOSINOPHIL # BLD AUTO: 0.12 10*3/MM3 (ref 0–0.4)
EOSINOPHIL NFR BLD AUTO: 2.5 % (ref 0.3–6.2)
ERYTHROCYTE [DISTWIDTH] IN BLOOD BY AUTOMATED COUNT: 15.3 % (ref 12.3–15.4)
ERYTHROCYTE [SEDIMENTATION RATE] IN BLOOD: 36 MM/HR (ref 0–15)
HCT VFR BLD AUTO: 31.3 % (ref 37.5–51)
HGB BLD-MCNC: 10.1 G/DL (ref 13–17.7)
IMM GRANULOCYTES # BLD AUTO: 0.01 10*3/MM3 (ref 0–0.05)
IMM GRANULOCYTES NFR BLD AUTO: 0.2 % (ref 0–0.5)
LYMPHOCYTES # BLD AUTO: 0.89 10*3/MM3 (ref 0.7–3.1)
LYMPHOCYTES NFR BLD AUTO: 18.7 % (ref 19.6–45.3)
MCH RBC QN AUTO: 32.3 PG (ref 26.6–33)
MCHC RBC AUTO-ENTMCNC: 32.3 G/DL (ref 31.5–35.7)
MCV RBC AUTO: 100 FL (ref 79–97)
MONOCYTES # BLD AUTO: 0.38 10*3/MM3 (ref 0.1–0.9)
MONOCYTES NFR BLD AUTO: 8 % (ref 5–12)
NEUTROPHILS NFR BLD AUTO: 3.34 10*3/MM3 (ref 1.7–7)
NEUTROPHILS NFR BLD AUTO: 70.2 % (ref 42.7–76)
NRBC BLD AUTO-RTO: 0 /100 WBC (ref 0–0.2)
PLATELET # BLD AUTO: 143 10*3/MM3 (ref 140–450)
PMV BLD AUTO: 11.2 FL (ref 6–12)
RBC # BLD AUTO: 3.13 10*6/MM3 (ref 4.14–5.8)
WBC NRBC COR # BLD AUTO: 4.76 10*3/MM3 (ref 3.4–10.8)

## 2024-02-06 PROCEDURE — 36415 COLL VENOUS BLD VENIPUNCTURE: CPT

## 2024-02-06 PROCEDURE — 85025 COMPLETE CBC W/AUTO DIFF WBC: CPT

## 2024-02-06 PROCEDURE — 85652 RBC SED RATE AUTOMATED: CPT

## 2024-02-06 PROCEDURE — 86140 C-REACTIVE PROTEIN: CPT

## 2024-02-07 ENCOUNTER — TRANSCRIBE ORDERS (OUTPATIENT)
Dept: ADMINISTRATIVE | Facility: HOSPITAL | Age: 49
End: 2024-02-07
Payer: MEDICARE

## 2024-02-07 DIAGNOSIS — H47.10 PAPILLEDEMA: Primary | ICD-10-CM

## 2024-02-08 ENCOUNTER — TELEPHONE (OUTPATIENT)
Age: 49
End: 2024-02-08
Payer: MEDICARE

## 2024-02-08 ENCOUNTER — READMISSION MANAGEMENT (OUTPATIENT)
Dept: CALL CENTER | Facility: HOSPITAL | Age: 49
End: 2024-02-08
Payer: MEDICARE

## 2024-02-08 DIAGNOSIS — G47.33 OSA (OBSTRUCTIVE SLEEP APNEA): Primary | ICD-10-CM

## 2024-02-08 NOTE — OUTREACH NOTE
CHF Week 3 Survey      Flowsheet Row Responses   [facility] patient discharged from? [facility]   Does the patient have one of the following disease processes/diagnoses(primary or secondary)? CHF   Week 3 attempt successful? Yes   Call start time 1743   Call end time 1748   Discharge diagnosis ASCVD status post stent of the distal RCA 09/20/2021 and 01/15/2024  Acute on chronic HFpEF NYHA class II combined systolic and diastolic   Meds reviewed with patient/caregiver? Yes   Is the patient having any side effects they believe may be caused by any medication additions or changes? No   Does the patient have all medications ordered at discharge? Yes   Is the patient taking all medications as directed (includes completed medication regime)? Yes   Does the patient have a primary care provider?  Yes   Does the patient have an appointment with their PCP within 7 days of discharge? Greater than 7 days   What is preventing the patient from scheduling follow up appointments within 7 days of discharge? Earlier appointment not available   Nursing Interventions Verified appointment date/time/provider   Has the patient kept scheduled appointments due by today? Yes   Has home health visited the patient within 72 hours of discharge? N/A   Pulse Ox monitoring Intermittent   Pulse Ox device source Patient   O2 Sat comments 98% on RA   O2 Sat: education provided Sat levels, When to seek care   Psychosocial issues? No   Did the patient receive a copy of their discharge instructions? Yes   Nursing interventions Reviewed instructions with patient   What is the patient's perception of their health status since discharge? Improving   Nursing interventions Nurse provided patient education   Is the patient able to teach back signs and symptoms of worsening condition? (i.e. weight gain, shortness of air, etc.) Yes   Is the patient/caregiver able to teach back the hierarchy of who to call/visit for symptoms/problems? PCP, Specialist, Home  health nurse, Urgent Care, ED, 911 Yes   Is the patient able to teach back Heart Failure Zones? Yes   CHF Zone this Call Green Zone   Green Zone Patient reports doing well, No new or worsening shortness of breath, Physical activity level is normal for you, No new swelling -  feet, ankles and legs look normal for you, Weight check stable, No chest pain   Green Zone Interventions Daily weight check, Low sodium diet, Meds as directed, Follow up visits planned   CHF Week 3 call completed? Yes   Graduated Yes   Is the patient interested in additional calls from an ambulatory ? No   Would this patient benefit from a Referral to [facility] Social Work? No   Call end time [id number]            Marcela FANG - Registered Nurse

## 2024-02-09 NOTE — TELEPHONE ENCOUNTER
Contacted patient to discuss sleep study results.     Sleep study revealed mild ALIA with AHI 8.1. Will send orders for AutoPAP to [facility].

## 2024-03-05 PROBLEM — M79.673 FOOT PAIN: Status: ACTIVE | Noted: 2022-08-04

## 2024-03-05 PROBLEM — M47.816 LUMBAR SPONDYLOSIS: Status: ACTIVE | Noted: 2023-06-15

## 2024-03-05 PROBLEM — M43.6 TORTICOLLIS: Status: ACTIVE | Noted: 2023-06-15

## 2024-03-05 PROBLEM — M25.551 PAIN OF RIGHT HIP JOINT: Status: ACTIVE | Noted: 2023-10-12

## 2024-03-05 PROBLEM — I87.339 STASIS DERMATITIS WITH VENOUS ULCER OF LOWER EXTREMITY DUE TO CHRONIC PERIPHERAL VENOUS HYPERTENSION: Status: ACTIVE | Noted: 2024-02-01

## 2024-03-05 PROBLEM — B35.3 TINEA PEDIS: Status: ACTIVE | Noted: 2022-11-28

## 2024-03-05 PROBLEM — M21.969 DEFORMITY OF METATARSAL: Status: ACTIVE | Noted: 2023-01-21

## 2024-03-05 PROBLEM — M20.40 HAMMER TOE: Status: ACTIVE | Noted: 2022-08-04

## 2024-03-05 PROBLEM — E11.42 DIABETIC PERIPHERAL NEUROPATHY ASSOCIATED WITH TYPE 2 DIABETES MELLITUS: Status: ACTIVE | Noted: 2022-08-04

## 2024-03-05 PROBLEM — B35.1 ONYCHOMYCOSIS OF TOENAIL: Status: ACTIVE | Noted: 2022-08-04

## 2024-03-05 PROBLEM — M79.10 MUSCLE PAIN: Status: ACTIVE | Noted: 2023-06-15

## 2024-03-05 PROBLEM — G60.9 HEREDITARY PERIPHERAL NEUROPATHY: Status: ACTIVE | Noted: 2022-08-07

## 2024-03-05 PROBLEM — M54.17 LUMBOSACRAL RADICULOPATHY: Status: ACTIVE | Noted: 2023-05-16

## 2024-03-05 PROBLEM — M54.50 LOW BACK PAIN: Status: ACTIVE | Noted: 2023-08-16

## 2024-03-05 PROBLEM — L72.0 EPIDERMOID CYST: Status: ACTIVE | Noted: 2022-11-28

## 2024-03-05 PROBLEM — I73.9 PERIPHERAL VASCULAR DISEASE: Status: ACTIVE | Noted: 2022-08-04

## 2024-03-05 RX ORDER — INSULIN HUMAN 100 [IU]/ML
INJECTION, SUSPENSION SUBCUTANEOUS
COMMUNITY
Start: 2024-02-03

## 2024-03-05 RX ORDER — SODIUM POLYSTYRENE SULFONATE 15 G/60ML
SUSPENSION ORAL; RECTAL
COMMUNITY
Start: 2024-02-02 | End: 2024-03-06

## 2024-03-05 RX ORDER — LACTULOSE 10 G/15ML
SOLUTION ORAL
COMMUNITY
Start: 2024-02-02

## 2024-03-05 RX ORDER — BUMETANIDE 1 MG/1
1 TABLET ORAL DAILY
COMMUNITY
Start: 2024-02-06

## 2024-03-05 RX ORDER — BLOOD SUGAR DIAGNOSTIC
STRIP MISCELLANEOUS
COMMUNITY
Start: 2024-02-06

## 2024-03-06 ENCOUNTER — OFFICE VISIT (OUTPATIENT)
Dept: PULMONOLOGY | Facility: CLINIC | Age: 49
End: 2024-03-06
Payer: MEDICARE

## 2024-03-06 VITALS
WEIGHT: 266.4 LBS | TEMPERATURE: 97 F | HEART RATE: 81 BPM | BODY MASS INDEX: 42.81 KG/M2 | SYSTOLIC BLOOD PRESSURE: 128 MMHG | DIASTOLIC BLOOD PRESSURE: 72 MMHG | OXYGEN SATURATION: 97 % | HEIGHT: 66 IN

## 2024-03-06 DIAGNOSIS — G47.34 NOCTURNAL HYPOXIA: ICD-10-CM

## 2024-03-06 DIAGNOSIS — R06.09 DYSPNEA ON EXERTION: ICD-10-CM

## 2024-03-06 DIAGNOSIS — G47.33 OSA (OBSTRUCTIVE SLEEP APNEA): Primary | ICD-10-CM

## 2024-03-06 DIAGNOSIS — E66.01 MORBID OBESITY WITH BMI OF 40.0-44.9, ADULT: ICD-10-CM

## 2024-03-06 DIAGNOSIS — I27.20 PULMONARY HYPERTENSION: ICD-10-CM

## 2024-03-06 NOTE — PROGRESS NOTES
Subjective      Chief Complaint  Sleep Apnea (Having a hard time tolerating machine, claustrophobic and coughing. ) and Shortness of Breath (On exertion and at night. )    Subjective      History of Present Illness  Matthew Madrid is a 48 y.o. male who presents today to [facility] with past medical history of ASCVD s/p stenting, chronic HFpEF, hyperlipidemia, CKD stage III, insulin-dependent type II diabetes mellitus, GERD, anxiety/depression, and former smoker who presents today for Sleep Apnea (Having a hard time tolerating machine, claustrophobic and coughing. ) and Shortness of Breath (On exertion and at night. ). This visit is a follow up appointment.     Sleep Apnea (Having a hard time tolerating machine, claustrophobic and coughing. ) and Shortness of Breath (On exertion and at night. ):  Since patient's last visit he had a home sleep study which revealed mild ALIA with AHI 8.1. He has received his AutoPAP and states that at times he has a hard time wearing it at least 4 hours a night. He states that he has the full face mask and sometimes feels like he's suffocating and his wife reports that he coughs intermittently throughout the night.    He states that he still has shortness of breath with exertion. He reports if he goes from his bedroom to the bathroom that he gets winded. He will use an albuterol inhaler and reports that this gives him relief. He has a daily cough that is productive with clear sputum and at times has some wheezing. He has lower extremity edema but states that this is no worse than usual. He doesn't currently have any oxygen requirements.       Current Outpatient Medications:     albuterol (PROVENTIL) (2.5 MG/3ML) 0.083% nebulizer solution, Take 2.5 mg by nebulization Every 6 (Six) Hours As Needed for Shortness of Air., Disp: 360 mL, Rfl: 2    allopurinol (ZYLOPRIM) 300 MG tablet, Take 1 tablet by mouth Daily., Disp: , Rfl:      ALPRAZolam (XANAX) 0.5 MG tablet, Take 1 tablet by mouth 3 (Three) Times a Day As Needed for Anxiety., Disp: , Rfl:     aspirin 81 MG EC tablet, Take 1 tablet by mouth Daily., Disp: 90 tablet, Rfl: 3    atorvastatin (LIPITOR) 40 MG tablet, Take 1 tablet by mouth Daily., Disp: , Rfl:     B Complex Vitamins (Vitamin-B Complex) tablet, Take 1 tablet by mouth Daily., Disp: , Rfl:     bumetanide (BUMEX) 1 MG tablet, Take 1 tablet by mouth Daily., Disp: , Rfl:     carvedilol (COREG) 25 MG tablet, Take 1 tablet by mouth 2 (Two) Times a Day With Meals., Disp: , Rfl:     Constulose 10 GM/15ML solution, TO BE TAKEN 30 MINUTES AFTER TAKING KATEXALATE 30ML BY MOUTH, Disp: , Rfl:     Dulaglutide (Trulicity) 4.5 MG/0.5ML solution pen-injector, Inject  under the skin into the appropriate area as directed 1 (One) Time Per Week., Disp: , Rfl:     ferrous sulfate 325 (65 FE) MG tablet, Take 1 tablet by mouth 3 (Three) Times a Day With Meals., Disp: , Rfl:     FLUoxetine (PROzac) 40 MG capsule, Take 1 capsule by mouth Daily., Disp: , Rfl:     gabapentin (NEURONTIN) 800 MG tablet, Take 1 tablet by mouth 3 (Three) Times a Day., Disp: , Rfl:     HumuLIN 70/30 KwikPen (70-30) 100 UNIT/ML suspension pen-injector, INJECT 36 UNITS SUB-Q IN THE MORNING AND AT 4PM, Disp: , Rfl:     hydrALAZINE (APRESOLINE) 50 MG tablet, Take 1 tablet by mouth Every 8 (Eight) Hours., Disp: 90 tablet, Rfl: 1    isosorbide mononitrate (IMDUR) 120 MG 24 hr tablet, Take 1 tablet by mouth Daily., Disp: , Rfl:     Krill Oil (Omega-3) 500 MG capsule, Take 1 capsule by mouth Daily., Disp: , Rfl:     levocetirizine (XYZAL) 5 MG tablet, Take 1 tablet by mouth Every Evening., Disp: , Rfl:     Mag-G 500 (27 Mg) MG tablet, Take 1 tablet by mouth Daily., Disp: , Rfl:     nitroglycerin (NITROSTAT) 0.4 MG SL tablet, Place 1 tablet under the tongue Every 5 (Five) Minutes As Needed for Chest Pain (Only if SBP Greater Than 100). Take no more than 3 doses in 15 minutes. If no  "relief, seek medical attention., Disp: 25 tablet, Rfl: 0    OneTouch Ultra test strip, , Disp: , Rfl:     pantoprazole (Protonix) 40 MG EC tablet, Take 1 tablet by mouth Daily., Disp: 30 tablet, Rfl: 11    promethazine (PHENERGAN) 12.5 MG tablet, Take 1 tablet by mouth 2 (Two) Times a Day As Needed for Nausea or Vomiting., Disp: , Rfl:     ranolazine (RANEXA) 1000 MG 12 hr tablet, Take 1 tablet by mouth Every 12 (Twelve) Hours., Disp: 60 tablet, Rfl: 2    tamsulosin (FLOMAX) 0.4 MG capsule 24 hr capsule, Take 1 capsule by mouth Daily., Disp: 30 capsule, Rfl: 3    ticagrelor (BRILINTA) 90 MG tablet tablet, Take 1 tablet by mouth 2 (Two) Times a Day., Disp: 180 tablet, Rfl: 3    traZODone (DESYREL) 100 MG tablet, Take 1 tablet by mouth At Night As Needed for Sleep., Disp: , Rfl:     vitamin B-12 (CYANOCOBALAMIN) 1000 MCG tablet, Take 1 tablet by mouth Daily., Disp: , Rfl:     vitamin D (ERGOCALCIFEROL) 1.25 MG (02026 UT) capsule capsule, Take 1 capsule by mouth 1 (One) Time Per Week., Disp: 5 capsule, Rfl: 3      Allergies   Allergen Reactions    Tuberculin Tests Rash       Objective     Objective   Vital Signs:  /72   Pulse 81   Temp 97 °F (36.1 °C)   Ht 167.6 cm (66\")   Wt 121 kg (266 lb 6.4 oz)   SpO2 97%   BMI 43.00 kg/m²   Estimated body mass index is 43 kg/m² as calculated from the following:    Height as of this encounter: 167.6 cm (66\").    Weight as of this encounter: 121 kg (266 lb 6.4 oz).          Past Medical History:   Diagnosis Date    ASCVD (arteriosclerotic cardiovascular disease) 09/13/2021    Chronic heart failure with preserved ejection fraction (HFpEF) 11/27/2023    Diabetes mellitus     Elevated cholesterol     GERD (gastroesophageal reflux disease)     Hyperlipidemia     Hypertension      Past Surgical History:   Procedure Laterality Date    CARDIAC CATHETERIZATION N/A 09/02/2021    Procedure: Left Heart Cath;  Surgeon: Vasile Moulton MD;  Location: [facility] CATH INVASIVE LOCATION;  " Service: Cardiology;  Laterality: N/A;    CARDIAC CATHETERIZATION N/A 2021    Procedure: Percutaneous Coronary Intervention;  Surgeon: Vasile Moulton MD;  Location: [facility] CATH INVASIVE LOCATION;  Service: Cardiology;  Laterality: N/A;    CARDIAC CATHETERIZATION N/A 1/15/2024    Procedure: Coronary angiography;  Surgeon: Anant Bennett MD;  Location: [facility] CATH INVASIVE LOCATION;  Service: Cardiology;  Laterality: N/A;    COLONOSCOPY      COLONOSCOPY N/A 10/4/2022    Procedure: COLONOSCOPY;  Surgeon: Gianluca Rodríguez MD;  Location: [facility] OR;  Service: Gastroenterology;  Laterality: N/A;    ENDOSCOPY      ENDOSCOPY N/A 10/4/2022    Procedure: ESOPHAGOGASTRODUODENOSCOPY;  Surgeon: Gianluca Rodríguez MD;  Location: [facility] OR;  Service: Gastroenterology;  Laterality: N/A;    LAPAROSCOPIC CHOLECYSTECTOMY       Social History     Socioeconomic History    Marital status:    Tobacco Use    Smoking status: Former     Current packs/day: 0.00     Types: Cigarettes     Quit date:      Years since quittin.     Passive exposure: Past    Smokeless tobacco: Never   Vaping Use    Vaping status: Never Used   Substance and Sexual Activity    Alcohol use: Yes     Alcohol/week: 6.0 standard drinks of alcohol     Types: 6 Glasses of wine per week     Comment: Occasional    Drug use: Never    Sexual activity: Defer      Physical Exam  Constitutional:       General: He is awake.      Appearance: Normal appearance. He is morbidly obese.   HENT:      Head: Normocephalic and atraumatic.      Nose: Nose normal.      Mouth/Throat:      Mouth: Mucous membranes are moist.      Pharynx: Oropharynx is clear.   Eyes:      Conjunctiva/sclera: Conjunctivae normal.      Pupils: Pupils are equal, round, and reactive to light.   Cardiovascular:      Rate and Rhythm: Normal rate and regular rhythm.      Pulses: Normal pulses.      Heart sounds: Normal heart sounds. No murmur heard.     No friction rub. No gallop.  "  Pulmonary:      Effort: Pulmonary effort is normal. No tachypnea, accessory muscle usage or respiratory distress.      Breath sounds: Normal breath sounds. No decreased breath sounds, wheezing, rhonchi or rales.   Musculoskeletal:         General: Normal range of motion.      Cervical back: Full passive range of motion without pain and normal range of motion.   Skin:     General: Skin is warm and dry.   Neurological:      General: No focal deficit present.      Mental Status: He is alert. Mental status is at baseline.   Psychiatric:         Mood and Affect: Mood normal.         Behavior: Behavior normal. Behavior is cooperative.         Thought Content: Thought content normal.        Result Review :  The following labs and radiology results have been reviewed.    Lab Review:   No results found for: \"FEV1\", \"FVC\", \"ICK8KYW\", \"TLC\", \"DLCO\"  Lab on 02/06/2024   Component Date Value Ref Range Status    Sed Rate 02/06/2024 36 (H)  0 - 15 mm/hr Final    C-Reactive Protein 02/06/2024 2.57 (H)  0.00 - 0.50 mg/dL Final    WBC 02/06/2024 4.76  3.40 - 10.80 10*3/mm3 Final    RBC 02/06/2024 3.13 (L)  4.14 - 5.80 10*6/mm3 Final    Hemoglobin 02/06/2024 10.1 (L)  13.0 - 17.7 g/dL Final    Hematocrit 02/06/2024 31.3 (L)  37.5 - 51.0 % Final    MCV 02/06/2024 100.0 (H)  79.0 - 97.0 fL Final    MCH 02/06/2024 32.3  26.6 - 33.0 pg Final    MCHC 02/06/2024 32.3  31.5 - 35.7 g/dL Final    RDW 02/06/2024 15.3  12.3 - 15.4 % Final    RDW-SD 02/06/2024 53.4  37.0 - 54.0 fl Final    MPV 02/06/2024 11.2  6.0 - 12.0 fL Final    Platelets 02/06/2024 143  140 - 450 10*3/mm3 Final    Neutrophil % 02/06/2024 70.2  42.7 - 76.0 % Final    Lymphocyte % 02/06/2024 18.7 (L)  19.6 - 45.3 % Final    Monocyte % 02/06/2024 8.0  5.0 - 12.0 % Final    Eosinophil % 02/06/2024 2.5  0.3 - 6.2 % Final    Basophil % 02/06/2024 0.4  0.0 - 1.5 % Final    Immature Grans % 02/06/2024 0.2  0.0 - 0.5 % Final    Neutrophils, Absolute 02/06/2024 3.34  1.70 - 7.00 " 10*3/mm3 Final    Lymphocytes, Absolute 02/06/2024 0.89  0.70 - 3.10 10*3/mm3 Final    Monocytes, Absolute 02/06/2024 0.38  0.10 - 0.90 10*3/mm3 Final    Eosinophils, Absolute 02/06/2024 0.12  0.00 - 0.40 10*3/mm3 Final    Basophils, Absolute 02/06/2024 0.02  0.00 - 0.20 10*3/mm3 Final    Immature Grans, Absolute 02/06/2024 0.01  0.00 - 0.05 10*3/mm3 Final    nRBC 02/06/2024 0.0  0.0 - 0.2 /100 WBC Final   Lab on 02/03/2024   Component Date Value Ref Range Status    Glucose 02/03/2024 81  65 - 99 mg/dL Final    BUN 02/03/2024 17  6 - 20 mg/dL Final    Creatinine 02/03/2024 1.79 (H)  0.76 - 1.27 mg/dL Final    Sodium 02/03/2024 136  136 - 145 mmol/L Final    Potassium 02/03/2024 4.0  3.5 - 5.2 mmol/L Final    Chloride 02/03/2024 99  98 - 107 mmol/L Final    CO2 02/03/2024 22.0  22.0 - 29.0 mmol/L Final    Calcium 02/03/2024 8.6  8.6 - 10.5 mg/dL Final    Total Protein 02/03/2024 7.0  6.0 - 8.5 g/dL Final    Albumin 02/03/2024 4.1  3.5 - 5.2 g/dL Final    ALT (SGPT) 02/03/2024 27  1 - 41 U/L Final    AST (SGOT) 02/03/2024 27  1 - 40 U/L Final    Alkaline Phosphatase 02/03/2024 64  39 - 117 U/L Final    Total Bilirubin 02/03/2024 0.4  0.0 - 1.2 mg/dL Final    Globulin 02/03/2024 2.9  gm/dL Final    A/G Ratio 02/03/2024 1.4  g/dL Final    BUN/Creatinine Ratio 02/03/2024 9.5  7.0 - 25.0 Final    Anion Gap 02/03/2024 15.0  5.0 - 15.0 mmol/L Final    eGFR 02/03/2024 46.2 (L)  >60.0 mL/min/1.73 Final    Protein/Creatinine Ratio, Urine 02/03/2024 630.0 (H)  0.0 - 200.0 mg/G Crea Final    Creatinine, Urine 02/03/2024 27.3  mg/dL Final    Total Protein, Urine 02/03/2024 17.2  mg/dL Final   Transcribe Orders on 02/03/2024   Component Date Value Ref Range Status    Color, UA 02/03/2024 Yellow  Yellow, Straw Final    Appearance, UA 02/03/2024 Clear  Clear Final    pH, UA 02/03/2024 6.0  5.0 - 8.0 Final    Specific Gravity, UA 02/03/2024 1.011  1.005 - 1.030 Final    Glucose, UA 02/03/2024 Negative  Negative Final    Ketones, UA  02/03/2024 Negative  Negative Final    Bilirubin, UA 02/03/2024 Negative  Negative Final    Blood, UA 02/03/2024 Negative  Negative Final    Protein, UA 02/03/2024 Trace (A)  Negative Final    Leuk Esterase, UA 02/03/2024 Negative  Negative Final    Nitrite, UA 02/03/2024 Negative  Negative Final    Urobilinogen, UA 02/03/2024 0.2 E.U./dL  0.2 - 1.0 E.U./dL Final    Extra Tube 02/03/2024 Hold for add-ons.   Final    Auto resulted.   Lab on 01/22/2024   Component Date Value Ref Range Status    Glucose 01/22/2024 122 (H)  65 - 99 mg/dL Final    BUN 01/22/2024 35 (H)  6 - 20 mg/dL Final    Creatinine 01/22/2024 2.65 (H)  0.76 - 1.27 mg/dL Final    Sodium 01/22/2024 139  136 - 145 mmol/L Final    Potassium 01/22/2024 5.6 (H)  3.5 - 5.2 mmol/L Final    Chloride 01/22/2024 107  98 - 107 mmol/L Final    CO2 01/22/2024 22.9  22.0 - 29.0 mmol/L Final    Calcium 01/22/2024 8.8  8.6 - 10.5 mg/dL Final    BUN/Creatinine Ratio 01/22/2024 13.2  7.0 - 25.0 Final    Anion Gap 01/22/2024 9.1  5.0 - 15.0 mmol/L Final    eGFR 01/22/2024 28.8 (L)  >60.0 mL/min/1.73 Final   Hospital Outpatient Visit on 01/18/2024   Component Date Value Ref Range Status    Absolute Lung Fluid Content 01/18/2024 32  20 - 35 % Final    Glucose 01/18/2024 234 (H)  65 - 99 mg/dL Final    BUN 01/18/2024 37 (H)  6 - 20 mg/dL Final    Creatinine 01/18/2024 2.28 (H)  0.76 - 1.27 mg/dL Final    Sodium 01/18/2024 136  136 - 145 mmol/L Final    Potassium 01/18/2024 5.2  3.5 - 5.2 mmol/L Final    Slight hemolysis detected by analyzer. Result may be falsely elevated.    Chloride 01/18/2024 107  98 - 107 mmol/L Final    CO2 01/18/2024 18.8 (L)  22.0 - 29.0 mmol/L Final    Calcium 01/18/2024 8.8  8.6 - 10.5 mg/dL Final    BUN/Creatinine Ratio 01/18/2024 16.2  7.0 - 25.0 Final    Anion Gap 01/18/2024 10.2  5.0 - 15.0 mmol/L Final    eGFR 01/18/2024 34.5 (L)  >60.0 mL/min/1.73 Final    proBNP 01/18/2024 226.3  0.0 - 450.0 pg/mL Final    Magnesium 01/18/2024 1.8  1.6 -  2.6 mg/dL Final   No results displayed because visit has over 200 results.      Lab on 12/28/2023   Component Date Value Ref Range Status    Glucose 12/28/2023 212 (H)  65 - 99 mg/dL Final    BUN 12/28/2023 22 (H)  6 - 20 mg/dL Final    Creatinine 12/28/2023 1.87 (H)  0.76 - 1.27 mg/dL Final    Sodium 12/28/2023 137  136 - 145 mmol/L Final    Potassium 12/28/2023 4.4  3.5 - 5.2 mmol/L Final    Chloride 12/28/2023 107  98 - 107 mmol/L Final    CO2 12/28/2023 22.5  22.0 - 29.0 mmol/L Final    Calcium 12/28/2023 8.8  8.6 - 10.5 mg/dL Final    BUN/Creatinine Ratio 12/28/2023 11.8  7.0 - 25.0 Final    Anion Gap 12/28/2023 7.5  5.0 - 15.0 mmol/L Final    eGFR 12/28/2023 43.8 (L)  >60.0 mL/min/1.73 Final   Hospital Outpatient Visit on 12/19/2023   Component Date Value Ref Range Status    Glucose 12/19/2023 259 (H)  65 - 99 mg/dL Final    BUN 12/19/2023 38 (H)  6 - 20 mg/dL Final    Creatinine 12/19/2023 1.90 (H)  0.76 - 1.27 mg/dL Final    Sodium 12/19/2023 135 (L)  136 - 145 mmol/L Final    Potassium 12/19/2023 4.3  3.5 - 5.2 mmol/L Final    Chloride 12/19/2023 103  98 - 107 mmol/L Final    CO2 12/19/2023 21.7 (L)  22.0 - 29.0 mmol/L Final    Calcium 12/19/2023 9.2  8.6 - 10.5 mg/dL Final    BUN/Creatinine Ratio 12/19/2023 20.0  7.0 - 25.0 Final    Anion Gap 12/19/2023 10.3  5.0 - 15.0 mmol/L Final    eGFR 12/19/2023 43.0 (L)  >60.0 mL/min/1.73 Final    proBNP 12/19/2023 125.9  0.0 - 450.0 pg/mL Final    Magnesium 12/19/2023 1.3 (L)  1.6 - 2.6 mg/dL Final    Absolute Lung Fluid Content 12/19/2023 32  20 - 35 % Final    Creatine Kinase 12/19/2023 125  20 - 200 U/L Final    QT Interval 12/19/2023 436  ms Final    QTC Interval 12/19/2023 473  ms Final   Lab on 12/12/2023   Component Date Value Ref Range Status    Glucose 12/12/2023 108 (H)  65 - 99 mg/dL Final    BUN 12/12/2023 25 (H)  6 - 20 mg/dL Final    Creatinine 12/12/2023 1.67 (H)  0.76 - 1.27 mg/dL Final    Sodium 12/12/2023 138  136 - 145 mmol/L Final    Potassium  12/12/2023 4.0  3.5 - 5.2 mmol/L Final    Chloride 12/12/2023 103  98 - 107 mmol/L Final    CO2 12/12/2023 22.7  22.0 - 29.0 mmol/L Final    Calcium 12/12/2023 9.2  8.6 - 10.5 mg/dL Final    BUN/Creatinine Ratio 12/12/2023 15.0  7.0 - 25.0 Final    Anion Gap 12/12/2023 12.3  5.0 - 15.0 mmol/L Final    eGFR 12/12/2023 50.2 (L)  >60.0 mL/min/1.73 Final      Reviewed CT chest without contrast from 09/2021     Wet Read   Interpretation per radiologist   Interpreted by Ruben Polanco PA on 9/1/2021  1:01 PM EDT   Narrative & Impression   EXAM:    CT Chest Without Intravenous Contrast     EXAM DATE:    9/1/2021 12:13 PM     CLINICAL HISTORY:    chest pain     TECHNIQUE:    Axial computed tomography images of the chest without intravenous  contrast.  Sagittal and coronal reformatted images were created and  reviewed.  This CT exam was performed using one or more of the following  dose reduction techniques:  automated exposure control, adjustment of  the mA and/or kV according to patient size, and/or use of iterative  reconstruction technique.     COMPARISON:    No relevant prior studies available.     FINDINGS:    LUNGS:  Unremarkable.  No mass.  No consolidation.    PLEURAL SPACE:  Unremarkable.  No pneumothorax.  No significant  effusion.    HEART:  Unremarkable.  No cardiomegaly.  No significant pericardial  effusion.    BONES/JOINTS:  Unremarkable.  No acute fracture.  No dislocation.    SOFT TISSUES:  Unremarkable.    VASCULATURE:  Unremarkable.  No thoracic aortic aneurysm.    LYMPH NODES:  Unremarkable.  No enlarged lymph nodes.     IMPRESSION:    No acute findings in the chest.     This report was finalized on 9/1/2021 12:41 PM by Dr. Ron Wharton MD.        Reviewed spirometry from 01/22/2024      Reviewed sleep study from 02/04/2024      Assessment / Plan         Assessment   Diagnoses and all orders for this visit:    1. ALIA (obstructive sleep apnea) (Primary)  2. Pulmonary hypertension  3. Nocturnal  hypoxia  4. Morbid obesity with BMI of 40.0-44.9, adult  Home sleep study revealed mild ALIA with AHI.   Currently reports having difficulty wearing PAP device for at least 4 hours per night as he states he feels like he is suffocating and his wife reports he will cough intermittently throughout the night.   Will send order to DME company to adjust pressure settings from 5-20 to 8-16 to evaluate if patient is better able to tolerate. If not, educated him to contact office and will try switching to nasal mask with chinstrap.   Educated him on reflux precautions that may be stimulating a cough at night. Currently on Protonix 40 mg daily. Advised to not eat or drink within 2 hours of going to bed.  Will order overnight pulse oximetry to assess if supplemental oxygen needs bled into PAP.     -     Miscellaneous DME  -     Overnight Sleep Oximetry Study; Future    Management obstructive sleep apnea also includes lifestyle modifications including weight loss, avoidance of alcohol, sedated, caffeine especially before bedtime, allowing adequate sleep time, and body position during sleep such as side versus back. 10% weight loss can result in a 50% improvement of the apnea-hypopnea index. Untreated sleep apnea can lead to cardiovascular/metabolic disorder, neurocognitive deficit, daytime sleepiness, motor vehicle accidents, depression, mood disorders and reduced quality of life.  Patient understands the risk of untreated obstructive sleep apnea and benefit benefits of the treatment. Recommended at least 4 hours of use per night for 70% of every month (approximately 21 out of 30 days) per CMS guidelines.      5. Dyspnea on exertion  Previous spirometry was normal without evidence of true obstruction but did reveal restriction (FEV1/FVC 77%, FEV1 75%, and FVC 80%). Restriction likely due to body habitus.   Shortness of breath likely multifactorial related to underlying heart failure and body habitus.  Performed 6-MWT during  visit with saturations remaining stable on room air from 95-97%.   Continue albuterol inhaler as needed.   Continue nebulizer treatments as needed.    Continue following with heart failure clinic for continued management of CHF.     No orders of the defined types were placed in this encounter.    I spent 37 minutes caring for Matthew on this date of service. This time includes time spent by me in the following activities:preparing for the visit, reviewing tests, obtaining and/or reviewing a separately obtained history, performing a medically appropriate examination and/or evaluation , counseling and educating the patient/family/caregiver, ordering medications, tests, or procedures, referring and communicating with other health care professionals , documenting information in the medical record, independently interpreting results and communicating that information with the patient/family/caregiver, and care coordination    Follow Up   Return in about 4 weeks (around 4/3/2024), or if symptoms worsen or fail to improve, for Next scheduled follow up.     Patient was given instructions and counseling regarding his condition or for health maintenance advice. Please see specific information pulled into the AVS if appropriate.       This document has been electronically signed by Whitney Smith PA-C   March 6, 2024 10:55 EST    Dictated Utilizing Dragon Dictation: Part of this note may be an electronic transcription/translation of spoken language to printed text using the Dragon Dictation System.

## 2024-03-13 ENCOUNTER — LAB (OUTPATIENT)
Dept: LAB | Facility: HOSPITAL | Age: 49
End: 2024-03-13
Payer: MEDICARE

## 2024-03-13 ENCOUNTER — TRANSCRIBE ORDERS (OUTPATIENT)
Dept: ADMINISTRATIVE | Facility: HOSPITAL | Age: 49
End: 2024-03-13
Payer: MEDICARE

## 2024-03-13 DIAGNOSIS — N18.31 CHRONIC KIDNEY DISEASE (CKD) STAGE G3A/A1, MODERATELY DECREASED GLOMERULAR FILTRATION RATE (GFR) BETWEEN 45-59 ML/MIN/1.73 SQUARE METER AND ALBUMINURIA CREATININE RATIO LESS THAN 30 MG/G (CMS/H*: ICD-10-CM

## 2024-03-13 DIAGNOSIS — N18.31 CHRONIC KIDNEY DISEASE (CKD) STAGE G3A/A1, MODERATELY DECREASED GLOMERULAR FILTRATION RATE (GFR) BETWEEN 45-59 ML/MIN/1.73 SQUARE METER AND ALBUMINURIA CREATININE RATIO LESS THAN 30 MG/G (CMS/H*: Primary | ICD-10-CM

## 2024-03-13 PROCEDURE — 82570 ASSAY OF URINE CREATININE: CPT

## 2024-03-13 PROCEDURE — 81003 URINALYSIS AUTO W/O SCOPE: CPT

## 2024-03-13 PROCEDURE — 36415 COLL VENOUS BLD VENIPUNCTURE: CPT

## 2024-03-13 PROCEDURE — 80053 COMPREHEN METABOLIC PANEL: CPT

## 2024-03-13 PROCEDURE — 84156 ASSAY OF PROTEIN URINE: CPT

## 2024-03-14 ENCOUNTER — TELEPHONE (OUTPATIENT)
Dept: PULMONOLOGY | Facility: CLINIC | Age: 49
End: 2024-03-14
Payer: MEDICARE

## 2024-03-14 DIAGNOSIS — G47.33 OSA (OBSTRUCTIVE SLEEP APNEA): ICD-10-CM

## 2024-03-14 DIAGNOSIS — I25.2 HISTORY OF NON-ST ELEVATION MYOCARDIAL INFARCTION (NSTEMI): ICD-10-CM

## 2024-03-14 DIAGNOSIS — G47.34 NOCTURNAL HYPOXIA: Primary | ICD-10-CM

## 2024-03-14 DIAGNOSIS — I25.10 ASCVD (ARTERIOSCLEROTIC CARDIOVASCULAR DISEASE): ICD-10-CM

## 2024-03-14 DIAGNOSIS — I50.32 CHRONIC HEART FAILURE WITH PRESERVED EJECTION FRACTION (HFPEF): ICD-10-CM

## 2024-03-14 DIAGNOSIS — Z95.5 HISTORY OF HEART ARTERY STENT: Primary | ICD-10-CM

## 2024-03-14 LAB
ALBUMIN SERPL-MCNC: 3.8 G/DL (ref 3.5–5.2)
ALBUMIN/GLOB SERPL: 1.2 G/DL
ALP SERPL-CCNC: 67 U/L (ref 39–117)
ALT SERPL W P-5'-P-CCNC: 21 U/L (ref 1–41)
ANION GAP SERPL CALCULATED.3IONS-SCNC: 12.9 MMOL/L (ref 5–15)
AST SERPL-CCNC: 16 U/L (ref 1–40)
BILIRUB SERPL-MCNC: 0.5 MG/DL (ref 0–1.2)
BILIRUB UR QL STRIP: NEGATIVE
BUN SERPL-MCNC: 43 MG/DL (ref 6–20)
BUN/CREAT SERPL: 20.5 (ref 7–25)
CALCIUM SPEC-SCNC: 9.1 MG/DL (ref 8.6–10.5)
CHLORIDE SERPL-SCNC: 103 MMOL/L (ref 98–107)
CLARITY UR: CLEAR
CO2 SERPL-SCNC: 21.1 MMOL/L (ref 22–29)
COLOR UR: YELLOW
CREAT SERPL-MCNC: 2.1 MG/DL (ref 0.76–1.27)
CREAT UR-MCNC: 27.3 MG/DL
EGFRCR SERPLBLD CKD-EPI 2021: 38.1 ML/MIN/1.73
GLOBULIN UR ELPH-MCNC: 3.2 GM/DL
GLUCOSE SERPL-MCNC: 288 MG/DL (ref 65–99)
GLUCOSE UR STRIP-MCNC: ABNORMAL MG/DL
HGB UR QL STRIP.AUTO: NEGATIVE
HOLD SPECIMEN: NORMAL
KETONES UR QL STRIP: NEGATIVE
LEUKOCYTE ESTERASE UR QL STRIP.AUTO: NEGATIVE
NITRITE UR QL STRIP: NEGATIVE
PH UR STRIP.AUTO: 6 [PH] (ref 5–8)
POTASSIUM SERPL-SCNC: 4.7 MMOL/L (ref 3.5–5.2)
PROT ?TM UR-MCNC: 12.1 MG/DL
PROT SERPL-MCNC: 7 G/DL (ref 6–8.5)
PROT UR QL STRIP: NEGATIVE
PROT/CREAT UR: 443.2 MG/G CREA (ref 0–200)
SODIUM SERPL-SCNC: 137 MMOL/L (ref 136–145)
SP GR UR STRIP: 1.01 (ref 1–1.03)
UROBILINOGEN UR QL STRIP: ABNORMAL

## 2024-03-14 NOTE — TELEPHONE ENCOUNTER
Contacted patient to discuss overnight pulse oximetry results. Results revealed oxygen desaturation to 86% and was </=88% for approximately 20 seconds. Patient qualifies for supplemental oxygen to be bled into PAP device. Will send orders to [facility].     Patient reports that since decreasing the pressure settings on his PAP device that he is tolerating it a lot better. He still has some difficulty tolerating the full face mask and requesting to switch to the nasal mask with chin strap for better tolerability.

## 2024-03-18 ENCOUNTER — HOSPITAL ENCOUNTER (OUTPATIENT)
Dept: CARDIOLOGY | Facility: HOSPITAL | Age: 49
Discharge: HOME | End: 2024-03-18
Admitting: PHYSICIAN ASSISTANT
Payer: MEDICARE

## 2024-03-18 VITALS
HEART RATE: 67 BPM | WEIGHT: 263 LBS | OXYGEN SATURATION: 96 % | SYSTOLIC BLOOD PRESSURE: 118 MMHG | BODY MASS INDEX: 42.45 KG/M2 | DIASTOLIC BLOOD PRESSURE: 63 MMHG

## 2024-03-18 DIAGNOSIS — I50.32 CHRONIC HEART FAILURE WITH PRESERVED EJECTION FRACTION (HFPEF): Primary | ICD-10-CM

## 2024-03-18 DIAGNOSIS — I50.43 CHF (CONGESTIVE HEART FAILURE), NYHA CLASS II, ACUTE ON CHRONIC, COMBINED: ICD-10-CM

## 2024-03-18 DIAGNOSIS — G47.33 OSA ON CPAP: ICD-10-CM

## 2024-03-18 DIAGNOSIS — I25.10 ASCVD (ARTERIOSCLEROTIC CARDIOVASCULAR DISEASE): Chronic | ICD-10-CM

## 2024-03-18 LAB
ABSOLUTE LUNG FLUID CONTENT: 41 % (ref 20–35)
ANION GAP SERPL CALCULATED.3IONS-SCNC: 12.7 MMOL/L (ref 5–15)
BUN SERPL-MCNC: 37 MG/DL (ref 6–20)
BUN/CREAT SERPL: 18.3 (ref 7–25)
CALCIUM SPEC-SCNC: 8.9 MG/DL (ref 8.6–10.5)
CHLORIDE SERPL-SCNC: 102 MMOL/L (ref 98–107)
CO2 SERPL-SCNC: 22.3 MMOL/L (ref 22–29)
CREAT SERPL-MCNC: 2.02 MG/DL (ref 0.76–1.27)
EGFRCR SERPLBLD CKD-EPI 2021: 39.7 ML/MIN/1.73
GLUCOSE SERPL-MCNC: 151 MG/DL (ref 65–99)
MAGNESIUM SERPL-MCNC: 1.4 MG/DL (ref 1.6–2.6)
NT-PROBNP SERPL-MCNC: 364 PG/ML (ref 0–450)
POTASSIUM SERPL-SCNC: 4.3 MMOL/L (ref 3.5–5.2)
SODIUM SERPL-SCNC: 137 MMOL/L (ref 136–145)

## 2024-03-18 PROCEDURE — G2211 COMPLEX E/M VISIT ADD ON: HCPCS | Performed by: PHYSICIAN ASSISTANT

## 2024-03-18 PROCEDURE — 99215 OFFICE O/P EST HI 40 MIN: CPT | Performed by: PHYSICIAN ASSISTANT

## 2024-03-18 PROCEDURE — 83880 ASSAY OF NATRIURETIC PEPTIDE: CPT | Performed by: PHYSICIAN ASSISTANT

## 2024-03-18 PROCEDURE — 80048 BASIC METABOLIC PNL TOTAL CA: CPT | Performed by: PHYSICIAN ASSISTANT

## 2024-03-18 PROCEDURE — 83735 ASSAY OF MAGNESIUM: CPT | Performed by: PHYSICIAN ASSISTANT

## 2024-03-18 RX ORDER — BUMETANIDE 1 MG/1
1 TABLET ORAL DAILY
Qty: 30 TABLET | Refills: 2 | Status: SHIPPED | OUTPATIENT
Start: 2024-03-18 | End: 2024-06-16

## 2024-03-18 RX ORDER — DAPAGLIFLOZIN 10 MG/1
10 TABLET, FILM COATED ORAL DAILY
Qty: 90 TABLET | Refills: 1 | Status: SHIPPED | OUTPATIENT
Start: 2024-03-18 | End: 2024-03-18 | Stop reason: SDUPTHER

## 2024-03-18 RX ORDER — DAPAGLIFLOZIN 10 MG/1
10 TABLET, FILM COATED ORAL DAILY
Qty: 90 TABLET | Refills: 1 | Status: SHIPPED | OUTPATIENT
Start: 2024-03-18 | End: 2024-09-14

## 2024-03-18 NOTE — PROGRESS NOTES
[facility] Failure [facility]  Pharmacist Note     Matthew Madrid is a 49 y.o. male seen in the Heart Failure Clinic for HFpEF (EF of 61-65% on 7/4/23).  Patient recently had a heart cath on 1/15 and new stents were placed on 1/15/24.     Matthew Madrid reports a poor understanding of medications and denies any issue with adherence.    Accompanied by his wife who assists with his medications and had a list of medication written out that she reports that she follows to give him his medications every day.     Patient reports that he Is now taking Bumex 1mg daily and it is doing well for his swelling which is minimal today. He reports some SOB with activity. They brought in his daily logs and BP runs in the 130-140's/60-70's, most recently all BP's have been in the 130's/70's with HR in the 70's. He is currently only taking Hydralazine 50mg bid, instead of tid and his amlodipine has been recently stopped.     He reports that his blood sugars have been running high lately, in the 300's. He now is on a sliding scale insulin as well.       Medication Use:   Hx of med intolerances: Lisinopril (discontinued at discharge in July 2023. Elevated CK and renal issues)   Retail Rx Management: [facility] in [place].     Past Medical History:   Diagnosis Date    ASCVD (arteriosclerotic cardiovascular disease) 09/13/2021    Chronic heart failure with preserved ejection fraction (HFpEF) 11/27/2023    Diabetes mellitus     Elevated cholesterol     GERD (gastroesophageal reflux disease)     Hyperlipidemia     Hypertension      ALLERGIES: Tuberculin tests  Current Outpatient Medications   Medication Sig Dispense Refill    albuterol (PROVENTIL) (2.5 MG/3ML) 0.083% nebulizer solution Take 2.5 mg by nebulization Every 6 (Six) Hours As Needed for Shortness of Air. 360 mL 2    allopurinol (ZYLOPRIM) 300 MG tablet Take 1 tablet by mouth Daily.      ALPRAZolam (XANAX) 0.5 MG tablet Take 1 tablet by mouth 3 (Three) Times a Day As Needed for Anxiety.       aspirin 81 MG EC tablet Take 1 tablet by mouth Daily. 90 tablet 3    atorvastatin (LIPITOR) 40 MG tablet Take 1 tablet by mouth Daily.      bumetanide (BUMEX) 1 MG tablet Take 1 tablet by mouth Daily for 90 days. 30 tablet 2    carvedilol (COREG) 25 MG tablet Take 1 tablet by mouth 2 (Two) Times a Day With Meals.      Dulaglutide (Trulicity) 4.5 MG/0.5ML solution pen-injector Inject  under the skin into the appropriate area as directed 1 (One) Time Per Week.      ferrous sulfate 325 (65 FE) MG tablet Take 1 tablet by mouth 3 (Three) Times a Day With Meals. Taking BID      FLUoxetine (PROzac) 40 MG capsule Take 1 capsule by mouth Daily.      gabapentin (NEURONTIN) 800 MG tablet Take 1 tablet by mouth 3 (Three) Times a Day.      HumuLIN 70/30 KwikPen (70-30) 100 UNIT/ML suspension pen-injector INJECT 36 UNITS SUB-Q IN THE MORNING AND AT 4PM      hydrALAZINE (APRESOLINE) 50 MG tablet Take 1 tablet by mouth Every 8 (Eight) Hours. 90 tablet 1    Insulin Lispro, 0.5 Unit Dial, (HUMALOG KWIKPEN ROSALBA) 100 UNIT/ML solution pen-injector Inject 60 Units under the skin into the appropriate area as directed As Needed. Max of 60 units daily- use sliding scale PRN      isosorbide mononitrate (IMDUR) 120 MG 24 hr tablet Take 1 tablet by mouth Daily.      Krill Oil (Omega-3) 500 MG capsule Take 2 capsules by mouth Daily.      levocetirizine (XYZAL) 5 MG tablet Take 1 tablet by mouth Every Evening.      Mag-G 500 (27 Mg) MG tablet Take 1 tablet by mouth Daily.      nitroglycerin (NITROSTAT) 0.4 MG SL tablet Place 1 tablet under the tongue Every 5 (Five) Minutes As Needed for Chest Pain (Only if SBP Greater Than 100). Take no more than 3 doses in 15 minutes. If no relief, seek medical attention. 25 tablet 0    OneTouch Ultra test strip       pantoprazole (Protonix) 40 MG EC tablet Take 1 tablet by mouth Daily. 30 tablet 11    promethazine (PHENERGAN) 12.5 MG tablet Take 1 tablet by mouth 2 (Two) Times a Day As Needed for Nausea or  Vomiting.      ranolazine (RANEXA) 1000 MG 12 hr tablet Take 1 tablet by mouth Every 12 (Twelve) Hours. 60 tablet 2    tamsulosin (FLOMAX) 0.4 MG capsule 24 hr capsule Take 1 capsule by mouth Daily. 30 capsule 3    ticagrelor (BRILINTA) 90 MG tablet tablet Take 1 tablet by mouth 2 (Two) Times a Day. 180 tablet 3    traZODone (DESYREL) 100 MG tablet Take 1 tablet by mouth At Night As Needed for Sleep.      vitamin B-12 (CYANOCOBALAMIN) 1000 MCG tablet Take 1 tablet by mouth Daily.      vitamin D (ERGOCALCIFEROL) 1.25 MG (35360 UT) capsule capsule Take 1 capsule by mouth 1 (One) Time Per Week. 5 capsule 3    dapagliflozin Propanediol 10 MG tablet Take 10 mg by mouth Daily for 180 days. 90 tablet 1     No current facility-administered medications for this encounter.       Vaccination History:   Pneumonia: Reports received - unsure when this was; likely a candidate for Prevnar 20  Annual Influenza: UTD  Shingles: Currently not eligible    Objective  Vitals:    03/18/24 0953   BP: 118/63   BP Location: Left arm   Patient Position: Sitting   Cuff Size: Adult   Pulse: 67   SpO2: 96%   Weight: 119 kg (263 lb)     Wt Readings from Last 3 Encounters:   03/18/24 119 kg (263 lb)   03/06/24 121 kg (266 lb 6.4 oz)   01/22/24 121 kg (266 lb)         03/18/24  0953   Weight: 119 kg (263 lb)     Lab Results   Component Value Date    GLUCOSE 151 (H) 03/18/2024    BUN 37 (H) 03/18/2024    CREATININE 2.02 (H) 03/18/2024    EGFRIFNONA 68 12/29/2021    BCR 18.3 03/18/2024    K 4.3 03/18/2024    CO2 22.3 03/18/2024    CALCIUM 8.9 03/18/2024    PROTENTOTREF 6.0 09/01/2023    ALBUMIN 3.8 03/13/2024    LABIL2 1.1 09/01/2023    AST 16 03/13/2024    ALT 21 03/13/2024     Lab Results   Component Value Date    WBC 4.76 02/06/2024    HGB 10.1 (L) 02/06/2024    HCT 31.3 (L) 02/06/2024    .0 (H) 02/06/2024     02/06/2024     Lab Results   Component Value Date    CKTOTAL 125 12/19/2023    CKMB 2.40 07/25/2023    TROPONINT 18 (H)  09/07/2023     Lab Results   Component Value Date    PROBNP 364.0 03/18/2024     Results for orders placed during the hospital encounter of 01/09/24    Adult Transthoracic Echo Complete W/ Cont if Necessary Per Protocol    Interpretation Summary    Left ventricular ejection fraction appears to be 51 - 55%.    Left ventricular diastolic function is consistent with (grade II w/high LAP) pseudonormalization.    There is no evidence of pericardial effusion         GDMT    Drug Class   Drug   Dose Last Dose Adjustment Additional Titration   Notes   ACEi/ARB/ARNI     Lisinopril D/C at discharge July 2023 - renal/ck    Beta Blocker Carvedilol 25 mg BID      MRA        SGLT2i Farxiga 10mg 3/18/24      Imdur 120 mg QD       Hydralazine 50 mg TID(taking BID)       Ranexa 1 g BID          Drug Therapy Problems    GDMT      Recommendations:     Patient would benefit from addition of SGLT2 inhibitor. Due to kidney issues, Zuri opted for Farxiga 10mg at this time ($0). Counseled the patient on this new medication and its side effects. Patient reports that he does sometimes get a yeast infection, therefore, please follow-up with patient on its side effects and risks versus benefits. Hoping to get better sugar control as well.         Patient was educated on heart failure medications and the importance of medication adherence.   All questions were addressed and patient expressed understanding.       Thank you for allowing me to participate in the care of your patient,    Chata Hou RPH  03/18/24  10:47 EDT

## 2024-03-18 NOTE — PROGRESS NOTES
Heart Failure Clinic    Date: 03/18/24     Vitals:    03/18/24 0953   BP: 118/63   Pulse: 67   SpO2: 96%    Weight 263    Method of arrival: Ambulatory    Weighing self daily: No    Monitoring Heart Failure Zones: Yes    Today's HF Zone: Green    Taking medications as prescribed: Yes    Edema some, has got better     Shortness of Air: with activity     Number of pillows used at night:<2    Educational Materials given:  avs                                                                         ReDS Value: 41  36-41 Possible Hypervolemic Status      Nettie De La Cruz, PCT 03/18/24 09:55 EDT

## 2024-03-18 NOTE — PROGRESS NOTES
[facility]  NEREYDA Aly Jonathan L, PA-C  [address] [place] [address]    Thank you for asking me to see Matthew Madrid for congestive heart failure.    HPI:     This is a 49 y.o. male with known past medical history of:    ASCVD  [facility] September 2021 with distal RCA lesion 95% stenosed, proximal RCA lesion 60% stenosed, mid RCA lesion 70% stenosed with successful PCI to distal RCA prior to bifurcation with Xience drug-eluting stent placed and recommendation for dual antiplatelet therapy for 1 year  [facility] 01/15/24 with successful IFR guided PTCA & stent placement of RCA with DAP to continue.  1st margion lesion was noted to be 30% stenosed; prox RCA-1 lesion 60% stenosed.  Prox RCA-2 lesion 60% stenosed.  Mid RCA lesion 70% stenosed.   CKD stage III  Chronic HFpEF  July 2023 TTE with EF 61 to 65% and grade 1 diastolic dysfunction as well as mild pulmonary hypertension  Mild pulmonary hypertension  Insulin-dependent diabetes type 2  GERD  Anxiety/depression  Morbid obesity  History of hypotension secondary to GI losses from chronic diarrhea since prior cholecystectomy  Hyperlipidemia  ALIA now on CPAP    Matthew Madrid presents for today for heart failure clinic evaluation.  The patient is typically seen by Susanna Johnson APRN.  Patient's primary cardiologist is Dr. Etienne & Bhupinder Graham PA-C.      Last known EF 61-65%.   Last known hospitalization and/or ED visit: Patient was hospitalized from January 9, 2024 through January 15, 2024 secondary to need for LHC with known CKD.  Nephrology followed along to assist with renal function.  He did undergo LHC with RCA stent as outlined in cardiac cath report within this document.   DAP to continue with medical management.    Accompanied by: Wife          03/18/24 visit data/details regarding:   Dyspnea: Improving dyspnea on exertion  Lower extremity swelling: Improving swelling of the lower  extremities  Abdominal swelling: Improving distension  Home weight: Weight monitoring booklet provided during initial visit; Scale provided  Home BP: BP monitoring booklet provided during initial visit; BP book brought to appointment with BP well controlled.   Home heart rate: HR monitoring booklet provided during initial visit; Has monitoring device  Daily activities of living: Performing with wife's assistance   Pillows/lying flat:1-2 pillows   HF zone: green  Mr. Madrid is chest pain free and doing well.  He reports bilateral lower extremity edema is close to his baseline.   He reports dyspnea on exertion has improved.   Overall, he reports he has been wearing his CPAP since prescribed and can see an improvement in his breathing and activities of living.  He reports his swelling has also improved since using his CPAP.      Specialists:   Cardiology: Bhupinder & Dr. Etienne       Review of Systems - Review of Systems   Constitutional: Negative for decreased appetite, diaphoresis and fever.   HENT:  Negative for congestion and ear pain.    Eyes:  Negative for blurred vision, discharge and double vision.   Cardiovascular:  Positive for dyspnea on exertion and leg swelling.   Respiratory:  Positive for shortness of breath. Negative for cough and hemoptysis.    Endocrine: Negative for cold intolerance and heat intolerance.   Hematologic/Lymphatic: Negative for adenopathy and bleeding problem.   Skin:  Negative for dry skin and nail changes.   Musculoskeletal:  Negative for arthritis and falls.   Gastrointestinal:  Negative for bloating and anorexia.   Genitourinary:  Negative for bladder incontinence and dysuria.   Neurological:  Negative for aphonia and difficulty with concentration.   Psychiatric/Behavioral:  Negative for altered mental status and hallucinations.    Allergic/Immunologic: Negative for environmental allergies and HIV exposure.         All other systems were reviewed and were negative.    Patient Active  Problem List   Diagnosis    NSTEMI, initial episode of care    NSTEMI (non-ST elevated myocardial infarction)    ASCVD (arteriosclerotic cardiovascular disease)    Essential hypertension    Dyslipidemia    DM (diabetes mellitus), type 2 with complications    SOB (shortness of breath)    Severe obesity (BMI 35.0-39.9) with comorbidity    Diarrhea    Abdominal pain    Dysphagia    Acute renal failure, unspecified acute renal failure type    Chronic heart failure with preserved ejection fraction (HFpEF)    Chest pain in adult    Acute on chronic heart failure with preserved ejection fraction (HFpEF)    CHF (congestive heart failure), NYHA class II, acute on chronic, combined    Deformity of metatarsal    Diabetic peripheral neuropathy associated with type 2 diabetes mellitus    Epidermoid cyst    Foot pain    Hammer toe    Hereditary peripheral neuropathy    Low back pain    Lumbosacral radiculopathy    Lumbar spondylosis    Muscle pain    Onychomycosis of toenail    Pain of right hip joint    Peripheral vascular disease    Stasis dermatitis with venous ulcer of lower extremity due to chronic peripheral venous hypertension    Tinea pedis    Torticollis       family history includes Heart attack in his paternal uncle; Hyperlipidemia in his father and mother.     reports that he quit smoking about 24 years ago. His smoking use included cigarettes. He has been exposed to tobacco smoke. He has never used smokeless tobacco. He reports current alcohol use of about 6.0 standard drinks of alcohol per week. He reports that he does not use drugs.    Allergies   Allergen Reactions    Tuberculin Tests Rash         Current Outpatient Medications:     albuterol (PROVENTIL) (2.5 MG/3ML) 0.083% nebulizer solution, Take 2.5 mg by nebulization Every 6 (Six) Hours As Needed for Shortness of Air., Disp: 360 mL, Rfl: 2    allopurinol (ZYLOPRIM) 300 MG tablet, Take 1 tablet by mouth Daily., Disp: , Rfl:     ALPRAZolam (XANAX) 0.5 MG tablet,  Take 1 tablet by mouth 3 (Three) Times a Day As Needed for Anxiety., Disp: , Rfl:     aspirin 81 MG EC tablet, Take 1 tablet by mouth Daily., Disp: 90 tablet, Rfl: 3    atorvastatin (LIPITOR) 40 MG tablet, Take 1 tablet by mouth Daily., Disp: , Rfl:     bumetanide (BUMEX) 1 MG tablet, Take 1 tablet by mouth Daily for 90 days., Disp: 30 tablet, Rfl: 2    carvedilol (COREG) 25 MG tablet, Take 1 tablet by mouth 2 (Two) Times a Day With Meals., Disp: , Rfl:     Dulaglutide (Trulicity) 4.5 MG/0.5ML solution pen-injector, Inject  under the skin into the appropriate area as directed 1 (One) Time Per Week., Disp: , Rfl:     ferrous sulfate 325 (65 FE) MG tablet, Take 1 tablet by mouth 3 (Three) Times a Day With Meals. Taking BID, Disp: , Rfl:     FLUoxetine (PROzac) 40 MG capsule, Take 1 capsule by mouth Daily., Disp: , Rfl:     gabapentin (NEURONTIN) 800 MG tablet, Take 1 tablet by mouth 3 (Three) Times a Day., Disp: , Rfl:     HumuLIN 70/30 KwikPen (70-30) 100 UNIT/ML suspension pen-injector, INJECT 36 UNITS SUB-Q IN THE MORNING AND AT 4PM, Disp: , Rfl:     hydrALAZINE (APRESOLINE) 50 MG tablet, Take 1 tablet by mouth Every 8 (Eight) Hours., Disp: 90 tablet, Rfl: 1    Insulin Lispro, 0.5 Unit Dial, (HUMALOG KWIKPEN ROSALBA) 100 UNIT/ML solution pen-injector, Inject 60 Units under the skin into the appropriate area as directed As Needed. Max of 60 units daily- use sliding scale PRN, Disp: , Rfl:     isosorbide mononitrate (IMDUR) 120 MG 24 hr tablet, Take 1 tablet by mouth Daily., Disp: , Rfl:     Krill Oil (Omega-3) 500 MG capsule, Take 2 capsules by mouth Daily., Disp: , Rfl:     levocetirizine (XYZAL) 5 MG tablet, Take 1 tablet by mouth Every Evening., Disp: , Rfl:     Mag-G 500 (27 Mg) MG tablet, Take 1 tablet by mouth Daily., Disp: , Rfl:     nitroglycerin (NITROSTAT) 0.4 MG SL tablet, Place 1 tablet under the tongue Every 5 (Five) Minutes As Needed for Chest Pain (Only if SBP Greater Than 100). Take no more than 3  doses in 15 minutes. If no relief, seek medical attention., Disp: 25 tablet, Rfl: 0    OneTouch Ultra test strip, , Disp: , Rfl:     pantoprazole (Protonix) 40 MG EC tablet, Take 1 tablet by mouth Daily., Disp: 30 tablet, Rfl: 11    promethazine (PHENERGAN) 12.5 MG tablet, Take 1 tablet by mouth 2 (Two) Times a Day As Needed for Nausea or Vomiting., Disp: , Rfl:     ranolazine (RANEXA) 1000 MG 12 hr tablet, Take 1 tablet by mouth Every 12 (Twelve) Hours., Disp: 60 tablet, Rfl: 2    tamsulosin (FLOMAX) 0.4 MG capsule 24 hr capsule, Take 1 capsule by mouth Daily., Disp: 30 capsule, Rfl: 3    ticagrelor (BRILINTA) 90 MG tablet tablet, Take 1 tablet by mouth 2 (Two) Times a Day., Disp: 180 tablet, Rfl: 3    traZODone (DESYREL) 100 MG tablet, Take 1 tablet by mouth At Night As Needed for Sleep., Disp: , Rfl:     vitamin B-12 (CYANOCOBALAMIN) 1000 MCG tablet, Take 1 tablet by mouth Daily., Disp: , Rfl:     vitamin D (ERGOCALCIFEROL) 1.25 MG (65468 UT) capsule capsule, Take 1 capsule by mouth 1 (One) Time Per Week., Disp: 5 capsule, Rfl: 3    dapagliflozin Propanediol 10 MG tablet, Take 10 mg by mouth Daily for 180 days., Disp: 90 tablet, Rfl: 1      Physical Exam:  I have reviewed the patient's current vital signs as documented in the patient's EMR.   Vitals:    03/18/24 0953   BP: 118/63   Pulse: 67   SpO2: 96%     Body mass index is 42.45 kg/m².       03/18/24  0953   Weight: 119 kg (263 lb)      Physical Exam  Vitals and nursing note reviewed.   Constitutional:       General: He is awake.      Appearance: Normal appearance.   HENT:      Head: Normocephalic and atraumatic.   Eyes:      General: Lids are normal.      Conjunctiva/sclera:      Right eye: Right conjunctiva is not injected.      Left eye: Left conjunctiva is not injected.      Comments: Bilateral undereye swelling has improved to some degree.     Cardiovascular:      Rate and Rhythm: Normal rate and regular rhythm.      Heart sounds: Murmur heard.       Systolic murmur is present with a grade of 2/6.   Pulmonary:      Effort: No tachypnea, bradypnea or prolonged expiration.      Breath sounds: No wheezing, rhonchi or rales.   Abdominal:      General: There is no distension.      Tenderness: There is no abdominal tenderness.   Musculoskeletal:      Right lower le+ Edema present.      Left lower le+ Edema present.   Skin:     General: Skin is warm and dry.   Neurological:      Mental Status: He is alert and oriented to person, place, and time.   Psychiatric:         Attention and Perception: Attention normal.         Mood and Affect: Mood normal.         Behavior: Behavior is cooperative.            JVP: Volume/Pulsation: Normal.        DATA REVIEWED:       ---------------------------------------------------  TTE/GAYLE:  Results for orders placed during the hospital encounter of 24    Adult Transthoracic Echo Complete W/ Cont if Necessary Per Protocol    Interpretation Summary    Left ventricular ejection fraction appears to be 51 - 55%.    Left ventricular diastolic function is consistent with (grade II w/high LAP) pseudonormalization.    There is no evidence of pericardial effusion        LAST HEART CATH/IF AVAILABLE:     Results for orders placed during the hospital encounter of 21    Cardiac Catheterization/Vascular Study    Narrative  · Dist RCA lesion is 95% stenosed.  · Prox RCA lesion is 60% stenosed.  · Mid RCA lesion is 70% stenosed on a sharp curve and it appears to be a kink in the vessel.  · Successfule PCI to distal RCA prior to bifurcation with 2.25x23 Xience post dilated with 2.5 NC from 99% to 0% with TMI flow 0-3      -----------------------------------------------------  CXR/Imaging:   Imaging Results (Most Recent)       None            I personally reviewed and interpreted the CXR.      -----------------------------------------------------  CT:   No radiology results for the last 30 days.  I personally reviewed the images of the  CT scan.  My personal interpretation is below.      ----------------------------------------------------      --------------------------------------------------------------------------------------------------    Laboratory evaluations:    Lab Results   Component Value Date    GLUCOSE 151 (H) 03/18/2024    BUN 37 (H) 03/18/2024    CREATININE 2.02 (H) 03/18/2024    EGFRIFNONA 68 12/29/2021    BCR 18.3 03/18/2024    K 4.3 03/18/2024    CO2 22.3 03/18/2024    CALCIUM 8.9 03/18/2024    PROTENTOTREF 6.0 09/01/2023    ALBUMIN 3.8 03/13/2024    LABIL2 1.1 09/01/2023    AST 16 03/13/2024    ALT 21 03/13/2024     Lab Results   Component Value Date    WBC 4.76 02/06/2024    HGB 10.1 (L) 02/06/2024    HCT 31.3 (L) 02/06/2024    .0 (H) 02/06/2024     02/06/2024     Lab Results   Component Value Date    CHOL 95 03/27/2023    TRIG 201 (H) 03/27/2023    HDL 29 (L) 03/27/2023    LDL 34 03/27/2023     Lab Results   Component Value Date    TSH 1.440 01/09/2024     Lab Results   Component Value Date    HGBA1C 6.70 (H) 01/09/2024     Lab Results   Component Value Date    ALT 21 03/13/2024     Lab Results   Component Value Date    HGBA1C 6.70 (H) 01/09/2024    HGBA1C 5.70 (H) 07/04/2023    HGBA1C 9.40 (H) 09/01/2021     Lab Results   Component Value Date    MICROALBUR 26.6 09/01/2023    CREATININE 2.02 (H) 03/18/2024     Lab Results   Component Value Date    IRON 53 (L) 01/11/2024    TIBC 325 01/11/2024    FERRITIN 117.60 01/11/2024     Lab Results   Component Value Date    INR 1.06 09/02/2021    PROTIME 14.2 09/02/2021        Lab Results   Component Value Date    ABSOLUTELUNG 41 (A) 03/18/2024    ABSOLUTELUNG 32 01/18/2024    ABSOLUTELUNG 32 12/19/2023       PAH RISK ASSESSMENT:      1. Chronic heart failure with preserved ejection fraction (HFpEF)    2. CHF (congestive heart failure), NYHA class II, acute on chronic, combined    3. ASCVD (arteriosclerotic cardiovascular disease)    4. ALIA on CPAP          ORDERS PLACED  TODAY:  Orders Placed This Encounter   Procedures    ReDs Vest    Basic Metabolic Panel    Magnesium    proBNP    Basic Metabolic Panel    Magnesium    proBNP    Basic Metabolic Panel        Diagnoses and all orders for this visit:    1. Chronic heart failure with preserved ejection fraction (HFpEF) (Primary)  -     Basic Metabolic Panel; Future  -     Magnesium; Future  -     proBNP; Future  -     Basic Metabolic Panel; Standing  -     Basic Metabolic Panel  -     Magnesium; Standing  -     Magnesium  -     proBNP; Standing  -     proBNP    2. CHF (congestive heart failure), NYHA class II, acute on chronic, combined  -     ReDs Vest  -     Basic Metabolic Panel; Future    3. ASCVD (arteriosclerotic cardiovascular disease)  Overview:  9/1/2021 TTE: LVEF 56 to 60%  9/2/2021 [facility] for non-STEMI: PCI MONTSE to distal RCA.  Proximal RCA 60% stenosed and mid RCA 70% stenosed      4. ALIA on CPAP    Other orders  -     dapagliflozin Propanediol 10 MG tablet; Take 10 mg by mouth Daily for 180 days.  Dispense: 90 tablet; Refill: 1  -     bumetanide (BUMEX) 1 MG tablet; Take 1 tablet by mouth Daily for 90 days.  Dispense: 30 tablet; Refill: 2             MEDS ORDERED TODAY:    New Medications Ordered This Visit   Medications    dapagliflozin Propanediol 10 MG tablet     Sig: Take 10 mg by mouth Daily for 180 days.     Dispense:  90 tablet     Refill:  1    bumetanide (BUMEX) 1 MG tablet     Sig: Take 1 tablet by mouth Daily for 90 days.     Dispense:  30 tablet     Refill:  2        ---------------------------------------------------------------------------------------------------------------------------          ASSESSMENT/PLAN:      Diagnosis Plan   1. Chronic heart failure with preserved ejection fraction (HFpEF)  Basic Metabolic Panel    Magnesium    proBNP    Basic Metabolic Panel    Basic Metabolic Panel    Magnesium    Magnesium    proBNP    proBNP      2. CHF (congestive heart failure), NYHA class II, acute on chronic,  combined  ReDs Vest    Basic Metabolic Panel      3. ASCVD (arteriosclerotic cardiovascular disease)        4. ALIA on CPAP            not acutely decompensated chronic diastolic heart failure. CHF.     NYHA stage Stage C: Structural heart disease is present AND symptoms have occurredFC-Class III: Marked limitation of physical activity. Comfortable at rest. Less than ordinary activity causes fatigue, palpitation, or dyspnea. NYHA FC change: change is not known.     Today, Patient is mildly volume overloadedand with  Moderate perfusion. The patient's hemodynamics are currently unacceptable. HR is: normal and is at goal. BP/MAP was reviewed and there isroom for medication up-titration.  Clinical trajectory was assessed and haswaxed and waned.     CHF GOAL DIRECTED MEDICAL THERAPY FOR PATIENT ADDRESSED/ADJUSTED:     GDMT: HFpEF    Drug Class   Drug   Dose Last Dose Adjustment Notes   ACEi/ARB/ARNI Hydralizine/Imdur on board 50mg TID/120mg qd     Beta Blocker Coreg 25mg BID     MRA CKD III      SGLT2i Consider in future visit   N/A   Secondaries if applicable:          -CHF Specific BB:    Carvedilol  at dose of 25mg BID.   We discussed processes/benefits of HF clinic including nursing, pharmacist, and provider evaluation during each visit with ability for in office ReDS vest, labs, and ability to provide IV diuresis in the clinic with close outpatient monitoring.  Additionally, patient was educated about the availability of delivery of medications to patient's clinic room prior to leaving the building which assists with medication compliance and insures medications are in hands when changes are made (if patient opts for apothecary usage) with thorough guidance regarding changes and medication schedule provided.          -ACE/ARB/ARNi/alternative:   Hydralazine 50mg TID on board at present.   Imdur 120mg on board at present.     -MRA:   The patient is FC-NYHA Class III and MRA is indicated.  However, given CKD III, will  hold on MRA addition at present.       -SGLT2 inhibitor therapy:   A BMP at initiation to verify GFR >30 was recommended, as was interval GFR surveillance.  The patient was advised to hold SGLT2I when PO intake is restricted due to a planned surgery, or due to an underlying illness.    Will consider Farxiga in future visit; however, at present we are adjusting diuretics and patient has upcoming cardiac catheterization, thus will avoid renal fluctuations until post cardiac catheterization.   Pt was advised SEs, some severe, including hypersensitivity and Ai's; coupled with discussion regarding common side effects of UTIs and female genital mycotic infections were discussed. If you will be NPO, or are sick (poor PO intake, N&V) please hold the medication until you are back to a normal diet.     -Diuretic regimen:   ReDS Vest reading for. 03/18/24 is 41; ReDs Vest reading reviewed with patient.    Continue daily bumex 1mg.    BMP, Mag, & ProBNP reviewed with patient.      -Fluid restriction/Sodium restriction:   Requested 2000 ml restriction  Patient has been asked to weigh daily and was provided with a printed diuretic strategy.  1,500 mg Na restriction was discussed.      -Acute vs. Chronic underlying conditions other than HF addressed during visit:   Essential HTN:   Continue Hydralazine 75mg TID.    Continue Imdur.   Stopped Norvasc  in prior visit 2/2 leg swelling; however, patient may have started taking again.  Advised to continue taking if he is presently taking.      ASCVD:   Continue Brilinta & ASA.   Continue atorvastatin.   [facility] report from Jan 2024 reviewed and available within document.    EKG with NSR.      IDDM type 2:   Continue management per primary.      CKD stage 3b likely 2/2 DM type 2, ID in combination with recent diuresis:   Continue f/u with Dr. Olvera.    Baseline per January 2024 Nephro inpatient documentation is around 1.8.    BMP today with creatinine with slight hit after cath dye.  Repeat BMP already ordered per Susan Coates NP.        Identifiable barriers to Heart Failure Self-care:   Medical Barriers:  Multiple medical comorbidities  Social Barriers:  No immediate known barriers.      -Sleep/Apnea:   Will refer for outpatient sleep study evaluation.      --------------------------------------------------------------------------------        Class 3 Severe Obesity (BMI >=40). Obesity-related health conditions include the following: hypertension, coronary heart disease, diabetes mellitus, dyslipidemias, and GERD. Obesity is unchanged. BMI is is above average; BMI management plan is completed. We discussed portion control, increasing exercise, and heart failure dietary management strategies .              >45 minutes out of 60 minutes face to face spent counseling patient extensively on dietary Na+ intake, importance of activity, weight monitoring, compliance with medications in addition to importance of titration with goal directed medical therapy and follow up appointments.            This document has been electronically signed by Zuri Johnson PA-C  March 18, 2024 12:57 EDT      Dictated Utilizing Dragon Dictation: Part of this note may be an electronic transcription/translation of spoken language to printed text using the Dragon Dictation System.    Follow-up appointment and medication changes provided in hand delivered After Visit Summary as well as reviewed in the room.

## 2024-03-19 ENCOUNTER — TRANSCRIBE ORDERS (OUTPATIENT)
Dept: ADMINISTRATIVE | Facility: HOSPITAL | Age: 49
End: 2024-03-19
Payer: MEDICARE

## 2024-03-19 DIAGNOSIS — M54.16 LUMBAR RADICULOPATHY: Primary | ICD-10-CM

## 2024-03-19 RX ORDER — CARVEDILOL 25 MG/1
25 TABLET ORAL 2 TIMES DAILY
Qty: 90 TABLET | Refills: 0 | Status: SHIPPED | OUTPATIENT
Start: 2024-03-19

## 2024-03-20 ENCOUNTER — TELEPHONE (OUTPATIENT)
Dept: CARDIOLOGY | Facility: CLINIC | Age: 49
End: 2024-03-20
Payer: MEDICARE

## 2024-03-20 ENCOUNTER — HOSPITAL ENCOUNTER (OUTPATIENT)
Dept: MRI IMAGING | Facility: HOSPITAL | Age: 49
Discharge: HOME | End: 2024-03-20
Admitting: ANESTHESIOLOGY
Payer: MEDICARE

## 2024-03-20 DIAGNOSIS — M54.16 LUMBAR RADICULOPATHY: ICD-10-CM

## 2024-03-20 PROCEDURE — 72148 MRI LUMBAR SPINE W/O DYE: CPT | Performed by: RADIOLOGY

## 2024-03-20 PROCEDURE — 72148 MRI LUMBAR SPINE W/O DYE: CPT

## 2024-03-20 NOTE — TELEPHONE ENCOUNTER
Caller: Matthew Madrid    Relationship: Self    Best call back number: [phone]    What is the best time to reach you: ANY    Who are you requesting to speak with (clinical staff, provider,  specific staff member): CLINICAL      What was the call regarding: PT WAS TOLD BY [facility] THAT HIS [facility] PPO IS NO LONGER ACCEPTED, HE CONTACTED BILLING AND THEY SAID THEY AREN'T TAKING [facility] PERIOD. HE WANTS TO KNOW IF HIS PPO PLAN IS COVERED AT THE OFFICE.     HE GOES TO THE HEART FAILURE CLINIC AT THE HOSPITAL.

## 2024-03-21 ENCOUNTER — TELEPHONE (OUTPATIENT)
Dept: PULMONOLOGY | Facility: CLINIC | Age: 49
End: 2024-03-21

## 2024-03-21 NOTE — TELEPHONE ENCOUNTER
Provider: MICHEAL LEBLANC     Caller: MORRIS CABAN     Relationship to Patient: SELF    Phone Number:     [phone]        Reason for Call: THE PT IS CALLING TO CLARIFY WILL HE STILL BE ABLE TO SEEN SINCE HIS INSURANCE IS CONSIDERED OUT OF NETWORK AFTER 04/01/24. HE WOULD LIKE TO KNOW IF THERE IS ANOTHER PULMONARY DOCTOR HE COULD BE REFERRED TO IN [place] IF HE CAN'T BE SEEN AFTER 04/01.     When was the patient last seen: 03/6/24

## 2024-03-21 NOTE — TELEPHONE ENCOUNTER
Spoke to patient, let him know I talked with  and she is going to find out for sure what is going on with the insurance.

## 2024-03-21 NOTE — TELEPHONE ENCOUNTER
"\"Returned call to patient and explained to him that we will still be accepting his insurance as he as PPO rather than HMO. We will still be accepting [employer] PPO after April 1st, 2024.\"                 "

## 2024-04-01 RX ORDER — CEFDINIR 300 MG/1
300 CAPSULE ORAL 2 TIMES DAILY
Qty: 14 CAPSULE | Refills: 0 | Status: SHIPPED | OUTPATIENT
Start: 2024-04-01 | End: 2024-04-08

## 2024-04-01 RX ORDER — HYDRALAZINE HYDROCHLORIDE 50 MG/1
50 TABLET, FILM COATED ORAL EVERY 8 HOURS
Qty: 90 TABLET | Refills: 0 | Status: SHIPPED | OUTPATIENT
Start: 2024-04-01

## 2024-04-02 ENCOUNTER — LAB (OUTPATIENT)
Dept: LAB | Facility: HOSPITAL | Age: 49
End: 2024-04-02
Payer: MEDICARE

## 2024-04-02 ENCOUNTER — TRANSCRIBE ORDERS (OUTPATIENT)
Dept: ADMINISTRATIVE | Facility: HOSPITAL | Age: 49
End: 2024-04-02
Payer: MEDICARE

## 2024-04-02 DIAGNOSIS — I50.22 CHRONIC SYSTOLIC CONGESTIVE HEART FAILURE: ICD-10-CM

## 2024-04-02 DIAGNOSIS — I50.22 CHRONIC SYSTOLIC CONGESTIVE HEART FAILURE: Primary | ICD-10-CM

## 2024-04-02 LAB
ANION GAP SERPL CALCULATED.3IONS-SCNC: 12.6 MMOL/L (ref 5–15)
BUN SERPL-MCNC: 41 MG/DL (ref 6–20)
BUN/CREAT SERPL: 18.6 (ref 7–25)
CALCIUM SPEC-SCNC: 9.3 MG/DL (ref 8.6–10.5)
CHLORIDE SERPL-SCNC: 103 MMOL/L (ref 98–107)
CO2 SERPL-SCNC: 23.4 MMOL/L (ref 22–29)
CREAT SERPL-MCNC: 2.2 MG/DL (ref 0.76–1.27)
EGFRCR SERPLBLD CKD-EPI 2021: 35.8 ML/MIN/1.73
GLUCOSE SERPL-MCNC: 145 MG/DL (ref 65–99)
POTASSIUM SERPL-SCNC: 4.3 MMOL/L (ref 3.5–5.2)
SODIUM SERPL-SCNC: 139 MMOL/L (ref 136–145)

## 2024-04-02 PROCEDURE — 80048 BASIC METABOLIC PNL TOTAL CA: CPT

## 2024-04-02 PROCEDURE — 36415 COLL VENOUS BLD VENIPUNCTURE: CPT

## 2024-04-09 ENCOUNTER — OFFICE VISIT (OUTPATIENT)
Dept: PULMONOLOGY | Facility: CLINIC | Age: 49
End: 2024-04-09
Payer: MEDICARE

## 2024-04-09 VITALS
TEMPERATURE: 96.7 F | BODY MASS INDEX: 42.27 KG/M2 | SYSTOLIC BLOOD PRESSURE: 118 MMHG | DIASTOLIC BLOOD PRESSURE: 78 MMHG | WEIGHT: 263 LBS | HEART RATE: 79 BPM | OXYGEN SATURATION: 95 % | HEIGHT: 66 IN

## 2024-04-09 DIAGNOSIS — I27.20 PULMONARY HYPERTENSION: ICD-10-CM

## 2024-04-09 DIAGNOSIS — G47.34 NOCTURNAL HYPOXIA: ICD-10-CM

## 2024-04-09 DIAGNOSIS — G47.33 OSA (OBSTRUCTIVE SLEEP APNEA): Primary | ICD-10-CM

## 2024-04-09 DIAGNOSIS — E66.01 MORBID OBESITY WITH BMI OF 40.0-44.9, ADULT: ICD-10-CM

## 2024-04-09 DIAGNOSIS — R06.09 DYSPNEA ON EXERTION: ICD-10-CM

## 2024-04-09 DIAGNOSIS — J98.4 RESTRICTIVE LUNG DISEASE: ICD-10-CM

## 2024-04-09 PROCEDURE — 99213 OFFICE O/P EST LOW 20 MIN: CPT | Performed by: PHYSICIAN ASSISTANT

## 2024-04-09 PROCEDURE — 1160F RVW MEDS BY RX/DR IN RCRD: CPT | Performed by: PHYSICIAN ASSISTANT

## 2024-04-09 PROCEDURE — 3074F SYST BP LT 130 MM HG: CPT | Performed by: PHYSICIAN ASSISTANT

## 2024-04-09 PROCEDURE — 3078F DIAST BP <80 MM HG: CPT | Performed by: PHYSICIAN ASSISTANT

## 2024-04-09 PROCEDURE — 1159F MED LIST DOCD IN RCRD: CPT | Performed by: PHYSICIAN ASSISTANT

## 2024-04-09 NOTE — PROGRESS NOTES
Subjective      Chief Complaint  Sleep Apnea    Subjective      History of Present Illness  Matthew Madrid is a 49 y.o. male who presents today to [facility] with past medical history of ASCVD s/p stenting, chronic HFpEF, hyperlipidemia, CKD stage III, insulin-dependent type II diabetes mellitus, GERD, anxiety/depression, and former smoker who presents today for Sleep Apnea. This visit is a follow up appointment.     Sleep Apnea:  Patient reports that he is doing well. He states that his PAP mask was switched from the full face mask to the nasal mask. He reports that he has been able to use more and averaging approximately 4-5 hours per night. He states that he does feel more rested compared to before he was started on a PAP device. He states that the mask is rubbing his nose area raw and would like to switch to the nasal pillow mask to see if this is more comfortable.     Patient continues to have shortness of breath with exertion. He continues to follow with heart failure clinic for management of volume status. He has an albuterol inhaler and nebulizer treatments to use as needed but states that he has not required use of these yet.       Current Outpatient Medications:     albuterol (PROVENTIL) (2.5 MG/3ML) 0.083% nebulizer solution, Take 2.5 mg by nebulization Every 6 (Six) Hours As Needed for Shortness of Air., Disp: 360 mL, Rfl: 2    allopurinol (ZYLOPRIM) 300 MG tablet, Take 1 tablet by mouth Daily., Disp: , Rfl:     ALPRAZolam (XANAX) 0.5 MG tablet, Take 1 tablet by mouth 3 (Three) Times a Day As Needed for Anxiety., Disp: , Rfl:     aspirin 81 MG EC tablet, Take 1 tablet by mouth Daily., Disp: 90 tablet, Rfl: 3    atorvastatin (LIPITOR) 40 MG tablet, Take 1 tablet by mouth Daily., Disp: , Rfl:     bumetanide (BUMEX) 1 MG tablet, Take 1 tablet by mouth Daily for 90 days., Disp: 30 tablet, Rfl: 2    carvedilol (COREG) 25 MG tablet, Take 1 tablet by  mouth twice daily, Disp: 90 tablet, Rfl: 0    dapagliflozin Propanediol 10 MG tablet, Take 10 mg by mouth Daily for 180 days., Disp: 90 tablet, Rfl: 1    Dulaglutide (Trulicity) 4.5 MG/0.5ML solution pen-injector, Inject  under the skin into the appropriate area as directed 1 (One) Time Per Week., Disp: , Rfl:     ferrous sulfate 325 (65 FE) MG tablet, Take 1 tablet by mouth 3 (Three) Times a Day With Meals. Taking BID, Disp: , Rfl:     FLUoxetine (PROzac) 40 MG capsule, Take 1 capsule by mouth Daily., Disp: , Rfl:     gabapentin (NEURONTIN) 800 MG tablet, Take 1 tablet by mouth 3 (Three) Times a Day., Disp: , Rfl:     HumuLIN 70/30 KwikPen (70-30) 100 UNIT/ML suspension pen-injector, INJECT 36 UNITS SUB-Q IN THE MORNING AND AT 4PM, Disp: , Rfl:     hydrALAZINE (APRESOLINE) 50 MG tablet, TAKE 1 TABLET BY MOUTH EVERY 8 HOURS, Disp: 90 tablet, Rfl: 0    Insulin Lispro, 0.5 Unit Dial, (HUMALOG KWIKPEN ROSALBA) 100 UNIT/ML solution pen-injector, Inject 60 Units under the skin into the appropriate area as directed As Needed. Max of 60 units daily- use sliding scale PRN, Disp: , Rfl:     isosorbide mononitrate (IMDUR) 120 MG 24 hr tablet, Take 1 tablet by mouth Daily., Disp: , Rfl:     Krill Oil (Omega-3) 500 MG capsule, Take 2 capsules by mouth Daily., Disp: , Rfl:     levocetirizine (XYZAL) 5 MG tablet, Take 1 tablet by mouth Every Evening., Disp: , Rfl:     Mag-G 500 (27 Mg) MG tablet, Take 1 tablet by mouth Daily., Disp: , Rfl:     nitroglycerin (NITROSTAT) 0.4 MG SL tablet, Place 1 tablet under the tongue Every 5 (Five) Minutes As Needed for Chest Pain (Only if SBP Greater Than 100). Take no more than 3 doses in 15 minutes. If no relief, seek medical attention., Disp: 25 tablet, Rfl: 0    OneTouch Ultra test strip, , Disp: , Rfl:     pantoprazole (Protonix) 40 MG EC tablet, Take 1 tablet by mouth Daily., Disp: 30 tablet, Rfl: 11    promethazine (PHENERGAN) 12.5 MG tablet, Take 1 tablet by mouth 2 (Two) Times a Day As  "Needed for Nausea or Vomiting., Disp: , Rfl:     ranolazine (RANEXA) 1000 MG 12 hr tablet, Take 1 tablet by mouth Every 12 (Twelve) Hours., Disp: 60 tablet, Rfl: 2    tamsulosin (FLOMAX) 0.4 MG capsule 24 hr capsule, Take 1 capsule by mouth Daily., Disp: 30 capsule, Rfl: 3    ticagrelor (BRILINTA) 90 MG tablet tablet, Take 1 tablet by mouth 2 (Two) Times a Day., Disp: 180 tablet, Rfl: 3    traZODone (DESYREL) 100 MG tablet, Take 1 tablet by mouth At Night As Needed for Sleep., Disp: , Rfl:     vitamin B-12 (CYANOCOBALAMIN) 1000 MCG tablet, Take 1 tablet by mouth Daily., Disp: , Rfl:     vitamin D (ERGOCALCIFEROL) 1.25 MG (58111 UT) capsule capsule, Take 1 capsule by mouth 1 (One) Time Per Week., Disp: 5 capsule, Rfl: 3      Allergies   Allergen Reactions    Tuberculin Tests Rash       Objective     Objective   Vital Signs:  /78   Pulse 79   Temp 96.7 °F (35.9 °C)   Ht 167.6 cm (65.98\")   Wt 119 kg (263 lb)   SpO2 95%   BMI 42.47 kg/m²   Estimated body mass index is 42.47 kg/m² as calculated from the following:    Height as of this encounter: 167.6 cm (65.98\").    Weight as of this encounter: 119 kg (263 lb).          Past Medical History:   Diagnosis Date    ASCVD (arteriosclerotic cardiovascular disease) 09/13/2021    Chronic heart failure with preserved ejection fraction (HFpEF) 11/27/2023    Diabetes mellitus     Elevated cholesterol     GERD (gastroesophageal reflux disease)     Hyperlipidemia     Hypertension      Past Surgical History:   Procedure Laterality Date    CARDIAC CATHETERIZATION N/A 09/02/2021    Procedure: Left Heart Cath;  Surgeon: Vasile Moulton MD;  Location:  [facility];  Service: Cardiology;  Laterality: N/A;    CARDIAC CATHETERIZATION N/A 09/02/2021    Procedure: Percutaneous Coronary Intervention;  Surgeon: Vasile Moulton MD;  Location:  [facility];  Service: Cardiology;  Laterality: N/A;    CARDIAC CATHETERIZATION N/A 1/15/2024    Procedure: " Coronary angiography;  Surgeon: Anant Bennett MD;  Location: [facility] CATH INVASIVE LOCATION;  Service: Cardiology;  Laterality: N/A;    COLONOSCOPY      COLONOSCOPY N/A 10/4/2022    Procedure: COLONOSCOPY;  Surgeon: Gianluca Rodríguez MD;  Location: [facility] OR;  Service: Gastroenterology;  Laterality: N/A;    ENDOSCOPY      ENDOSCOPY N/A 10/4/2022    Procedure: ESOPHAGOGASTRODUODENOSCOPY;  Surgeon: Gianluca Rodríguez MD;  Location: [facility] OR;  Service: Gastroenterology;  Laterality: N/A;    LAPAROSCOPIC CHOLECYSTECTOMY       Social History     Socioeconomic History    Marital status:    Tobacco Use    Smoking status: Former     Current packs/day: 0.00     Types: Cigarettes     Quit date:      Years since quittin.2     Passive exposure: Past    Smokeless tobacco: Never   Vaping Use    Vaping status: Never Used   Substance and Sexual Activity    Alcohol use: Yes     Alcohol/week: 6.0 standard drinks of alcohol     Types: 6 Glasses of wine per week     Comment: Occasional    Drug use: Never    Sexual activity: Defer      Physical Exam  Constitutional:       General: He is awake.      Appearance: Normal appearance. He is morbidly obese.   HENT:      Head: Normocephalic and atraumatic.      Nose: Nose normal.      Mouth/Throat:      Mouth: Mucous membranes are moist.      Pharynx: Oropharynx is clear.   Eyes:      Conjunctiva/sclera: Conjunctivae normal.      Pupils: Pupils are equal, round, and reactive to light.   Cardiovascular:      Rate and Rhythm: Normal rate and regular rhythm.      Pulses: Normal pulses.      Heart sounds: Normal heart sounds. No murmur heard.     No friction rub. No gallop.   Pulmonary:      Effort: Pulmonary effort is normal. No tachypnea, accessory muscle usage or respiratory distress.      Breath sounds: Normal breath sounds. No decreased breath sounds, wheezing, rhonchi or rales.   Musculoskeletal:         General: Normal range of motion.      Cervical back: Full  "passive range of motion without pain and normal range of motion.   Skin:     General: Skin is warm and dry.   Neurological:      General: No focal deficit present.      Mental Status: He is alert. Mental status is at baseline.   Psychiatric:         Mood and Affect: Mood normal.         Behavior: Behavior normal. Behavior is cooperative.         Thought Content: Thought content normal.        Result Review :  The following labs and radiology results have been reviewed.    Lab Review:   No results found for: \"FEV1\", \"FVC\", \"YNB0DPP\", \"TLC\", \"DLCO\"  Lab on 04/02/2024   Component Date Value Ref Range Status    Glucose 04/02/2024 145 (H)  65 - 99 mg/dL Final    BUN 04/02/2024 41 (H)  6 - 20 mg/dL Final    Creatinine 04/02/2024 2.20 (H)  0.76 - 1.27 mg/dL Final    Sodium 04/02/2024 139  136 - 145 mmol/L Final    Potassium 04/02/2024 4.3  3.5 - 5.2 mmol/L Final    Chloride 04/02/2024 103  98 - 107 mmol/L Final    CO2 04/02/2024 23.4  22.0 - 29.0 mmol/L Final    Calcium 04/02/2024 9.3  8.6 - 10.5 mg/dL Final    BUN/Creatinine Ratio 04/02/2024 18.6  7.0 - 25.0 Final    Anion Gap 04/02/2024 12.6  5.0 - 15.0 mmol/L Final    eGFR 04/02/2024 35.8 (L)  >60.0 mL/min/1.73 Final   Hospital Outpatient Visit on 03/18/2024   Component Date Value Ref Range Status    Glucose 03/18/2024 151 (H)  65 - 99 mg/dL Final    BUN 03/18/2024 37 (H)  6 - 20 mg/dL Final    Creatinine 03/18/2024 2.02 (H)  0.76 - 1.27 mg/dL Final    Sodium 03/18/2024 137  136 - 145 mmol/L Final    Potassium 03/18/2024 4.3  3.5 - 5.2 mmol/L Final    Slight hemolysis detected by analyzer. Result may be falsely elevated.    Chloride 03/18/2024 102  98 - 107 mmol/L Final    CO2 03/18/2024 22.3  22.0 - 29.0 mmol/L Final    Calcium 03/18/2024 8.9  8.6 - 10.5 mg/dL Final    BUN/Creatinine Ratio 03/18/2024 18.3  7.0 - 25.0 Final    Anion Gap 03/18/2024 12.7  5.0 - 15.0 mmol/L Final    eGFR 03/18/2024 39.7 (L)  >60.0 mL/min/1.73 Final    Magnesium 03/18/2024 1.4 (L)  1.6 - " 2.6 mg/dL Final    proBNP 03/18/2024 364.0  0.0 - 450.0 pg/mL Final    Absolute Lung Fluid Content 03/18/2024 41 (A)  20 - 35 % Final   Lab on 03/13/2024   Component Date Value Ref Range Status    Color, UA 03/13/2024 Yellow  Yellow, Straw Final    Appearance, UA 03/13/2024 Clear  Clear Final    pH, UA 03/13/2024 6.0  5.0 - 8.0 Final    Specific Gravity, UA 03/13/2024 1.009  1.005 - 1.030 Final    Glucose, UA 03/13/2024 100 mg/dL (Trace) (A)  Negative Final    Ketones, UA 03/13/2024 Negative  Negative Final    Bilirubin, UA 03/13/2024 Negative  Negative Final    Blood, UA 03/13/2024 Negative  Negative Final    Protein, UA 03/13/2024 Negative  Negative Final    Leuk Esterase, UA 03/13/2024 Negative  Negative Final    Nitrite, UA 03/13/2024 Negative  Negative Final    Urobilinogen, UA 03/13/2024 0.2 E.U./dL  0.2 - 1.0 E.U./dL Final    Protein/Creatinine Ratio, Urine 03/13/2024 443.2 (H)  0.0 - 200.0 mg/G Crea Final    Creatinine, Urine 03/13/2024 27.3  mg/dL Final    Total Protein, Urine 03/13/2024 12.1  mg/dL Final    Glucose 03/13/2024 288 (H)  65 - 99 mg/dL Final    BUN 03/13/2024 43 (H)  6 - 20 mg/dL Final    Creatinine 03/13/2024 2.10 (H)  0.76 - 1.27 mg/dL Final    Sodium 03/13/2024 137  136 - 145 mmol/L Final    Potassium 03/13/2024 4.7  3.5 - 5.2 mmol/L Final    Chloride 03/13/2024 103  98 - 107 mmol/L Final    CO2 03/13/2024 21.1 (L)  22.0 - 29.0 mmol/L Final    Calcium 03/13/2024 9.1  8.6 - 10.5 mg/dL Final    Total Protein 03/13/2024 7.0  6.0 - 8.5 g/dL Final    Albumin 03/13/2024 3.8  3.5 - 5.2 g/dL Final    ALT (SGPT) 03/13/2024 21  1 - 41 U/L Final    AST (SGOT) 03/13/2024 16  1 - 40 U/L Final    Alkaline Phosphatase 03/13/2024 67  39 - 117 U/L Final    Total Bilirubin 03/13/2024 0.5  0.0 - 1.2 mg/dL Final    Globulin 03/13/2024 3.2  gm/dL Final    A/G Ratio 03/13/2024 1.2  g/dL Final    BUN/Creatinine Ratio 03/13/2024 20.5  7.0 - 25.0 Final    Anion Gap 03/13/2024 12.9  5.0 - 15.0 mmol/L Final    eGFR  03/13/2024 38.1 (L)  >60.0 mL/min/1.73 Final    Extra Tube 03/13/2024 Hold for add-ons.   Final    Auto resulted.   Lab on 02/06/2024   Component Date Value Ref Range Status    Sed Rate 02/06/2024 36 (H)  0 - 15 mm/hr Final    C-Reactive Protein 02/06/2024 2.57 (H)  0.00 - 0.50 mg/dL Final    WBC 02/06/2024 4.76  3.40 - 10.80 10*3/mm3 Final    RBC 02/06/2024 3.13 (L)  4.14 - 5.80 10*6/mm3 Final    Hemoglobin 02/06/2024 10.1 (L)  13.0 - 17.7 g/dL Final    Hematocrit 02/06/2024 31.3 (L)  37.5 - 51.0 % Final    MCV 02/06/2024 100.0 (H)  79.0 - 97.0 fL Final    MCH 02/06/2024 32.3  26.6 - 33.0 pg Final    MCHC 02/06/2024 32.3  31.5 - 35.7 g/dL Final    RDW 02/06/2024 15.3  12.3 - 15.4 % Final    RDW-SD 02/06/2024 53.4  37.0 - 54.0 fl Final    MPV 02/06/2024 11.2  6.0 - 12.0 fL Final    Platelets 02/06/2024 143  140 - 450 10*3/mm3 Final    Neutrophil % 02/06/2024 70.2  42.7 - 76.0 % Final    Lymphocyte % 02/06/2024 18.7 (L)  19.6 - 45.3 % Final    Monocyte % 02/06/2024 8.0  5.0 - 12.0 % Final    Eosinophil % 02/06/2024 2.5  0.3 - 6.2 % Final    Basophil % 02/06/2024 0.4  0.0 - 1.5 % Final    Immature Grans % 02/06/2024 0.2  0.0 - 0.5 % Final    Neutrophils, Absolute 02/06/2024 3.34  1.70 - 7.00 10*3/mm3 Final    Lymphocytes, Absolute 02/06/2024 0.89  0.70 - 3.10 10*3/mm3 Final    Monocytes, Absolute 02/06/2024 0.38  0.10 - 0.90 10*3/mm3 Final    Eosinophils, Absolute 02/06/2024 0.12  0.00 - 0.40 10*3/mm3 Final    Basophils, Absolute 02/06/2024 0.02  0.00 - 0.20 10*3/mm3 Final    Immature Grans, Absolute 02/06/2024 0.01  0.00 - 0.05 10*3/mm3 Final    nRBC 02/06/2024 0.0  0.0 - 0.2 /100 WBC Final   Lab on 02/03/2024   Component Date Value Ref Range Status    Glucose 02/03/2024 81  65 - 99 mg/dL Final    BUN 02/03/2024 17  6 - 20 mg/dL Final    Creatinine 02/03/2024 1.79 (H)  0.76 - 1.27 mg/dL Final    Sodium 02/03/2024 136  136 - 145 mmol/L Final    Potassium 02/03/2024 4.0  3.5 - 5.2 mmol/L Final    Chloride 02/03/2024 99   98 - 107 mmol/L Final    CO2 02/03/2024 22.0  22.0 - 29.0 mmol/L Final    Calcium 02/03/2024 8.6  8.6 - 10.5 mg/dL Final    Total Protein 02/03/2024 7.0  6.0 - 8.5 g/dL Final    Albumin 02/03/2024 4.1  3.5 - 5.2 g/dL Final    ALT (SGPT) 02/03/2024 27  1 - 41 U/L Final    AST (SGOT) 02/03/2024 27  1 - 40 U/L Final    Alkaline Phosphatase 02/03/2024 64  39 - 117 U/L Final    Total Bilirubin 02/03/2024 0.4  0.0 - 1.2 mg/dL Final    Globulin 02/03/2024 2.9  gm/dL Final    A/G Ratio 02/03/2024 1.4  g/dL Final    BUN/Creatinine Ratio 02/03/2024 9.5  7.0 - 25.0 Final    Anion Gap 02/03/2024 15.0  5.0 - 15.0 mmol/L Final    eGFR 02/03/2024 46.2 (L)  >60.0 mL/min/1.73 Final    Protein/Creatinine Ratio, Urine 02/03/2024 630.0 (H)  0.0 - 200.0 mg/G Crea Final    Creatinine, Urine 02/03/2024 27.3  mg/dL Final    Total Protein, Urine 02/03/2024 17.2  mg/dL Final   Transcribe Orders on 02/03/2024   Component Date Value Ref Range Status    Color, UA 02/03/2024 Yellow  Yellow, Straw Final    Appearance, UA 02/03/2024 Clear  Clear Final    pH, UA 02/03/2024 6.0  5.0 - 8.0 Final    Specific Gravity, UA 02/03/2024 1.011  1.005 - 1.030 Final    Glucose, UA 02/03/2024 Negative  Negative Final    Ketones, UA 02/03/2024 Negative  Negative Final    Bilirubin, UA 02/03/2024 Negative  Negative Final    Blood, UA 02/03/2024 Negative  Negative Final    Protein, UA 02/03/2024 Trace (A)  Negative Final    Leuk Esterase, UA 02/03/2024 Negative  Negative Final    Nitrite, UA 02/03/2024 Negative  Negative Final    Urobilinogen, UA 02/03/2024 0.2 E.U./dL  0.2 - 1.0 E.U./dL Final    Extra Tube 02/03/2024 Hold for add-ons.   Final    Auto resulted.   Lab on 01/22/2024   Component Date Value Ref Range Status    Glucose 01/22/2024 122 (H)  65 - 99 mg/dL Final    BUN 01/22/2024 35 (H)  6 - 20 mg/dL Final    Creatinine 01/22/2024 2.65 (H)  0.76 - 1.27 mg/dL Final    Sodium 01/22/2024 139  136 - 145 mmol/L Final    Potassium 01/22/2024 5.6 (H)  3.5 - 5.2  mmol/L Final    Chloride 01/22/2024 107  98 - 107 mmol/L Final    CO2 01/22/2024 22.9  22.0 - 29.0 mmol/L Final    Calcium 01/22/2024 8.8  8.6 - 10.5 mg/dL Final    BUN/Creatinine Ratio 01/22/2024 13.2  7.0 - 25.0 Final    Anion Gap 01/22/2024 9.1  5.0 - 15.0 mmol/L Final    eGFR 01/22/2024 28.8 (L)  >60.0 mL/min/1.73 Final   Hospital Outpatient Visit on 01/18/2024   Component Date Value Ref Range Status    Absolute Lung Fluid Content 01/18/2024 32  20 - 35 % Final    Glucose 01/18/2024 234 (H)  65 - 99 mg/dL Final    BUN 01/18/2024 37 (H)  6 - 20 mg/dL Final    Creatinine 01/18/2024 2.28 (H)  0.76 - 1.27 mg/dL Final    Sodium 01/18/2024 136  136 - 145 mmol/L Final    Potassium 01/18/2024 5.2  3.5 - 5.2 mmol/L Final    Slight hemolysis detected by analyzer. Result may be falsely elevated.    Chloride 01/18/2024 107  98 - 107 mmol/L Final    CO2 01/18/2024 18.8 (L)  22.0 - 29.0 mmol/L Final    Calcium 01/18/2024 8.8  8.6 - 10.5 mg/dL Final    BUN/Creatinine Ratio 01/18/2024 16.2  7.0 - 25.0 Final    Anion Gap 01/18/2024 10.2  5.0 - 15.0 mmol/L Final    eGFR 01/18/2024 34.5 (L)  >60.0 mL/min/1.73 Final    proBNP 01/18/2024 226.3  0.0 - 450.0 pg/mL Final    Magnesium 01/18/2024 1.8  1.6 - 2.6 mg/dL Final   No results displayed because visit has over 200 results.         Reviewed CT chest without contrast from 09/2021     Wet Read   Interpretation per radiologist   Interpreted by Ruben Polanco PA on 9/1/2021  1:01 PM EDT   Narrative & Impression   EXAM:    CT Chest Without Intravenous Contrast     EXAM DATE:    9/1/2021 12:13 PM     CLINICAL HISTORY:    chest pain     TECHNIQUE:    Axial computed tomography images of the chest without intravenous  contrast.  Sagittal and coronal reformatted images were created and  reviewed.  This CT exam was performed using one or more of the following  dose reduction techniques:  automated exposure control, adjustment of  the mA and/or kV according to patient size, and/or use of  iterative  reconstruction technique.     COMPARISON:    No relevant prior studies available.     FINDINGS:    LUNGS:  Unremarkable.  No mass.  No consolidation.    PLEURAL SPACE:  Unremarkable.  No pneumothorax.  No significant  effusion.    HEART:  Unremarkable.  No cardiomegaly.  No significant pericardial  effusion.    BONES/JOINTS:  Unremarkable.  No acute fracture.  No dislocation.    SOFT TISSUES:  Unremarkable.    VASCULATURE:  Unremarkable.  No thoracic aortic aneurysm.    LYMPH NODES:  Unremarkable.  No enlarged lymph nodes.     IMPRESSION:    No acute findings in the chest.     This report was finalized on 9/1/2021 12:41 PM by Dr. Ron Wharton MD.        Reviewed spirometry from 01/22/2024      Reviewed sleep study from 02/04/2024      Assessment / Plan         Assessment   Diagnoses and all orders for this visit:    1. ALIA (obstructive sleep apnea) (Primary)  2. Pulmonary hypertension  3. Nocturnal hypoxia  4. Morbid obesity with BMI of 40.0-44.9, adult  Previous home sleep study revealed mild ALIA with AHI 8.2.   Reports his mask was switched from the full face mask to nasal mask and has been tolerating some better. Reports he has been able to be more compliant with PAP device and averages 4-5 hours per night.   Reports that his current nasal mask causes sores along his nose and would like to switch to nasal pillow mask to see if he tolerates this better, sent order to DME company.   Sent order to DME company to increase pressure settings from 8-16 to 8-18 per patient request.     -     Miscellaneous DME    Management obstructive sleep apnea also includes lifestyle modifications including weight loss, avoidance of alcohol, sedated, caffeine especially before bedtime, allowing adequate sleep time, and body position during sleep such as side versus back. 10% weight loss can result in a 50% improvement of the apnea-hypopnea index. Untreated sleep apnea can lead to cardiovascular/metabolic disorder,  neurocognitive deficit, daytime sleepiness, motor vehicle accidents, depression, mood disorders and reduced quality of life.  Patient understands the risk of untreated obstructive sleep apnea and benefit benefits of the treatment. Recommended at least 4 hours of use per night for 70% of every month (approximately 21 out of 30 days) per CMS guidelines.      5. Dyspnea on exertion  6. Restrictive lung disease  Previous spirometry was normal without evidence of true obstruction but did reveal restriction (FEV1/FVC 77%, FEV1 75%, and FVC 80%). Restriction likely due to body habitus.   Previous 6-MWT revealed stable saturations with ambulation remaining 95-97% while on room air.   Shortness of breath likely multifactorial related to body habitus and underlying heart failure.   Continue albuterol inhaler and nebulizer treatments as needed.   Continue following with heart failure clinic for management of volume status.      No orders of the defined types were placed in this encounter.    I spent 22 minutes caring for Matthew on this date of service. This time includes time spent by me in the following activities:preparing for the visit, reviewing tests, obtaining and/or reviewing a separately obtained history, performing a medically appropriate examination and/or evaluation , counseling and educating the patient/family/caregiver, ordering medications, tests, or procedures, referring and communicating with other health care professionals , documenting information in the medical record, independently interpreting results and communicating that information with the patient/family/caregiver, and care coordination    Follow Up   Return in about 6 months (around 10/9/2024), or if symptoms worsen or fail to improve, for Next scheduled follow up.     Patient was given instructions and counseling regarding his condition or for health maintenance advice. Please see specific information pulled into the AVS if appropriate.       This  document has been electronically signed by Whitney Smith PA-C   April 9, 2024 09:20 EDT    Dictated Utilizing Dragon Dictation: Part of this note may be an electronic transcription/translation of spoken language to printed text using the Dragon Dictation System.

## 2024-04-23 ENCOUNTER — OFFICE VISIT (OUTPATIENT)
Dept: CARDIOLOGY | Facility: CLINIC | Age: 49
End: 2024-04-23
Payer: MEDICARE

## 2024-04-23 VITALS
OXYGEN SATURATION: 96 % | SYSTOLIC BLOOD PRESSURE: 124 MMHG | HEIGHT: 66 IN | WEIGHT: 265.6 LBS | BODY MASS INDEX: 42.68 KG/M2 | DIASTOLIC BLOOD PRESSURE: 58 MMHG | RESPIRATION RATE: 16 BRPM | HEART RATE: 63 BPM

## 2024-04-23 DIAGNOSIS — I25.10 ASCVD (ARTERIOSCLEROTIC CARDIOVASCULAR DISEASE): Chronic | ICD-10-CM

## 2024-04-23 DIAGNOSIS — I50.32 CHRONIC HEART FAILURE WITH PRESERVED EJECTION FRACTION (HFPEF): Primary | ICD-10-CM

## 2024-04-23 PROCEDURE — 3074F SYST BP LT 130 MM HG: CPT | Performed by: PHYSICIAN ASSISTANT

## 2024-04-23 PROCEDURE — 99213 OFFICE O/P EST LOW 20 MIN: CPT | Performed by: PHYSICIAN ASSISTANT

## 2024-04-23 PROCEDURE — 3078F DIAST BP <80 MM HG: CPT | Performed by: PHYSICIAN ASSISTANT

## 2024-04-23 RX ORDER — BUMETANIDE 1 MG/1
1 TABLET ORAL DAILY
Qty: 30 TABLET | Refills: 2 | Status: SHIPPED | OUTPATIENT
Start: 2024-04-23 | End: 2024-04-29

## 2024-04-23 NOTE — PROGRESS NOTES
Susanna Johnson APRN  Matthew Madrid  1975  04/23/2024    Patient Active Problem List   Diagnosis    NSTEMI, initial episode of care    NSTEMI (non-ST elevated myocardial infarction)    ASCVD (arteriosclerotic cardiovascular disease)    Essential hypertension    Dyslipidemia    DM (diabetes mellitus), type 2 with complications    SOB (shortness of breath)    Severe obesity (BMI 35.0-39.9) with comorbidity    Diarrhea    Abdominal pain    Dysphagia    Acute renal failure, unspecified acute renal failure type    Chronic heart failure with preserved ejection fraction (HFpEF)    Chest pain in adult    Acute on chronic heart failure with preserved ejection fraction (HFpEF)    CHF (congestive heart failure), NYHA class II, acute on chronic, combined    Deformity of metatarsal    Diabetic peripheral neuropathy associated with type 2 diabetes mellitus    Epidermoid cyst    Foot pain    Hammer toe    Hereditary peripheral neuropathy    Low back pain    Lumbosacral radiculopathy    Lumbar spondylosis    Muscle pain    Onychomycosis of toenail    Pain of right hip joint    Peripheral vascular disease    Stasis dermatitis with venous ulcer of lower extremity due to chronic peripheral venous hypertension    Tinea pedis    Torticollis       Dear Susanna Johnson APRN:    Subjective     History of Present Illness:    Chief Complaint   Patient presents with    Follow-up     3 mos post stenting    Med Management     List provided       Matthew Madrid is a pleasant 49 y.o. male with a past medical history significant for CAD with history of acute NSTEMI with LHC on 9/2/2021 with subsequent stenting in the proximal RCA. He recently had a repeat LHC on 1/15/2024 with stenting on the mid RCA. He also has history of CKD, essential hypertension, diabetes mellitus, dyslipidemia. He does not smoke tobacco. He comes in today for cardiology follow-up.     Matthew does report he has increased dyspnea and fatigue today  he is also concern for some increased pedal edema as well he does deny any overt chest pains today.  Renal function although abnormal has been stable currently creatinine is at 2.2 and he is following with nephrology.        Allergies   Allergen Reactions    Tuberculin Tests Rash   :      Current Outpatient Medications:     albuterol (PROVENTIL) (2.5 MG/3ML) 0.083% nebulizer solution, Take 2.5 mg by nebulization Every 6 (Six) Hours As Needed for Shortness of Air., Disp: 360 mL, Rfl: 2    allopurinol (ZYLOPRIM) 300 MG tablet, Take 1 tablet by mouth Daily., Disp: , Rfl:     ALPRAZolam (XANAX) 0.5 MG tablet, Take 1 tablet by mouth 3 (Three) Times a Day As Needed for Anxiety., Disp: , Rfl:     aspirin 81 MG EC tablet, Take 1 tablet by mouth Daily., Disp: 90 tablet, Rfl: 3    atorvastatin (LIPITOR) 40 MG tablet, Take 1 tablet by mouth Daily., Disp: , Rfl:     bumetanide (BUMEX) 1 MG tablet, Take 1 tablet by mouth Daily for 90 days., Disp: 30 tablet, Rfl: 2    carvedilol (COREG) 25 MG tablet, Take 1 tablet by mouth twice daily, Disp: 90 tablet, Rfl: 0    Dulaglutide (Trulicity) 4.5 MG/0.5ML solution pen-injector, Inject  under the skin into the appropriate area as directed 1 (One) Time Per Week., Disp: , Rfl:     ferrous sulfate 325 (65 FE) MG tablet, Take 1 tablet by mouth 3 (Three) Times a Day With Meals. Taking BID, Disp: , Rfl:     FLUoxetine (PROzac) 40 MG capsule, Take 1 capsule by mouth Daily., Disp: , Rfl:     gabapentin (NEURONTIN) 800 MG tablet, Take 1 tablet by mouth 3 (Three) Times a Day., Disp: , Rfl:     HumuLIN 70/30 KwikPen (70-30) 100 UNIT/ML suspension pen-injector, INJECT 36 UNITS SUB-Q IN THE MORNING AND AT 4PM, Disp: , Rfl:     hydrALAZINE (APRESOLINE) 50 MG tablet, TAKE 1 TABLET BY MOUTH EVERY 8 HOURS, Disp: 90 tablet, Rfl: 0    Insulin Lispro, 0.5 Unit Dial, (HUMALOG KWIKPEN ROSALBA) 100 UNIT/ML solution pen-injector, Inject 60 Units under the skin into the appropriate area as directed As Needed. Max  of 60 units daily- use sliding scale PRN, Disp: , Rfl:     isosorbide mononitrate (IMDUR) 120 MG 24 hr tablet, Take 1 tablet by mouth Daily., Disp: , Rfl:     Krill Oil (Omega-3) 500 MG capsule, Take 2 capsules by mouth Daily., Disp: , Rfl:     levocetirizine (XYZAL) 5 MG tablet, Take 1 tablet by mouth Every Evening., Disp: , Rfl:     Mag-G 500 (27 Mg) MG tablet, Take 1 tablet by mouth Daily., Disp: , Rfl:     nitroglycerin (NITROSTAT) 0.4 MG SL tablet, Place 1 tablet under the tongue Every 5 (Five) Minutes As Needed for Chest Pain (Only if SBP Greater Than 100). Take no more than 3 doses in 15 minutes. If no relief, seek medical attention., Disp: 25 tablet, Rfl: 0    pantoprazole (Protonix) 40 MG EC tablet, Take 1 tablet by mouth Daily., Disp: 30 tablet, Rfl: 11    promethazine (PHENERGAN) 12.5 MG tablet, Take 1 tablet by mouth 2 (Two) Times a Day As Needed for Nausea or Vomiting., Disp: , Rfl:     ranolazine (RANEXA) 1000 MG 12 hr tablet, Take 1 tablet by mouth Every 12 (Twelve) Hours., Disp: 60 tablet, Rfl: 2    tamsulosin (FLOMAX) 0.4 MG capsule 24 hr capsule, Take 1 capsule by mouth Daily., Disp: 30 capsule, Rfl: 3    ticagrelor (BRILINTA) 90 MG tablet tablet, Take 1 tablet by mouth 2 (Two) Times a Day., Disp: 180 tablet, Rfl: 3    traZODone (DESYREL) 100 MG tablet, Take 1 tablet by mouth At Night As Needed for Sleep., Disp: , Rfl:     vitamin B-12 (CYANOCOBALAMIN) 1000 MCG tablet, Take 1 tablet by mouth Daily., Disp: , Rfl:     vitamin D (ERGOCALCIFEROL) 1.25 MG (63143 UT) capsule capsule, Take 1 capsule by mouth 1 (One) Time Per Week., Disp: 5 capsule, Rfl: 3    dapagliflozin Propanediol 10 MG tablet, Take 10 mg by mouth Daily for 180 days. (Patient not taking: Reported on 4/23/2024), Disp: 90 tablet, Rfl: 1    OneTouch Ultra test strip, , Disp: , Rfl:     The following portions of the patient's history were reviewed and updated as appropriate: allergies, current medications, past family history, past medical  "history, past social history, past surgical history and problem list.    Social History     Tobacco Use    Smoking status: Former     Current packs/day: 0.00     Types: Cigarettes     Quit date:      Years since quittin.3     Passive exposure: Past    Smokeless tobacco: Never   Vaping Use    Vaping status: Never Used   Substance Use Topics    Alcohol use: Yes     Alcohol/week: 6.0 standard drinks of alcohol     Types: 6 Glasses of wine per week     Comment: Occasional    Drug use: Never         Objective   Vitals:    24 0925   BP: 124/58   Pulse: 63   Resp: 16   SpO2: 96%   Weight: 120 kg (265 lb 9.6 oz)   Height: 167.6 cm (66\")     Body mass index is 42.87 kg/m².    ROS    Constitutional:       General: Not in acute distress.     Appearance: Healthy appearance. Well-developed and not in distress. Not diaphoretic.   Eyes:      Conjunctiva/sclera: Conjunctivae normal.      Pupils: Pupils are equal, round, and reactive to light.   HENT:      Head: Normocephalic and atraumatic.   Neck:      Vascular: No carotid bruit or JVD.   Pulmonary:      Effort: Pulmonary effort is normal. No respiratory distress.      Breath sounds: Normal breath sounds.   Cardiovascular:      Normal rate. Regular rhythm.   Edema:     Peripheral edema absent.   Skin:     General: Skin is cool.   Neurological:      Mental Status: Alert, oriented to person, place, and time and oriented to person, place and time.         Lab Results   Component Value Date     2024    K 4.3 2024     2024    CO2 23.4 2024    BUN 41 (H) 2024    CREATININE 2.20 (H) 2024    GLUCOSE 145 (H) 2024    CALCIUM 9.3 2024    AST 16 2024    ALT 21 2024    ALKPHOS 67 2024    LABIL2 1.1 2023     Lab Results   Component Value Date    CKTOTAL 125 2023     Lab Results   Component Value Date    WBC 4.76 2024    HGB 10.1 (L) 2024    HCT 31.3 (L) 2024     " "02/06/2024     Lab Results   Component Value Date    INR 1.06 09/02/2021     Lab Results   Component Value Date    MG 1.4 (L) 03/18/2024     Lab Results   Component Value Date    TSH 1.440 01/09/2024    TRIG 201 (H) 03/27/2023    HDL 29 (L) 03/27/2023    LDL 34 03/27/2023      No results found for: \"BNP\"    During this visit the following were done:  Labs Reviewed []    Labs Ordered []    Radiology Reports Reviewed []    Radiology Ordered []    PCP Records Reviewed []    Referring Provider Records Reviewed []    ER Records Reviewed []    Hospital Records Reviewed []    History Obtained From Family []    Radiology Images Reviewed []    Other Reviewed []    Records Requested []       Procedures    Assessment & Plan    Diagnosis Plan   1. Chronic heart failure with preserved ejection fraction (HFpEF)  Basic Metabolic Panel      2. ASCVD (arteriosclerotic cardiovascular disease)                 Recommendations:  Chronic HFpEF  Appears mildly fluid overloaded today with increased pedal edema and dyspnea.  I did advise him to increase Bumex to 2 mg for 3 days and to recheck BMP in 1 week.  I also recommended him seeing heart failure clinic at sooner appointment.  Edema may be related to his CKD as well I did explain this to him.  Coronary artery disease  Denies any overt chest pains today continue GDMT with aspirin, Lipitor, carvedilol, Ranexa, Brilinta  Essential hypertension    No follow-ups on file.    As always, I appreciate very much the opportunity to participate in the cardiovascular care of your patients.      With Best Regards,    Bhupinder Graham PA-C          "

## 2024-04-26 RX ORDER — HYDRALAZINE HYDROCHLORIDE 50 MG/1
50 TABLET, FILM COATED ORAL EVERY 8 HOURS
Qty: 90 TABLET | Refills: 0 | Status: SHIPPED | OUTPATIENT
Start: 2024-04-26

## 2024-04-29 ENCOUNTER — HOSPITAL ENCOUNTER (OUTPATIENT)
Dept: CARDIOLOGY | Facility: HOSPITAL | Age: 49
Discharge: HOME | End: 2024-04-29
Admitting: PHYSICIAN ASSISTANT
Payer: MEDICARE

## 2024-04-29 VITALS
HEIGHT: 66 IN | WEIGHT: 261.6 LBS | DIASTOLIC BLOOD PRESSURE: 65 MMHG | OXYGEN SATURATION: 96 % | SYSTOLIC BLOOD PRESSURE: 126 MMHG | HEART RATE: 66 BPM | BODY MASS INDEX: 42.04 KG/M2

## 2024-04-29 DIAGNOSIS — I25.10 ASCVD (ARTERIOSCLEROTIC CARDIOVASCULAR DISEASE): Chronic | ICD-10-CM

## 2024-04-29 DIAGNOSIS — G60.9 HEREDITARY PERIPHERAL NEUROPATHY: ICD-10-CM

## 2024-04-29 DIAGNOSIS — I50.33 ACUTE ON CHRONIC HEART FAILURE WITH PRESERVED EJECTION FRACTION (HFPEF): Primary | ICD-10-CM

## 2024-04-29 DIAGNOSIS — N18.30 STAGE 3 CHRONIC KIDNEY DISEASE, UNSPECIFIED WHETHER STAGE 3A OR 3B CKD: ICD-10-CM

## 2024-04-29 LAB
ABSOLUTE LUNG FLUID CONTENT: 34 % (ref 20–35)
ANION GAP SERPL CALCULATED.3IONS-SCNC: 11.9 MMOL/L (ref 5–15)
BUN SERPL-MCNC: 45 MG/DL (ref 6–20)
BUN/CREAT SERPL: 21.3 (ref 7–25)
CALCIUM SPEC-SCNC: 9.5 MG/DL (ref 8.6–10.5)
CHLORIDE SERPL-SCNC: 105 MMOL/L (ref 98–107)
CO2 SERPL-SCNC: 22.1 MMOL/L (ref 22–29)
CREAT SERPL-MCNC: 2.11 MG/DL (ref 0.76–1.27)
EGFRCR SERPLBLD CKD-EPI 2021: 37.7 ML/MIN/1.73
GLUCOSE SERPL-MCNC: 143 MG/DL (ref 65–99)
MAGNESIUM SERPL-MCNC: 1.8 MG/DL (ref 1.6–2.6)
NT-PROBNP SERPL-MCNC: 370.7 PG/ML (ref 0–450)
POTASSIUM SERPL-SCNC: 4.8 MMOL/L (ref 3.5–5.2)
QT INTERVAL: 456 MS
QTC INTERVAL: 456 MS
SODIUM SERPL-SCNC: 139 MMOL/L (ref 136–145)

## 2024-04-29 PROCEDURE — 94726 PLETHYSMOGRAPHY LUNG VOLUMES: CPT | Performed by: PHYSICIAN ASSISTANT

## 2024-04-29 PROCEDURE — 93005 ELECTROCARDIOGRAM TRACING: CPT | Performed by: PHYSICIAN ASSISTANT

## 2024-04-29 PROCEDURE — 96374 THER/PROPH/DIAG INJ IV PUSH: CPT | Performed by: PHYSICIAN ASSISTANT

## 2024-04-29 PROCEDURE — 83735 ASSAY OF MAGNESIUM: CPT | Performed by: PHYSICIAN ASSISTANT

## 2024-04-29 PROCEDURE — 80048 BASIC METABOLIC PNL TOTAL CA: CPT | Performed by: PHYSICIAN ASSISTANT

## 2024-04-29 PROCEDURE — 83880 ASSAY OF NATRIURETIC PEPTIDE: CPT | Performed by: PHYSICIAN ASSISTANT

## 2024-04-29 PROCEDURE — 25010000002 BUMETANIDE PER 0.5 MG: Performed by: PHYSICIAN ASSISTANT

## 2024-04-29 PROCEDURE — 36415 COLL VENOUS BLD VENIPUNCTURE: CPT | Performed by: PHYSICIAN ASSISTANT

## 2024-04-29 RX ORDER — BUMETANIDE 0.25 MG/ML
2.5 INJECTION INTRAMUSCULAR; INTRAVENOUS ONCE
Status: COMPLETED | OUTPATIENT
Start: 2024-04-29 | End: 2024-04-29

## 2024-04-29 RX ORDER — BUMETANIDE 1 MG/1
1 TABLET ORAL DAILY
Qty: 30 TABLET | Refills: 2 | Status: SHIPPED | OUTPATIENT
Start: 2024-04-29 | End: 2024-07-28

## 2024-04-29 RX ADMIN — BUMETANIDE 2.5 MG: 0.25 INJECTION INTRAMUSCULAR; INTRAVENOUS at 14:06

## 2024-04-29 NOTE — PROGRESS NOTES
Heart Failure Clinic    Date: 04/29/24     Vitals:    04/29/24 1301   BP: 126/65   Pulse: 66   SpO2: 96%    Weight 261.6    Method of arrival: Ambulatory    Weighing self daily: Most days    Monitoring Heart Failure Zones: Yes    Today's HF Zone: Yellow     Taking medications as prescribed: Yes    Edema Yes    Shortness of Air: Yes    Number of pillows used at night:<2    Educational Materials given:  avs                                                                         ReDS Value: 34  25-35 Optimal Value Status      Dimitrios Maki RN 04/29/24 13:02 EDT

## 2024-04-29 NOTE — PROGRESS NOTES
Heart Failure Clinic  Pharmacist Note     Matthew Madrid is a 49 y.o. male seen in the Heart Failure Clinic for HFpEF (EF of 61-65% on 7/4/23).  Patient recently had a heart cath on 1/15 and new stents were placed on 1/15/24.     Matthew Madrid reports a poor understanding of medications and denies any issue with adherence.    Accompanied by his wife who assists with his medications and had a list of medication written out that she reports that she follows to give him his medications every day.     Patient reports that he is swollen with some pitting edema in his legs and reports SOB as well. He is currently taking Bumex 1mg daily. He reports that the swelling has been getting worst for 3 weeks or so.     He brought in his daily logs and BP's in the morning run in the 130-140's/60-70's and HR in the 60-70's.     He is currently not taking Farxiga due to pain in his groin when he started to take it. He called and was instructed to stop it at this time.       Medication Use:   Hx of med intolerances: Lisinopril (discontinued at discharge in July 2023. Elevated CK and renal issues); pain in groin with Farxiga.   Retail Rx Management: [facility] in [place].     Past Medical History:   Diagnosis Date    ASCVD (arteriosclerotic cardiovascular disease) 09/13/2021    Chronic heart failure with preserved ejection fraction (HFpEF) 11/27/2023    Diabetes mellitus     Elevated cholesterol     GERD (gastroesophageal reflux disease)     Hyperlipidemia     Hypertension      ALLERGIES: Tuberculin tests  Current Outpatient Medications   Medication Sig Dispense Refill    albuterol (PROVENTIL) (2.5 MG/3ML) 0.083% nebulizer solution Take 2.5 mg by nebulization Every 6 (Six) Hours As Needed for Shortness of Air. 360 mL 2    allopurinol (ZYLOPRIM) 300 MG tablet Take 1 tablet by mouth Daily.      ALPRAZolam (XANAX) 0.5 MG tablet Take 1 tablet by mouth 3 (Three) Times a Day As Needed for Anxiety.      aspirin 81 MG EC tablet Take 1 tablet by  mouth Daily. 90 tablet 3    atorvastatin (LIPITOR) 40 MG tablet Take 1 tablet by mouth Daily.      bumetanide (BUMEX) 1 MG tablet Take 1 tablet by mouth Daily for 90 days. 2mg daily through 05/03/24 then back to 1mg 30 tablet 2    carvedilol (COREG) 25 MG tablet Take 1 tablet by mouth twice daily 90 tablet 0    Dulaglutide (Trulicity) 4.5 MG/0.5ML solution pen-injector Inject  under the skin into the appropriate area as directed 1 (One) Time Per Week.      ferrous sulfate 325 (65 FE) MG tablet Take 1 tablet by mouth 3 (Three) Times a Day With Meals. Taking BID      FLUoxetine (PROzac) 40 MG capsule Take 1 capsule by mouth Daily.      gabapentin (NEURONTIN) 800 MG tablet Take 1 tablet by mouth 3 (Three) Times a Day.      HumuLIN 70/30 KwikPen (70-30) 100 UNIT/ML suspension pen-injector INJECT 36 UNITS SUB-Q IN THE MORNING AND AT 4PM      hydrALAZINE (APRESOLINE) 50 MG tablet TAKE 1 TABLET BY MOUTH EVERY 8 HOURS 90 tablet 0    Insulin Lispro, 0.5 Unit Dial, (HUMALOG KWIKPEN ROSALBA) 100 UNIT/ML solution pen-injector Inject 60 Units under the skin into the appropriate area as directed As Needed. Max of 60 units daily- use sliding scale PRN      isosorbide mononitrate (IMDUR) 120 MG 24 hr tablet Take 1 tablet by mouth Daily.      Krill Oil (Omega-3) 500 MG capsule Take 2 capsules by mouth Daily.      levocetirizine (XYZAL) 5 MG tablet Take 1 tablet by mouth Every Evening.      Mag-G 500 (27 Mg) MG tablet Take 1 tablet by mouth Daily.      nitroglycerin (NITROSTAT) 0.4 MG SL tablet Place 1 tablet under the tongue Every 5 (Five) Minutes As Needed for Chest Pain (Only if SBP Greater Than 100). Take no more than 3 doses in 15 minutes. If no relief, seek medical attention. 25 tablet 0    OneTouch Ultra test strip       pantoprazole (Protonix) 40 MG EC tablet Take 1 tablet by mouth Daily. 30 tablet 11    promethazine (PHENERGAN) 12.5 MG tablet Take 1 tablet by mouth 2 (Two) Times a Day As Needed for Nausea or Vomiting.       "ranolazine (RANEXA) 1000 MG 12 hr tablet Take 1 tablet by mouth Every 12 (Twelve) Hours. 60 tablet 2    tamsulosin (FLOMAX) 0.4 MG capsule 24 hr capsule Take 1 capsule by mouth Daily. 30 capsule 3    ticagrelor (BRILINTA) 90 MG tablet tablet Take 1 tablet by mouth 2 (Two) Times a Day. 180 tablet 3    traZODone (DESYREL) 100 MG tablet Take 1 tablet by mouth At Night As Needed for Sleep.      vitamin B-12 (CYANOCOBALAMIN) 1000 MCG tablet Take 1 tablet by mouth Daily.      vitamin D (ERGOCALCIFEROL) 1.25 MG (13624 UT) capsule capsule Take 1 capsule by mouth 1 (One) Time Per Week. 5 capsule 3    dapagliflozin Propanediol 10 MG tablet Take 10 mg by mouth Daily for 180 days. (Patient not taking: Reported on 4/23/2024) 90 tablet 1     Current Facility-Administered Medications   Medication Dose Route Frequency Provider Last Rate Last Admin    bumetanide (BUMEX) injection 2.5 mg  2.5 mg Intravenous Once Zuri Johnson PA-C           Vaccination History:   Pneumonia: Reports received - unsure when this was; likely a candidate for Prevnar 20  Annual Influenza: UTD  Shingles: Currently not eligible    Objective  Vitals:    04/29/24 1301   BP: 126/65   BP Location: Left arm   Patient Position: Sitting   Cuff Size: Adult   Pulse: 66   SpO2: 96%   Weight: 119 kg (261 lb 9.6 oz)   Height: 167.6 cm (66\")       Wt Readings from Last 3 Encounters:   04/29/24 119 kg (261 lb 9.6 oz)   04/23/24 120 kg (265 lb 9.6 oz)   04/09/24 119 kg (263 lb)         04/29/24  1301   Weight: 119 kg (261 lb 9.6 oz)       Lab Results   Component Value Date    GLUCOSE 143 (H) 04/29/2024    BUN 45 (H) 04/29/2024    CREATININE 2.11 (H) 04/29/2024    EGFRIFNONA 68 12/29/2021    BCR 21.3 04/29/2024    K 4.8 04/29/2024    CO2 22.1 04/29/2024    CALCIUM 9.5 04/29/2024    PROTENTOTREF 6.0 09/01/2023    ALBUMIN 3.8 03/13/2024    LABIL2 1.1 09/01/2023    AST 16 03/13/2024    ALT 21 03/13/2024     Lab Results   Component Value Date    WBC 4.76 02/06/2024    " HGB 10.1 (L) 02/06/2024    HCT 31.3 (L) 02/06/2024    .0 (H) 02/06/2024     02/06/2024     Lab Results   Component Value Date    CKTOTAL 125 12/19/2023    CKMB 2.40 07/25/2023    TROPONINT 18 (H) 09/07/2023     Lab Results   Component Value Date    PROBNP 370.7 04/29/2024     Results for orders placed during the hospital encounter of 01/09/24    Adult Transthoracic Echo Complete W/ Cont if Necessary Per Protocol    Interpretation Summary    Left ventricular ejection fraction appears to be 51 - 55%.    Left ventricular diastolic function is consistent with (grade II w/high LAP) pseudonormalization.    There is no evidence of pericardial effusion         GDMT    Drug Class   Drug   Dose Last Dose Adjustment Additional Titration   Notes   ACEi/ARB/ARNI     Lisinopril D/C at discharge July 2023 - renal/ck    Beta Blocker Carvedilol 25 mg BID      MRA    CKD    SGLT2i Pain in groin     Imdur 120 mg QD       Hydralazine 50 mg TID       Ranexa 1 g BID          Drug Therapy Problems    Edema  GDMT      Recommendations:     Zuri to diurese with 2.5mg of Bumex and increase daily Bumex to 2mg daily until 5/3/24 then reduce back down to 1mg daily.     Zuri to stop Farxiga altogether due to side effects.       Patient was educated on heart failure medications and the importance of medication adherence.   All questions were addressed and patient expressed understanding.       Thank you for allowing me to participate in the care of your patient,    Chata Hou [facility]  04/29/24  14:08 EDT

## 2024-04-29 NOTE — PROGRESS NOTES
[facility]  NEREYDA Aly Louisa Elizabeth, APRN  [address]  [place] [address]    Thank you for asking me to see Matthew Madrid for congestive heart failure.    HPI:     This is a 49 y.o. male with known past medical history of:    ASCVD  [facility] September 2021 with distal RCA lesion 95% stenosed, proximal RCA lesion 60% stenosed, mid RCA lesion 70% stenosed with successful PCI to distal RCA prior to bifurcation with Xience drug-eluting stent placed and recommendation for dual antiplatelet therapy for 1 year  [facility] 01/15/24 with successful IFR guided PTCA & stent placement of RCA with DAP to continue.  1st margion lesion was noted to be 30% stenosed; prox RCA-1 lesion 60% stenosed.  Prox RCA-2 lesion 60% stenosed.  Mid RCA lesion 70% stenosed.   CKD stage III  Chronic HFpEF  July 2023 TTE with EF 61 to 65% and grade 1 diastolic dysfunction as well as mild pulmonary hypertension  Mild pulmonary hypertension  Insulin-dependent diabetes type 2  GERD  Anxiety/depression  Morbid obesity  History of hypotension secondary to GI losses from chronic diarrhea since prior cholecystectomy  Hyperlipidemia  ALIA now on CPAP    Matthew Madrid presents for today for heart failure clinic evaluation.  The patient is typically seen by Susanna Johnson APRN.  Patient's primary cardiologist is Dr. Etienne & Bhupinder Graham PA-C.      Last known EF 61-65%.   Last known hospitalization and/or ED visit: Patient was hospitalized from January 9, 2024 through January 15, 2024 secondary to need for LHC with known CKD.  Nephrology followed along to assist with renal function.  He did undergo LHC with RCA stent as outlined in cardiac cath report within this document.   DAP to continue with medical management.    Accompanied by: Wife          04/29/2024 visit data/details regarding:   Dyspnea:Worsening dyspnea on exertion  Lower extremity swelling: Worsening swelling of  extremities  Abdominal swelling: Improving distension  Home weight: Weight monitoring booklet provided during initial visit; Scale provided  Home BP: BP monitoring booklet provided during initial visit; BP book brought to appointment with BP well controlled.   Home heart rate: HR monitoring booklet provided during initial visit; Has monitoring device  Daily activities of living: Performing with wife's assistance   Pillows/lying flat:1-2 pillows   HF zone: Yellow  Mr. Madrid reports worsening dyspnea on exertion and swelling.  He reports he does not drink on week day but does have 6-7 beers on the weekends.  This past weekend he worked at a [employer] with his cousin at the [employer] and reports worsening swelling.  In total, his swelling has been worse for 2-3 weeks duration but more so today.       Specialists:   Cardiology: Bhupinder & Dr. Eteinne       Review of Systems - Review of Systems   Constitutional: Negative for decreased appetite, diaphoresis and fever.   HENT:  Negative for congestion and ear pain.    Eyes:  Negative for blurred vision, discharge and double vision.   Cardiovascular:  Positive for dyspnea on exertion and leg swelling.   Respiratory:  Positive for shortness of breath. Negative for cough and hemoptysis.    Endocrine: Negative for cold intolerance and heat intolerance.   Hematologic/Lymphatic: Negative for adenopathy and bleeding problem.   Skin:  Negative for dry skin and nail changes.   Musculoskeletal:  Negative for arthritis and falls.   Gastrointestinal:  Negative for bloating and anorexia.   Genitourinary:  Negative for bladder incontinence and dysuria.   Neurological:  Negative for aphonia and difficulty with concentration.   Psychiatric/Behavioral:  Negative for altered mental status and hallucinations.    Allergic/Immunologic: Negative for environmental allergies and HIV exposure.         All other systems were reviewed and were negative.    Patient Active Problem List   Diagnosis     NSTEMI, initial episode of care    NSTEMI (non-ST elevated myocardial infarction)    ASCVD (arteriosclerotic cardiovascular disease)    Essential hypertension    Dyslipidemia    DM (diabetes mellitus), type 2 with complications    SOB (shortness of breath)    Severe obesity (BMI 35.0-39.9) with comorbidity    Diarrhea    Abdominal pain    Dysphagia    Acute renal failure, unspecified acute renal failure type    Chronic heart failure with preserved ejection fraction (HFpEF)    Chest pain in adult    Acute on chronic heart failure with preserved ejection fraction (HFpEF)    CHF (congestive heart failure), NYHA class II, acute on chronic, combined    Deformity of metatarsal    Diabetic peripheral neuropathy associated with type 2 diabetes mellitus    Epidermoid cyst    Foot pain    Hammer toe    Hereditary peripheral neuropathy    Low back pain    Lumbosacral radiculopathy    Lumbar spondylosis    Muscle pain    Onychomycosis of toenail    Pain of right hip joint    Peripheral vascular disease    Stasis dermatitis with venous ulcer of lower extremity due to chronic peripheral venous hypertension    Tinea pedis    Torticollis       family history includes Heart attack in his paternal uncle; Hyperlipidemia in his father and mother.     reports that he quit smoking about 24 years ago. His smoking use included cigarettes. He has been exposed to tobacco smoke. He has never used smokeless tobacco. He reports current alcohol use of about 6.0 standard drinks of alcohol per week. He reports that he does not use drugs.    Allergies   Allergen Reactions    Tuberculin Tests Rash         Current Outpatient Medications:     albuterol (PROVENTIL) (2.5 MG/3ML) 0.083% nebulizer solution, Take 2.5 mg by nebulization Every 6 (Six) Hours As Needed for Shortness of Air., Disp: 360 mL, Rfl: 2    allopurinol (ZYLOPRIM) 300 MG tablet, Take 1 tablet by mouth Daily., Disp: , Rfl:     ALPRAZolam (XANAX) 0.5 MG tablet, Take 1 tablet by mouth 3  (Three) Times a Day As Needed for Anxiety., Disp: , Rfl:     aspirin 81 MG EC tablet, Take 1 tablet by mouth Daily., Disp: 90 tablet, Rfl: 3    atorvastatin (LIPITOR) 40 MG tablet, Take 1 tablet by mouth Daily., Disp: , Rfl:     bumetanide (BUMEX) 1 MG tablet, Take 1 tablet by mouth Daily for 90 days. 2mg daily through 05/03/24 then back to 1mg, Disp: 30 tablet, Rfl: 2    carvedilol (COREG) 25 MG tablet, Take 1 tablet by mouth twice daily, Disp: 90 tablet, Rfl: 0    Dulaglutide (Trulicity) 4.5 MG/0.5ML solution pen-injector, Inject  under the skin into the appropriate area as directed 1 (One) Time Per Week., Disp: , Rfl:     ferrous sulfate 325 (65 FE) MG tablet, Take 1 tablet by mouth 3 (Three) Times a Day With Meals. Taking BID, Disp: , Rfl:     FLUoxetine (PROzac) 40 MG capsule, Take 1 capsule by mouth Daily., Disp: , Rfl:     gabapentin (NEURONTIN) 800 MG tablet, Take 1 tablet by mouth 3 (Three) Times a Day., Disp: , Rfl:     HumuLIN 70/30 KwikPen (70-30) 100 UNIT/ML suspension pen-injector, INJECT 36 UNITS SUB-Q IN THE MORNING AND AT 4PM, Disp: , Rfl:     hydrALAZINE (APRESOLINE) 50 MG tablet, TAKE 1 TABLET BY MOUTH EVERY 8 HOURS, Disp: 90 tablet, Rfl: 0    Insulin Lispro, 0.5 Unit Dial, (HUMALOG KWIKPEN ROSALBA) 100 UNIT/ML solution pen-injector, Inject 60 Units under the skin into the appropriate area as directed As Needed. Max of 60 units daily- use sliding scale PRN, Disp: , Rfl:     isosorbide mononitrate (IMDUR) 120 MG 24 hr tablet, Take 1 tablet by mouth Daily., Disp: , Rfl:     Krill Oil (Omega-3) 500 MG capsule, Take 2 capsules by mouth Daily., Disp: , Rfl:     levocetirizine (XYZAL) 5 MG tablet, Take 1 tablet by mouth Every Evening., Disp: , Rfl:     Mag-G 500 (27 Mg) MG tablet, Take 1 tablet by mouth Daily., Disp: , Rfl:     nitroglycerin (NITROSTAT) 0.4 MG SL tablet, Place 1 tablet under the tongue Every 5 (Five) Minutes As Needed for Chest Pain (Only if SBP Greater Than 100). Take no more than 3  doses in 15 minutes. If no relief, seek medical attention., Disp: 25 tablet, Rfl: 0    OneTouch Ultra test strip, , Disp: , Rfl:     pantoprazole (Protonix) 40 MG EC tablet, Take 1 tablet by mouth Daily., Disp: 30 tablet, Rfl: 11    promethazine (PHENERGAN) 12.5 MG tablet, Take 1 tablet by mouth 2 (Two) Times a Day As Needed for Nausea or Vomiting., Disp: , Rfl:     ranolazine (RANEXA) 1000 MG 12 hr tablet, Take 1 tablet by mouth Every 12 (Twelve) Hours., Disp: 60 tablet, Rfl: 2    tamsulosin (FLOMAX) 0.4 MG capsule 24 hr capsule, Take 1 capsule by mouth Daily., Disp: 30 capsule, Rfl: 3    ticagrelor (BRILINTA) 90 MG tablet tablet, Take 1 tablet by mouth 2 (Two) Times a Day., Disp: 180 tablet, Rfl: 3    traZODone (DESYREL) 100 MG tablet, Take 1 tablet by mouth At Night As Needed for Sleep., Disp: , Rfl:     vitamin B-12 (CYANOCOBALAMIN) 1000 MCG tablet, Take 1 tablet by mouth Daily., Disp: , Rfl:     vitamin D (ERGOCALCIFEROL) 1.25 MG (87573 UT) capsule capsule, Take 1 capsule by mouth 1 (One) Time Per Week., Disp: 5 capsule, Rfl: 3    dapagliflozin Propanediol 10 MG tablet, Take 10 mg by mouth Daily for 180 days. (Patient not taking: Reported on 4/23/2024), Disp: 90 tablet, Rfl: 1  No current facility-administered medications for this encounter.      Physical Exam:  I have reviewed the patient's current vital signs as documented in the patient's EMR.   Vitals:    04/29/24 1301   BP: 126/65   Pulse: 66   SpO2: 96%       Body mass index is 42.22 kg/m².       04/29/24  1301   Weight: 119 kg (261 lb 9.6 oz)        Physical Exam  Vitals and nursing note reviewed.   Constitutional:       General: He is awake.      Appearance: Normal appearance.   HENT:      Head: Normocephalic and atraumatic.   Eyes:      General: Lids are normal.      Conjunctiva/sclera:      Right eye: Right conjunctiva is not injected.      Left eye: Left conjunctiva is not injected.      Comments: Bilateral undereye swelling has improved to some  degree.     Cardiovascular:      Rate and Rhythm: Normal rate and regular rhythm.      Heart sounds: Murmur heard.      Systolic murmur is present with a grade of 2/6.   Pulmonary:      Effort: No tachypnea, bradypnea or prolonged expiration.      Breath sounds: No wheezing, rhonchi or rales.   Abdominal:      General: There is no distension.      Tenderness: There is no abdominal tenderness.   Musculoskeletal:      Right lower le+ Pitting Edema present.      Left lower le+ Pitting Edema present.   Skin:     General: Skin is warm and dry.   Neurological:      Mental Status: He is alert and oriented to person, place, and time.   Psychiatric:         Attention and Perception: Attention normal.         Mood and Affect: Mood normal.         Behavior: Behavior is cooperative.            JVP: Volume/Pulsation: Normal.        DATA REVIEWED:       ---------------------------------------------------  TTE/GAYLE:  Results for orders placed during the hospital encounter of 24    Adult Transthoracic Echo Complete W/ Cont if Necessary Per Protocol    Interpretation Summary    Left ventricular ejection fraction appears to be 51 - 55%.    Left ventricular diastolic function is consistent with (grade II w/high LAP) pseudonormalization.    There is no evidence of pericardial effusion        LAST HEART CATH/IF AVAILABLE:     Results for orders placed during the hospital encounter of 21    Cardiac Catheterization/Vascular Study    Narrative  · Dist RCA lesion is 95% stenosed.  · Prox RCA lesion is 60% stenosed.  · Mid RCA lesion is 70% stenosed on a sharp curve and it appears to be a kink in the vessel.  · Successfule PCI to distal RCA prior to bifurcation with 2.25x23 Xience post dilated with 2.5 NC from 99% to 0% with TMI flow 0-3      -----------------------------------------------------  CXR/Imaging:   Imaging Results (Most Recent)       None            I personally reviewed and interpreted the CXR.       -----------------------------------------------------  CT:   No radiology results for the last 30 days.  I personally reviewed the images of the CT scan.  My personal interpretation is below.      ----------------------------------------------------      --------------------------------------------------------------------------------------------------    Laboratory evaluations:    Lab Results   Component Value Date    GLUCOSE 143 (H) 04/29/2024    BUN 45 (H) 04/29/2024    CREATININE 2.11 (H) 04/29/2024    EGFRIFNONA 68 12/29/2021    BCR 21.3 04/29/2024    K 4.8 04/29/2024    CO2 22.1 04/29/2024    CALCIUM 9.5 04/29/2024    PROTENTOTREF 6.0 09/01/2023    ALBUMIN 3.8 03/13/2024    LABIL2 1.1 09/01/2023    AST 16 03/13/2024    ALT 21 03/13/2024     Lab Results   Component Value Date    WBC 4.76 02/06/2024    HGB 10.1 (L) 02/06/2024    HCT 31.3 (L) 02/06/2024    .0 (H) 02/06/2024     02/06/2024     Lab Results   Component Value Date    CHOL 95 03/27/2023    TRIG 201 (H) 03/27/2023    HDL 29 (L) 03/27/2023    LDL 34 03/27/2023     Lab Results   Component Value Date    TSH 1.440 01/09/2024     Lab Results   Component Value Date    HGBA1C 6.70 (H) 01/09/2024     Lab Results   Component Value Date    ALT 21 03/13/2024     Lab Results   Component Value Date    HGBA1C 6.70 (H) 01/09/2024    HGBA1C 5.70 (H) 07/04/2023    HGBA1C 9.40 (H) 09/01/2021     Lab Results   Component Value Date    MICROALBUR 26.6 09/01/2023    CREATININE 2.11 (H) 04/29/2024     Lab Results   Component Value Date    IRON 53 (L) 01/11/2024    TIBC 325 01/11/2024    FERRITIN 117.60 01/11/2024     Lab Results   Component Value Date    INR 1.06 09/02/2021    PROTIME 14.2 09/02/2021        Lab Results   Component Value Date    ABSOLUTELUNG 34 04/29/2024    ABSOLUTELUNG 41 (A) 03/18/2024    ABSOLUTELUNG 32 01/18/2024       PAH RISK ASSESSMENT:      1. Acute on chronic heart failure with preserved ejection fraction (HFpEF)    2. Hereditary  peripheral neuropathy    3. ASCVD (arteriosclerotic cardiovascular disease)    4. Stage 3 chronic kidney disease, unspecified whether stage 3a or 3b CKD          ORDERS PLACED TODAY:  Orders Placed This Encounter   Procedures    ReDs Vest    Basic Metabolic Panel    Magnesium    proBNP    Basic Metabolic Panel    Magnesium    proBNP    ECG 12 Lead Chest Pain        Diagnoses and all orders for this visit:    1. Acute on chronic heart failure with preserved ejection fraction (HFpEF) (Primary)  -     Basic Metabolic Panel; Future  -     Magnesium; Future  -     proBNP; Future  -     Basic Metabolic Panel; Standing  -     Basic Metabolic Panel  -     Magnesium; Standing  -     Magnesium  -     proBNP; Standing  -     proBNP  -     ReDs Vest    2. Hereditary peripheral neuropathy    3. ASCVD (arteriosclerotic cardiovascular disease)  Overview:  9/1/2021 TTE: LVEF 56 to 60%  9/2/2021 [facility] for non-STEMI: PCI MONTSE to distal RCA.  Proximal RCA 60% stenosed and mid RCA 70% stenosed      4. Stage 3 chronic kidney disease, unspecified whether stage 3a or 3b CKD    Other orders  -     ECG 12 Lead Chest Pain; Standing  -     ECG 12 Lead Chest Pain  -     bumetanide (BUMEX) injection 2.5 mg  -     bumetanide (BUMEX) 1 MG tablet; Take 1 tablet by mouth Daily for 90 days. 2mg daily through 05/03/24 then back to 1mg  Dispense: 30 tablet; Refill: 2             MEDS ORDERED TODAY:    New Medications Ordered This Visit   Medications    bumetanide (BUMEX) injection 2.5 mg    bumetanide (BUMEX) 1 MG tablet     Sig: Take 1 tablet by mouth Daily for 90 days. 2mg daily through 05/03/24 then back to 1mg     Dispense:  30 tablet     Refill:  2        ---------------------------------------------------------------------------------------------------------------------------          ASSESSMENT/PLAN:      Diagnosis Plan   1. Acute on chronic heart failure with preserved ejection fraction (HFpEF)  Basic Metabolic Panel    Magnesium    proBNP     Basic Metabolic Panel    Basic Metabolic Panel    Magnesium    Magnesium    proBNP    proBNP    ReDs Vest      2. Hereditary peripheral neuropathy        3. ASCVD (arteriosclerotic cardiovascular disease)        4. Stage 3 chronic kidney disease, unspecified whether stage 3a or 3b CKD            not acutely decompensated chronic diastolic heart failure. CHF.     NYHA stage Stage C: Structural heart disease is present AND symptoms have occurredFC-Class III: Marked limitation of physical activity. Comfortable at rest. Less than ordinary activity causes fatigue, palpitation, or dyspnea. NYHA FC change: change is not known.     Today, Patient is mildly volume overloadedand with  Moderate perfusion. The patient's hemodynamics are currently unacceptable. HR is: normal and is at goal. BP/MAP was reviewed and there isroom for medication up-titration.  Clinical trajectory was assessed and haswaxed and waned.     CHF GOAL DIRECTED MEDICAL THERAPY FOR PATIENT ADDRESSED/ADJUSTED:     GDMT: HFpEF    Drug Class   Drug   Dose Last Dose Adjustment Notes   ACEi/ARB/ARNI Hydralizine/Imdur on board 50mg TID/120mg qd     Beta Blocker Coreg 25mg BID     MRA CKD III      SGLT2i Consider in future visit   N/A   Secondaries if applicable:          -CHF Specific BB:    Carvedilol  at dose of 25mg BID.   We discussed processes/benefits of HF clinic including nursing, pharmacist, and provider evaluation during each visit with ability for in office ReDS vest, labs, and ability to provide IV diuresis in the clinic with close outpatient monitoring.  Additionally, patient was educated about the availability of delivery of medications to patient's clinic room prior to leaving the building which assists with medication compliance and insures medications are in hands when changes are made (if patient opts for apothecary usage) with thorough guidance regarding changes and medication schedule provided.          -ACE/ARB/ARNi/alternative:   Hydralazine  50mg TID on board at present.   Imdur 120mg on board at present.     -MRA:   The patient is FC-NYHA Class III and MRA is indicated.  However, given CKD III, will hold on MRA addition at present.       -SGLT2 inhibitor therapy:   A BMP at initiation to verify GFR >30 was recommended, as was interval GFR surveillance.  The patient was advised to hold SGLT2I when PO intake is restricted due to a planned surgery, or due to an underlying illness.    Will consider Farxiga in future visit; however, at present we are adjusting diuretics and patient has upcoming cardiac catheterization, thus will avoid renal fluctuations until post cardiac catheterization.   Pt was advised SEs, some severe, including hypersensitivity and Ai's; coupled with discussion regarding common side effects of UTIs and female genital mycotic infections were discussed. If you will be NPO, or are sick (poor PO intake, N&V) please hold the medication until you are back to a normal diet.     -Diuretic regimen:   ReDS Vest reading for. 04/30/24 is 34 ReDs Vest reading reviewed with patient.    IV Bumex 2.5mg x1 in office today with nearly 400mL out following.  2mg daily for 3 days starting 04/30 then down to 1mg daily.    BMP, Mag, & ProBNP reviewed with patient.      -Fluid restriction/Sodium restriction:   Requested 2000 ml restriction  Patient has been asked to weigh daily and was provided with a printed diuretic strategy.  1,500 mg Na restriction was discussed.      -Acute vs. Chronic underlying conditions other than HF addressed during visit:   Essential HTN:   Continue Hydralazine 75mg TID.    Continue Imdur.   Stopped Norvasc  in prior visit 2/2 leg swelling; however, patient may have started taking again.  Advised to continue taking if he is presently taking.      ASCVD:   Continue Brilinta & ASA.   Continue atorvastatin.   [facility] report from Jan [facility]24 reviewed and available within document.    EKG with NSR.      IDDM type 2:   Continue management  per primary.      CKD stage 3b likely 2/2 DM type 2, ID in combination with recent diuresis:   Continue f/u with Dr. Olvera.    Baseline per January 2024 Nephro inpatient documentation is around 1.8.    BMP today with creatinine with slight hit after cath dye. Repeat BMP already ordered per Susan Coates NP.        Identifiable barriers to Heart Failure Self-care:   Medical Barriers:  Multiple medical comorbidities  Social Barriers:  No immediate known barriers.      -Sleep/Apnea:   Will refer for outpatient sleep study evaluation.      --------------------------------------------------------------------------------        Class 3 Severe Obesity (BMI >=40). Obesity-related health conditions include the following: hypertension, coronary heart disease, diabetes mellitus, dyslipidemias, and GERD. Obesity is unchanged. BMI is is above average; BMI management plan is completed. We discussed portion control, increasing exercise, and heart failure dietary management strategies .              >45 minutes out of 60 minutes face to face spent counseling patient extensively on dietary Na+ intake, importance of activity, weight monitoring, compliance with medications in addition to importance of titration with goal directed medical therapy and follow up appointments.            This document has been electronically signed by Zuri Johnson PA-C  April 30, 2024 10:09 EDT      Dictated Utilizing Dragon Dictation: Part of this note may be an electronic transcription/translation of spoken language to printed text using the Dragon Dictation System.    Follow-up appointment and medication changes provided in hand delivered After Visit Summary as well as reviewed in the room.

## 2024-05-06 DIAGNOSIS — I50.31 ACUTE HEART FAILURE WITH PRESERVED EJECTION FRACTION (HFPEF): Primary | ICD-10-CM

## 2024-05-06 RX ORDER — MAGNESIUM OXIDE TAB 400 MG (241.3 MG ELEMENTAL MG) 400 (241.3 MG) MG
2 TAB ORAL DAILY
Qty: 60 TABLET | Refills: 0 | Status: SHIPPED | OUTPATIENT
Start: 2024-05-06

## 2024-05-09 RX ORDER — BUMETANIDE 1 MG/1
2 TABLET ORAL 2 TIMES DAILY
Qty: 120 TABLET | Refills: 2 | Status: SHIPPED | OUTPATIENT
Start: 2024-05-09 | End: 2024-08-07

## 2024-05-10 ENCOUNTER — LAB (OUTPATIENT)
Dept: LAB | Facility: HOSPITAL | Age: 49
End: 2024-05-10
Payer: MEDICARE

## 2024-05-10 DIAGNOSIS — I50.32 CHRONIC HEART FAILURE WITH PRESERVED EJECTION FRACTION (HFPEF): ICD-10-CM

## 2024-05-10 DIAGNOSIS — I50.31 ACUTE HEART FAILURE WITH PRESERVED EJECTION FRACTION (HFPEF): ICD-10-CM

## 2024-05-10 LAB
ANION GAP SERPL CALCULATED.3IONS-SCNC: 10.8 MMOL/L (ref 5–15)
BUN SERPL-MCNC: 34 MG/DL (ref 6–20)
BUN/CREAT SERPL: 16.7 (ref 7–25)
CALCIUM SPEC-SCNC: 8.7 MG/DL (ref 8.6–10.5)
CHLORIDE SERPL-SCNC: 107 MMOL/L (ref 98–107)
CO2 SERPL-SCNC: 23.2 MMOL/L (ref 22–29)
CREAT SERPL-MCNC: 2.03 MG/DL (ref 0.76–1.27)
EGFRCR SERPLBLD CKD-EPI 2021: 39.4 ML/MIN/1.73
GLUCOSE SERPL-MCNC: 182 MG/DL (ref 65–99)
POTASSIUM SERPL-SCNC: 4.2 MMOL/L (ref 3.5–5.2)
SODIUM SERPL-SCNC: 141 MMOL/L (ref 136–145)

## 2024-05-10 PROCEDURE — 80048 BASIC METABOLIC PNL TOTAL CA: CPT

## 2024-05-10 PROCEDURE — 36415 COLL VENOUS BLD VENIPUNCTURE: CPT

## 2024-05-13 ENCOUNTER — TRANSCRIBE ORDERS (OUTPATIENT)
Dept: ADMINISTRATIVE | Facility: HOSPITAL | Age: 49
End: 2024-05-13
Payer: MEDICARE

## 2024-05-13 ENCOUNTER — LAB (OUTPATIENT)
Dept: LAB | Facility: HOSPITAL | Age: 49
End: 2024-05-13
Payer: MEDICARE

## 2024-05-13 DIAGNOSIS — N18.31 CHRONIC KIDNEY DISEASE, STAGE 3A: ICD-10-CM

## 2024-05-13 DIAGNOSIS — N18.31 CHRONIC KIDNEY DISEASE, STAGE 3A: Primary | ICD-10-CM

## 2024-05-13 LAB
ALBUMIN SERPL-MCNC: 3.8 G/DL (ref 3.5–5.2)
ALBUMIN/GLOB SERPL: 1.4 G/DL
ALP SERPL-CCNC: 72 U/L (ref 39–117)
ALT SERPL W P-5'-P-CCNC: 25 U/L (ref 1–41)
ANION GAP SERPL CALCULATED.3IONS-SCNC: 11 MMOL/L (ref 5–15)
AST SERPL-CCNC: 19 U/L (ref 1–40)
BILIRUB SERPL-MCNC: 0.4 MG/DL (ref 0–1.2)
BILIRUB UR QL STRIP: NEGATIVE
BUN SERPL-MCNC: 31 MG/DL (ref 6–20)
BUN/CREAT SERPL: 15.7 (ref 7–25)
CALCIUM SPEC-SCNC: 8.6 MG/DL (ref 8.6–10.5)
CHLORIDE SERPL-SCNC: 107 MMOL/L (ref 98–107)
CLARITY UR: CLEAR
CO2 SERPL-SCNC: 22 MMOL/L (ref 22–29)
COLOR UR: YELLOW
CREAT SERPL-MCNC: 1.97 MG/DL (ref 0.76–1.27)
CREAT UR-MCNC: 31.7 MG/DL
EGFRCR SERPLBLD CKD-EPI 2021: 40.9 ML/MIN/1.73
GLOBULIN UR ELPH-MCNC: 2.7 GM/DL
GLUCOSE SERPL-MCNC: 212 MG/DL (ref 65–99)
GLUCOSE UR STRIP-MCNC: NEGATIVE MG/DL
HGB UR QL STRIP.AUTO: NEGATIVE
KETONES UR QL STRIP: NEGATIVE
LEUKOCYTE ESTERASE UR QL STRIP.AUTO: NEGATIVE
NITRITE UR QL STRIP: NEGATIVE
PH UR STRIP.AUTO: 6 [PH] (ref 5–8)
POTASSIUM SERPL-SCNC: 4.2 MMOL/L (ref 3.5–5.2)
PROT ?TM UR-MCNC: 11.7 MG/DL
PROT SERPL-MCNC: 6.5 G/DL (ref 6–8.5)
PROT UR QL STRIP: NEGATIVE
PROT/CREAT UR: 369.1 MG/G CREA (ref 0–200)
SODIUM SERPL-SCNC: 140 MMOL/L (ref 136–145)
SP GR UR STRIP: 1.01 (ref 1–1.03)
UROBILINOGEN UR QL STRIP: NORMAL

## 2024-05-13 PROCEDURE — 36415 COLL VENOUS BLD VENIPUNCTURE: CPT

## 2024-05-13 PROCEDURE — 80053 COMPREHEN METABOLIC PANEL: CPT

## 2024-05-13 PROCEDURE — 82570 ASSAY OF URINE CREATININE: CPT

## 2024-05-13 PROCEDURE — 81003 URINALYSIS AUTO W/O SCOPE: CPT

## 2024-05-13 PROCEDURE — 84156 ASSAY OF PROTEIN URINE: CPT

## 2024-05-13 RX ORDER — TAMSULOSIN HYDROCHLORIDE 0.4 MG/1
1 CAPSULE ORAL DAILY
Qty: 30 CAPSULE | Refills: 0 | Status: SHIPPED | OUTPATIENT
Start: 2024-05-13

## 2024-05-23 ENCOUNTER — OFFICE VISIT (OUTPATIENT)
Dept: CARDIOLOGY | Facility: CLINIC | Age: 49
End: 2024-05-23
Payer: MEDICARE

## 2024-05-23 VITALS
DIASTOLIC BLOOD PRESSURE: 63 MMHG | HEART RATE: 69 BPM | SYSTOLIC BLOOD PRESSURE: 123 MMHG | WEIGHT: 256.2 LBS | HEIGHT: 66 IN | OXYGEN SATURATION: 95 % | BODY MASS INDEX: 41.17 KG/M2

## 2024-05-23 DIAGNOSIS — I50.32 CHRONIC HEART FAILURE WITH PRESERVED EJECTION FRACTION (HFPEF): ICD-10-CM

## 2024-05-23 DIAGNOSIS — I25.10 ASCVD (ARTERIOSCLEROTIC CARDIOVASCULAR DISEASE): Primary | Chronic | ICD-10-CM

## 2024-05-23 DIAGNOSIS — I10 ESSENTIAL HYPERTENSION: Chronic | ICD-10-CM

## 2024-05-23 DIAGNOSIS — E78.5 DYSLIPIDEMIA: Chronic | ICD-10-CM

## 2024-05-23 RX ORDER — AMLODIPINE BESYLATE 5 MG/1
TABLET ORAL
COMMUNITY
Start: 2024-04-16

## 2024-05-23 NOTE — PROGRESS NOTES
Susanna Johnson APRN  Matthew Madrid  1975  05/23/2024    Patient Active Problem List   Diagnosis    NSTEMI, initial episode of care    NSTEMI (non-ST elevated myocardial infarction)    ASCVD (arteriosclerotic cardiovascular disease)    Essential hypertension    Dyslipidemia    DM (diabetes mellitus), type 2 with complications    SOB (shortness of breath)    Severe obesity (BMI 35.0-39.9) with comorbidity    Diarrhea    Abdominal pain    Dysphagia    Acute renal failure, unspecified acute renal failure type    Chronic heart failure with preserved ejection fraction (HFpEF)    Chest pain in adult    Acute on chronic heart failure with preserved ejection fraction (HFpEF)    CHF (congestive heart failure), NYHA class II, acute on chronic, combined    Deformity of metatarsal    Diabetic peripheral neuropathy associated with type 2 diabetes mellitus    Epidermoid cyst    Foot pain    Hammer toe    Hereditary peripheral neuropathy    Low back pain    Lumbosacral radiculopathy    Lumbar spondylosis    Muscle pain    Onychomycosis of toenail    Pain of right hip joint    Peripheral vascular disease    Stasis dermatitis with venous ulcer of lower extremity due to chronic peripheral venous hypertension    Tinea pedis    Torticollis       Dear Susanna Johnson APRN:    Subjective     History of Present Illness:    Chief Complaint   Patient presents with    Follow-up     ROUTINE       Matthew Madrid is a pleasant 49 y.o. male with a past medical history significant for CAD with history of acute NSTEMI with LHC on 9/2/2021 with subsequent stenting in the proximal RCA. He recently had a repeat LHC on 1/15/2024 with stenting on the mid RCA. He also has history of CKD, essential hypertension, diabetes mellitus, dyslipidemia. He also has chronic HFpEF. He does not smoke tobacco. He comes in today for cardiology follow-up.     Matthew comes in today reporting that he has been stable he does have some chronic  dyspnea and chronic fatigue.  He denies any overt chest pains, palpitations, syncope, or near syncope.  He does admit to poor diet adherence often eating processed meats high in sodium such as hotdogs or pulled pork.    Allergies   Allergen Reactions    Tuberculin Tests Rash   :      Current Outpatient Medications:     albuterol (PROVENTIL) (2.5 MG/3ML) 0.083% nebulizer solution, Take 2.5 mg by nebulization Every 6 (Six) Hours As Needed for Shortness of Air., Disp: 360 mL, Rfl: 2    allopurinol (ZYLOPRIM) 300 MG tablet, Take 1 tablet by mouth Daily., Disp: , Rfl:     ALPRAZolam (XANAX) 0.5 MG tablet, Take 1 tablet by mouth 3 (Three) Times a Day As Needed for Anxiety., Disp: , Rfl:     aspirin 81 MG EC tablet, Take 1 tablet by mouth Daily., Disp: 90 tablet, Rfl: 3    atorvastatin (LIPITOR) 40 MG tablet, Take 1 tablet by mouth Daily., Disp: , Rfl:     bumetanide (BUMEX) 1 MG tablet, Take 2 tablets by mouth 2 (Two) Times a Day for 90 days. 2mg daily through 05/03/24 then back to 1mg, Disp: 120 tablet, Rfl: 2    carvedilol (COREG) 25 MG tablet, Take 1 tablet by mouth twice daily, Disp: 90 tablet, Rfl: 0    Dulaglutide (Trulicity) 4.5 MG/0.5ML solution pen-injector, Inject  under the skin into the appropriate area as directed 1 (One) Time Per Week., Disp: , Rfl:     ferrous sulfate 325 (65 FE) MG tablet, Take 1 tablet by mouth 3 (Three) Times a Day With Meals. Taking BID, Disp: , Rfl:     FLUoxetine (PROzac) 40 MG capsule, Take 1 capsule by mouth Daily., Disp: , Rfl:     gabapentin (NEURONTIN) 800 MG tablet, Take 1 tablet by mouth 3 (Three) Times a Day., Disp: , Rfl:     HumuLIN 70/30 KwikPen (70-30) 100 UNIT/ML suspension pen-injector, INJECT 36 UNITS SUB-Q IN THE MORNING AND AT 4PM, Disp: , Rfl:     hydrALAZINE (APRESOLINE) 50 MG tablet, TAKE 1 TABLET BY MOUTH EVERY 8 HOURS, Disp: 90 tablet, Rfl: 0    Insulin Lispro, 0.5 Unit Dial, (HUMALOG KWIKPEN ROSALBA) 100 UNIT/ML solution pen-injector, Inject 60 Units under the  skin into the appropriate area as directed As Needed. Max of 60 units daily- use sliding scale PRN, Disp: , Rfl:     isosorbide mononitrate (IMDUR) 120 MG 24 hr tablet, Take 1 tablet by mouth Daily., Disp: , Rfl:     Krill Oil (Omega-3) 500 MG capsule, Take 2 capsules by mouth Daily., Disp: , Rfl:     levocetirizine (XYZAL) 5 MG tablet, Take 1 tablet by mouth Every Evening., Disp: , Rfl:     Mag-G 500 (27 Mg) MG tablet, Take 1 tablet by mouth Daily., Disp: , Rfl:     MAGnesium-Oxide 400 (240 Mg) MG tablet, Take 2 tablets by mouth once daily, Disp: 60 tablet, Rfl: 0    nitroglycerin (NITROSTAT) 0.4 MG SL tablet, Place 1 tablet under the tongue Every 5 (Five) Minutes As Needed for Chest Pain (Only if SBP Greater Than 100). Take no more than 3 doses in 15 minutes. If no relief, seek medical attention., Disp: 25 tablet, Rfl: 0    OneTouch Ultra test strip, , Disp: , Rfl:     pantoprazole (Protonix) 40 MG EC tablet, Take 1 tablet by mouth Daily., Disp: 30 tablet, Rfl: 11    promethazine (PHENERGAN) 12.5 MG tablet, Take 1 tablet by mouth 2 (Two) Times a Day As Needed for Nausea or Vomiting., Disp: , Rfl:     ranolazine (RANEXA) 1000 MG 12 hr tablet, Take 1 tablet by mouth Every 12 (Twelve) Hours., Disp: 60 tablet, Rfl: 2    tamsulosin (FLOMAX) 0.4 MG capsule 24 hr capsule, Take 1 capsule by mouth once daily, Disp: 30 capsule, Rfl: 0    ticagrelor (BRILINTA) 90 MG tablet tablet, Take 1 tablet by mouth 2 (Two) Times a Day., Disp: 180 tablet, Rfl: 3    traZODone (DESYREL) 100 MG tablet, Take 1 tablet by mouth At Night As Needed for Sleep., Disp: , Rfl:     vitamin B-12 (CYANOCOBALAMIN) 1000 MCG tablet, Take 1 tablet by mouth Daily., Disp: , Rfl:     vitamin D (ERGOCALCIFEROL) 1.25 MG (69610 UT) capsule capsule, Take 1 capsule by mouth 1 (One) Time Per Week., Disp: 5 capsule, Rfl: 3    amLODIPine (NORVASC) 5 MG tablet, , Disp: , Rfl:     dapagliflozin Propanediol 10 MG tablet, Take 10 mg by mouth Daily for 180 days. (Patient  "not taking: Reported on 2024), Disp: 90 tablet, Rfl: 1    The following portions of the patient's history were reviewed and updated as appropriate: allergies, current medications, past family history, past medical history, past social history, past surgical history and problem list.    Social History     Tobacco Use    Smoking status: Former     Current packs/day: 0.00     Types: Cigarettes     Quit date:      Years since quittin.4     Passive exposure: Past    Smokeless tobacco: Never   Vaping Use    Vaping status: Never Used   Substance Use Topics    Alcohol use: Yes     Alcohol/week: 6.0 standard drinks of alcohol     Types: 6 Glasses of wine per week     Comment: Occasional    Drug use: Never         Objective   Vitals:    24 0903 24 0927   BP:  123/63   Pulse: 69    SpO2: 95%    Weight: 116 kg (256 lb 3.2 oz)    Height: 167.6 cm (66\")      Body mass index is 41.35 kg/m².    ROS    Constitutional:       General: Not in acute distress.     Appearance: Healthy appearance. Well-developed and not in distress. Not diaphoretic.   Eyes:      Conjunctiva/sclera: Conjunctivae normal.      Pupils: Pupils are equal, round, and reactive to light.   HENT:      Head: Normocephalic and atraumatic.   Neck:      Vascular: No carotid bruit or JVD.   Pulmonary:      Effort: Pulmonary effort is normal. No respiratory distress.      Breath sounds: Normal breath sounds.   Cardiovascular:      Normal rate. Regular rhythm.   Edema:     Peripheral edema absent.   Skin:     General: Skin is cool.   Neurological:      Mental Status: Alert, oriented to person, place, and time and oriented to person, place and time.         Lab Results   Component Value Date     2024    K 4.2 2024     2024    CO2 22.0 2024    BUN 31 (H) 2024    CREATININE 1.97 (H) 2024    GLUCOSE 212 (H) 2024    CALCIUM 8.6 2024    AST 19 2024    ALT 25 2024    ALKPHOS 72 " "05/13/2024    LABIL2 1.1 09/01/2023     Lab Results   Component Value Date    CKTOTAL 125 12/19/2023     Lab Results   Component Value Date    WBC 4.76 02/06/2024    HGB 10.1 (L) 02/06/2024    HCT 31.3 (L) 02/06/2024     02/06/2024     Lab Results   Component Value Date    INR 1.06 09/02/2021     Lab Results   Component Value Date    MG 1.8 04/29/2024     Lab Results   Component Value Date    TSH 1.440 01/09/2024    TRIG 201 (H) 03/27/2023    HDL 29 (L) 03/27/2023    LDL 34 03/27/2023      No results found for: \"BNP\"    During this visit the following were done:  Labs Reviewed []    Labs Ordered []    Radiology Reports Reviewed []    Radiology Ordered []    PCP Records Reviewed []    Referring Provider Records Reviewed []    ER Records Reviewed []    Hospital Records Reviewed []    History Obtained From Family []    Radiology Images Reviewed []    Other Reviewed []    Records Requested []       Procedures    Assessment & Plan    Diagnosis Plan   1. ASCVD (arteriosclerotic cardiovascular disease)        2. Chronic heart failure with preserved ejection fraction (HFpEF)        3. Dyslipidemia        4. Essential hypertension                 Recommendations:  Chronic HFpEF  Overall stable following with heart failure clinic.  Continue with Lipitor, Bumex, Coreg,  CAD  Denies any recent worsening anginal symptoms continue Brilinta, Ranexa, amlodipine, Lipitor, Coreg, Imdur  CKD  Following with nephrology.  Currently stable    No follow-ups on file.    As always, I appreciate very much the opportunity to participate in the cardiovascular care of your patients.      With Best Regards,    Bhupinder Graham PA-C          "

## 2024-06-03 RX ORDER — RANOLAZINE 1000 MG/1
TABLET, EXTENDED RELEASE ORAL
Qty: 60 TABLET | Refills: 0 | Status: SHIPPED | OUTPATIENT
Start: 2024-06-03

## 2024-06-05 ENCOUNTER — HOSPITAL ENCOUNTER (OUTPATIENT)
Dept: CARDIOLOGY | Facility: HOSPITAL | Age: 49
Discharge: HOME | End: 2024-06-05
Payer: MEDICARE

## 2024-06-05 VITALS
SYSTOLIC BLOOD PRESSURE: 112 MMHG | WEIGHT: 262.2 LBS | BODY MASS INDEX: 42.14 KG/M2 | HEIGHT: 66 IN | HEART RATE: 66 BPM | OXYGEN SATURATION: 95 % | DIASTOLIC BLOOD PRESSURE: 58 MMHG

## 2024-06-05 DIAGNOSIS — I50.32 CHRONIC HEART FAILURE WITH PRESERVED EJECTION FRACTION (HFPEF): Primary | ICD-10-CM

## 2024-06-05 DIAGNOSIS — R06.02 SHORTNESS OF BREATH: ICD-10-CM

## 2024-06-05 DIAGNOSIS — I50.43 CHF (CONGESTIVE HEART FAILURE), NYHA CLASS II, ACUTE ON CHRONIC, COMBINED: ICD-10-CM

## 2024-06-05 LAB
ABSOLUTE LUNG FLUID CONTENT: 38 % (ref 20–35)
ANION GAP SERPL CALCULATED.3IONS-SCNC: 12.4 MMOL/L (ref 5–15)
BUN SERPL-MCNC: 46 MG/DL (ref 6–20)
BUN/CREAT SERPL: 22.4 (ref 7–25)
CALCIUM SPEC-SCNC: 9 MG/DL (ref 8.6–10.5)
CHLORIDE SERPL-SCNC: 98 MMOL/L (ref 98–107)
CO2 SERPL-SCNC: 26.6 MMOL/L (ref 22–29)
CREAT SERPL-MCNC: 2.05 MG/DL (ref 0.76–1.27)
EGFRCR SERPLBLD CKD-EPI 2021: 39 ML/MIN/1.73
GLUCOSE SERPL-MCNC: 228 MG/DL (ref 65–99)
MAGNESIUM SERPL-MCNC: 1.4 MG/DL (ref 1.6–2.6)
NT-PROBNP SERPL-MCNC: 445.3 PG/ML (ref 0–450)
POTASSIUM SERPL-SCNC: 4 MMOL/L (ref 3.5–5.2)
SODIUM SERPL-SCNC: 137 MMOL/L (ref 136–145)

## 2024-06-05 PROCEDURE — 80048 BASIC METABOLIC PNL TOTAL CA: CPT | Performed by: PHYSICIAN ASSISTANT

## 2024-06-05 PROCEDURE — 36415 COLL VENOUS BLD VENIPUNCTURE: CPT | Performed by: PHYSICIAN ASSISTANT

## 2024-06-05 PROCEDURE — 99214 OFFICE O/P EST MOD 30 MIN: CPT | Performed by: PHYSICIAN ASSISTANT

## 2024-06-05 PROCEDURE — 83880 ASSAY OF NATRIURETIC PEPTIDE: CPT | Performed by: PHYSICIAN ASSISTANT

## 2024-06-05 PROCEDURE — 94726 PLETHYSMOGRAPHY LUNG VOLUMES: CPT | Performed by: PHYSICIAN ASSISTANT

## 2024-06-05 PROCEDURE — 83735 ASSAY OF MAGNESIUM: CPT | Performed by: PHYSICIAN ASSISTANT

## 2024-06-05 RX ORDER — BUMETANIDE 1 MG/1
1 TABLET ORAL EVERY OTHER DAY
Qty: 4 TABLET | Refills: 0 | Status: SHIPPED | OUTPATIENT
Start: 2024-06-05

## 2024-06-05 RX ORDER — SEMAGLUTIDE 1.34 MG/ML
INJECTION, SOLUTION SUBCUTANEOUS WEEKLY
COMMUNITY

## 2024-06-05 NOTE — PROGRESS NOTES
Heart Failure Clinic  Pharmacist Note     Matthew Madrid is a 49 y.o. male seen in the Heart Failure Clinic for HFpEF.  Matthew Madrid reports a poor understanding of medications.  His wife is usually with him but could not come today although she has given him a notebook with a list of all of his medications.  We also confirmed with her via phone that he has still been taking Bumex 2mg BID.  He reports some swelling in his belly and weight gain. He thinks he is up about 10lbs.  He also reports some confusion to me.  He does have a BP log completed and last home BP was 132/71 and most numbers are similar.      He is continues to hold Farxiga due to pain in his groin when he started to take it.     Medication Use:   Hx of med intolerances: Lisinopril (discontinued at discharge in July 2023. Elevated CK and renal issues); pain in groin with Farxiga.   Retail Rx Management: [facility] in [place].     Past Medical History:   Diagnosis Date    ASCVD (arteriosclerotic cardiovascular disease) 09/13/2021    Chronic heart failure with preserved ejection fraction (HFpEF) 11/27/2023    Diabetes mellitus     Elevated cholesterol     GERD (gastroesophageal reflux disease)     Hyperlipidemia     Hypertension      ALLERGIES: Tuberculin tests  Current Outpatient Medications   Medication Sig Dispense Refill    albuterol (PROVENTIL) (2.5 MG/3ML) 0.083% nebulizer solution Take 2.5 mg by nebulization Every 6 (Six) Hours As Needed for Shortness of Air. 360 mL 2    allopurinol (ZYLOPRIM) 300 MG tablet Take 1 tablet by mouth Daily.      ALPRAZolam (XANAX) 0.5 MG tablet Take 1 tablet by mouth 3 (Three) Times a Day As Needed for Anxiety.      aspirin 81 MG EC tablet Take 1 tablet by mouth Daily. 90 tablet 3    atorvastatin (LIPITOR) 40 MG tablet Take 1 tablet by mouth Daily.      bumetanide (BUMEX) 1 MG tablet Take 2 tablets by mouth 2 (Two) Times a Day for 90 days. 2mg daily through 05/03/24 then back to 1mg 120 tablet 2    carvedilol (COREG)  25 MG tablet Take 1 tablet by mouth twice daily 90 tablet 0    ferrous sulfate 325 (65 FE) MG tablet Take 1 tablet by mouth 3 (Three) Times a Day With Meals. Taking BID      FLUoxetine (PROzac) 40 MG capsule Take 1 capsule by mouth Daily.      gabapentin (NEURONTIN) 800 MG tablet Take 1 tablet by mouth 3 (Three) Times a Day.      HumuLIN 70/30 KwikPen (70-30) 100 UNIT/ML suspension pen-injector INJECT 36 UNITS SUB-Q IN THE MORNING AND AT 4PM      hydrALAZINE (APRESOLINE) 50 MG tablet TAKE 1 TABLET BY MOUTH EVERY 8 HOURS 90 tablet 0    Insulin Lispro, 0.5 Unit Dial, (HUMALOG KWIKPEN ROSALBA) 100 UNIT/ML solution pen-injector Inject 60 Units under the skin into the appropriate area as directed As Needed. Max of 60 units daily- use sliding scale PRN      isosorbide mononitrate (IMDUR) 120 MG 24 hr tablet Take 1 tablet by mouth Daily.      levocetirizine (XYZAL) 5 MG tablet Take 1 tablet by mouth Every Evening.      MAGnesium-Oxide 400 (240 Mg) MG tablet Take 2 tablets by mouth once daily 60 tablet 0    nitroglycerin (NITROSTAT) 0.4 MG SL tablet Place 1 tablet under the tongue Every 5 (Five) Minutes As Needed for Chest Pain (Only if SBP Greater Than 100). Take no more than 3 doses in 15 minutes. If no relief, seek medical attention. 25 tablet 0    OneTouch Ultra test strip       promethazine (PHENERGAN) 12.5 MG tablet Take 1 tablet by mouth 2 (Two) Times a Day As Needed for Nausea or Vomiting.      ranolazine (RANEXA) 1000 MG 12 hr tablet TAKE 1  BY MOUTH EVERY 12 HOURS 60 tablet 0    Semaglutide, 1 MG/DOSE, (Ozempic, 1 MG/DOSE,) 2 MG/1.5ML solution pen-injector Inject  under the skin into the appropriate area as directed 1 (One) Time Per Week.      tamsulosin (FLOMAX) 0.4 MG capsule 24 hr capsule Take 1 capsule by mouth once daily 30 capsule 0    ticagrelor (BRILINTA) 90 MG tablet tablet Take 1 tablet by mouth 2 (Two) Times a Day. 180 tablet 3    traZODone (DESYREL) 100 MG tablet Take 1 tablet by mouth At Night As  "Needed for Sleep.      vitamin B-12 (CYANOCOBALAMIN) 1000 MCG tablet Take 1 tablet by mouth Daily.      vitamin D (ERGOCALCIFEROL) 1.25 MG (62925 UT) capsule capsule Take 1 capsule by mouth 1 (One) Time Per Week. 5 capsule 3    amLODIPine (NORVASC) 5 MG tablet       bumetanide (Bumex) 1 MG tablet Take 1 tablet by mouth Every Other Day. Take in addition to current bumex dose for 4 doses 4 tablet 0     No current facility-administered medications for this encounter.       Vaccination History:   Pneumonia: Reports received - unsure when this was; likely a candidate for Prevnar 20  Annual Influenza: UTD  Shingles: Currently not eligible    Objective  Vitals:    06/05/24 0953   BP: 112/58   BP Location: Left arm   Patient Position: Sitting   Cuff Size: Adult   Pulse: 66   SpO2: 95%   Weight: 119 kg (262 lb 3.2 oz)   Height: 167.6 cm (66\")       Wt Readings from Last 3 Encounters:   06/05/24 119 kg (262 lb 3.2 oz)   05/23/24 116 kg (256 lb 3.2 oz)   04/29/24 119 kg (261 lb 9.6 oz)         06/05/24  0953   Weight: 119 kg (262 lb 3.2 oz)       Lab Results   Component Value Date    GLUCOSE 228 (H) 06/05/2024    BUN 46 (H) 06/05/2024    CREATININE 2.05 (H) 06/05/2024    EGFRIFNONA 68 12/29/2021    BCR 22.4 06/05/2024    K 4.0 06/05/2024    CO2 26.6 06/05/2024    CALCIUM 9.0 06/05/2024    PROTENTOTREF 6.0 09/01/2023    ALBUMIN 3.8 05/13/2024    LABIL2 1.1 09/01/2023    AST 19 05/13/2024    ALT 25 05/13/2024     Lab Results   Component Value Date    WBC 4.76 02/06/2024    HGB 10.1 (L) 02/06/2024    HCT 31.3 (L) 02/06/2024    .0 (H) 02/06/2024     02/06/2024     Lab Results   Component Value Date    CKTOTAL 125 12/19/2023    CKMB 2.40 07/25/2023    TROPONINT 18 (H) 09/07/2023     Lab Results   Component Value Date    PROBNP 445.3 06/05/2024     Results for orders placed during the hospital encounter of 01/09/24    Adult Transthoracic Echo Complete W/ Cont if Necessary Per Protocol    Interpretation Summary    Left " ventricular ejection fraction appears to be 51 - 55%.    Left ventricular diastolic function is consistent with (grade II w/high LAP) pseudonormalization.    There is no evidence of pericardial effusion         GDMT    Drug Class   Drug   Dose Last Dose Adjustment Additional Titration   Notes   ACEi/ARB/ARNI     Lisinopril D/C at discharge July 2023 - renal/ck    Beta Blocker Carvedilol 25 mg BID      MRA    CKD    SGLT2i Pain in groin     Imdur 120 mg QD       Hydralazine 50 mg TID       Ranexa 1 g BID          Drug Therapy Problems    Edema      Recommendations:     [facility] will provide additional bumex at this time.      Patient was educated on heart failure medications and the importance of medication adherence.   All questions were addressed and patient expressed understanding.       Thank you for allowing me to participate in the care of your patient,    Ericka Wei, PharmD  06/05/24  14:44 EDT

## 2024-06-05 NOTE — PROGRESS NOTES
Heart Failure Clinic    Date: 06/05/24     Vitals:    06/05/24 0953   BP: 112/58   Pulse: 66   SpO2: 95%      Weight 262.2  Method of arrival: Ambulatory    Weighing self daily: Yes    Monitoring Heart Failure Zones: Yes    Today's HF Zone: Yellow     Taking medications as prescribed: Yes    Edema Yes    Shortness of Air: Yes    Number of pillows used at night:<2    Educational Materials given:  avs                                                                         ReDS Value:   36-41 Possible Hypervolemic Status  38%      Dimitrios Maki RN 06/05/24 09:54 EDT

## 2024-06-06 ENCOUNTER — HOSPITAL ENCOUNTER (EMERGENCY)
Facility: HOSPITAL | Age: 49
Discharge: HOME | End: 2024-06-07
Attending: EMERGENCY MEDICINE | Admitting: EMERGENCY MEDICINE
Payer: MEDICARE

## 2024-06-06 ENCOUNTER — APPOINTMENT (OUTPATIENT)
Dept: GENERAL RADIOLOGY | Facility: HOSPITAL | Age: 49
End: 2024-06-06
Payer: MEDICARE

## 2024-06-06 VITALS
DIASTOLIC BLOOD PRESSURE: 60 MMHG | RESPIRATION RATE: 18 BRPM | WEIGHT: 260 LBS | TEMPERATURE: 98.2 F | SYSTOLIC BLOOD PRESSURE: 139 MMHG | HEIGHT: 66 IN | HEART RATE: 70 BPM | BODY MASS INDEX: 41.78 KG/M2 | OXYGEN SATURATION: 98 %

## 2024-06-06 DIAGNOSIS — S30.0XXA CONTUSION OF COCCYX, INITIAL ENCOUNTER: ICD-10-CM

## 2024-06-06 DIAGNOSIS — M53.3 PAIN IN SACRUM: Primary | ICD-10-CM

## 2024-06-06 PROCEDURE — 96372 THER/PROPH/DIAG INJ SC/IM: CPT

## 2024-06-06 PROCEDURE — 25010000002 DEXAMETHASONE SODIUM PHOSPHATE 10 MG/ML SOLUTION: Performed by: NURSE PRACTITIONER

## 2024-06-06 PROCEDURE — 72220 X-RAY EXAM SACRUM TAILBONE: CPT

## 2024-06-06 PROCEDURE — 99283 EMERGENCY DEPT VISIT LOW MDM: CPT

## 2024-06-06 PROCEDURE — 72110 X-RAY EXAM L-2 SPINE 4/>VWS: CPT

## 2024-06-06 PROCEDURE — 25010000002 HYDROMORPHONE 1 MG/ML SOLUTION: Performed by: NURSE PRACTITIONER

## 2024-06-06 RX ORDER — DEXAMETHASONE SODIUM PHOSPHATE 10 MG/ML
10 INJECTION, SOLUTION INTRAMUSCULAR; INTRAVENOUS ONCE
Status: COMPLETED | OUTPATIENT
Start: 2024-06-06 | End: 2024-06-06

## 2024-06-06 RX ADMIN — HYDROMORPHONE HYDROCHLORIDE 1 MG: 1 INJECTION, SOLUTION INTRAMUSCULAR; INTRAVENOUS; SUBCUTANEOUS at 23:27

## 2024-06-06 RX ADMIN — DEXAMETHASONE SODIUM PHOSPHATE 10 MG: 10 INJECTION INTRAMUSCULAR; INTRAVENOUS at 23:27

## 2024-06-07 PROCEDURE — 72110 X-RAY EXAM L-2 SPINE 4/>VWS: CPT | Performed by: RADIOLOGY

## 2024-06-07 PROCEDURE — 72220 X-RAY EXAM SACRUM TAILBONE: CPT | Performed by: RADIOLOGY

## 2024-06-07 RX ORDER — HYDROCODONE BITARTRATE AND ACETAMINOPHEN 5; 325 MG/1; MG/1
1 TABLET ORAL EVERY 8 HOURS PRN
Qty: 9 TABLET | Refills: 0 | Status: SHIPPED | OUTPATIENT
Start: 2024-06-07 | End: 2024-06-10

## 2024-06-07 NOTE — ED PROVIDER NOTES
Subjective   History of Present Illness  Patient is a 49 year old male with PMH significant for ASCVD, CHF, diabetes mellitus, GERD, hyperlipidemia, and hypertension. He presents to the ED with complaints of low back and sacral pain. 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 He reports he landed on his buttocks. He reports pain to his lower back, buttock and sacrum. He denies any other significant complaints.        Review of Systems   Constitutional: Negative.  Negative for fever.   HENT: Negative.     Eyes: Negative.    Respiratory: Negative.     Cardiovascular: Negative.  Negative for chest pain.   Gastrointestinal: Negative.  Negative for abdominal pain.   Endocrine: Negative.    Genitourinary: Negative.  Negative for dysuria.   Musculoskeletal:  Positive for back pain.   Skin: Negative.    Allergic/Immunologic: Negative.    Neurological: Negative.    Hematological: Negative.    Psychiatric/Behavioral: Negative.     All other systems reviewed and are negative.      Past Medical History:   Diagnosis Date    ASCVD (arteriosclerotic cardiovascular disease) 09/13/2021    Chronic heart failure with preserved ejection fraction (HFpEF) 11/27/2023    Diabetes mellitus     Elevated cholesterol     GERD (gastroesophageal reflux disease)     Hyperlipidemia     Hypertension        Allergies   Allergen Reactions    Tuberculin Tests Rash       Past Surgical History:   Procedure Laterality Date    CARDIAC CATHETERIZATION N/A 09/02/2021    Procedure: Left Heart Cath;  Surgeon: Vasile Moulton MD;  Location: [facility] INVASIVE LOCATION;  Service: Cardiology;  Laterality: N/A;    CARDIAC CATHETERIZATION N/A 09/02/2021    Procedure: Percutaneous Coronary Intervention;  Surgeon: Vasile Moulton MD;  Location: [facility] INVASIVE LOCATION;  Service: Cardiology;  Laterality: N/A;    CARDIAC CATHETERIZATION N/A 1/15/2024    Procedure: Coronary angiography;  Surgeon: Anant Bennett MD;   Location: [facility] CATH INVASIVE LOCATION;  Service: Cardiology;  Laterality: N/A;    COLONOSCOPY      COLONOSCOPY N/A 10/4/2022    Procedure: COLONOSCOPY;  Surgeon: Gianluca Rodríguez MD;  Location: [facility] OR;  Service: Gastroenterology;  Laterality: N/A;    ENDOSCOPY      ENDOSCOPY N/A 10/4/2022    Procedure: ESOPHAGOGASTRODUODENOSCOPY;  Surgeon: Gianluca Rodríguez MD;  Location: [facility] OR;  Service: Gastroenterology;  Laterality: N/A;    LAPAROSCOPIC CHOLECYSTECTOMY         Family History   Problem Relation Age of Onset    Hyperlipidemia Mother     Hyperlipidemia Father     Heart attack Paternal Uncle        Social History     Socioeconomic History    Marital status:    Tobacco Use    Smoking status: Former     Current packs/day: 0.00     Types: Cigarettes     Quit date:      Years since quittin.4     Passive exposure: Past    Smokeless tobacco: Never   Vaping Use    Vaping status: Never Used   Substance and Sexual Activity    Alcohol use: Yes     Alcohol/week: 6.0 standard drinks of alcohol     Types: 6 Glasses of wine per week     Comment: Occasional    Drug use: Never    Sexual activity: Defer           Objective   Physical Exam  Vitals and nursing note reviewed.   Constitutional:       General: He is not in acute distress.     Appearance: He is well-developed. He is not diaphoretic.   HENT:      Head: Normocephalic and atraumatic.      Right Ear: External ear normal.      Left Ear: External ear normal.      Nose: Nose normal.   Eyes:      Conjunctiva/sclera: Conjunctivae normal.      Pupils: Pupils are equal, round, and reactive to light.   Neck:      Vascular: No JVD.      Trachea: No tracheal deviation.   Cardiovascular:      Rate and Rhythm: Normal rate and regular rhythm.      Heart sounds: Normal heart sounds. No murmur heard.  Pulmonary:      Effort: Pulmonary effort is normal. No respiratory distress.      Breath sounds: Normal breath sounds. No wheezing.   Abdominal:       General: Bowel sounds are normal.      Palpations: Abdomen is soft.      Tenderness: There is no abdominal tenderness.   Musculoskeletal:         General: No deformity. Normal range of motion.      Cervical back: Normal range of motion and neck supple.      Lumbar back: Tenderness present. Decreased range of motion.   Skin:     General: Skin is warm and dry.      Coloration: Skin is not pale.      Findings: No erythema or rash.   Neurological:      Mental Status: He is alert and oriented to person, place, and time.      Cranial Nerves: No cranial nerve deficit.   Psychiatric:         Behavior: Behavior normal.         Thought Content: Thought content normal.         Procedures           ED Course  ED Course as of 06/07/24 0225 Fri Jun 07, 2024   0111 XR Sacrum & Coccyx  IMPRESSION:  1. No acute fracture or traumatic malalignment.   [MB]   0111 XR Spine Lumbar Complete 4+VW     IMPRESSION:  1. No acute fracture or traumatic listhesis.   [MB]      ED Course User Index  [MB] Chata Rivas APRN                                             Medical Decision Making  Patient is a 49 year old male with PMH significant for ASCVD, CHF, diabetes mellitus, GERD, hyperlipidemia, and hypertension. He presents to the ED with complaints of low back and sacral pain. 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 He reports he landed on his buttocks. He reports pain to his lower back, buttock and sacrum. He denies any other significant complaints.    Problems Addressed:  Contusion of coccyx, initial encounter: complicated acute illness or injury  Pain in sacrum: complicated acute illness or injury    Amount and/or Complexity of Data Reviewed  Radiology: ordered. Decision-making details documented in ED Course.    Risk  Prescription drug management.        Final diagnoses:   Pain in sacrum   Contusion of coccyx, initial encounter       ED Disposition  ED Disposition       ED Disposition    Discharge    Condition   Stable    Comment   --               Susanna Johnson, APRN  [address]  [place] [id number] [phone]    Call in 2 days           Medication List        New Prescriptions      HYDROcodone-acetaminophen 5-325 MG per tablet  Commonly known as: NORCO  Take 1 tablet by mouth Every 8 (Eight) Hours As Needed for Moderate Pain for up to 3 days.               Where to Get Your Medications        These medications were sent to [facility] [address] [place] [address] [phone] [place] [phone]   [address] [place] [id number]      Phone: [phone]   HYDROcodone-acetaminophen 5-325 MG per tablet            Chata Rivas, CARLOS  06/07/24 0225

## 2024-06-10 RX ORDER — TAMSULOSIN HYDROCHLORIDE 0.4 MG/1
1 CAPSULE ORAL DAILY
Qty: 30 CAPSULE | Refills: 0 | Status: SHIPPED | OUTPATIENT
Start: 2024-06-10

## 2024-06-10 RX ORDER — LANOLIN ALCOHOL/MO/W.PET/CERES
2 CREAM (GRAM) TOPICAL DAILY
Qty: 60 TABLET | Refills: 0 | OUTPATIENT
Start: 2024-06-10

## 2024-06-10 RX ORDER — CALCIUM CARBONATE 300MG(750)
1 TABLET,CHEWABLE ORAL 2 TIMES DAILY
Qty: 60 TABLET | Refills: 0 | Status: SHIPPED | OUTPATIENT
Start: 2024-06-10

## 2024-06-10 RX ORDER — MAGNESIUM OXIDE TAB 400 MG (241.3 MG ELEMENTAL MG) 400 (241.3 MG) MG
2 TAB ORAL DAILY
Qty: 60 TABLET | Refills: 0 | OUTPATIENT
Start: 2024-06-10

## 2024-06-10 NOTE — TELEPHONE ENCOUNTER
Refill request received.     Refilled tamsulosin.     Going to refill Magnesium but increase from 240 mg BID to 400 mg BID and recheck level in one week.       Event:   Patient notable for persistent swelling, and weight increase overnight from 250s to 260 then 268 lbs. No significant change with urine output after additional Bumex.     Will bump appointment up to tomorrow, 6/11 at 1 PM and repeat blood work.

## 2024-06-11 ENCOUNTER — HOSPITAL ENCOUNTER (OUTPATIENT)
Dept: ULTRASOUND IMAGING | Facility: HOSPITAL | Age: 49
Discharge: HOME | End: 2024-06-11
Payer: MEDICARE

## 2024-06-11 ENCOUNTER — HOSPITAL ENCOUNTER (OUTPATIENT)
Dept: CARDIOLOGY | Facility: HOSPITAL | Age: 49
Discharge: HOME | End: 2024-06-11
Payer: MEDICARE

## 2024-06-11 VITALS
OXYGEN SATURATION: 95 % | HEIGHT: 66 IN | SYSTOLIC BLOOD PRESSURE: 134 MMHG | HEART RATE: 73 BPM | DIASTOLIC BLOOD PRESSURE: 60 MMHG | WEIGHT: 268 LBS | BODY MASS INDEX: 43.07 KG/M2

## 2024-06-11 DIAGNOSIS — D50.9 IRON DEFICIENCY ANEMIA, UNSPECIFIED IRON DEFICIENCY ANEMIA TYPE: ICD-10-CM

## 2024-06-11 DIAGNOSIS — I10 ESSENTIAL HYPERTENSION: Chronic | ICD-10-CM

## 2024-06-11 DIAGNOSIS — I50.32 CHRONIC HEART FAILURE WITH PRESERVED EJECTION FRACTION (HFPEF): ICD-10-CM

## 2024-06-11 DIAGNOSIS — E83.42 HYPOMAGNESEMIA: ICD-10-CM

## 2024-06-11 DIAGNOSIS — I50.33 ACUTE ON CHRONIC HEART FAILURE WITH PRESERVED EJECTION FRACTION (HFPEF): Primary | ICD-10-CM

## 2024-06-11 LAB
ABSOLUTE LUNG FLUID CONTENT: 38 % (ref 20–35)
ANION GAP SERPL CALCULATED.3IONS-SCNC: 13.4 MMOL/L (ref 5–15)
BUN SERPL-MCNC: 48 MG/DL (ref 6–20)
BUN/CREAT SERPL: 24.4 (ref 7–25)
CALCIUM SPEC-SCNC: 9.5 MG/DL (ref 8.6–10.5)
CHLORIDE SERPL-SCNC: 96 MMOL/L (ref 98–107)
CO2 SERPL-SCNC: 27.6 MMOL/L (ref 22–29)
CREAT SERPL-MCNC: 1.97 MG/DL (ref 0.76–1.27)
EGFRCR SERPLBLD CKD-EPI 2021: 40.9 ML/MIN/1.73
GLUCOSE SERPL-MCNC: 264 MG/DL (ref 65–99)
MAGNESIUM SERPL-MCNC: 1.6 MG/DL (ref 1.6–2.6)
NT-PROBNP SERPL-MCNC: 849.7 PG/ML (ref 0–450)
POTASSIUM SERPL-SCNC: 3.9 MMOL/L (ref 3.5–5.2)
SODIUM SERPL-SCNC: 137 MMOL/L (ref 136–145)

## 2024-06-11 PROCEDURE — 36415 COLL VENOUS BLD VENIPUNCTURE: CPT | Performed by: PHYSICIAN ASSISTANT

## 2024-06-11 PROCEDURE — 76700 US EXAM ABDOM COMPLETE: CPT

## 2024-06-11 PROCEDURE — 80048 BASIC METABOLIC PNL TOTAL CA: CPT | Performed by: PHYSICIAN ASSISTANT

## 2024-06-11 PROCEDURE — 99214 OFFICE O/P EST MOD 30 MIN: CPT | Performed by: PHYSICIAN ASSISTANT

## 2024-06-11 PROCEDURE — 94726 PLETHYSMOGRAPHY LUNG VOLUMES: CPT | Performed by: PHYSICIAN ASSISTANT

## 2024-06-11 PROCEDURE — 83735 ASSAY OF MAGNESIUM: CPT | Performed by: PHYSICIAN ASSISTANT

## 2024-06-11 PROCEDURE — 76700 US EXAM ABDOM COMPLETE: CPT | Performed by: RADIOLOGY

## 2024-06-11 PROCEDURE — 83880 ASSAY OF NATRIURETIC PEPTIDE: CPT

## 2024-06-11 RX ORDER — POTASSIUM CHLORIDE 750 MG/1
20 TABLET, FILM COATED, EXTENDED RELEASE ORAL DAILY
Qty: 6 TABLET | Refills: 0 | Status: SHIPPED | OUTPATIENT
Start: 2024-06-11 | End: 2024-06-14

## 2024-06-11 RX ORDER — METOLAZONE 2.5 MG/1
2.5 TABLET ORAL DAILY
Qty: 3 TABLET | Refills: 0 | Status: SHIPPED | OUTPATIENT
Start: 2024-06-11 | End: 2024-06-19

## 2024-06-11 NOTE — PROGRESS NOTES
[facility]    Date: 06/11/24     Vitals:    06/11/24 1304   BP: 134/60   Pulse: 73   SpO2: 95%      Weight 268  Method of arrival: Ambulatory    Weighing self daily: Yes    Monitoring Heart Failure Zones: Yes    Today's HF Zone: Yellow     Taking medications as prescribed: Yes    Edema Yes    Shortness of Air: Yes    Number of pillows used at night:Recliner    Educational Materials given:  avs                                                                         ReDS Value: 38  36-41 Possible Hypervolemic Status      Dimitrios Maki RN 06/11/24 13:05 EDT

## 2024-06-11 NOTE — PROGRESS NOTES
[facility]  Pharmacist Note     Matthew Madrid is a 49 y.o. male seen in the Heart Failure Clinic for HFpEF.    Matthew Madrid reports a poor understanding of medications.  His wife is with him today and they have the notebook with medications.  They report no medications changes other than a course of pain medication after an ED visit and taking the extra bumex every other day.  He reports taking 3 bumex this morning and still having SOB and swelling.  He reports his weight is up 8 lbs. He does have a home BP log and last readings were 138/69 and 138/67.     He is continues to hold Farxiga due to pain in his groin when he started to take it.     Medication Use:   Hx of med intolerances: Lisinopril (discontinued at discharge in July 2023. Elevated CK and renal issues); pain in groin with Farxiga.   Retail Rx Management: [facility] in [place].     Past Medical History:   Diagnosis Date    ASCVD (arteriosclerotic cardiovascular disease) 09/13/2021    Chronic heart failure with preserved ejection fraction (HFpEF) 11/27/2023    Diabetes mellitus     Elevated cholesterol     GERD (gastroesophageal reflux disease)     Hyperlipidemia     Hypertension      ALLERGIES: Tuberculin tests  Current Outpatient Medications   Medication Sig Dispense Refill    albuterol (PROVENTIL) (2.5 MG/3ML) 0.083% nebulizer solution Take 2.5 mg by nebulization Every 6 (Six) Hours As Needed for Shortness of Air. 360 mL 2    allopurinol (ZYLOPRIM) 300 MG tablet Take 1 tablet by mouth Daily.      ALPRAZolam (XANAX) 0.5 MG tablet Take 1 tablet by mouth 3 (Three) Times a Day As Needed for Anxiety.      amLODIPine (NORVASC) 5 MG tablet       aspirin 81 MG EC tablet Take 1 tablet by mouth Daily. 90 tablet 3    atorvastatin (LIPITOR) 40 MG tablet Take 1 tablet by mouth Daily.      bumetanide (BUMEX) 1 MG tablet Take 2 tablets by mouth 2 (Two) Times a Day for 90 days. 2mg daily through 05/03/24 then back to 1mg 120 tablet 2    bumetanide (Bumex) 1  MG tablet Take 1 tablet by mouth Every Other Day. Take in addition to current bumex dose for 4 doses 4 tablet 0    carvedilol (COREG) 25 MG tablet Take 1 tablet by mouth twice daily 90 tablet 0    ferrous sulfate 325 (65 FE) MG tablet Take 1 tablet by mouth 3 (Three) Times a Day With Meals. Taking BID      FLUoxetine (PROzac) 40 MG capsule Take 1 capsule by mouth Daily.      gabapentin (NEURONTIN) 800 MG tablet Take 1 tablet by mouth 3 (Three) Times a Day.      HumuLIN 70/30 KwikPen (70-30) 100 UNIT/ML suspension pen-injector INJECT 36 UNITS SUB-Q IN THE MORNING AND AT 4PM      hydrALAZINE (APRESOLINE) 50 MG tablet TAKE 1 TABLET BY MOUTH EVERY 8 HOURS 90 tablet 0    Insulin Lispro, 0.5 Unit Dial, (HUMALOG KWIKPEN ROSALBA) 100 UNIT/ML solution pen-injector Inject 60 Units under the skin into the appropriate area as directed As Needed. Max of 60 units daily- use sliding scale PRN      isosorbide mononitrate (IMDUR) 120 MG 24 hr tablet Take 1 tablet by mouth Daily.      levocetirizine (XYZAL) 5 MG tablet Take 1 tablet by mouth Every Evening.      Magnesium 400 MG tablet Take 1 tablet by mouth 2 (Two) Times a Day. 60 tablet 0    nitroglycerin (NITROSTAT) 0.4 MG SL tablet Place 1 tablet under the tongue Every 5 (Five) Minutes As Needed for Chest Pain (Only if SBP Greater Than 100). Take no more than 3 doses in 15 minutes. If no relief, seek medical attention. 25 tablet 0    OneTouch Ultra test strip       promethazine (PHENERGAN) 12.5 MG tablet Take 1 tablet by mouth 2 (Two) Times a Day As Needed for Nausea or Vomiting.      ranolazine (RANEXA) 1000 MG 12 hr tablet TAKE 1  BY MOUTH EVERY 12 HOURS 60 tablet 0    Semaglutide, 1 MG/DOSE, (Ozempic, 1 MG/DOSE,) 2 MG/1.5ML solution pen-injector Inject  under the skin into the appropriate area as directed 1 (One) Time Per Week.      tamsulosin (FLOMAX) 0.4 MG capsule 24 hr capsule Take 1 capsule by mouth once daily 30 capsule 0    ticagrelor (BRILINTA) 90 MG tablet tablet Take 1  "tablet by mouth 2 (Two) Times a Day. 180 tablet 3    traZODone (DESYREL) 100 MG tablet Take 1 tablet by mouth At Night As Needed for Sleep.      vitamin B-12 (CYANOCOBALAMIN) 1000 MCG tablet Take 1 tablet by mouth Daily.      vitamin D (ERGOCALCIFEROL) 1.25 MG (12304 UT) capsule capsule Take 1 capsule by mouth 1 (One) Time Per Week. 5 capsule 3     No current facility-administered medications for this encounter.       Vaccination History:   Pneumonia: Reports received - unsure when this was; likely a candidate for Prevnar 20  Annual Influenza: UTD  Shingles: Currently not eligible    Objective  Vitals:    06/11/24 1304   BP: 134/60   BP Location: Left arm   Patient Position: Sitting   Cuff Size: Adult   Pulse: 73   SpO2: 95%   Weight: 122 kg (268 lb)   Height: 167.6 cm (66\")       Wt Readings from Last 3 Encounters:   06/11/24 122 kg (268 lb)   06/06/24 118 kg (260 lb)   06/05/24 119 kg (262 lb 3.2 oz)         06/11/24  1304   Weight: 122 kg (268 lb)       Lab Results   Component Value Date    GLUCOSE 264 (H) 06/11/2024    BUN 48 (H) 06/11/2024    CREATININE 1.97 (H) 06/11/2024    EGFRIFNONA 68 12/29/2021    BCR 24.4 06/11/2024    K 3.9 06/11/2024    CO2 27.6 06/11/2024    CALCIUM 9.5 06/11/2024    PROTENTOTREF 6.0 09/01/2023    ALBUMIN 3.8 05/13/2024    LABIL2 1.1 09/01/2023    AST 19 05/13/2024    ALT 25 05/13/2024     Lab Results   Component Value Date    WBC 4.76 02/06/2024    HGB 10.1 (L) 02/06/2024    HCT 31.3 (L) 02/06/2024    .0 (H) 02/06/2024     02/06/2024     Lab Results   Component Value Date    CKTOTAL 125 12/19/2023    CKMB 2.40 07/25/2023    TROPONINT 18 (H) 09/07/2023     Lab Results   Component Value Date    PROBNP 849.7 (H) 06/11/2024     Results for orders placed during the hospital encounter of 01/09/24    Adult Transthoracic Echo Complete W/ Cont if Necessary Per Protocol    Interpretation Summary    Left ventricular ejection fraction appears to be 51 - 55%.    Left ventricular " diastolic function is consistent with (grade II w/high LAP) pseudonormalization.    There is no evidence of pericardial effusion         GDMT    Drug Class   Drug   Dose Last Dose Adjustment Additional Titration   Notes   ACEi/ARB/ARNI     Lisinopril D/C at discharge July 2023 - renal/ck    Beta Blocker Carvedilol 25 mg BID      MRA    CKD    SGLT2i Pain in groin     Imdur 120 mg QD       Hydralazine 50 mg TID       Ranexa 1 g BID          Drug Therapy Problems    Edema      Recommendations:     Consider short term course of metolazone with close monitoring of electrolytes and fluid status.  Would recommend to take metolazone 30 minutes prior to bumex as well as potassium supplementation on those days.     Patient was educated on heart failure medications and the importance of medication adherence.   All questions were addressed and patient expressed understanding.       Thank you for allowing me to participate in the care of your patient,    Ericka Wei, PharmD  06/11/24  14:20 EDT

## 2024-06-11 NOTE — PROGRESS NOTES
[facility]  NEREYDA Aly Devin L, PA-C  [address] [facility] [place] [address]    Thank you for asking me to see Matthew Madrid for {Symptoms; cardiac:19795}.    HPI:     This is a 49 y.o. male with known past medical history of ***:    Matthew Madrid presents for today for ***.  The patient is typically seen by Susanna Johnson APRN.  Patient's primary cardiologist is ***.     Last known EF ***.   Last known hospitalization and/or ED visit:   Accompanied by: ***          06/11/24 visit data/details regarding:   Dyspnea: ***  Lower extremity swelling: ***  Abdominal swelling: ***  Home weight: Weight monitoring booklet provided during initial visit; ***  Home BP: BP monitoring booklet provided during initial visit; ***  Home heart rate: HR monitoring booklet provided during initial visit; ***  Daily activities of living:   Pillows/lying flat: ***   HF zone:       Specialists:   Cardiology:   ***        Review of Systems - ROS ***     All other systems were reviewed and were negative.    Patient Active Problem List   Diagnosis    NSTEMI, initial episode of care    NSTEMI (non-ST elevated myocardial infarction)    ASCVD (arteriosclerotic cardiovascular disease)    Essential hypertension    Dyslipidemia    DM (diabetes mellitus), type 2 with complications    SOB (shortness of breath)    Severe obesity (BMI 35.0-39.9) with comorbidity    Diarrhea    Abdominal pain    Dysphagia    Acute renal failure, unspecified acute renal failure type    Chronic heart failure with preserved ejection fraction (HFpEF)    Chest pain in adult    Acute on chronic heart failure with preserved ejection fraction (HFpEF)    CHF (congestive heart failure), NYHA class II, acute on chronic, combined    Deformity of metatarsal    Diabetic peripheral neuropathy associated with type 2 diabetes mellitus    Epidermoid cyst    Foot pain    Hammer toe    Hereditary peripheral neuropathy    Low  back pain    Lumbosacral radiculopathy    Lumbar spondylosis    Muscle pain    Onychomycosis of toenail    Pain of right hip joint    Peripheral vascular disease    Stasis dermatitis with venous ulcer of lower extremity due to chronic peripheral venous hypertension    Tinea pedis    Torticollis       family history includes Heart attack in his paternal uncle; Hyperlipidemia in his father and mother.     reports that he quit smoking about 24 years ago. His smoking use included cigarettes. He has been exposed to tobacco smoke. He has never used smokeless tobacco. He reports current alcohol use of about 6.0 standard drinks of alcohol per week. He reports that he does not use drugs.    Allergies   Allergen Reactions    Tuberculin Tests Rash         Current Outpatient Medications:     albuterol (PROVENTIL) (2.5 MG/3ML) 0.083% nebulizer solution, Take 2.5 mg by nebulization Every 6 (Six) Hours As Needed for Shortness of Air., Disp: 360 mL, Rfl: 2    allopurinol (ZYLOPRIM) 300 MG tablet, Take 1 tablet by mouth Daily., Disp: , Rfl:     ALPRAZolam (XANAX) 0.5 MG tablet, Take 1 tablet by mouth 3 (Three) Times a Day As Needed for Anxiety., Disp: , Rfl:     amLODIPine (NORVASC) 5 MG tablet, , Disp: , Rfl:     aspirin 81 MG EC tablet, Take 1 tablet by mouth Daily., Disp: 90 tablet, Rfl: 3    atorvastatin (LIPITOR) 40 MG tablet, Take 1 tablet by mouth Daily., Disp: , Rfl:     bumetanide (BUMEX) 1 MG tablet, Take 2 tablets by mouth 2 (Two) Times a Day for 90 days. 2mg daily through 05/03/24 then back to 1mg, Disp: 120 tablet, Rfl: 2    carvedilol (COREG) 25 MG tablet, Take 1 tablet by mouth twice daily, Disp: 90 tablet, Rfl: 0    ferrous sulfate 325 (65 FE) MG tablet, Take 1 tablet by mouth 3 (Three) Times a Day With Meals. Taking BID, Disp: , Rfl:     FLUoxetine (PROzac) 40 MG capsule, Take 1 capsule by mouth Daily., Disp: , Rfl:     gabapentin (NEURONTIN) 800 MG tablet, Take 1 tablet by mouth 3 (Three) Times a Day., Disp: , Rfl:      HumuLIN 70/30 KwikPen (70-30) 100 UNIT/ML suspension pen-injector, INJECT 36 UNITS SUB-Q IN THE MORNING AND AT 4PM, Disp: , Rfl:     hydrALAZINE (APRESOLINE) 50 MG tablet, TAKE 1 TABLET BY MOUTH EVERY 8 HOURS, Disp: 90 tablet, Rfl: 0    Insulin Lispro, 0.5 Unit Dial, (HUMALOG KWIKPEN ROSALBA) 100 UNIT/ML solution pen-injector, Inject 60 Units under the skin into the appropriate area as directed As Needed. Max of 60 units daily- use sliding scale PRN, Disp: , Rfl:     isosorbide mononitrate (IMDUR) 120 MG 24 hr tablet, Take 1 tablet by mouth Daily., Disp: , Rfl:     levocetirizine (XYZAL) 5 MG tablet, Take 1 tablet by mouth Every Evening., Disp: , Rfl:     Magnesium 400 MG tablet, Take 1 tablet by mouth 2 (Two) Times a Day., Disp: 60 tablet, Rfl: 0    metOLazone (ZAROXOLYN) 2.5 MG tablet, Take 1 tablet by mouth Daily for 3 days. Do not take if BP < 100/60. Take together with Potassium, Disp: 3 tablet, Rfl: 0    nitroglycerin (NITROSTAT) 0.4 MG SL tablet, Place 1 tablet under the tongue Every 5 (Five) Minutes As Needed for Chest Pain (Only if SBP Greater Than 100). Take no more than 3 doses in 15 minutes. If no relief, seek medical attention., Disp: 25 tablet, Rfl: 0    OneTouch Ultra test strip, , Disp: , Rfl:     potassium chloride 10 MEQ CR tablet, Take 2 tablets by mouth Daily for 3 days. Only take when Metolazone is used, Disp: 6 tablet, Rfl: 0    promethazine (PHENERGAN) 12.5 MG tablet, Take 1 tablet by mouth 2 (Two) Times a Day As Needed for Nausea or Vomiting., Disp: , Rfl:     ranolazine (RANEXA) 1000 MG 12 hr tablet, TAKE 1  BY MOUTH EVERY 12 HOURS, Disp: 60 tablet, Rfl: 0    Semaglutide, 1 MG/DOSE, (Ozempic, 1 MG/DOSE,) 2 MG/1.5ML solution pen-injector, Inject  under the skin into the appropriate area as directed 1 (One) Time Per Week., Disp: , Rfl:     tamsulosin (FLOMAX) 0.4 MG capsule 24 hr capsule, Take 1 capsule by mouth once daily, Disp: 30 capsule, Rfl: 0    ticagrelor (BRILINTA) 90 MG tablet  tablet, Take 1 tablet by mouth 2 (Two) Times a Day., Disp: 180 tablet, Rfl: 3    traZODone (DESYREL) 100 MG tablet, Take 1 tablet by mouth At Night As Needed for Sleep., Disp: , Rfl:     vitamin B-12 (CYANOCOBALAMIN) 1000 MCG tablet, Take 1 tablet by mouth Daily., Disp: , Rfl:     vitamin D (ERGOCALCIFEROL) 1.25 MG (22874 UT) capsule capsule, Take 1 capsule by mouth 1 (One) Time Per Week., Disp: 5 capsule, Rfl: 3      Physical Exam:  I have reviewed the patient's current vital signs as documented in the patient's EMR.   Vitals:    06/11/24 1304   BP: 134/60   Pulse: 73   SpO2: 95%     Body mass index is 43.26 kg/m².       06/11/24  1304   Weight: 122 kg (268 lb)      Physical Exam ***    JVP: Volume/Pulsation: {Exam; neck veins:46701}.        DATA REVIEWED:     EKG. I personally reviewed and interpreted the EKG.      EKG: {ekg findings:890213}.   ---------------------------------------------------  TTE/GAYLE:  Results for orders placed during the hospital encounter of 01/09/24    Adult Transthoracic Echo Complete W/ Cont if Necessary Per Protocol    Interpretation Summary    Left ventricular ejection fraction appears to be 51 - 55%.    Left ventricular diastolic function is consistent with (grade II w/high LAP) pseudonormalization.    There is no evidence of pericardial effusion        LAST HEART CATH/IF AVAILABLE:     Results for orders placed during the hospital encounter of 01/09/24    Cardiac Catheterization/Vascular Study    Conclusion    1st Mrg lesion is 30% stenosed.    Prox RCA-1 lesion is 60% stenosed.    Prox RCA-2 lesion is 60% stenosed.    Mid RCA lesion is 70% stenosed.    1.  Successful IFR guided PTCA and stent placement of the right coronary artery.  Dual antiplatelet therapy to continue.  Medical management for coronary artery disease will be advised.      -----------------------------------------------------  CXR/Imaging:   Imaging Results (Most Recent)       None            I personally reviewed  "and interpreted the CXR.      -----------------------------------------------------  CT:   US Abdomen Complete    Result Date: 6/11/2024    Fatty infiltration of the liver.   This report was finalized on 6/11/2024 2:38 PM by Dr. Ron Wharton MD.      XR Sacrum & Coccyx    Result Date: 6/7/2024  1. No acute fracture or traumatic malalignment.  This report was finalized on 6/7/2024 12:16 AM by Keith Agudelo MD.      XR Spine Lumbar Complete 4+VW    Result Date: 6/7/2024  1. No acute fracture or traumatic listhesis.  This report was finalized on 6/7/2024 12:16 AM by Keith Agudelo MD.     I personally reviewed the images of the CT scan.  My personal interpretation is below.      ----------------------------------------------------    PFTs:    I interpreted the PFTs. {PFT Flow Volume Loops:21303::\"Flow-Volume loops are normal.\"}. {PFT Lung Volumes:21304::\"Plethysmographic lung volumes are within normal limits.\"}. {PFT Spirometry Interpretation:21301::\"Spirometry is within normal limits.\"}.   --------------------------------------------------------------------------------------------------    Laboratory evaluations:    Lab Results   Component Value Date    GLUCOSE 264 (H) 06/11/2024    BUN 48 (H) 06/11/2024    CREATININE 1.97 (H) 06/11/2024    EGFRIFNONA 68 12/29/2021    BCR 24.4 06/11/2024    K 3.9 06/11/2024    CO2 27.6 06/11/2024    CALCIUM 9.5 06/11/2024    PROTENTOTREF 6.0 09/01/2023    ALBUMIN 3.8 05/13/2024    LABIL2 1.1 09/01/2023    AST 19 05/13/2024    ALT 25 05/13/2024     Lab Results   Component Value Date    WBC 4.76 02/06/2024    HGB 10.1 (L) 02/06/2024    HCT 31.3 (L) 02/06/2024    .0 (H) 02/06/2024     02/06/2024     Lab Results   Component Value Date    CHOL 95 03/27/2023    TRIG 201 (H) 03/27/2023    HDL 29 (L) 03/27/2023    LDL 34 03/27/2023     Lab Results   Component Value Date    TSH 1.440 01/09/2024     Lab Results   Component Value Date    HGBA1C 6.70 (H) 01/09/2024     Lab Results "   Component Value Date    ALT 25 05/13/2024     Lab Results   Component Value Date    HGBA1C 6.70 (H) 01/09/2024    HGBA1C 5.70 (H) 07/04/2023    HGBA1C 9.40 (H) 09/01/2021     Lab Results   Component Value Date    MICROALBUR 26.6 09/01/2023    CREATININE 1.97 (H) 06/11/2024     Lab Results   Component Value Date    IRON 53 (L) 01/11/2024    TIBC 325 01/11/2024    FERRITIN 117.60 01/11/2024     Lab Results   Component Value Date    INR 1.06 09/02/2021    PROTIME 14.2 09/02/2021        Lab Results   Component Value Date    ABSOLUTELUNG 38 (A) 06/11/2024    ABSOLUTELUNG 38 (A) 06/05/2024    ABSOLUTELUNG 34 04/29/2024       PAH RISK ASSESSMENT:      1. Acute on chronic heart failure with preserved ejection fraction (HFpEF)          ORDERS PLACED TODAY:  Orders Placed This Encounter   Procedures    ReDs Vest    BNP    Basic Metabolic Panel    Magnesium        Diagnoses and all orders for this visit:    1. Acute on chronic heart failure with preserved ejection fraction (HFpEF) (Primary)  -     ReDs Vest  -     BNP  -     Basic Metabolic Panel; Standing  -     Magnesium; Standing  -     Basic Metabolic Panel  -     Magnesium    Other orders  -     metOLazone (ZAROXOLYN) 2.5 MG tablet; Take 1 tablet by mouth Daily for 3 days. Do not take if BP < 100/60. Take together with Potassium  Dispense: 3 tablet; Refill: 0  -     potassium chloride 10 MEQ CR tablet; Take 2 tablets by mouth Daily for 3 days. Only take when Metolazone is used  Dispense: 6 tablet; Refill: 0             MEDS ORDERED TODAY:    New Medications Ordered This Visit   Medications    metOLazone (ZAROXOLYN) 2.5 MG tablet     Sig: Take 1 tablet by mouth Daily for 3 days. Do not take if BP < 100/60. Take together with Potassium     Dispense:  3 tablet     Refill:  0    potassium chloride 10 MEQ CR tablet     Sig: Take 2 tablets by mouth Daily for 3 days. Only take when Metolazone is used     Dispense:  6 tablet     Refill:  0     "    ---------------------------------------------------------------------------------------------------------------------------          ASSESSMENT/PLAN:      Diagnosis Plan   1. Acute on chronic heart failure with preserved ejection fraction (HFpEF)  ReDs Vest    BNP    Basic Metabolic Panel    Magnesium    Basic Metabolic Panel    Magnesium          {CHF acuity:32648} {car chf dysfunction:08382}. {Diagnoses; chf:97773}. Etiology: {[facility]_Heart_Failure_Etiology:71223}. LVEF ***.  RV EF is {[facility]:98820}.     NYHA stage {NYHA CLASS:26782}FC-{NYHA:30259}. NYHA FC change: {Nyha change:51467}. Last 6MWT: {6MWT speed:48848}.  ***    Today, {CHF volume status:12633::\"Patient is currently volume overloaded.\"}and with  {EXCELLENT/MODERATE/MINIMAL:96918} perfusion. The patient's hemodynamics are currently {Desc; acceptable/unacceptable:76849}. HR is: {Rate:34939} and {is/is not:37891} at goal. BP/MAP was reviewed and there {IS/IS NOT:27419}room for medication up-titration.  Clinical trajectory was assessed and has{worsened, improve, remained stable:71827}.     CHF GOAL DIRECTED MEDICAL THERAPY FOR PATIENT ADDRESSED/ADJUSTED:     GDMT: ***    Drug Class   Drug   Dose Last Dose Adjustment Notes   ACEi/ARB/ARNI       Beta Blocker       MRA       SGLT2i    N/A   Secondaries if applicable:          -CHF Specific BB:    {B Blockers:76114}  at dose of ***.   We discussed processes/benefits of HF clinic including nursing, pharmacist, and provider evaluation during each visit with ability for in office ReDS vest, labs, and ability to provide IV diuresis in the clinic with close outpatient monitoring.  Additionally, patient was educated about the availability of delivery of medications to patient's clinic room prior to leaving the building which assists with medication compliance and insures medications are in hands when changes are made (if patient opts for apothecary usage) with thorough guidance regarding changes and medication " schedule provided.          -ACE/ARB/ARNi:   Current dose: ***  /Target dose: ***  Baseline creatinine previously known to be ***.   {Meds; ace inhibitors:28384}   {Meds; angiotensin II receptor blockers:44571}.  Starting an ARNI/ACE/ARB can lead to a small and non- progressive increase in serum creatinine.  This should be less than 30% of the baseline.  Small changes in creatinine or not an indication to discontinue or dose reduce the medication.    -MRA:   The patient is FC-{CHF Class:52928} and MRA {is/is not:01200} indicated.   Spironolactone ***mg  The patient will be started on Aldactone.  They have been asked to obtain a BMP within 5 days of starting for monitoring the kidney function and potassium.  Aldactone was explained to the patient.  However, not all possible side effects and adverse reactions are able to be fully discussed.  Handout was offered to the patient detailing the prescription. ***  Patient was advised to not use potassium products.  Quarterly monitoring of potassium and renal function is currently recommended    -SGLT2 inhibitor therapy:   A BMP at initiation to verify GFR >30 was recommended, as was interval GFR surveillance.  The patient was advised to hold SGLT2I when PO intake is restricted due to a planned surgery, or due to an underlying illness.    Recommend starting  Jardiance (empagliflozin) 10mg with quarterly assessment of GFR. ***  Recommend starting Farxiga (dapagliflozin) 10mg daily with quarterly assessment of GFR. ***  RTC one week with BMP after initiation ***.   Pt was advised SEs, some severe, including hypersensitivity and Ai's; coupled with discussion regarding common side effects of UTIs and female genital mycotic infections were discussed. If you will be NPO, or are sick (poor PO intake, N&V) please hold the medication until you are back to a normal diet.     -Diuretic regimen:   ReDS Vest reading for. 06/11/24 is  ***; ReDs Vest reading reviewed with patient.   "  {not indicated/requested/declined:72726}  {Treatments; meds diuretics:61839}  BMP, Mag, & ProBNP reviewed with patient.      -Fluid restriction/Sodium restriction:   Requested 2000 ml restriction  Patient has been asked to weigh daily and was provided with a printed diuretic strategy.  1,500 mg Na restriction was discussed.    -Devices if applicable:       -Acute vs. Chronic underlying conditions other than HF addressed during visit:   ***    Identifiable barriers to Heart Failure Self-care:   Medical Barriers: {Medical Barriers:19197::\"Cognitive Impairment\",\"Depression\",\"Substance Use Disorders\",\"Frailty\"}  Social Barriers: {Social Barriers:19197::\"Financial [place] of HF treatment\",\"Food insecurity\",\"Homelessness or housing insecurity\",\"Intimate partner violence or elder abuse\",\"Language barriers\", 'Low health literacy\",\"Social isolation or low social support\",\"Transport limitations\"}        CONSIDERATIONS: ***  ------------------------------------------------------------------  -Iron/Anemia in CHF:    ----------------------------------------------------------------------    -Sleep/Apnea:   ***    --------------------------------------------------------------------------------    Summary:   Labs this in office today.  Slight interval improvement of renal function as we were worried about trend with recent increase of Bumex.    Increase of Bumex did not improve the swelling or weight gain.  Notable for 8 pound weight gain  Abdominal ultrasound completed today, notable for fatty liver changes  Will send copy to PCP and refer to GI  No effusion  Chest x-ray not yet completed.  Will try brief addition of metolazone 5 mg for 3 days with holding parameters  Ordered additional potassium 20 mEq for 3 days to take only with metolazone  Magnesium ordered thought to be 240 mg, but order was truly 400 mg twice daily.  Repeat labs today is actually now within normal limits.  Continue with magnesium 400 mg twice " daily        Will recheck in office in 1 week.  Contact us sooner as needed.             This document has been electronically signed by Maya Owens PA-C  June 11, 2024 17:53 EDT      Dictated Utilizing Dragon Dictation: Part of this note may be an electronic transcription/translation of spoken language to printed text using the Dragon Dictation System.    Follow-up appointment and medication changes provided in hand delivered After Visit Summary as well as reviewed in the room.

## 2024-06-11 NOTE — PROGRESS NOTES
[facility]       Susanna Johnson APRN  [address]  [place] [address]    Thank you for asking me to see Matthew Madrid for congestive heart failure.    HPI:     This is a 49 y.o. male with known past medical history of    ASCVD  [facility] September 2021 with distal RCA lesion 95% stenosed, proximal RCA lesion 60% stenosed, mid RCA lesion 70% stenosis with successful PCI to distal RCA prior to bifurcation with Xience drug-eluting stent placed and recommendation for dual antiplatelet therapy for 1 year  [facility] January 2024 with successful IFR guided PTCA and stent placement of RCA with DAPT to continue.  First marginal lesion was noted to be 30% stenosed: Proximal RCA 1 lesion 60% stenosed.  Proximal RCA to lesion 60% stenosed.  Mid RCA lesion 70% stenosis.  CKD stage III  Chronic HFpEF  Mild pulmonary hypertension  Insulin dependent diabetes type 2  GERD  Anxiety/depression  Morbid obesity  History of hypertension secondary to GI losses from chronic diarrhea since prior cholecystectomy  Hyperlipidemia  ALIA on CPAP      Matthew Madrid presents for today for HFpEF.  The patient is typically seen by Susanna Johnson APRN.  Patient's primary cardiologist is Bhupinder Graham.     Last known EF 51-55% (January 2024).   Last known hospitalization and/or ED visit:   January 2024 due to [facility] with known CAD.  Nephrology followed during this time due to CKD.  RCA stent placed.      06/11/24 visit data/details regarding:   Dyspnea: Yes  Lower extremity swelling: Yes, interval increase  Abdominal swelling: yes, interval increase  Home weight: Weight monitoring booklet provided during initial visit; has noted recent weight gain.  Weight has fluctuated from the 250s to the 260s (both up and down).  Currently weighing 262 in office today.  Home BP: BP monitoring booklet provided during initial visit; systolic BP has been notable in the 120s to 150s.  He does notice a correlation that when  his weight is up, BP is improved and vice versa.  Home heart rate: HR monitoring booklet provided during initial visit; heart rate appropriate today  Daily activities of living:   Pillows/lying flat: Bed with head elevation  HF zone: Yellow      Specialists:   Cardiology: Bhupinder Byrd      Review of Systems - ROS interval changes in weight, shortness of breath, increased lower extremity edema, brain fog.       All other systems were reviewed and were negative.    Patient Active Problem List   Diagnosis    NSTEMI, initial episode of care    NSTEMI (non-ST elevated myocardial infarction)    ASCVD (arteriosclerotic cardiovascular disease)    Essential hypertension    Dyslipidemia    DM (diabetes mellitus), type 2 with complications    SOB (shortness of breath)    Severe obesity (BMI 35.0-39.9) with comorbidity    Diarrhea    Abdominal pain    Dysphagia    Acute renal failure, unspecified acute renal failure type    Chronic heart failure with preserved ejection fraction (HFpEF)    Chest pain in adult    Acute on chronic heart failure with preserved ejection fraction (HFpEF)    CHF (congestive heart failure), NYHA class II, acute on chronic, combined    Deformity of metatarsal    Diabetic peripheral neuropathy associated with type 2 diabetes mellitus    Epidermoid cyst    Foot pain    Hammer toe    Hereditary peripheral neuropathy    Low back pain    Lumbosacral radiculopathy    Lumbar spondylosis    Muscle pain    Onychomycosis of toenail    Pain of right hip joint    Peripheral vascular disease    Stasis dermatitis with venous ulcer of lower extremity due to chronic peripheral venous hypertension    Tinea pedis    Torticollis       family history includes Heart attack in his paternal uncle; Hyperlipidemia in his father and mother.     reports that he quit smoking about 24 years ago. His smoking use included cigarettes. He has been exposed to tobacco smoke. He has never used smokeless tobacco. He reports  current alcohol use of about 6.0 standard drinks of alcohol per week. He reports that he does not use drugs.    Allergies   Allergen Reactions    Tuberculin Tests Rash         Current Outpatient Medications:     albuterol (PROVENTIL) (2.5 MG/3ML) 0.083% nebulizer solution, Take 2.5 mg by nebulization Every 6 (Six) Hours As Needed for Shortness of Air., Disp: 360 mL, Rfl: 2    allopurinol (ZYLOPRIM) 300 MG tablet, Take 1 tablet by mouth Daily., Disp: , Rfl:     ALPRAZolam (XANAX) 0.5 MG tablet, Take 1 tablet by mouth 3 (Three) Times a Day As Needed for Anxiety., Disp: , Rfl:     aspirin 81 MG EC tablet, Take 1 tablet by mouth Daily., Disp: 90 tablet, Rfl: 3    atorvastatin (LIPITOR) 40 MG tablet, Take 1 tablet by mouth Daily., Disp: , Rfl:     bumetanide (BUMEX) 1 MG tablet, Take 2 tablets by mouth 2 (Two) Times a Day for 90 days. 2mg daily through 05/03/24 then back to 1mg, Disp: 120 tablet, Rfl: 2    carvedilol (COREG) 25 MG tablet, Take 1 tablet by mouth twice daily, Disp: 90 tablet, Rfl: 0    ferrous sulfate 325 (65 FE) MG tablet, Take 1 tablet by mouth 3 (Three) Times a Day With Meals. Taking BID, Disp: , Rfl:     FLUoxetine (PROzac) 40 MG capsule, Take 1 capsule by mouth Daily., Disp: , Rfl:     gabapentin (NEURONTIN) 800 MG tablet, Take 1 tablet by mouth 3 (Three) Times a Day., Disp: , Rfl:     HumuLIN 70/30 KwikPen (70-30) 100 UNIT/ML suspension pen-injector, INJECT 36 UNITS SUB-Q IN THE MORNING AND AT 4PM, Disp: , Rfl:     hydrALAZINE (APRESOLINE) 50 MG tablet, TAKE 1 TABLET BY MOUTH EVERY 8 HOURS, Disp: 90 tablet, Rfl: 0    Insulin Lispro, 0.5 Unit Dial, (HUMALOG KWIKPEN ROSALBA) 100 UNIT/ML solution pen-injector, Inject 60 Units under the skin into the appropriate area as directed As Needed. Max of 60 units daily- use sliding scale PRN, Disp: , Rfl:     isosorbide mononitrate (IMDUR) 120 MG 24 hr tablet, Take 1 tablet by mouth Daily., Disp: , Rfl:     levocetirizine (XYZAL) 5 MG tablet, Take 1 tablet by  mouth Every Evening., Disp: , Rfl:     nitroglycerin (NITROSTAT) 0.4 MG SL tablet, Place 1 tablet under the tongue Every 5 (Five) Minutes As Needed for Chest Pain (Only if SBP Greater Than 100). Take no more than 3 doses in 15 minutes. If no relief, seek medical attention., Disp: 25 tablet, Rfl: 0    OneTouch Ultra test strip, , Disp: , Rfl:     promethazine (PHENERGAN) 12.5 MG tablet, Take 1 tablet by mouth 2 (Two) Times a Day As Needed for Nausea or Vomiting., Disp: , Rfl:     ranolazine (RANEXA) 1000 MG 12 hr tablet, TAKE 1  BY MOUTH EVERY 12 HOURS, Disp: 60 tablet, Rfl: 0    Semaglutide, 1 MG/DOSE, (Ozempic, 1 MG/DOSE,) 2 MG/1.5ML solution pen-injector, Inject  under the skin into the appropriate area as directed 1 (One) Time Per Week., Disp: , Rfl:     ticagrelor (BRILINTA) 90 MG tablet tablet, Take 1 tablet by mouth 2 (Two) Times a Day., Disp: 180 tablet, Rfl: 3    traZODone (DESYREL) 100 MG tablet, Take 1 tablet by mouth At Night As Needed for Sleep., Disp: , Rfl:     vitamin B-12 (CYANOCOBALAMIN) 1000 MCG tablet, Take 1 tablet by mouth Daily., Disp: , Rfl:     vitamin D (ERGOCALCIFEROL) 1.25 MG (76434 UT) capsule capsule, Take 1 capsule by mouth 1 (One) Time Per Week., Disp: 5 capsule, Rfl: 3    amLODIPine (NORVASC) 5 MG tablet, , Disp: , Rfl:     bumetanide (Bumex) 1 MG tablet, Take 1 tablet by mouth Every Other Day. Take in addition to current bumex dose for 4 doses, Disp: 4 tablet, Rfl: 0    Magnesium 400 MG tablet, Take 1 tablet by mouth 2 (Two) Times a Day., Disp: 60 tablet, Rfl: 0    tamsulosin (FLOMAX) 0.4 MG capsule 24 hr capsule, Take 1 capsule by mouth once daily, Disp: 30 capsule, Rfl: 0        Physical Exam:  I have reviewed the patient's current vital signs as documented in the patient's EMR.   Vitals:    06/05/24 0953   BP: 112/58   Pulse: 66   SpO2: 95%     Body mass index is 42.32 kg/m².       06/05/24  0953   Weight: 119 kg (262 lb 3.2 oz)      Physical Exam  Vitals reviewed.    Constitutional:       General: He is not in acute distress.     Appearance: He is not diaphoretic.   HENT:      Head: Normocephalic and atraumatic.   Cardiovascular:      Rate and Rhythm: Normal rate and regular rhythm.   Pulmonary:      Effort: Pulmonary effort is normal.      Breath sounds: No wheezing, rhonchi or rales.   Musculoskeletal:      Right lower leg: Edema present.      Left lower leg: Edema present.   Neurological:      Mental Status: He is alert and oriented to person, place, and time.   Psychiatric:         Behavior: Behavior normal.           DATA REVIEWED:     EKG. I personally reviewed the EKG.      ---------------------------------------------------  TTE/GAYLE:  Results for orders placed during the hospital encounter of 01/09/24    Adult Transthoracic Echo Complete W/ Cont if Necessary Per Protocol    Interpretation Summary    Left ventricular ejection fraction appears to be 51 - 55%.    Left ventricular diastolic function is consistent with (grade II w/high LAP) pseudonormalization.    There is no evidence of pericardial effusion      LAST HEART CATH/IF AVAILABLE:     Results for orders placed during the hospital encounter of 01/09/24    Cardiac Catheterization/Vascular Study    Conclusion    1st Mrg lesion is 30% stenosed.    Prox RCA-1 lesion is 60% stenosed.    Prox RCA-2 lesion is 60% stenosed.    Mid RCA lesion is 70% stenosed.    1.  Successful IFR guided PTCA and stent placement of the right coronary artery.  Dual antiplatelet therapy to continue.  Medical management for coronary artery disease will be advised.      -----------------------------------------------------  CXR/Imaging:   IMPRESSION:    Unremarkable exam. No acute cardiopulmonary findings identified.  This report was finalized on 9/7/2023 4:18 PM by Dr. Ron Wharton MD.    I personally reviewed the CXR from September 2023    -----------------------------------------------------  CT:   XR Sacrum & Coccyx    Result Date:  6/7/2024  1. No acute fracture or traumatic malalignment.  This report was finalized on 6/7/2024 12:16 AM by Keith Agudelo MD.      XR Spine Lumbar Complete 4+VW    Result Date: 6/7/2024  1. No acute fracture or traumatic listhesis.  This report was finalized on 6/7/2024 12:16 AM by Keith Agudelo MD.     I personally reviewed the images of the CT scan.  Agree with the interpretation.      --------------------------------------------------------------------------------------------    Laboratory evaluations:    Lab Results   Component Value Date    GLUCOSE 228 (H) 06/05/2024    BUN 46 (H) 06/05/2024    CREATININE 2.05 (H) 06/05/2024    EGFRIFNONA 68 12/29/2021    BCR 22.4 06/05/2024    K 4.0 06/05/2024    CO2 26.6 06/05/2024    CALCIUM 9.0 06/05/2024    PROTENTOTREF 6.0 09/01/2023    ALBUMIN 3.8 05/13/2024    LABIL2 1.1 09/01/2023    AST 19 05/13/2024    ALT 25 05/13/2024     Lab Results   Component Value Date    WBC 4.76 02/06/2024    HGB 10.1 (L) 02/06/2024    HCT 31.3 (L) 02/06/2024    .0 (H) 02/06/2024     02/06/2024     Lab Results   Component Value Date    CHOL 95 03/27/2023    TRIG 201 (H) 03/27/2023    HDL 29 (L) 03/27/2023    LDL 34 03/27/2023     Lab Results   Component Value Date    TSH 1.440 01/09/2024     Lab Results   Component Value Date    HGBA1C 6.70 (H) 01/09/2024     Lab Results   Component Value Date    ALT 25 05/13/2024     Lab Results   Component Value Date    HGBA1C 6.70 (H) 01/09/2024    HGBA1C 5.70 (H) 07/04/2023    HGBA1C 9.40 (H) 09/01/2021     Lab Results   Component Value Date    MICROALBUR 26.6 09/01/2023    CREATININE 2.05 (H) 06/05/2024     Lab Results   Component Value Date    IRON 53 (L) 01/11/2024    TIBC 325 01/11/2024    FERRITIN 117.60 01/11/2024     Lab Results   Component Value Date    INR 1.06 09/02/2021    PROTIME 14.2 09/02/2021        Lab Results   Component Value Date    ABSOLUTELUNG 38 (A) 06/05/2024    ABSOLUTELUNG 34 04/29/2024    ABSOLUTELUNG 41 (A)  03/18/2024       PAH RISK ASSESSMENT:      1. Chronic heart failure with preserved ejection fraction (HFpEF)    2. CHF (congestive heart failure), NYHA class II, acute on chronic, combined    3. Shortness of breath          ORDERS PLACED TODAY:  Orders Placed This Encounter   Procedures    ReDs Vest    US Abdomen Complete    XR chest 2 vw    BNP    Basic Metabolic Panel    Magnesium    BNP        Diagnoses and all orders for this visit:    1. Chronic heart failure with preserved ejection fraction (HFpEF) (Primary)  -     BNP; Future  -     ReDs Vest  -     BNP; Standing  -     BNP  -     US Abdomen Complete; Future    2. CHF (congestive heart failure), NYHA class II, acute on chronic, combined    3. Shortness of breath  -     XR chest 2 vw; Future    Other orders  -     Basic Metabolic Panel; Standing  -     Magnesium; Standing  -     Basic Metabolic Panel  -     Magnesium  -     bumetanide (Bumex) 1 MG tablet; Take 1 tablet by mouth Every Other Day. Take in addition to current bumex dose for 4 doses  Dispense: 4 tablet; Refill: 0           MEDS ORDERED TODAY:    New Medications Ordered This Visit   Medications    bumetanide (Bumex) 1 MG tablet     Sig: Take 1 tablet by mouth Every Other Day. Take in addition to current bumex dose for 4 doses     Dispense:  4 tablet     Refill:  0     Add this in addition to current Bumex dosing.  Ordered chest x-ray  Ordered abdominal ultrasound      ------------------------------------------------------------------------------------------------          ASSESSMENT/PLAN:      Diagnosis Plan   1. Chronic heart failure with preserved ejection fraction (HFpEF)  BNP    ReDs Vest    BNP    BNP    US Abdomen Complete      2. CHF (congestive heart failure), NYHA class II, acute on chronic, combined        3. Shortness of breath  XR chest 2 vw          acute on chronic diastolic heart failure. CHF. Etiology:     NYHA stage Stage C: Structural heart disease is present AND symptoms have  occurredFC-Class III: Marked limitation of physical activity. Comfortable at rest. Less than ordinary activity causes fatigue, palpitation, or dyspnea.     Today, Patient is currently volume overloaded.and with  Moderate perfusion. The patient's hemodynamics are currently acceptable. HR is: normal and is at goal. BP/MAP was reviewed and there isroom for medication up-titration.  However, this was complicated by need for diuresis today to treat worsened swelling.        CHF GOAL DIRECTED MEDICAL THERAPY FOR PATIENT ADDRESSED/ADJUSTED:     GDMT: HFpEF    Drug Class   Drug   Dose Last Dose Adjustment Notes   ACEi/ARB/ARNI       Beta Blocker Carvedilol 25 mg BID     MRA       SGLT2i    N/A   Secondaries if applicable:  Bumex 2 mg, 2 tablets BID      Isosorbide mononitrate 120 mg daily      Hydralazine 50 mg TID      Ranexa 1 g BID           -CHF Specific BB:   Continue Carvedilol 25 mg BID      -ACE/ARB/ARNi/alternative  Continue hydralazine 50 mg TID  Continue Imdur 120 mg daily  Will avoid adding ACE/ARB/combo in the setting of current renal function      -MRA:   The patient is FC-NYHA Class III and MRA is indicated. However, cannot add at this time due to underlying CKD.   May consider soon or brief addition if temporary BP exchange is not effective.      -SGLT2 inhibitor therapy:   A BMP at initiation to verify GFR >30 was recommended, as was interval GFR surveillance.  May consider in the near future, but will hold off at this time due to renal function.      -Diuretic regimen:   Currently on Bumex 2 mg, 2 tablets twice daily (8 mg total per day)  Due to symptomatic complaints of weight fluctuation and worsened swelling/tightness of the lower extremities bilaterally, will try brief course of Bumex 1 mg every other day for 4 doses  Do not take if BP is lower than 100/60.  Creatinine/renal function remains impaired but stable.  BMP, Mag, & ProBNP reviewed with patient.      -Fluid restriction/Sodium restriction:    Requested 2000 ml restriction  Patient has been asked to weigh daily and was provided with a printed diuretic strategy.  1,500 mg Na restriction was discussed.    Ordered chest x-ray.  Ordered abdominal ultrasound    -Acute vs. Chronic underlying conditions other than HF addressed during visit:   Hypertension:   Continue Hydralazine   Continue Imdur  (Previously on norvasc but discontinued back in February 2024 due to leg swelling)        ------------------------------------------    -Sleep/Apnea:   On CPAP at nighttime.     --------------------------------------------------------------------------------        This document has been electronically signed by Maya Owens PA-C  June 11, 2024 08:06 EDT      Dictated Utilizing Dragon Dictation: Part of this note may be an electronic transcription/translation of spoken language to printed text using the Dragon Dictation System.    Follow-up appointment and medication changes provided in hand delivered After Visit Summary as well as reviewed in the room.

## 2024-06-19 ENCOUNTER — HOSPITAL ENCOUNTER (OUTPATIENT)
Dept: CARDIOLOGY | Facility: HOSPITAL | Age: 49
Discharge: HOME | End: 2024-06-19
Admitting: PHYSICIAN ASSISTANT
Payer: MEDICARE

## 2024-06-19 VITALS
SYSTOLIC BLOOD PRESSURE: 130 MMHG | BODY MASS INDEX: 41.62 KG/M2 | OXYGEN SATURATION: 95 % | HEART RATE: 64 BPM | WEIGHT: 259 LBS | DIASTOLIC BLOOD PRESSURE: 67 MMHG | HEIGHT: 66 IN

## 2024-06-19 DIAGNOSIS — E83.42 HYPOMAGNESEMIA: ICD-10-CM

## 2024-06-19 DIAGNOSIS — E11.8 DM (DIABETES MELLITUS), TYPE 2 WITH COMPLICATIONS: Chronic | ICD-10-CM

## 2024-06-19 DIAGNOSIS — G47.33 OSA (OBSTRUCTIVE SLEEP APNEA): ICD-10-CM

## 2024-06-19 DIAGNOSIS — I50.32 CHRONIC HEART FAILURE WITH PRESERVED EJECTION FRACTION (HFPEF): Primary | ICD-10-CM

## 2024-06-19 DIAGNOSIS — I10 ESSENTIAL HYPERTENSION: Chronic | ICD-10-CM

## 2024-06-19 DIAGNOSIS — D50.9 IRON DEFICIENCY ANEMIA, UNSPECIFIED IRON DEFICIENCY ANEMIA TYPE: ICD-10-CM

## 2024-06-19 LAB
ABSOLUTE LUNG FLUID CONTENT: 36 % (ref 20–35)
ANION GAP SERPL CALCULATED.3IONS-SCNC: 12.5 MMOL/L (ref 5–15)
BUN SERPL-MCNC: 72 MG/DL (ref 6–20)
BUN/CREAT SERPL: 27.9 (ref 7–25)
CALCIUM SPEC-SCNC: 9.7 MG/DL (ref 8.6–10.5)
CHLORIDE SERPL-SCNC: 94 MMOL/L (ref 98–107)
CO2 SERPL-SCNC: 26.5 MMOL/L (ref 22–29)
CREAT SERPL-MCNC: 2.58 MG/DL (ref 0.76–1.27)
EGFRCR SERPLBLD CKD-EPI 2021: 29.6 ML/MIN/1.73
GLUCOSE SERPL-MCNC: 331 MG/DL (ref 65–99)
MAGNESIUM SERPL-MCNC: 1.7 MG/DL (ref 1.6–2.6)
NT-PROBNP SERPL-MCNC: 227.4 PG/ML (ref 0–450)
POTASSIUM SERPL-SCNC: 4.2 MMOL/L (ref 3.5–5.2)
SODIUM SERPL-SCNC: 133 MMOL/L (ref 136–145)

## 2024-06-19 PROCEDURE — 83880 ASSAY OF NATRIURETIC PEPTIDE: CPT

## 2024-06-19 PROCEDURE — 83735 ASSAY OF MAGNESIUM: CPT | Performed by: PHYSICIAN ASSISTANT

## 2024-06-19 PROCEDURE — 36415 COLL VENOUS BLD VENIPUNCTURE: CPT | Performed by: PHYSICIAN ASSISTANT

## 2024-06-19 PROCEDURE — 94726 PLETHYSMOGRAPHY LUNG VOLUMES: CPT | Performed by: PHYSICIAN ASSISTANT

## 2024-06-19 PROCEDURE — 99214 OFFICE O/P EST MOD 30 MIN: CPT | Performed by: PHYSICIAN ASSISTANT

## 2024-06-19 PROCEDURE — 80048 BASIC METABOLIC PNL TOTAL CA: CPT | Performed by: PHYSICIAN ASSISTANT

## 2024-06-19 RX ORDER — BUMETANIDE 1 MG/1
2 TABLET ORAL 2 TIMES DAILY
Qty: 120 TABLET | Refills: 2 | Status: SHIPPED | OUTPATIENT
Start: 2024-06-19 | End: 2024-09-17

## 2024-06-19 NOTE — PROGRESS NOTES
Heart Failure Clinic    Date: 06/19/24     Vitals:    06/19/24 0940   BP: 130/67   Pulse: 64   SpO2: 95%    Weight 259    Method of arrival: Ambulatory    Weighing self daily: Yes    Monitoring Heart Failure Zones: Yes    Today's HF Zone: Yellow     Taking medications as prescribed: Yes    Edema No    Shortness of Air: Yes    Number of pillows used at night:Recliner adjustable bed    Educational Materials given:  avs                                                                         ReDS Value: 36  36-41 Possible Hypervolemic Status      Dimitrios Maki RN 06/19/24 09:42 EDT

## 2024-06-24 PROBLEM — D50.9 IRON DEFICIENCY ANEMIA: Status: ACTIVE | Noted: 2024-06-24

## 2024-06-24 PROBLEM — E83.42 HYPOMAGNESEMIA: Status: ACTIVE | Noted: 2024-06-24

## 2024-07-01 RX ORDER — RANOLAZINE 1000 MG/1
1 TABLET, EXTENDED RELEASE ORAL EVERY 12 HOURS
Qty: 60 TABLET | Refills: 0 | Status: SHIPPED | OUTPATIENT
Start: 2024-07-01

## 2024-07-04 PROBLEM — G47.33 OSA (OBSTRUCTIVE SLEEP APNEA): Status: ACTIVE | Noted: 2024-07-04

## 2024-07-04 NOTE — PROGRESS NOTES
[facility]       Referring, [place] [address]    Thank you for asking me to see Matthew Madrid for congestive heart failure.    HPI:     This is a 49 y.o. male with known past medical history of:    ASCVD  [facility] September 2021 with distal RCA lesion 95% stenosed, proximal RCA lesion 60% stenosed, mid RCA lesion 70% stenosis with successful PCI to distal RCA prior to bifurcation with Xience drug-eluting stent placed and recommendation for dual antiplatelet therapy for 1 year  [facility] January 2024 with successful IFR guided PTCA and stent placement of RCA with DAPT to continue.  First marginal lesion was noted to be 30% stenosed: Proximal RCA 1 lesion 60% stenosed.  Proximal RCA to lesion 60% stenosed.  Mid RCA lesion 70% stenosis.  CKD stage III  Chronic HFpEF  Mild pulmonary hypertension  Insulin dependent diabetes type 2  GERD  Anxiety/depression  Morbid obesity  History of hypertension secondary to GI losses from chronic diarrhea since prior cholecystectomy  Hyperlipidemia  ALIA on CPAP    Matthew Madrid presents for today for follow-up.  The patient is typically seen by Susanna Johnson APRN.  Patient's primary cardiologist is Santos DAWN.     Last known EF 51 to 55% January 2024.   Last known hospitalization and/or ED visit:   January 2024 due to [facility] with known CAD.  Nephrology followed during this time due to CKD.  RCA stent placed.      06/19/2024 visit data/details regarding:   Dyspnea: Present, baseline  Lower extremity swelling: Significantly improved from last visit, mildly persistent  Abdominal swelling: None  Home weight: Weight monitoring booklet provided during initial visit; weight reduced from last visit  Home BP: BP monitoring booklet provided during initial visit; blood pressure remained stable.  Home heart rate: HR monitoring booklet provided during initial visit; rate appropriate.  Daily activities of living: Still somewhat  limited due to shortness of breath, but noted some minimal improvement since the recent diuretic  Pillows/lying flat: Hospital bed  HF zone: Yellow      Specialists:   Cardiology: General cardiology Bhupinder Graham  Pulmonology [facility]        Review of Systems - ROS swelling present but improved.  Shortness of breath only on exertion, stable.  Abdominal swelling improved.   All other systems were reviewed and were negative.    Patient Active Problem List   Diagnosis    NSTEMI, initial episode of care    NSTEMI (non-ST elevated myocardial infarction)    ASCVD (arteriosclerotic cardiovascular disease)    Essential hypertension    Dyslipidemia    DM (diabetes mellitus), type 2 with complications    SOB (shortness of breath)    Severe obesity (BMI 35.0-39.9) with comorbidity    Diarrhea    Abdominal pain    Dysphagia    Acute renal failure, unspecified acute renal failure type    Chronic heart failure with preserved ejection fraction (HFpEF)    Chest pain in adult    Acute on chronic heart failure with preserved ejection fraction (HFpEF)    CHF (congestive heart failure), NYHA class II, acute on chronic, combined    Deformity of metatarsal    Diabetic peripheral neuropathy associated with type 2 diabetes mellitus    Epidermoid cyst    Foot pain    Hammer toe    Hereditary peripheral neuropathy    Low back pain    Lumbosacral radiculopathy    Lumbar spondylosis    Muscle pain    Onychomycosis of toenail    Pain of right hip joint    Peripheral vascular disease    Stasis dermatitis with venous ulcer of lower extremity due to chronic peripheral venous hypertension    Tinea pedis    Torticollis    Iron deficiency anemia    Hypomagnesemia    ALIA (obstructive sleep apnea)       family history includes Heart attack in his paternal uncle; Hyperlipidemia in his father and mother.     reports that he quit smoking about 24 years ago. His smoking use included cigarettes. He has been exposed to tobacco smoke. He has never used smokeless  tobacco. He reports current alcohol use of about 6.0 standard drinks of alcohol per week. He reports that he does not use drugs.    Allergies   Allergen Reactions    Tuberculin Tests Rash         Current Outpatient Medications:     albuterol (PROVENTIL) (2.5 MG/3ML) 0.083% nebulizer solution, Take 2.5 mg by nebulization Every 6 (Six) Hours As Needed for Shortness of Air., Disp: 360 mL, Rfl: 2    allopurinol (ZYLOPRIM) 300 MG tablet, Take 1 tablet by mouth Daily., Disp: , Rfl:     ALPRAZolam (XANAX) 0.5 MG tablet, Take 1 tablet by mouth 3 (Three) Times a Day As Needed for Anxiety., Disp: , Rfl:     aspirin 81 MG EC tablet, Take 1 tablet by mouth Daily., Disp: 90 tablet, Rfl: 3    atorvastatin (LIPITOR) 40 MG tablet, Take 1 tablet by mouth Daily., Disp: , Rfl:     bumetanide (BUMEX) 1 MG tablet, Take 2 tablets by mouth 2 (Two) Times a Day for 90 days. 2mg daily through 05/03/24 then back to 1mg, Disp: 120 tablet, Rfl: 2    carvedilol (COREG) 25 MG tablet, Take 1 tablet by mouth twice daily, Disp: 90 tablet, Rfl: 0    ferrous sulfate 325 (65 FE) MG tablet, Take 1 tablet by mouth 3 (Three) Times a Day With Meals. Taking BID, Disp: , Rfl:     FLUoxetine (PROzac) 40 MG capsule, Take 1 capsule by mouth Daily., Disp: , Rfl:     gabapentin (NEURONTIN) 800 MG tablet, Take 1 tablet by mouth 3 (Three) Times a Day., Disp: , Rfl:     HumuLIN 70/30 KwikPen (70-30) 100 UNIT/ML suspension pen-injector, INJECT 36 UNITS SUB-Q IN THE MORNING AND AT 4PM, Disp: , Rfl:     hydrALAZINE (APRESOLINE) 50 MG tablet, TAKE 1 TABLET BY MOUTH EVERY 8 HOURS, Disp: 90 tablet, Rfl: 0    Insulin Lispro, 0.5 Unit Dial, (HUMALOG KWIKPEN ROSALBA) 100 UNIT/ML solution pen-injector, Inject 60 Units under the skin into the appropriate area as directed As Needed. Max of 60 units daily- use sliding scale PRN, Disp: , Rfl:     isosorbide mononitrate (IMDUR) 120 MG 24 hr tablet, Take 1 tablet by mouth Daily., Disp: , Rfl:     levocetirizine (XYZAL) 5 MG tablet,  Take 1 tablet by mouth Every Evening., Disp: , Rfl:     Magnesium 400 MG tablet, Take 1 tablet by mouth 2 (Two) Times a Day., Disp: 60 tablet, Rfl: 0    nitroglycerin (NITROSTAT) 0.4 MG SL tablet, Place 1 tablet under the tongue Every 5 (Five) Minutes As Needed for Chest Pain (Only if SBP Greater Than 100). Take no more than 3 doses in 15 minutes. If no relief, seek medical attention., Disp: 25 tablet, Rfl: 0    OneTouch Ultra test strip, , Disp: , Rfl:     promethazine (PHENERGAN) 12.5 MG tablet, Take 1 tablet by mouth 2 (Two) Times a Day As Needed for Nausea or Vomiting., Disp: , Rfl:     Semaglutide, 1 MG/DOSE, (Ozempic, 1 MG/DOSE,) 2 MG/1.5ML solution pen-injector, Inject  under the skin into the appropriate area as directed 1 (One) Time Per Week., Disp: , Rfl:     tamsulosin (FLOMAX) 0.4 MG capsule 24 hr capsule, Take 1 capsule by mouth once daily, Disp: 30 capsule, Rfl: 0    ticagrelor (BRILINTA) 90 MG tablet tablet, Take 1 tablet by mouth 2 (Two) Times a Day., Disp: 180 tablet, Rfl: 3    traZODone (DESYREL) 100 MG tablet, Take 1 tablet by mouth At Night As Needed for Sleep., Disp: , Rfl:     vitamin B-12 (CYANOCOBALAMIN) 1000 MCG tablet, Take 1 tablet by mouth Daily., Disp: , Rfl:     vitamin D (ERGOCALCIFEROL) 1.25 MG (84570 UT) capsule capsule, Take 1 capsule by mouth 1 (One) Time Per Week., Disp: 5 capsule, Rfl: 3    ranolazine (RANEXA) 1000 MG 12 hr tablet, TAKE 1 TABLET BY MOUTH EVERY 12 HOURS, Disp: 60 tablet, Rfl: 0      Physical Exam:  I have reviewed the patient's current vital signs as documented in the patient's EMR.   Vitals:    06/19/24 0940   BP: 130/67   Pulse: 64   SpO2: 95%     Body mass index is 41.8 kg/m².       06/19/24  0940   Weight: 117 kg (259 lb)      Physical Exam  Vitals reviewed.   Constitutional:       General: He is not in acute distress.     Appearance: He is not diaphoretic.   HENT:      Head: Normocephalic and atraumatic.   Cardiovascular:      Rate and Rhythm: Normal rate and  regular rhythm.   Pulmonary:      Effort: Pulmonary effort is normal.      Breath sounds: No wheezing, rhonchi or rales.   Musculoskeletal:         General: Swelling (1+, improved.  Less redness.) present.   Neurological:      Mental Status: He is alert and oriented to person, place, and time.   Psychiatric:         Behavior: Behavior normal.            DATA REVIEWED:     EKG. I personally reviewed the EKG.             ---------------------------------------------------  TTE/GAYLE:  Results for orders placed during the hospital encounter of 01/09/24    Adult Transthoracic Echo Complete W/ Cont if Necessary Per Protocol    Interpretation Summary    Left ventricular ejection fraction appears to be 51 - 55%.    Left ventricular diastolic function is consistent with (grade II w/high LAP) pseudonormalization.    There is no evidence of pericardial effusion        LAST HEART CATH/IF AVAILABLE:     Results for orders placed during the hospital encounter of 01/09/24    Cardiac Catheterization/Vascular Study    Conclusion    1st Mrg lesion is 30% stenosed.    Prox RCA-1 lesion is 60% stenosed.    Prox RCA-2 lesion is 60% stenosed.    Mid RCA lesion is 70% stenosed.    1.  Successful IFR guided PTCA and stent placement of the right coronary artery.  Dual antiplatelet therapy to continue.  Medical management for coronary artery disease will be advised.      -----------------------------------------------------  CXR/Imaging/CT:   US Abdomen Complete    Result Date: 6/11/2024    Fatty infiltration of the liver.   This report was finalized on 6/11/2024 2:38 PM by Dr. Ron Wharton MD.      XR Sacrum & Coccyx    Result Date: 6/7/2024  1. No acute fracture or traumatic malalignment.  This report was finalized on 6/7/2024 12:16 AM by Keith Agudelo MD.      XR Spine Lumbar Complete 4+VW    Result Date: 6/7/2024  1. No acute fracture or traumatic listhesis.  This report was finalized on 6/7/2024 12:16 AM by Keith Agudelo MD.     I  personally reviewed the images of the CT scan.  My personal interpretation is below.        --------------------------------------------------------------------------------------------------    Laboratory evaluations:    Lab Results   Component Value Date    GLUCOSE 331 (H) 06/19/2024    BUN 72 (H) 06/19/2024    CREATININE 2.58 (H) 06/19/2024    EGFRIFNONA 68 12/29/2021    BCR 27.9 (H) 06/19/2024    K 4.2 06/19/2024    CO2 26.5 06/19/2024    CALCIUM 9.7 06/19/2024    PROTENTOTREF 6.0 09/01/2023    ALBUMIN 3.8 05/13/2024    LABIL2 1.1 09/01/2023    AST 19 05/13/2024    ALT 25 05/13/2024     Lab Results   Component Value Date    WBC 4.76 02/06/2024    HGB 10.1 (L) 02/06/2024    HCT 31.3 (L) 02/06/2024    .0 (H) 02/06/2024     02/06/2024     Lab Results   Component Value Date    CHOL 95 03/27/2023    TRIG 201 (H) 03/27/2023    HDL 29 (L) 03/27/2023    LDL 34 03/27/2023     Lab Results   Component Value Date    TSH 1.440 01/09/2024     Lab Results   Component Value Date    HGBA1C 6.70 (H) 01/09/2024     Lab Results   Component Value Date    ALT 25 05/13/2024     Lab Results   Component Value Date    HGBA1C 6.70 (H) 01/09/2024    HGBA1C 5.70 (H) 07/04/2023    HGBA1C 9.40 (H) 09/01/2021     Lab Results   Component Value Date    MICROALBUR 26.6 09/01/2023    CREATININE 2.58 (H) 06/19/2024     Lab Results   Component Value Date    IRON 53 (L) 01/11/2024    TIBC 325 01/11/2024    FERRITIN 117.60 01/11/2024     Lab Results   Component Value Date    INR 1.06 09/02/2021    PROTIME 14.2 09/02/2021        Lab Results   Component Value Date    ABSOLUTELUNG 36 (A) 06/19/2024    ABSOLUTELUNG 38 (A) 06/11/2024    ABSOLUTELUNG 38 (A) 06/05/2024       PAH RISK ASSESSMENT:      1. Chronic heart failure with preserved ejection fraction (HFpEF)    2. DM (diabetes mellitus), type 2 with complications    3. Iron deficiency anemia, unspecified iron deficiency anemia type    4. Essential hypertension    5. ALIA (obstructive sleep  apnea)    6. Hypomagnesemia          ORDERS PLACED TODAY:  Orders Placed This Encounter   Procedures    ReDs Vest    BNP    Basic Metabolic Panel    Magnesium    Ambulatory Referral to Endocrinology        Diagnoses and all orders for this visit:    1. Chronic heart failure with preserved ejection fraction (HFpEF) (Primary)  -     ReDs Vest  -     BNP  -     Basic Metabolic Panel; Standing  -     Magnesium; Standing  -     Basic Metabolic Panel  -     Magnesium    2. DM (diabetes mellitus), type 2 with complications  -     Ambulatory Referral to Endocrinology    3. Iron deficiency anemia, unspecified iron deficiency anemia type    4. Essential hypertension    5. ALIA (obstructive sleep apnea)    6. Hypomagnesemia    Other orders  -     bumetanide (BUMEX) 1 MG tablet; Take 2 tablets by mouth 2 (Two) Times a Day for 90 days. 2mg daily through 05/03/24 then back to 1mg  Dispense: 120 tablet; Refill: 2           MEDS ORDERED TODAY:    New Medications Ordered This Visit   Medications    bumetanide (BUMEX) 1 MG tablet     Sig: Take 2 tablets by mouth 2 (Two) Times a Day for 90 days. 2mg daily through 05/03/24 then back to 1mg     Dispense:  120 tablet     Refill:  2        ---------------------------------------------------------------------------------------------------------------------------          ASSESSMENT/PLAN:      Diagnosis Plan   1. Chronic heart failure with preserved ejection fraction (HFpEF)  ReDs Vest    BNP    Basic Metabolic Panel    Magnesium    Basic Metabolic Panel    Magnesium      2. DM (diabetes mellitus), type 2 with complications  Ambulatory Referral to Endocrinology      3. Iron deficiency anemia, unspecified iron deficiency anemia type        4. Essential hypertension        5. ALIA (obstructive sleep apnea)        6. Hypomagnesemia            not acutely decompensated chronic diastolic heart failure. .  LVEF 51-55%.     NYHA stage Stage C: Structural heart disease is present AND symptoms have  occurredFC-Class III: Marked limitation of physical activity. Comfortable at rest. Less than ordinary activity causes fatigue, palpitation, or dyspnea.     Patient appears euvolemic.and with  Excellent perfusion. The patient's hemodynamics are currently acceptable. HR is: normal and is at goal. BP/MAP was reviewed and there isroom for medication up-titration.  Clinical trajectory was assessed and hasimproved.       CHF GOAL DIRECTED MEDICAL THERAPY FOR PATIENT ADDRESSED/ADJUSTED:     GDMT: HFpEF    Drug Class   Drug   Dose Last Dose Adjustment Notes   ACEi/ARB/ARNI Hydralazine 50 mg 3 times daily     Beta Blocker Carvedilol 25 mg twice daily     MRA       SGLT2i    N/A   Secondaries if applicable:  Bumex       Imdur 120 mg daily           -CHF Specific BB:   Continue carvedilol 25 mg BID         -ACE/ARB/ARNi:   Avoiding ACE/ARB currently due to renal function  Continue hydralazine 50 mg TID  Continue Imdur 120 mg daily 120 mg daily        -MRA:   The patient is FC-NYHA Class III and MRA is indicated.   However, avoiding chronic due to renal function.   Continues on Bumex for symptomatic treatment.   Recent short course of metolazone well-tolerated, swelling reduced.        -SGLT2 inhibitor therapy:   May consider in the near future -limited currently due to renal function        -Diuretic regimen:   ReDS Vest reading for. 6/19/2024 is 36, previously 38 on 6/11 visit  Continue with Bumex 1 mg - 2 tablets BID  Symptoms improved with additional short course of metolazone  BMP, Mag, & ProBNP reviewed       -Fluid restriction/Sodium restriction:   Recommend 2000 ml fluid restriction  Recommend daily vitals log of BP, heart rate, weight, and CHF zone score.   Recommend 1,500 mg Na restriction      Abdominal US - non-significant.   Swelling improved with short course metolazone         -Acute vs. Chronic underlying conditions other than HF addressed during visit:   Hypertension  Continue the above-mentioned  therapies  Still on Norvasc, but discontinued in February 2024 due to leg swelling    Hypomagnesemia  Continue magnesium replacement    Iron deficiency  Continues on replacement    ALIA  CPAP at nighttime    T2DM   Referral to Endocrinology.   --------------------------------------------------------------------------------          This document has been electronically signed by Maya Owens PA-C      Dictated Utilizing Dragon Dictation: Part of this note may be an electronic transcription/translation of spoken language to printed text using the Dragon Dictation System.    Follow-up appointment and medication changes provided in hand delivered After Visit Summary as well as reviewed in the room.

## 2024-07-05 NOTE — ADDENDUM NOTE
Encounter addended by: Chata Hou [facility] on: 7/5/2024 12:25 PM   Actions taken: Social Care Target section edited

## 2024-07-09 RX ORDER — CALCIUM CARBONATE 300MG(750)
1 TABLET,CHEWABLE ORAL 2 TIMES DAILY
Qty: 60 TABLET | Refills: 1 | Status: SHIPPED | OUTPATIENT
Start: 2024-07-09

## 2024-07-09 RX ORDER — TAMSULOSIN HYDROCHLORIDE 0.4 MG/1
1 CAPSULE ORAL DAILY
Qty: 30 CAPSULE | Refills: 1 | Status: SHIPPED | OUTPATIENT
Start: 2024-07-09

## 2024-07-09 NOTE — TELEPHONE ENCOUNTER
Approved Tamsulosin 0.4 mg daily per previous order.       Filled by TRINIDAD Johnson in May 2024.     I am covering for FINESSE Johnson PA-C who is currently out of office.

## 2024-07-15 ENCOUNTER — HOSPITAL ENCOUNTER (OUTPATIENT)
Dept: CARDIOLOGY | Facility: HOSPITAL | Age: 49
Discharge: HOME | End: 2024-07-15
Admitting: PHYSICIAN ASSISTANT
Payer: MEDICARE

## 2024-07-15 VITALS
OXYGEN SATURATION: 96 % | BODY MASS INDEX: 42.27 KG/M2 | WEIGHT: 263 LBS | DIASTOLIC BLOOD PRESSURE: 59 MMHG | SYSTOLIC BLOOD PRESSURE: 131 MMHG | HEIGHT: 66 IN | HEART RATE: 69 BPM

## 2024-07-15 DIAGNOSIS — N18.9 ACUTE KIDNEY INJURY SUPERIMPOSED ON CKD: ICD-10-CM

## 2024-07-15 DIAGNOSIS — K21.9 GASTROESOPHAGEAL REFLUX DISEASE, UNSPECIFIED WHETHER ESOPHAGITIS PRESENT: ICD-10-CM

## 2024-07-15 DIAGNOSIS — I50.32 CHRONIC HEART FAILURE WITH PRESERVED EJECTION FRACTION (HFPEF): Primary | ICD-10-CM

## 2024-07-15 DIAGNOSIS — I25.10 ASCVD (ARTERIOSCLEROTIC CARDIOVASCULAR DISEASE): Chronic | ICD-10-CM

## 2024-07-15 DIAGNOSIS — N17.9 ACUTE KIDNEY INJURY SUPERIMPOSED ON CKD: ICD-10-CM

## 2024-07-15 LAB
ABSOLUTE LUNG FLUID CONTENT: 36 % (ref 20–35)
ANION GAP SERPL CALCULATED.3IONS-SCNC: 13.6 MMOL/L (ref 5–15)
BUN SERPL-MCNC: 56 MG/DL (ref 6–20)
BUN/CREAT SERPL: 22.7 (ref 7–25)
CALCIUM SPEC-SCNC: 9.1 MG/DL (ref 8.6–10.5)
CHLORIDE SERPL-SCNC: 99 MMOL/L (ref 98–107)
CO2 SERPL-SCNC: 25.4 MMOL/L (ref 22–29)
CREAT SERPL-MCNC: 2.47 MG/DL (ref 0.76–1.27)
EGFRCR SERPLBLD CKD-EPI 2021: 31.2 ML/MIN/1.73
GLUCOSE SERPL-MCNC: 275 MG/DL (ref 65–99)
MAGNESIUM SERPL-MCNC: 1.9 MG/DL (ref 1.6–2.6)
NT-PROBNP SERPL-MCNC: 491.7 PG/ML (ref 0–450)
POTASSIUM SERPL-SCNC: 4.4 MMOL/L (ref 3.5–5.2)
QT INTERVAL: 456 MS
QTC INTERVAL: 478 MS
SODIUM SERPL-SCNC: 138 MMOL/L (ref 136–145)

## 2024-07-15 PROCEDURE — 83735 ASSAY OF MAGNESIUM: CPT | Performed by: PHYSICIAN ASSISTANT

## 2024-07-15 PROCEDURE — 94726 PLETHYSMOGRAPHY LUNG VOLUMES: CPT | Performed by: PHYSICIAN ASSISTANT

## 2024-07-15 PROCEDURE — 36415 COLL VENOUS BLD VENIPUNCTURE: CPT | Performed by: PHYSICIAN ASSISTANT

## 2024-07-15 PROCEDURE — 80048 BASIC METABOLIC PNL TOTAL CA: CPT | Performed by: PHYSICIAN ASSISTANT

## 2024-07-15 PROCEDURE — 83880 ASSAY OF NATRIURETIC PEPTIDE: CPT | Performed by: PHYSICIAN ASSISTANT

## 2024-07-15 PROCEDURE — 93005 ELECTROCARDIOGRAM TRACING: CPT | Performed by: PHYSICIAN ASSISTANT

## 2024-07-15 RX ORDER — FAMOTIDINE 20 MG/1
20 TABLET, FILM COATED ORAL DAILY
Qty: 30 TABLET | Refills: 2 | Status: SHIPPED | OUTPATIENT
Start: 2024-07-15 | End: 2024-10-13

## 2024-07-15 RX ORDER — BUMETANIDE 1 MG/1
2 TABLET ORAL DAILY
Qty: 60 TABLET | Refills: 2 | Status: SHIPPED | OUTPATIENT
Start: 2024-07-15 | End: 2024-10-13

## 2024-07-15 NOTE — PROGRESS NOTES
Heart Failure Clinic    Date: 07/15/24     Vitals:    07/15/24 0916   BP: 131/59   Pulse: 69   SpO2: 96%    Weight 263    Method of arrival: Ambulatory with cane    Weighing self daily: Yes    Monitoring Heart Failure Zones: Yes    Today's HF Zone: Yellow     Taking medications as prescribed: Yes    Edema Yes    Shortness of Air: Yes    Number of pillows used at night:Recliner    Educational Materials given:  AVS                                                                         ReDS Value: 35  25-35 Optimal Value Status      Stacy Downing MA 07/15/24 09:17 EDT

## 2024-07-15 NOTE — PROGRESS NOTES
"[facility]       Referring, Self  [place] [place] [address]    Thank you for asking me to see Matthew Madrid for congestive heart failure.    HPI:     This is a 49 y.o. male with known past medical history of    ASCVD  [facility] September 2021 with distal RCA lesion 95% stenosed, proximal RCA lesion 60% stenosed, mid RCA lesion 70% stenosis with successful PCI to distal RCA prior to bifurcation with Xience drug-eluting stent placed and recommendation for dual antiplatelet therapy for 1 year  [facility] January 2024 with successful IFR guided PTCA and stent placement of RCA with DAPT to continue.  First marginal lesion was noted to be 30% stenosed: Proximal RCA 1 lesion 60% stenosed.  Proximal RCA to lesion 60% stenosed.  Mid RCA lesion 70% stenosis.  CKD stage III  Chronic HFpEF  Mild pulmonary hypertension  Insulin dependent diabetes type 2  GERD  Anxiety/depression  Morbid obesity  History of hypertension secondary to GI losses from chronic diarrhea since prior cholecystectomy  Hyperlipidemia  ALIA on CPAP      Matthew Madrid presents for today for HFpEF.  The patient is typically seen by Susanna Johnson APRN.  Patient's primary cardiologist is Bhupinder Graham.     Last known EF 51-55% (January 2024).   Last known hospitalization and/or ED visit:   January 2024 due to [facility] with known CAD.  Nephrology followed during this time due to CKD.  RCA stent placed.      07/15/24 visit data/details regarding:   Dyspnea: Worsening dyspnea on exertion  Lower extremity swelling: Reports subjective worsening swelling of his legs  Abdominal swelling: Worsening abdominal pain   Home weight: Weight monitoring booklet provided during initial visit; has noted recent weight gain.  Fluctuating weight. Per log review, this seems to fluctuate more on the weekends.  Patient's wife report they did \"cook out\".    Home BP: BP monitoring booklet provided during initial visit; systolic BP has been " notable in the 120s to 150s.  He does notice a correlation that when his weight is up, BP is improved and vice versa.  Home heart rate: HR monitoring booklet provided during initial visit; heart rate appropriate today  Daily activities of living: Performing on his own  Pillows/lying flat: Bed with head elevation  HF zone: Yellow  Patient reports chest pain once a day for the last several days lasting for 5 minutes that resolves on its own.  It is non-radiating and it is not particularly worsened by anything in particular.   He reports he is taking his Brilinta & ASA as prescribed.   He continues to take  Ranexa & Imdur. Last heart cath reviewed;  See assessment & plan.    He reports periodic numbness in his legs bilaterally. He reports his legs are fully functional and he is independently ambulatory but has a periodic numb sensation on his skin.   Encouraged him to f/u with PCP regarding this as he is on gabapentin for neuropathies with known DM type 2.   He reports worsening debility.    He states he has f/u with Nephro on 08/06 as well as his primary cardiology office.    BP has been ranging from 120s-150s; though patient has only been taking hydralazine 1 x daily rather than TID.  Discussed TID dosing.     Specialists:   Cardiology: Dr. Etienne, Bhupinder Graham      Review of Systems - Review of Systems   Constitutional: Negative for decreased appetite and diaphoresis.   HENT:  Negative for congestion, ear pain and hearing loss.    Eyes:  Negative for blurred vision and pain.   Cardiovascular:  Positive for chest pain and dyspnea on exertion.   Respiratory:  Positive for shortness of breath. Negative for cough.    Endocrine: Negative for cold intolerance and polydipsia.   Hematologic/Lymphatic: Negative for adenopathy and bleeding problem.   Skin:  Negative for nail changes.   Musculoskeletal:  Negative for arthritis and falls.   Gastrointestinal:  Negative for bloating and anorexia.   Genitourinary:  Negative for  bladder incontinence and dysuria.   Neurological:  Negative for aphonia and difficulty with concentration.   Psychiatric/Behavioral:  Negative for altered mental status and hallucinations.    Allergic/Immunologic: Negative for environmental allergies and hives.         All other systems were reviewed and were negative.    Patient Active Problem List   Diagnosis    NSTEMI, initial episode of care    NSTEMI (non-ST elevated myocardial infarction)    ASCVD (arteriosclerotic cardiovascular disease)    Essential hypertension    Dyslipidemia    DM (diabetes mellitus), type 2 with complications    SOB (shortness of breath)    Severe obesity (BMI 35.0-39.9) with comorbidity    Diarrhea    Abdominal pain    Dysphagia    Acute renal failure, unspecified acute renal failure type    Chronic heart failure with preserved ejection fraction (HFpEF)    Chest pain in adult    Acute on chronic heart failure with preserved ejection fraction (HFpEF)    CHF (congestive heart failure), NYHA class II, acute on chronic, combined    Deformity of metatarsal    Diabetic peripheral neuropathy associated with type 2 diabetes mellitus    Epidermoid cyst    Foot pain    Hammer toe    Hereditary peripheral neuropathy    Low back pain    Lumbosacral radiculopathy    Lumbar spondylosis    Muscle pain    Onychomycosis of toenail    Pain of right hip joint    Peripheral vascular disease    Stasis dermatitis with venous ulcer of lower extremity due to chronic peripheral venous hypertension    Tinea pedis    Torticollis    Iron deficiency anemia    Hypomagnesemia    ALIA (obstructive sleep apnea)       family history includes Heart attack in his paternal uncle; Hyperlipidemia in his father and mother.     reports that he quit smoking about 24 years ago. His smoking use included cigarettes. He has been exposed to tobacco smoke. He has never used smokeless tobacco. He reports current alcohol use of about 6.0 standard drinks of alcohol per week. He reports  that he does not use drugs.    Allergies   Allergen Reactions    Tuberculin Tests Rash         Current Outpatient Medications:     albuterol (PROVENTIL) (2.5 MG/3ML) 0.083% nebulizer solution, Take 2.5 mg by nebulization Every 6 (Six) Hours As Needed for Shortness of Air., Disp: 360 mL, Rfl: 2    allopurinol (ZYLOPRIM) 300 MG tablet, Take 1 tablet by mouth Daily., Disp: , Rfl:     ALPRAZolam (XANAX) 0.5 MG tablet, Take 1 tablet by mouth 3 (Three) Times a Day As Needed for Anxiety., Disp: , Rfl:     aspirin 81 MG EC tablet, Take 1 tablet by mouth Daily., Disp: 90 tablet, Rfl: 3    atorvastatin (LIPITOR) 40 MG tablet, Take 1 tablet by mouth Daily., Disp: , Rfl:     bumetanide (BUMEX) 1 MG tablet, Take 2 tablets by mouth Daily for 90 days. 2mg daily through 05/03/24 then back to 1mg, Disp: 60 tablet, Rfl: 2    carvedilol (COREG) 25 MG tablet, Take 1 tablet by mouth twice daily, Disp: 90 tablet, Rfl: 0    ferrous sulfate 325 (65 FE) MG tablet, Take 1 tablet by mouth 3 (Three) Times a Day With Meals. Taking BID, Disp: , Rfl:     FLUoxetine (PROzac) 40 MG capsule, Take 1 capsule by mouth Daily., Disp: , Rfl:     gabapentin (NEURONTIN) 800 MG tablet, Take 1 tablet by mouth 3 (Three) Times a Day., Disp: , Rfl:     HumuLIN 70/30 KwikPen (70-30) 100 UNIT/ML suspension pen-injector, INJECT 36 UNITS SUB-Q IN THE MORNING AND AT 4PM, Disp: , Rfl:     hydrALAZINE (APRESOLINE) 50 MG tablet, TAKE 1 TABLET BY MOUTH EVERY 8 HOURS (Patient taking differently: Take 1 tablet by mouth Every 8 (Eight) Hours. Taking once daily), Disp: 90 tablet, Rfl: 0    Insulin Lispro, 0.5 Unit Dial, (HUMALOG KWIKPEN ROSALBA) 100 UNIT/ML solution pen-injector, Inject 60 Units under the skin into the appropriate area as directed As Needed. Max of 60 units daily- use sliding scale PRN, Disp: , Rfl:     isosorbide mononitrate (IMDUR) 120 MG 24 hr tablet, Take 1 tablet by mouth Daily., Disp: , Rfl:     levocetirizine (XYZAL) 5 MG tablet, Take 1 tablet by mouth  Every Evening., Disp: , Rfl:     Magnesium 400 MG tablet, Take 1 tablet by mouth twice daily, Disp: 60 tablet, Rfl: 1    nitroglycerin (NITROSTAT) 0.4 MG SL tablet, Place 1 tablet under the tongue Every 5 (Five) Minutes As Needed for Chest Pain (Only if SBP Greater Than 100). Take no more than 3 doses in 15 minutes. If no relief, seek medical attention., Disp: 25 tablet, Rfl: 0    OneTouch Ultra test strip, , Disp: , Rfl:     promethazine (PHENERGAN) 12.5 MG tablet, Take 1 tablet by mouth 2 (Two) Times a Day As Needed for Nausea or Vomiting., Disp: , Rfl:     ranolazine (RANEXA) 1000 MG 12 hr tablet, TAKE 1 TABLET BY MOUTH EVERY 12 HOURS, Disp: 60 tablet, Rfl: 0    Semaglutide, 1 MG/DOSE, (Ozempic, 1 MG/DOSE,) 2 MG/1.5ML solution pen-injector, Inject  under the skin into the appropriate area as directed 1 (One) Time Per Week., Disp: , Rfl:     tamsulosin (FLOMAX) 0.4 MG capsule 24 hr capsule, Take 1 capsule by mouth Daily., Disp: 30 capsule, Rfl: 1    ticagrelor (BRILINTA) 90 MG tablet tablet, Take 1 tablet by mouth 2 (Two) Times a Day., Disp: 180 tablet, Rfl: 3    traZODone (DESYREL) 100 MG tablet, Take 1 tablet by mouth At Night As Needed for Sleep., Disp: , Rfl:     vitamin B-12 (CYANOCOBALAMIN) 1000 MCG tablet, Take 1 tablet by mouth Daily., Disp: , Rfl:     vitamin D (ERGOCALCIFEROL) 1.25 MG (74871 UT) capsule capsule, Take 1 capsule by mouth 1 (One) Time Per Week., Disp: 5 capsule, Rfl: 3    famotidine (Pepcid) 20 MG tablet, Take 1 tablet by mouth Daily for 90 days., Disp: 30 tablet, Rfl: 2    Misc. Devices (Classics Rolling Walker) misc, Use 1 Units Daily., Disp: 1 each, Rfl: 0        Physical Exam:  I have reviewed the patient's current vital signs as documented in the patient's EMR.   Vitals:    07/15/24 0916   BP: 131/59   Pulse: 69   SpO2: 96%       Body mass index is 42.45 kg/m².       07/15/24  0916   Weight: 119 kg (263 lb)        Physical Exam  Vitals reviewed.   Constitutional:       General: He is  not in acute distress.     Appearance: He is not diaphoretic.   HENT:      Head: Normocephalic and atraumatic.   Cardiovascular:      Rate and Rhythm: Normal rate and regular rhythm.   Pulmonary:      Effort: Pulmonary effort is normal.      Breath sounds: No wheezing, rhonchi or rales.   Musculoskeletal:      Right lower leg: Edema present.      Left lower leg: Edema present.      Comments: Very faint non-pitting edema present.    Neurological:      Mental Status: He is alert and oriented to person, place, and time.   Psychiatric:         Mood and Affect: Affect is flat.      Comments: Behavior at baseline for patient.           DATA REVIEWED:     EKG. I personally reviewed the EKG.      ---------------------------------------------------  TTE/GAYLE:  Results for orders placed during the hospital encounter of 01/09/24    Adult Transthoracic Echo Complete W/ Cont if Necessary Per Protocol    Interpretation Summary    Left ventricular ejection fraction appears to be 51 - 55%.    Left ventricular diastolic function is consistent with (grade II w/high LAP) pseudonormalization.    There is no evidence of pericardial effusion      LAST HEART CATH/IF AVAILABLE:     Results for orders placed during the hospital encounter of 01/09/24    Cardiac Catheterization/Vascular Study    Conclusion    1st Mrg lesion is 30% stenosed.    Prox RCA-1 lesion is 60% stenosed.    Prox RCA-2 lesion is 60% stenosed.    Mid RCA lesion is 70% stenosed.    1.  Successful IFR guided PTCA and stent placement of the right coronary artery.  Dual antiplatelet therapy to continue.  Medical management for coronary artery disease will be advised.      -----------------------------------------------------  CXR/Imaging:   IMPRESSION:    Unremarkable exam. No acute cardiopulmonary findings identified.  This report was finalized on 9/7/2023 4:18 PM by Dr. Ron Wharton MD.    I personally reviewed the CXR from September  2023    -----------------------------------------------------  CT:   No radiology results for the last 30 days.  I personally reviewed the images of the CT scan.  Agree with the interpretation.      --------------------------------------------------------------------------------------------    Laboratory evaluations:    Lab Results   Component Value Date    GLUCOSE 275 (H) 07/15/2024    BUN 56 (H) 07/15/2024    CREATININE 2.47 (H) 07/15/2024    EGFRIFNONA 68 12/29/2021    BCR 22.7 07/15/2024    K 4.4 07/15/2024    CO2 25.4 07/15/2024    CALCIUM 9.1 07/15/2024    PROTENTOTREF 6.0 09/01/2023    ALBUMIN 3.8 05/13/2024    LABIL2 1.1 09/01/2023    AST 19 05/13/2024    ALT 25 05/13/2024     Lab Results   Component Value Date    WBC 4.76 02/06/2024    HGB 10.1 (L) 02/06/2024    HCT 31.3 (L) 02/06/2024    .0 (H) 02/06/2024     02/06/2024     Lab Results   Component Value Date    CHOL 95 03/27/2023    TRIG 201 (H) 03/27/2023    HDL 29 (L) 03/27/2023    LDL 34 03/27/2023     Lab Results   Component Value Date    TSH 1.440 01/09/2024     Lab Results   Component Value Date    HGBA1C 6.70 (H) 01/09/2024     Lab Results   Component Value Date    ALT 25 05/13/2024     Lab Results   Component Value Date    HGBA1C 6.70 (H) 01/09/2024    HGBA1C 5.70 (H) 07/04/2023    HGBA1C 9.40 (H) 09/01/2021     Lab Results   Component Value Date    MICROALBUR 26.6 09/01/2023    CREATININE 2.47 (H) 07/15/2024     Lab Results   Component Value Date    IRON 53 (L) 01/11/2024    TIBC 325 01/11/2024    FERRITIN 117.60 01/11/2024     Lab Results   Component Value Date    INR 1.06 09/02/2021    PROTIME 14.2 09/02/2021        Lab Results   Component Value Date    ABSOLUTELUNG 36 (A) 07/15/2024    ABSOLUTELUNG 36 (A) 06/19/2024    ABSOLUTELUNG 38 (A) 06/11/2024       PAH RISK ASSESSMENT:      1. Chronic heart failure with preserved ejection fraction (HFpEF)    2. ASCVD (arteriosclerotic cardiovascular disease)    3. Acute kidney injury  superimposed on CKD          ORDERS PLACED TODAY:  Orders Placed This Encounter   Procedures    ReDs Vest    Basic Metabolic Panel    Magnesium    proBNP    Basic Metabolic Panel    Magnesium    proBNP    Ambulatory Referral to Cardiology    Stress Test With Myocardial Perfusion - One Day    ECG 12 Lead Chest Pain        Diagnoses and all orders for this visit:    1. Chronic heart failure with preserved ejection fraction (HFpEF) (Primary)  -     Basic Metabolic Panel; Future  -     Magnesium; Future  -     proBNP; Future  -     ReDs Vest  -     Basic Metabolic Panel; Standing  -     Basic Metabolic Panel  -     Magnesium; Standing  -     Magnesium  -     proBNP; Standing  -     proBNP  -     Ambulatory Referral to Cardiology  -     Stress Test With Myocardial Perfusion - One Day; Future    2. ASCVD (arteriosclerotic cardiovascular disease)  Overview:  9/1/2021 TTE: LVEF 56 to 60%  9/2/2021 [facility] for non-STEMI: PCI MONTSE to distal RCA.  Proximal RCA 60% stenosed and mid RCA 70% stenosed    Orders:  -     Ambulatory Referral to Cardiology  -     Stress Test With Myocardial Perfusion - One Day; Future    3. Acute kidney injury superimposed on CKD    Other orders  -     ECG 12 Lead Chest Pain; Standing  -     ECG 12 Lead Chest Pain  -     famotidine (Pepcid) 20 MG tablet; Take 1 tablet by mouth Daily for 90 days.  Dispense: 30 tablet; Refill: 2  -     bumetanide (BUMEX) 1 MG tablet; Take 2 tablets by mouth Daily for 90 days. 2mg daily through 05/03/24 then back to 1mg  Dispense: 60 tablet; Refill: 2  -     Misc. Devices (Classics Rolling Walker) misc; Use 1 Units Daily.  Dispense: 1 each; Refill: 0           MEDS ORDERED TODAY:    New Medications Ordered This Visit   Medications    famotidine (Pepcid) 20 MG tablet     Sig: Take 1 tablet by mouth Daily for 90 days.     Dispense:  30 tablet     Refill:  2    bumetanide (BUMEX) 1 MG tablet     Sig: Take 2 tablets by mouth Daily for 90 days. 2mg daily through 05/03/24 then back  to 1mg     Dispense:  60 tablet     Refill:  2    Misc. Devices (Classics Rolling Walker) misc     Sig: Use 1 Units Daily.     Dispense:  1 each     Refill:  0     Add this in addition to current Bumex dosing.  Ordered chest x-ray  Ordered abdominal ultrasound      ------------------------------------------------------------------------------------------------          ASSESSMENT/PLAN:      Diagnosis Plan   1. Chronic heart failure with preserved ejection fraction (HFpEF)  Basic Metabolic Panel    Magnesium    proBNP    ReDs Vest    Basic Metabolic Panel    Basic Metabolic Panel    Magnesium    Magnesium    proBNP    proBNP    Ambulatory Referral to Cardiology    Stress Test With Myocardial Perfusion - One Day      2. ASCVD (arteriosclerotic cardiovascular disease)  Ambulatory Referral to Cardiology    Stress Test With Myocardial Perfusion - One Day      3. Acute kidney injury superimposed on CKD            not acutely decompensated chronic diastolic heart failure. CHF. Etiology:     NYHA stage Stage C: Structural heart disease is present AND symptoms have occurredFC-Class III: Marked limitation of physical activity. Comfortable at rest. Less than ordinary activity causes fatigue, palpitation, or dyspnea.     Today, Patient is approaching euvolemiaand with  Moderate perfusion. The patient's hemodynamics are currently acceptable. HR is: normal and is at goal. BP/MAP was reviewed and there isroom for medication up-titration.          CHF GOAL DIRECTED MEDICAL THERAPY FOR PATIENT ADDRESSED/ADJUSTED:     GDMT: HFpEF    Drug Class   Drug   Dose Last Dose Adjustment Notes   ACEi/ARB/ARNI       Beta Blocker Carvedilol 25 mg BID     MRA       SGLT2i    N/A   Secondaries if applicable:  Bumex 2 mg, 2 tablets BID      Isosorbide mononitrate 120 mg daily      Hydralazine 50 mg TID      Ranexa 1 g BID           -CHF Specific BB:   Continue Carvedilol 25 mg BID      -ACE/ARB/ARNi/alternative  Continue hydralazine 50 mg  TID  Continue Imdur 120 mg daily  Will avoid adding ACE/ARB/combo in the setting of current renal function      -MRA:   The patient is FC-NYHA Class III and MRA is indicated. However, cannot add at this time due to underlying CKD.   May consider soon or brief addition if temporary BP exchange is not effective.      -SGLT2 inhibitor therapy:   A BMP at initiation to verify GFR >30 was recommended, as was interval GFR surveillance.  May consider in the near future, but will hold off at this time due to renal function.      -Diuretic regimen:   Currently on Bumex 2 mg BID; will reduce to once daily given worsening eGFR over the last one months' duration.   Recommend keeping his Nephro appointment on 08/06 with decreased Bumex dosing.    Do not take if BP is lower than 100/60.  Creatinine/renal function remains impaired but stable.  BMP, Mag, & ProBNP reviewed with patient.      -Fluid restriction/Sodium restriction:   Requested 2000 ml restriction  Patient has been asked to weigh daily and was provided with a printed diuretic strategy.  1,500 mg Na restriction was discussed.        -Acute vs. Chronic underlying conditions other than HF addressed during visit:   ASCVD:   Chest pain reported with EKG without known significant ischemia.    LHC reports reviewed. Continue DAPT.   Patient reports more recent chest pain.  Stress test ordered.   Patient requests to be evaluated by Interventional Cardiology in the interim.  Referral made for Dr. Bennett as patient was last cathed by him.      GERD:   Add daily Pepcid 20mg.  Patient has some acid reflux and is on many medications that could potentially be irritating his gastric lining.  As such, sent in H2 blocker to assist.  Discussed at length with patient and his wife.     Hypertension:   Continue Hydralazine but take TIDas prescribed rather than once daily.    Continue Imdur  (Previously on norvasc but discontinued back in February 2024 due to leg  swelling)        ------------------------------------------    -Sleep/Apnea:   On CPAP at nighttime.     --------------------------------------------------------------------------------        This document has been electronically signed by Zuri Johnson PA-C  July 15, 2024 12:44 EDT      Dictated Utilizing Dragon Dictation: Part of this note may be an electronic transcription/translation of spoken language to printed text using the Dragon Dictation System.    Follow-up appointment and medication changes provided in hand delivered After Visit Summary as well as reviewed in the room.          >45 minutes out of 60 minutes face to face spent counseling patient extensively on dietary Na+ intake, importance of activity, weight monitoring, compliance with medications in addition to importance of titration with goal directed medical therapy and follow up appointments.

## 2024-07-15 NOTE — PROGRESS NOTES
Heart Failure Clinic  Pharmacist Note     Matthew Madrid is a 49 y.o. male seen in the Heart Failure Clinic for HFpEF.    Matthew Madrid reports a poor understanding of medications.  His wife is with him today and they have the notebook with medications that is not up to date, but is mostly correct.     He reports swelling that has been there over the past few weeks and SOB that is affecting how much he can walk. He is currently taking Bumex 2mg bid for about 2 weeks. He reports that he had been taking 3mg bid every other day with 2mg bid previously.     He reports chest pains since Thursday, that happen daily for about 5 minutes. He reports that he tries not to use his Nitro, because it gives him a headache. He reports numbness in his legs and feet.     He brought in his daily logs and BP ranged from 130's/60-80's with some higher BP's in the 171/77 and 152/82. BP in the clinic today was 131/59 with a HR of 69.     Medication Use:   Hx of med intolerances: Lisinopril (discontinued at discharge in July 2023. Elevated CK and renal issues); pain in groin with Farxiga.   Retail Rx Management: [facility] in [place].     Past Medical History:   Diagnosis Date    ASCVD (arteriosclerotic cardiovascular disease) 09/13/2021    Chronic heart failure with preserved ejection fraction (HFpEF) 11/27/2023    Diabetes mellitus     Elevated cholesterol     GERD (gastroesophageal reflux disease)     Hyperlipidemia     Hypertension     ALIA (obstructive sleep apnea) 7/4/2024     ALLERGIES: Tuberculin tests  Current Outpatient Medications   Medication Sig Dispense Refill    albuterol (PROVENTIL) (2.5 MG/3ML) 0.083% nebulizer solution Take 2.5 mg by nebulization Every 6 (Six) Hours As Needed for Shortness of Air. 360 mL 2    allopurinol (ZYLOPRIM) 300 MG tablet Take 1 tablet by mouth Daily.      ALPRAZolam (XANAX) 0.5 MG tablet Take 1 tablet by mouth 3 (Three) Times a Day As Needed for Anxiety.      aspirin 81 MG EC tablet Take 1 tablet by  mouth Daily. 90 tablet 3    atorvastatin (LIPITOR) 40 MG tablet Take 1 tablet by mouth Daily.      bumetanide (BUMEX) 1 MG tablet Take 2 tablets by mouth Daily for 90 days. 2mg daily through 05/03/24 then back to 1mg 60 tablet 2    carvedilol (COREG) 25 MG tablet Take 1 tablet by mouth twice daily 90 tablet 0    ferrous sulfate 325 (65 FE) MG tablet Take 1 tablet by mouth 3 (Three) Times a Day With Meals. Taking BID      FLUoxetine (PROzac) 40 MG capsule Take 1 capsule by mouth Daily.      gabapentin (NEURONTIN) 800 MG tablet Take 1 tablet by mouth 3 (Three) Times a Day.      HumuLIN 70/30 KwikPen (70-30) 100 UNIT/ML suspension pen-injector INJECT 36 UNITS SUB-Q IN THE MORNING AND AT 4PM      hydrALAZINE (APRESOLINE) 50 MG tablet TAKE 1 TABLET BY MOUTH EVERY 8 HOURS (Patient taking differently: Take 1 tablet by mouth Every 8 (Eight) Hours. Taking once daily) 90 tablet 0    Insulin Lispro, 0.5 Unit Dial, (HUMALOG KWIKPEN ROSALBA) 100 UNIT/ML solution pen-injector Inject 60 Units under the skin into the appropriate area as directed As Needed. Max of 60 units daily- use sliding scale PRN      isosorbide mononitrate (IMDUR) 120 MG 24 hr tablet Take 1 tablet by mouth Daily.      levocetirizine (XYZAL) 5 MG tablet Take 1 tablet by mouth Every Evening.      Magnesium 400 MG tablet Take 1 tablet by mouth twice daily 60 tablet 1    nitroglycerin (NITROSTAT) 0.4 MG SL tablet Place 1 tablet under the tongue Every 5 (Five) Minutes As Needed for Chest Pain (Only if SBP Greater Than 100). Take no more than 3 doses in 15 minutes. If no relief, seek medical attention. 25 tablet 0    OneTouch Ultra test strip       promethazine (PHENERGAN) 12.5 MG tablet Take 1 tablet by mouth 2 (Two) Times a Day As Needed for Nausea or Vomiting.      ranolazine (RANEXA) 1000 MG 12 hr tablet TAKE 1 TABLET BY MOUTH EVERY 12 HOURS 60 tablet 0    Semaglutide, 1 MG/DOSE, (Ozempic, 1 MG/DOSE,) 2 MG/1.5ML solution pen-injector Inject  under the skin into  "the appropriate area as directed 1 (One) Time Per Week.      tamsulosin (FLOMAX) 0.4 MG capsule 24 hr capsule Take 1 capsule by mouth Daily. 30 capsule 1    ticagrelor (BRILINTA) 90 MG tablet tablet Take 1 tablet by mouth 2 (Two) Times a Day. 180 tablet 3    traZODone (DESYREL) 100 MG tablet Take 1 tablet by mouth At Night As Needed for Sleep.      vitamin B-12 (CYANOCOBALAMIN) 1000 MCG tablet Take 1 tablet by mouth Daily.      vitamin D (ERGOCALCIFEROL) 1.25 MG (60332 UT) capsule capsule Take 1 capsule by mouth 1 (One) Time Per Week. 5 capsule 3    famotidine (Pepcid) 20 MG tablet Take 1 tablet by mouth Daily for 90 days. 30 tablet 2    Misc. Devices ([employer] Rolling Walker) misc Use 1 Units Daily. 1 each 0     No current facility-administered medications for this encounter.       Vaccination History:   Pneumonia: Reports received - unsure when this was; likely a candidate for Prevnar 20  Annual Influenza: UTD  Shingles: Currently not eligible    Objective  Vitals:    07/15/24 0916   BP: 131/59   BP Location: Left arm   Patient Position: Sitting   Cuff Size: Adult   Pulse: 69   SpO2: 96%   Weight: 119 kg (263 lb)   Height: 167.6 cm (66\")         Wt Readings from Last 3 Encounters:   07/15/24 119 kg (263 lb)   06/19/24 117 kg (259 lb)   06/11/24 122 kg (268 lb)         07/15/24  0916   Weight: 119 kg (263 lb)         Lab Results   Component Value Date    GLUCOSE 275 (H) 07/15/2024    BUN 56 (H) 07/15/2024    CREATININE 2.47 (H) 07/15/2024    EGFRIFNONA 68 12/29/2021    BCR 22.7 07/15/2024    K 4.4 07/15/2024    CO2 25.4 07/15/2024    CALCIUM 9.1 07/15/2024    PROTENTOTREF 6.0 09/01/2023    ALBUMIN 3.8 05/13/2024    LABIL2 1.1 09/01/2023    AST 19 05/13/2024    ALT 25 05/13/2024     Lab Results   Component Value Date    WBC 4.76 02/06/2024    HGB 10.1 (L) 02/06/2024    HCT 31.3 (L) 02/06/2024    .0 (H) 02/06/2024     02/06/2024     Lab Results   Component Value Date    CKTOTAL 125 12/19/2023    CKMB " 2.40 07/25/2023    TROPONINT 18 (H) 09/07/2023     Lab Results   Component Value Date    PROBNP 491.7 (H) 07/15/2024     Results for orders placed during the hospital encounter of 01/09/24    Adult Transthoracic Echo Complete W/ Cont if Necessary Per Protocol    Interpretation Summary    Left ventricular ejection fraction appears to be 51 - 55%.    Left ventricular diastolic function is consistent with (grade II w/high LAP) pseudonormalization.    There is no evidence of pericardial effusion         GDMT    Drug Class   Drug   Dose Last Dose Adjustment Additional Titration   Notes   ACEi/ARB/ARNI     Lisinopril D/C at discharge July 2023 - renal/ck    Beta Blocker Carvedilol 25 mg BID      MRA    CKD    SGLT2i Pain in groin     Imdur 120 mg QD       Hydralazine 50 mg daily -pt reports taking daily 7/15/24      Ranexa 1 g BID          Drug Therapy Problems    SOB/edema  ADH via med dispense history tab  Chest pains and numbness in legs      Recommendations:     Zuri to decrease Bumex to daily administration . New Rx sent in. He follows up with Nephrology on 8/6/24 per patient.   Coreg has not been filled since 3/19/24 for a 30 day supply. Patient's wife reports that she is giving to the patient bid every day- She recalled the strength of 25mg and feels confident that he is taking. Also reports that he is taking hydralazine 50mg daily- the sig on the most recent Rx refilled. Patient's wife reports that he is no longer taking Norvasc 5mg daily. Made Zuri aware.   Made Zuri aware- she ordered an EKG.       Patient was educated on heart failure medications and the importance of medication adherence.   All questions were addressed and patient expressed understanding.       Thank you for allowing me to participate in the care of your patient,    Chata Hou, [facility]  07/15/24  11:25 EDT

## 2024-07-16 ENCOUNTER — TRANSCRIBE ORDERS (OUTPATIENT)
Dept: ADMINISTRATIVE | Facility: HOSPITAL | Age: 49
End: 2024-07-16
Payer: MEDICARE

## 2024-07-16 ENCOUNTER — LAB (OUTPATIENT)
Dept: LAB | Facility: HOSPITAL | Age: 49
End: 2024-07-16
Payer: MEDICARE

## 2024-07-16 DIAGNOSIS — N18.31 STAGE 3A CHRONIC KIDNEY DISEASE: Primary | ICD-10-CM

## 2024-07-16 DIAGNOSIS — N18.31 STAGE 3A CHRONIC KIDNEY DISEASE: ICD-10-CM

## 2024-07-16 LAB
25(OH)D3 SERPL-MCNC: 51.5 NG/ML (ref 30–100)
ALBUMIN SERPL-MCNC: 4 G/DL (ref 3.5–5.2)
ALBUMIN/GLOB SERPL: 1.4 G/DL
ALP SERPL-CCNC: 77 U/L (ref 39–117)
ALT SERPL W P-5'-P-CCNC: 19 U/L (ref 1–41)
ANION GAP SERPL CALCULATED.3IONS-SCNC: 11.7 MMOL/L (ref 5–15)
AST SERPL-CCNC: 14 U/L (ref 1–40)
BILIRUB SERPL-MCNC: 0.6 MG/DL (ref 0–1.2)
BUN SERPL-MCNC: 52 MG/DL (ref 6–20)
BUN/CREAT SERPL: 24 (ref 7–25)
CALCIUM SPEC-SCNC: 9.2 MG/DL (ref 8.6–10.5)
CHLORIDE SERPL-SCNC: 103 MMOL/L (ref 98–107)
CO2 SERPL-SCNC: 23.3 MMOL/L (ref 22–29)
CREAT SERPL-MCNC: 2.17 MG/DL (ref 0.76–1.27)
EGFRCR SERPLBLD CKD-EPI 2021: 36.4 ML/MIN/1.73
GLOBULIN UR ELPH-MCNC: 2.9 GM/DL
GLUCOSE SERPL-MCNC: 265 MG/DL (ref 65–99)
POTASSIUM SERPL-SCNC: 4.3 MMOL/L (ref 3.5–5.2)
PROT SERPL-MCNC: 6.9 G/DL (ref 6–8.5)
PTH-INTACT SERPL-MCNC: 60.7 PG/ML (ref 15–65)
SODIUM SERPL-SCNC: 138 MMOL/L (ref 136–145)

## 2024-07-16 PROCEDURE — 83970 ASSAY OF PARATHORMONE: CPT

## 2024-07-16 PROCEDURE — 36415 COLL VENOUS BLD VENIPUNCTURE: CPT

## 2024-07-16 PROCEDURE — 82306 VITAMIN D 25 HYDROXY: CPT

## 2024-07-16 PROCEDURE — 80053 COMPREHEN METABOLIC PANEL: CPT

## 2024-07-22 RX ORDER — HYDRALAZINE HYDROCHLORIDE 50 MG/1
50 TABLET, FILM COATED ORAL EVERY 8 HOURS
Qty: 90 TABLET | Refills: 0 | Status: SHIPPED | OUTPATIENT
Start: 2024-07-22

## 2024-07-29 RX ORDER — RANOLAZINE 1000 MG/1
TABLET, EXTENDED RELEASE ORAL
Qty: 60 TABLET | Refills: 0 | Status: SHIPPED | OUTPATIENT
Start: 2024-07-29

## 2024-08-01 NOTE — PROGRESS NOTES
Heart Failure Clinic  Pharmacist Note     Matthew Madrid is a 49 y.o. male seen in the Heart Failure Clinic for HFpEF.    Matthew Madrid reports a poor understanding of medications.  His wife is with him today and they have the notebook with medications.  He really feels like the last medication change was helpful and is down about 9 pounds.  He reports taking metolazone and potassium for 3 days.  He continues to take bumex 1mg, 2 tablets BID.  He is still SOB but feels a lot better than last time he was here.     He does weigh himself daily and has a home BP log.  His most recent home BPs are 133/71, 130/66 and HR 65.   Other BPs on log were very similar.     Medication Use:   Hx of med intolerances: Lisinopril (discontinued at discharge in July 2023. Elevated CK and renal issues); pain in groin with Farxiga.   Retail Rx Management: [facility] in [place].     Past Medical History:   Diagnosis Date    ASCVD (arteriosclerotic cardiovascular disease) 09/13/2021    Chronic heart failure with preserved ejection fraction (HFpEF) 11/27/2023    Diabetes mellitus     Elevated cholesterol     GERD (gastroesophageal reflux disease)     Hyperlipidemia     Hypertension      ALLERGIES: Tuberculin tests  Current Outpatient Medications   Medication Sig Dispense Refill    albuterol (PROVENTIL) (2.5 MG/3ML) 0.083% nebulizer solution Take 2.5 mg by nebulization Every 6 (Six) Hours As Needed for Shortness of Air. 360 mL 2    allopurinol (ZYLOPRIM) 300 MG tablet Take 1 tablet by mouth Daily.      ALPRAZolam (XANAX) 0.5 MG tablet Take 1 tablet by mouth 3 (Three) Times a Day As Needed for Anxiety.      aspirin 81 MG EC tablet Take 1 tablet by mouth Daily. 90 tablet 3    atorvastatin (LIPITOR) 40 MG tablet Take 1 tablet by mouth Daily.      bumetanide (BUMEX) 1 MG tablet Take 2 tablets by mouth 2 (Two) Times a Day for 90 days. 2mg daily through 05/03/24 then back to 1mg 120 tablet 2    carvedilol (COREG) 25 MG tablet Take 1 tablet by mouth  twice daily 90 tablet 0    ferrous sulfate 325 (65 FE) MG tablet Take 1 tablet by mouth 3 (Three) Times a Day With Meals. Taking BID      FLUoxetine (PROzac) 40 MG capsule Take 1 capsule by mouth Daily.      gabapentin (NEURONTIN) 800 MG tablet Take 1 tablet by mouth 3 (Three) Times a Day.      HumuLIN 70/30 KwikPen (70-30) 100 UNIT/ML suspension pen-injector INJECT 36 UNITS SUB-Q IN THE MORNING AND AT 4PM      hydrALAZINE (APRESOLINE) 50 MG tablet TAKE 1 TABLET BY MOUTH EVERY 8 HOURS 90 tablet 0    Insulin Lispro, 0.5 Unit Dial, (HUMALOG KWIKPEN ROSALBA) 100 UNIT/ML solution pen-injector Inject 60 Units under the skin into the appropriate area as directed As Needed. Max of 60 units daily- use sliding scale PRN      isosorbide mononitrate (IMDUR) 120 MG 24 hr tablet Take 1 tablet by mouth Daily.      levocetirizine (XYZAL) 5 MG tablet Take 1 tablet by mouth Every Evening.      Magnesium 400 MG tablet Take 1 tablet by mouth 2 (Two) Times a Day. 60 tablet 0    nitroglycerin (NITROSTAT) 0.4 MG SL tablet Place 1 tablet under the tongue Every 5 (Five) Minutes As Needed for Chest Pain (Only if SBP Greater Than 100). Take no more than 3 doses in 15 minutes. If no relief, seek medical attention. 25 tablet 0    OneTouch Ultra test strip       promethazine (PHENERGAN) 12.5 MG tablet Take 1 tablet by mouth 2 (Two) Times a Day As Needed for Nausea or Vomiting.      ranolazine (RANEXA) 1000 MG 12 hr tablet TAKE 1  BY MOUTH EVERY 12 HOURS 60 tablet 0    Semaglutide, 1 MG/DOSE, (Ozempic, 1 MG/DOSE,) 2 MG/1.5ML solution pen-injector Inject  under the skin into the appropriate area as directed 1 (One) Time Per Week.      tamsulosin (FLOMAX) 0.4 MG capsule 24 hr capsule Take 1 capsule by mouth once daily 30 capsule 0    ticagrelor (BRILINTA) 90 MG tablet tablet Take 1 tablet by mouth 2 (Two) Times a Day. 180 tablet 3    traZODone (DESYREL) 100 MG tablet Take 1 tablet by mouth At Night As Needed for Sleep.      vitamin B-12  "(CYANOCOBALAMIN) 1000 MCG tablet Take 1 tablet by mouth Daily.      vitamin D (ERGOCALCIFEROL) 1.25 MG (42759 UT) capsule capsule Take 1 capsule by mouth 1 (One) Time Per Week. 5 capsule 3     No current facility-administered medications for this encounter.       Vaccination History:   Pneumonia: Reports received - unsure when this was; likely a candidate for Prevnar 20  Annual Influenza: UTD  Shingles: Currently not eligible    Objective  Vitals:    06/19/24 0940   BP: 130/67   BP Location: Left arm   Patient Position: Sitting   Cuff Size: Adult   Pulse: 64   SpO2: 95%   Weight: 117 kg (259 lb)   Height: 167.6 cm (66\")       Wt Readings from Last 3 Encounters:   06/19/24 117 kg (259 lb)   06/11/24 122 kg (268 lb)   06/06/24 118 kg (260 lb)         06/19/24  0940   Weight: 117 kg (259 lb)       Lab Results   Component Value Date    GLUCOSE 264 (H) 06/11/2024    BUN 48 (H) 06/11/2024    CREATININE 1.97 (H) 06/11/2024    EGFRIFNONA 68 12/29/2021    BCR 24.4 06/11/2024    K 3.9 06/11/2024    CO2 27.6 06/11/2024    CALCIUM 9.5 06/11/2024    PROTENTOTREF 6.0 09/01/2023    ALBUMIN 3.8 05/13/2024    LABIL2 1.1 09/01/2023    AST 19 05/13/2024    ALT 25 05/13/2024     Lab Results   Component Value Date    WBC 4.76 02/06/2024    HGB 10.1 (L) 02/06/2024    HCT 31.3 (L) 02/06/2024    .0 (H) 02/06/2024     02/06/2024     Lab Results   Component Value Date    CKTOTAL 125 12/19/2023    CKMB 2.40 07/25/2023    TROPONINT 18 (H) 09/07/2023     Lab Results   Component Value Date    PROBNP 849.7 (H) 06/11/2024     Results for orders placed during the hospital encounter of 01/09/24    Adult Transthoracic Echo Complete W/ Cont if Necessary Per Protocol    Interpretation Summary    Left ventricular ejection fraction appears to be 51 - 55%.    Left ventricular diastolic function is consistent with (grade II w/high LAP) pseudonormalization.    There is no evidence of pericardial effusion         GDMT    Drug Class   Drug "   Dose Last Dose Adjustment Additional Titration   Notes   ACEi/ARB/ARNI     Lisinopril D/C at discharge July 2023 - renal/ck    Beta Blocker Carvedilol 25 mg BID      MRA    CKD    SGLT2i Pain in groin     Imdur 120 mg QD       Hydralazine 50 mg TID       Ranexa 1 g BID          Drug Therapy Problems    GDMT      Recommendations:     Consider Jardiance if renal fx is stable, aMya will consider once labs are back.  Counseled Pt on risk of UTI/yeast infection and explained that he is at risk for same experience with Farxiga.  Counseled on keeping the area clean and dry to minimize risk.     Patient was educated on heart failure medications and the importance of medication adherence.   All questions were addressed and patient expressed understanding.       Thank you for allowing me to participate in the care of your patient,    Ericka Wei, PharmD  06/19/24  10:25 EDT   [Negative] : Respiratory

## 2024-08-23 ENCOUNTER — TELEPHONE (OUTPATIENT)
Dept: CARDIOLOGY | Facility: CLINIC | Age: 49
End: 2024-08-23

## 2024-08-23 ENCOUNTER — HOSPITAL ENCOUNTER (OUTPATIENT)
Facility: HOSPITAL | Age: 49
Discharge: HOME | End: 2024-08-23
Payer: MEDICARE

## 2024-08-23 ENCOUNTER — TELEPHONE (OUTPATIENT)
Dept: CARDIOLOGY | Facility: CLINIC | Age: 49
End: 2024-08-23
Payer: MEDICARE

## 2024-08-23 ENCOUNTER — OFFICE VISIT (OUTPATIENT)
Dept: CARDIOLOGY | Facility: CLINIC | Age: 49
End: 2024-08-23
Payer: MEDICARE

## 2024-08-23 VITALS
RESPIRATION RATE: 18 BRPM | HEIGHT: 66 IN | DIASTOLIC BLOOD PRESSURE: 68 MMHG | OXYGEN SATURATION: 95 % | SYSTOLIC BLOOD PRESSURE: 128 MMHG | BODY MASS INDEX: 42.59 KG/M2 | HEART RATE: 60 BPM | WEIGHT: 265 LBS

## 2024-08-23 DIAGNOSIS — E78.5 DYSLIPIDEMIA: Chronic | ICD-10-CM

## 2024-08-23 DIAGNOSIS — I25.10 ASCVD (ARTERIOSCLEROTIC CARDIOVASCULAR DISEASE): Primary | Chronic | ICD-10-CM

## 2024-08-23 DIAGNOSIS — I50.32 CHRONIC HEART FAILURE WITH PRESERVED EJECTION FRACTION (HFPEF): ICD-10-CM

## 2024-08-23 DIAGNOSIS — I10 ESSENTIAL HYPERTENSION: Chronic | ICD-10-CM

## 2024-08-23 DIAGNOSIS — I25.10 ASCVD (ARTERIOSCLEROTIC CARDIOVASCULAR DISEASE): Chronic | ICD-10-CM

## 2024-08-23 PROBLEM — R07.9 CHEST PAIN IN ADULT: Status: RESOLVED | Noted: 2024-01-09 | Resolved: 2024-08-23

## 2024-08-23 PROCEDURE — 71046 X-RAY EXAM CHEST 2 VIEWS: CPT

## 2024-08-23 RX ORDER — FLUOXETINE HYDROCHLORIDE 40 MG/1
40 CAPSULE ORAL DAILY
COMMUNITY

## 2024-08-23 RX ORDER — AMLODIPINE BESYLATE 5 MG/1
5 TABLET ORAL DAILY
COMMUNITY

## 2024-08-23 RX ORDER — PANTOPRAZOLE SODIUM 40 MG/1
40 TABLET, DELAYED RELEASE ORAL DAILY
COMMUNITY

## 2024-08-23 NOTE — PROGRESS NOTES
Susanna Johnson APRN  Matthew Madrid  1975  08/23/2024    Patient Active Problem List   Diagnosis    NSTEMI, initial episode of care    NSTEMI (non-ST elevated myocardial infarction)    ASCVD (arteriosclerotic cardiovascular disease)    Essential hypertension    Dyslipidemia    DM (diabetes mellitus), type 2 with complications    SOB (shortness of breath)    Severe obesity (BMI 35.0-39.9) with comorbidity    Diarrhea    Abdominal pain    Dysphagia    Acute renal failure, unspecified acute renal failure type    Chronic heart failure with preserved ejection fraction (HFpEF)    Chest pain in adult    Acute on chronic heart failure with preserved ejection fraction (HFpEF)    CHF (congestive heart failure), NYHA class II, acute on chronic, combined    Deformity of metatarsal    Diabetic peripheral neuropathy associated with type 2 diabetes mellitus    Epidermoid cyst    Foot pain    Hammer toe    Hereditary peripheral neuropathy    Low back pain    Lumbosacral radiculopathy    Lumbar spondylosis    Muscle pain    Onychomycosis of toenail    Pain of right hip joint    Peripheral vascular disease    Stasis dermatitis with venous ulcer of lower extremity due to chronic peripheral venous hypertension    Tinea pedis    Torticollis    Iron deficiency anemia    Hypomagnesemia    ALIA (obstructive sleep apnea)       Susanna Moses APRN:    Subjective     History of Present Illness:    Chief Complaint   Patient presents with    ASCVD     3 month routine fu       Matthew Madrid is a pleasant 49 y.o. male with a past medical history significant for CAD with history of acute NSTEMI with LHC on 9/2/2021 with subsequent stenting in the proximal RCA. He recently had a repeat LHC on 1/15/2024 with stenting on the mid RCA. He also has history of CKD, essential hypertension, diabetes mellitus, dyslipidemia. He also has chronic HFpEF. He does not smoke tobacco. He comes in today for cardiology follow-up.      Matthew reports that he has been having issues lately with diabetic neuropathy having numbness in his lower extremities.  He also reports he has been more weak fatigued lately.  Although he does report chest pains are improved having less frequent episodes.  However, when they do occur they are lasting about 5 minutes in duration.  He reports these are occurring less than once weekly.  He is scheduled to see interventional cardiologist, Dr. Bennett this Monday. It looks like his coreg was stopped he is unsure by who.  He also reports generalized weakness and fatigue as well as dyspnea.  He reports it has been difficult with diuretics and balancing renal function.    Allergies   Allergen Reactions    Tuberculin Tests Rash   :      Current Outpatient Medications:     albuterol (PROVENTIL) (2.5 MG/3ML) 0.083% nebulizer solution, Take 2.5 mg by nebulization Every 6 (Six) Hours As Needed for Shortness of Air., Disp: 360 mL, Rfl: 2    allopurinol (ZYLOPRIM) 300 MG tablet, Take 1 tablet by mouth Daily., Disp: , Rfl:     ALPRAZolam (XANAX) 0.5 MG tablet, Take 1 tablet by mouth 3 (Three) Times a Day As Needed for Anxiety., Disp: , Rfl:     amLODIPine (NORVASC) 5 MG tablet, Take 1 tablet by mouth Daily., Disp: , Rfl:     aspirin 81 MG EC tablet, Take 1 tablet by mouth Daily., Disp: 90 tablet, Rfl: 3    atorvastatin (LIPITOR) 40 MG tablet, Take 1 tablet by mouth Daily., Disp: , Rfl:     bumetanide (BUMEX) 1 MG tablet, Take 2 tablets by mouth Daily for 90 days. 2mg daily through 05/03/24 then back to 1mg, Disp: 60 tablet, Rfl: 2    famotidine (Pepcid) 20 MG tablet, Take 1 tablet by mouth Daily for 90 days., Disp: 30 tablet, Rfl: 2    ferrous sulfate 325 (65 FE) MG tablet, Take 1 tablet by mouth 3 (Three) Times a Day With Meals. Taking BID, Disp: , Rfl:     FLUoxetine (PROzac) 40 MG capsule, Take 1 capsule by mouth Daily., Disp: , Rfl:     FLUoxetine (PROzac) 40 MG capsule, Take 1 capsule by mouth Daily., Disp: , Rfl:      gabapentin (NEURONTIN) 800 MG tablet, Take 1 tablet by mouth 3 (Three) Times a Day., Disp: , Rfl:     HumuLIN 70/30 KwikPen (70-30) 100 UNIT/ML suspension pen-injector, INJECT 36 UNITS SUB-Q IN THE MORNING AND AT 4PM, Disp: , Rfl:     hydrALAZINE (APRESOLINE) 50 MG tablet, TAKE 1 TABLET BY MOUTH EVERY 8 HOURS, Disp: 90 tablet, Rfl: 0    Insulin Lispro, 0.5 Unit Dial, (HUMALOG KWIKPEN ROSALBA) 100 UNIT/ML solution pen-injector, Inject 60 Units under the skin into the appropriate area as directed As Needed. Max of 60 units daily- use sliding scale PRN, Disp: , Rfl:     isosorbide mononitrate (IMDUR) 120 MG 24 hr tablet, Take 1 tablet by mouth Daily., Disp: , Rfl:     levocetirizine (XYZAL) 5 MG tablet, Take 1 tablet by mouth Every Evening., Disp: , Rfl:     Magnesium 400 MG tablet, Take 1 tablet by mouth twice daily, Disp: 60 tablet, Rfl: 1    pantoprazole (PROTONIX) 40 MG EC tablet, Take 1 tablet by mouth Daily., Disp: , Rfl:     promethazine (PHENERGAN) 12.5 MG tablet, Take 1 tablet by mouth 2 (Two) Times a Day As Needed for Nausea or Vomiting., Disp: , Rfl:     ranolazine (RANEXA) 1000 MG 12 hr tablet, TAKE 1  BY MOUTH EVERY 12 HOURS, Disp: 60 tablet, Rfl: 0    Semaglutide, 1 MG/DOSE, (Ozempic, 1 MG/DOSE,) 2 MG/1.5ML solution pen-injector, Inject  under the skin into the appropriate area as directed 1 (One) Time Per Week., Disp: , Rfl:     tamsulosin (FLOMAX) 0.4 MG capsule 24 hr capsule, Take 1 capsule by mouth Daily., Disp: 30 capsule, Rfl: 1    ticagrelor (BRILINTA) 90 MG tablet tablet, Take 1 tablet by mouth 2 (Two) Times a Day., Disp: 180 tablet, Rfl: 3    traZODone (DESYREL) 100 MG tablet, Take 1 tablet by mouth At Night As Needed for Sleep., Disp: , Rfl:     vitamin B-12 (CYANOCOBALAMIN) 1000 MCG tablet, Take 1 tablet by mouth Daily., Disp: , Rfl:     vitamin D (ERGOCALCIFEROL) 1.25 MG (00793 UT) capsule capsule, Take 1 capsule by mouth 1 (One) Time Per Week., Disp: 5 capsule, Rfl: 3    carvedilol (COREG) 25  "MG tablet, Take 1 tablet by mouth twice daily (Patient not taking: Reported on 2024), Disp: 90 tablet, Rfl: 0    Misc. Devices (Classics Rolling Walker) misc, Use 1 Units Daily., Disp: 1 each, Rfl: 0    Misc. Devices (Classics Rolling Walker) misc, Use 1 Units Daily., Disp: 1 each, Rfl: 0    nitroglycerin (NITROSTAT) 0.4 MG SL tablet, Place 1 tablet under the tongue Every 5 (Five) Minutes As Needed for Chest Pain (Only if SBP Greater Than 100). Take no more than 3 doses in 15 minutes. If no relief, seek medical attention. (Patient not taking: Reported on 2024), Disp: 25 tablet, Rfl: 0    OneTouch Ultra test strip, , Disp: , Rfl:     The following portions of the patient's history were reviewed and updated as appropriate: allergies, current medications, past family history, past medical history, past social history, past surgical history and problem list.    Social History     Tobacco Use    Smoking status: Former     Current packs/day: 0.00     Types: Cigarettes     Quit date:      Years since quittin.6     Passive exposure: Past    Smokeless tobacco: Never   Vaping Use    Vaping status: Never Used   Substance Use Topics    Alcohol use: Yes     Alcohol/week: 6.0 standard drinks of alcohol     Types: 6 Glasses of wine per week     Comment: Occasional    Drug use: Never         Objective   Vitals:    24 0846   BP: 128/68   BP Location: Left arm   Patient Position: Sitting   Cuff Size: Adult   Pulse: 60   Resp: 18   SpO2: 95%   Weight: 120 kg (265 lb)   Height: 167.6 cm (66\")     Body mass index is 42.77 kg/m².    ROS    Constitutional:       General: Not in acute distress.     Appearance: Healthy appearance. Well-developed and not in distress. Not diaphoretic.   Eyes:      Conjunctiva/sclera: Conjunctivae normal.      Pupils: Pupils are equal, round, and reactive to light.   HENT:      Head: Normocephalic and atraumatic.   Neck:      Vascular: No carotid bruit or JVD.   Pulmonary:      Effort: " "Pulmonary effort is normal. No respiratory distress.      Breath sounds: Normal breath sounds.      Comments: Diminished  Cardiovascular:      Normal rate. Regular rhythm.   Edema:     Pretibial: bilateral 2+ edema of the pretibial area.     Ankle: bilateral 2+ edema of the ankle.     Feet: bilateral 2+ edema of the feet.  Skin:     General: Skin is cool.   Neurological:      Mental Status: Alert, oriented to person, place, and time and oriented to person, place and time.         Lab Results   Component Value Date     07/16/2024    K 4.3 07/16/2024     07/16/2024    CO2 23.3 07/16/2024    BUN 52 (H) 07/16/2024    CREATININE 2.17 (H) 07/16/2024    GLUCOSE 265 (H) 07/16/2024    CALCIUM 9.2 07/16/2024    AST 14 07/16/2024    ALT 19 07/16/2024    ALKPHOS 77 07/16/2024    LABIL2 1.1 09/01/2023     Lab Results   Component Value Date    CKTOTAL 125 12/19/2023     Lab Results   Component Value Date    WBC 4.76 02/06/2024    HGB 10.1 (L) 02/06/2024    HCT 31.3 (L) 02/06/2024     02/06/2024     Lab Results   Component Value Date    INR 1.06 09/02/2021     Lab Results   Component Value Date    MG 1.9 07/15/2024     Lab Results   Component Value Date    TSH 1.440 01/09/2024    TRIG 201 (H) 03/27/2023    HDL 29 (L) 03/27/2023    LDL 34 03/27/2023      No results found for: \"BNP\"    During this visit the following were done:  Labs Reviewed []    Labs Ordered []    Radiology Reports Reviewed []    Radiology Ordered []    PCP Records Reviewed []    Referring Provider Records Reviewed []    ER Records Reviewed []    Hospital Records Reviewed []    History Obtained From Family []    Radiology Images Reviewed []    Other Reviewed []    Records Requested []       Procedures    Assessment & Plan   No diagnosis found.         Recommendations:  Chronic HFpEF  Does appear he is retaining some fluid he reports his dry weight is around 260 and is 265 today.  I asked him to get a chest x-ray if radiological evidence of " pulmonary congestion is seen we will increase his Bumex.  He still admits to eating high salt diet such as pepperoni's and fast food.  Emphasized the importance of salt restriction.  He apparently is not on carvedilol unsure where this was stopped he was seen in the heart failure clinic in July and was taking carvedilol at the time.  He is borderline bradycardic at 60 bpm so reluctant to restart at 25 mg today.  Will try to obtain nephrologist notes to see if this was discontinued for bradycardia.  CAD  He is having some chest pains is scheduled to see interventional cardiology next week for consideration of repeat left heart catheterization given borderline disease seen on last heart cath  Continue aspirin, Lipitor, Imdur, Ranexa, and Brilinta.    No follow-ups on file.    As always, I appreciate very much the opportunity to participate in the cardiovascular care of your patients.      With Best Regards,    Bhupinder Graham PA-C

## 2024-08-23 NOTE — TELEPHONE ENCOUNTER
Called Dr. Olvera office and they stated pt advised them that he had stopped it but Dr. Olvera didn't advise him to.         ----- Message from Bhupinder Graham sent at 8/23/2024  9:39 AM EDT -----  Can we call his nephrologists office and see if he stopped his coreg?

## 2024-08-26 ENCOUNTER — TELEPHONE (OUTPATIENT)
Dept: CARDIOLOGY | Facility: CLINIC | Age: 49
End: 2024-08-26
Payer: MEDICARE

## 2024-08-26 ENCOUNTER — PATIENT ROUNDING (BHMG ONLY) (OUTPATIENT)
Dept: CARDIOLOGY | Facility: CLINIC | Age: 49
End: 2024-08-26
Payer: MEDICARE

## 2024-08-26 ENCOUNTER — OFFICE VISIT (OUTPATIENT)
Dept: CARDIOLOGY | Facility: CLINIC | Age: 49
End: 2024-08-26
Payer: MEDICARE

## 2024-08-26 VITALS
DIASTOLIC BLOOD PRESSURE: 73 MMHG | OXYGEN SATURATION: 95 % | SYSTOLIC BLOOD PRESSURE: 135 MMHG | HEART RATE: 74 BPM | WEIGHT: 265 LBS | HEIGHT: 66 IN | BODY MASS INDEX: 42.59 KG/M2

## 2024-08-26 DIAGNOSIS — I25.10 ASCVD (ARTERIOSCLEROTIC CARDIOVASCULAR DISEASE): Chronic | ICD-10-CM

## 2024-08-26 DIAGNOSIS — E78.5 DYSLIPIDEMIA: Chronic | ICD-10-CM

## 2024-08-26 DIAGNOSIS — I20.89 ATYPICAL ANGINA: Primary | ICD-10-CM

## 2024-08-26 DIAGNOSIS — I50.33 ACUTE ON CHRONIC HEART FAILURE WITH PRESERVED EJECTION FRACTION (HFPEF): ICD-10-CM

## 2024-08-26 DIAGNOSIS — I73.9 PERIPHERAL VASCULAR DISEASE: ICD-10-CM

## 2024-08-26 DIAGNOSIS — E11.8 DM (DIABETES MELLITUS), TYPE 2 WITH COMPLICATIONS: Chronic | ICD-10-CM

## 2024-08-26 DIAGNOSIS — I10 ESSENTIAL HYPERTENSION: Chronic | ICD-10-CM

## 2024-08-26 PROCEDURE — 99214 OFFICE O/P EST MOD 30 MIN: CPT | Performed by: INTERNAL MEDICINE

## 2024-08-26 PROCEDURE — 1160F RVW MEDS BY RX/DR IN RCRD: CPT | Performed by: INTERNAL MEDICINE

## 2024-08-26 PROCEDURE — 3078F DIAST BP <80 MM HG: CPT | Performed by: INTERNAL MEDICINE

## 2024-08-26 PROCEDURE — 3075F SYST BP GE 130 - 139MM HG: CPT | Performed by: INTERNAL MEDICINE

## 2024-08-26 PROCEDURE — 1159F MED LIST DOCD IN RCRD: CPT | Performed by: INTERNAL MEDICINE

## 2024-08-26 NOTE — TELEPHONE ENCOUNTER
Caller: Matthew Madrid    Relationship: Self    Best call back number: [phone]    What is the best time to reach you: ANYTIME    What was the call regarding: PATIENT WOULD LIKE TO KNOW IF ABHIJIT CAN SEND A MESSAGE TO HIS KIDNEY DOCTOR (DR. GAYTAN) THAT HE IS RECOMMENDING TEMPORARY DIALYSIS.

## 2024-08-26 NOTE — PROGRESS NOTES
A [employer] message has been sent to the patient for patient rounding for [facility] [place]

## 2024-08-26 NOTE — PROGRESS NOTES
Office Note    Subjective     Matthew Madrid is a 49 y.o. male who presents to day for evaluation of chest pain      Active Problems:  Problem List Items Addressed This Visit          Cardiac and Vasculature    ASCVD (arteriosclerotic cardiovascular disease) (Chronic)    Overview     9/1/2021 [facility]TE: LVEF 56 to 60%  9/2/2021 [facility] for non-STEMI: PCI MONTSE to distal RCA.  Proximal RCA 60% stenosed and mid RCA 70% stenosed         Essential hypertension (Chronic)    Dyslipidemia (Chronic)    Overview     9/3/2021 total cholesterol 142, triglycerides 274, HDL 25 and LDL 72  9/3/2021 new start statin medication  10/14/2021 total cholesterol 98, triglycerides 148, HDL 26, and LDL 47  12/29/2021 total cholesterol 100, triglycerides 160, HDL 25, and LDL 48  3/27/2023 total cholesterol 95, triglycerides 201, HDL 29, and LDL 34         Acute on chronic heart failure with preserved ejection fraction (HFpEF)    Peripheral vascular disease    Atypical angina - Primary    Relevant Orders    Stress Test With Myocardial Perfusion (1 Day)       Endocrine and Metabolic    DM (diabetes mellitus), type 2 with complications (Chronic)       HPI  Patient is a pleasant 49-year-old gentleman past medical history significant for coronary disease, chronic kidney disease, hypertension in for evaluation of worsening chest pain.  Patient has had multiple stents done to the right coronary artery initial stent was in September 2020 1 repeat stent January 2024 he had moderate disease in the proximal mid and distal RCA.  iFR was normal for the proximal lesion but mid to distal vessel had abnormal IFR requiring PCI done.  Patient is taking dual antiplatelet therapy.  Patient EF is preserved on echo done in January 9, 2024 at 51 to 55%.  Patient baseline creatinine on July 15, 2024 is 56/2.47    Patient is having discomfort chest on walking.  Has leg swelling belly distention.  Dyspnea on exertion proximal current dyspnea.    PRIOR MEDS  Current Outpatient  Medications on File Prior to Visit   Medication Sig Dispense Refill    albuterol (PROVENTIL) (2.5 MG/3ML) 0.083% nebulizer solution Take 2.5 mg by nebulization Every 6 (Six) Hours As Needed for Shortness of Air. 360 mL 2    allopurinol (ZYLOPRIM) 300 MG tablet Take 1 tablet by mouth Daily.      ALPRAZolam (XANAX) 0.5 MG tablet Take 1 tablet by mouth 3 (Three) Times a Day As Needed for Anxiety.      amLODIPine (NORVASC) 5 MG tablet Take 1 tablet by mouth Daily.      aspirin 81 MG EC tablet Take 1 tablet by mouth Daily. 90 tablet 3    atorvastatin (LIPITOR) 40 MG tablet Take 1 tablet by mouth Daily.      bumetanide (BUMEX) 1 MG tablet Take 2 tablets by mouth Daily for 90 days. 2mg daily through 05/03/24 then back to 1mg 60 tablet 2    famotidine (Pepcid) 20 MG tablet Take 1 tablet by mouth Daily for 90 days. 30 tablet 2    ferrous sulfate 325 (65 FE) MG tablet Take 1 tablet by mouth 3 (Three) Times a Day With Meals. Taking BID      FLUoxetine (PROzac) 40 MG capsule Take 1 capsule by mouth Daily.      FLUoxetine (PROzac) 40 MG capsule Take 1 capsule by mouth Daily.      gabapentin (NEURONTIN) 800 MG tablet Take 1 tablet by mouth 3 (Three) Times a Day.      HumuLIN 70/30 KwikPen (70-30) 100 UNIT/ML suspension pen-injector INJECT 36 UNITS SUB-Q IN THE MORNING AND AT 4PM      hydrALAZINE (APRESOLINE) 50 MG tablet TAKE 1 TABLET BY MOUTH EVERY 8 HOURS 90 tablet 0    Insulin Lispro, 0.5 Unit Dial, (HUMALOG KWIKPEN ROSALBA) 100 UNIT/ML solution pen-injector Inject 60 Units under the skin into the appropriate area as directed As Needed. Max of 60 units daily- use sliding scale PRN      isosorbide mononitrate (IMDUR) 120 MG 24 hr tablet Take 1 tablet by mouth Daily.      levocetirizine (XYZAL) 5 MG tablet Take 1 tablet by mouth Every Evening.      Magnesium 400 MG tablet Take 1 tablet by mouth twice daily 60 tablet 1    Misc. Devices (Classics Rolling Walker) misc Use 1 Units Daily. 1 each 0    Misc. Devices (Class[employer] Rolling  Walker) misc Use 1 Units Daily. 1 each 0    OneTouch Ultra test strip       pantoprazole (PROTONIX) 40 MG EC tablet Take 1 tablet by mouth Daily.      promethazine (PHENERGAN) 12.5 MG tablet Take 1 tablet by mouth 2 (Two) Times a Day As Needed for Nausea or Vomiting.      ranolazine (RANEXA) 1000 MG 12 hr tablet TAKE 1  BY MOUTH EVERY 12 HOURS 60 tablet 0    Semaglutide, 1 MG/DOSE, (Ozempic, 1 MG/DOSE,) 2 MG/1.5ML solution pen-injector Inject  under the skin into the appropriate area as directed 1 (One) Time Per Week.      tamsulosin (FLOMAX) 0.4 MG capsule 24 hr capsule Take 1 capsule by mouth Daily. 30 capsule 1    ticagrelor (BRILINTA) 90 MG tablet tablet Take 1 tablet by mouth 2 (Two) Times a Day. 180 tablet 3    traZODone (DESYREL) 100 MG tablet Take 1 tablet by mouth At Night As Needed for Sleep.      vitamin B-12 (CYANOCOBALAMIN) 1000 MCG tablet Take 1 tablet by mouth Daily.      vitamin D (ERGOCALCIFEROL) 1.25 MG (59025 UT) capsule capsule Take 1 capsule by mouth 1 (One) Time Per Week. 5 capsule 3    [DISCONTINUED] carvedilol (COREG) 25 MG tablet Take 1 tablet by mouth twice daily (Patient not taking: Reported on 8/23/2024) 90 tablet 0    [DISCONTINUED] nitroglycerin (NITROSTAT) 0.4 MG SL tablet Place 1 tablet under the tongue Every 5 (Five) Minutes As Needed for Chest Pain (Only if SBP Greater Than 100). Take no more than 3 doses in 15 minutes. If no relief, seek medical attention. (Patient not taking: Reported on 8/23/2024) 25 tablet 0     No current facility-administered medications on file prior to visit.       ALLERGIES  Tuberculin tests    HISTORY  Past Medical History:   Diagnosis Date    ASCVD (arteriosclerotic cardiovascular disease) 09/13/2021    Chronic heart failure with preserved ejection fraction (HFpEF) 11/27/2023    Diabetes mellitus     Elevated cholesterol     GERD (gastroesophageal reflux disease)     Hyperlipidemia     Hypertension     ALIA (obstructive sleep apnea) 7/4/2024       Social  "History     Socioeconomic History    Marital status:    Tobacco Use    Smoking status: Former     Current packs/day: 0.00     Types: Cigarettes     Quit date:      Years since quittin.6     Passive exposure: Past    Smokeless tobacco: Never   Vaping Use    Vaping status: Never Used   Substance and Sexual Activity    Alcohol use: Yes     Alcohol/week: 6.0 standard drinks of alcohol     Types: 6 Glasses of wine per week     Comment: Occasional    Drug use: Never    Sexual activity: Defer       Family History   Problem Relation Age of Onset    Hyperlipidemia Mother     Hyperlipidemia Father     Heart attack Paternal Uncle        Review of Systems   Respiratory:  Positive for chest tightness and shortness of breath.    Cardiovascular:  Positive for chest pain and leg swelling. Negative for palpitations.   Gastrointestinal:  Positive for abdominal distention.   Neurological:  Negative for dizziness, seizures, syncope, weakness and headaches.       Objective     VITALS: /73 (BP Location: Left arm, Patient Position: Sitting, Cuff Size: Large Adult)   Pulse 74   Ht 167.6 cm (66\")   Wt 120 kg (265 lb)   SpO2 95%   BMI 42.77 kg/m²     LABS:   Lab on 2024   Component Date Value Ref Range Status    Glucose 2024 265 (H)  65 - 99 mg/dL Final    BUN 2024 52 (H)  6 - 20 mg/dL Final    Creatinine 2024 2.17 (H)  0.76 - 1.27 mg/dL Final    Sodium 2024 138  136 - 145 mmol/L Final    Potassium 2024 4.3  3.5 - 5.2 mmol/L Final    Chloride 2024 103  98 - 107 mmol/L Final    CO2 2024 23.3  22.0 - 29.0 mmol/L Final    Calcium 2024 9.2  8.6 - 10.5 mg/dL Final    Total Protein 2024 6.9  6.0 - 8.5 g/dL Final    Albumin 2024 4.0  3.5 - 5.2 g/dL Final    ALT (SGPT) 2024 19  1 - 41 U/L Final    AST (SGOT) 2024 14  1 - 40 U/L Final    Alkaline Phosphatase 2024 77  39 - 117 U/L Final    Total Bilirubin 2024 0.6  0.0 - 1.2 mg/dL Final "    Globulin 07/16/2024 2.9  gm/dL Final    A/G Ratio 07/16/2024 1.4  g/dL Final    BUN/Creatinine Ratio 07/16/2024 24.0  7.0 - 25.0 Final    Anion Gap 07/16/2024 11.7  5.0 - 15.0 mmol/L Final    eGFR 07/16/2024 36.4 (L)  >60.0 mL/min/1.73 Final    25 Hydroxy, Vitamin D 07/16/2024 51.5  30.0 - 100.0 ng/ml Final    PTH, Intact 07/16/2024 60.7  15.0 - 65.0 pg/mL Final   Hospital Outpatient Visit on 07/15/2024   Component Date Value Ref Range Status    Absolute Lung Fluid Content 07/15/2024 36 (A)  20 - 35 % Final    Glucose 07/15/2024 275 (H)  65 - 99 mg/dL Final    BUN 07/15/2024 56 (H)  6 - 20 mg/dL Final    Creatinine 07/15/2024 2.47 (H)  0.76 - 1.27 mg/dL Final    Sodium 07/15/2024 138  136 - 145 mmol/L Final    Potassium 07/15/2024 4.4  3.5 - 5.2 mmol/L Final    Slight hemolysis detected by analyzer. Result may be falsely elevated.    Chloride 07/15/2024 99  98 - 107 mmol/L Final    CO2 07/15/2024 25.4  22.0 - 29.0 mmol/L Final    Calcium 07/15/2024 9.1  8.6 - 10.5 mg/dL Final    BUN/Creatinine Ratio 07/15/2024 22.7  7.0 - 25.0 Final    Anion Gap 07/15/2024 13.6  5.0 - 15.0 mmol/L Final    eGFR 07/15/2024 31.2 (L)  >60.0 mL/min/1.73 Final    Magnesium 07/15/2024 1.9  1.6 - 2.6 mg/dL Final    proBNP 07/15/2024 491.7 (H)  0.0 - 450.0 pg/mL Final    QT Interval 07/15/2024 456  ms Final    QTC Interval 07/15/2024 478  ms Final   Hospital Outpatient Visit on 06/19/2024   Component Date Value Ref Range Status    Absolute Lung Fluid Content 06/19/2024 36 (A)  20 - 35 % Final    proBNP 06/19/2024 227.4  0.0 - 450.0 pg/mL Final    Glucose 06/19/2024 331 (H)  65 - 99 mg/dL Final    BUN 06/19/2024 72 (H)  6 - 20 mg/dL Final    Creatinine 06/19/2024 2.58 (H)  0.76 - 1.27 mg/dL Final    Sodium 06/19/2024 133 (L)  136 - 145 mmol/L Final    Potassium 06/19/2024 4.2  3.5 - 5.2 mmol/L Final    Chloride 06/19/2024 94 (L)  98 - 107 mmol/L Final    CO2 06/19/2024 26.5  22.0 - 29.0 mmol/L Final    Calcium 06/19/2024 9.7  8.6 - 10.5  mg/dL Final    BUN/Creatinine Ratio 06/19/2024 27.9 (H)  7.0 - 25.0 Final    Anion Gap 06/19/2024 12.5  5.0 - 15.0 mmol/L Final    eGFR 06/19/2024 29.6 (L)  >60.0 mL/min/1.73 Final    Magnesium 06/19/2024 1.7  1.6 - 2.6 mg/dL Final   Hospital Outpatient Visit on 06/11/2024   Component Date Value Ref Range Status    Absolute Lung Fluid Content 06/11/2024 38 (A)  20 - 35 % Final    proBNP 06/11/2024 849.7 (H)  0.0 - 450.0 pg/mL Final    Glucose 06/11/2024 264 (H)  65 - 99 mg/dL Final    BUN 06/11/2024 48 (H)  6 - 20 mg/dL Final    Creatinine 06/11/2024 1.97 (H)  0.76 - 1.27 mg/dL Final    Sodium 06/11/2024 137  136 - 145 mmol/L Final    Potassium 06/11/2024 3.9  3.5 - 5.2 mmol/L Final    Slight hemolysis detected by analyzer. Result may be falsely elevated.    Chloride 06/11/2024 96 (L)  98 - 107 mmol/L Final    CO2 06/11/2024 27.6  22.0 - 29.0 mmol/L Final    Calcium 06/11/2024 9.5  8.6 - 10.5 mg/dL Final    BUN/Creatinine Ratio 06/11/2024 24.4  7.0 - 25.0 Final    Anion Gap 06/11/2024 13.4  5.0 - 15.0 mmol/L Final    eGFR 06/11/2024 40.9 (L)  >60.0 mL/min/1.73 Final    Magnesium 06/11/2024 1.6  1.6 - 2.6 mg/dL Final   Hospital Outpatient Visit on 06/05/2024   Component Date Value Ref Range Status    Absolute Lung Fluid Content 06/05/2024 38 (A)  20 - 35 % Final    Glucose 06/05/2024 228 (H)  65 - 99 mg/dL Final    BUN 06/05/2024 46 (H)  6 - 20 mg/dL Final    Creatinine 06/05/2024 2.05 (H)  0.76 - 1.27 mg/dL Final    Sodium 06/05/2024 137  136 - 145 mmol/L Final    Potassium 06/05/2024 4.0  3.5 - 5.2 mmol/L Final    Chloride 06/05/2024 98  98 - 107 mmol/L Final    CO2 06/05/2024 26.6  22.0 - 29.0 mmol/L Final    Calcium 06/05/2024 9.0  8.6 - 10.5 mg/dL Final    BUN/Creatinine Ratio 06/05/2024 22.4  7.0 - 25.0 Final    Anion Gap 06/05/2024 12.4  5.0 - 15.0 mmol/L Final    eGFR 06/05/2024 39.0 (L)  >60.0 mL/min/1.73 Final    Magnesium 06/05/2024 1.4 (L)  1.6 - 2.6 mg/dL Final    proBNP 06/05/2024 445.3  0.0 - 450.0  pg/mL Final         IMAGING:   XR Chest 2 View    Result Date: 8/23/2024    Unremarkable radiographic appearance of the chest.   This report was finalized on 8/23/2024 11:33 AM by Dr. Pedro Delacruz MD.      US Abdomen Complete    Result Date: 6/11/2024    Fatty infiltration of the liver.   This report was finalized on 6/11/2024 2:38 PM by Dr. Ron Wharton MD.      XR Sacrum & Coccyx    Result Date: 6/7/2024  1. No acute fracture or traumatic malalignment.  This report was finalized on 6/7/2024 12:16 AM by Keith Agudelo MD.      XR Spine Lumbar Complete 4+VW    Result Date: 6/7/2024  1. No acute fracture or traumatic listhesis.  This report was finalized on 6/7/2024 12:16 AM by Keith Agudelo MD.      Results for orders placed during the hospital encounter of 07/04/23    Stress Test With Myocardial Perfusion One Day    Interpretation Summary    Left ventricular ejection fraction is normal (Calculated EF = 66%).    Myocardial perfusion imaging indicates a normal myocardial perfusion study with no evidence of ischemia.    TID 1.0.    Findings consistent with a normal ECG stress test.     No results found for this or any previous visit.          EXAM:  Constitutional:       General: Awake.      Appearance: Healthy appearance. Not in distress.   Eyes:      Conjunctiva/sclera: Conjunctivae normal.   HENT:      Head: Normocephalic and atraumatic.      Nose: Nose normal.    Mouth/Throat:      Pharynx: Oropharynx is clear.   Neck:      Thyroid: Thyroid normal.      Vascular: No carotid bruit or JVD.   Pulmonary:      Effort: Pulmonary effort is normal.      Breath sounds: Normal breath sounds.   Chest:      Chest wall: Not tender to palpatation.   Cardiovascular:      PMI at left midclavicular line. Normal rate. Regular rhythm. Normal S1 with normal intensity. Normal S2 with normal intensity.       Murmurs: There is no murmur.      No gallop.  No rub.   Pulses:     Intact distal pulses.   Edema:     Peripheral edema  present.     Ankle: bilateral 2+ edema of the ankle.  Abdominal:      General: Bowel sounds are normal. There is no distension.      Palpations: Abdomen is soft. There is no hepatomegaly.      Tenderness: There is no abdominal tenderness.   Musculoskeletal: Normal range of motion.      Cervical back: Normal range of motion. Skin:     General: Skin is warm and dry. There is no cyanosis.      Coloration: Skin is not jaundiced.   Neurological:      Mental Status: Alert and oriented to person, place and time.      Motor: Motor function is intact.      Gait: Gait is intact.   Psychiatric:         Attention and Perception: Attention and perception normal.         Speech: Speech normal.         Behavior: Behavior is cooperative.         Cognition and Memory: Cognition and memory normal.         Judgment: Judgment normal.          Procedure   Procedures       Assessment & Plan     Diagnoses and all orders for this visit:    1. Atypical angina (Primary)  -     Stress Test With Myocardial Perfusion (1 Day)    2. ASCVD (arteriosclerotic cardiovascular disease)    3. Essential hypertension    4. Dyslipidemia    5. Acute on chronic heart failure with preserved ejection fraction (HFpEF)    6. Peripheral vascular disease    7. DM (diabetes mellitus), type 2 with complications          PLAN  1. cardiac.  Patient with history of coronary disease with RCA stent done in January 2024.  Patient had a proximal RCA lesion also which had normal IFR.  Patient will be kept on Ranexa and nitrates.  Will like to get a stress test on the patient and follow back up in clinic  #2 renal.  Patient has chronic kidney disease.  July 15 patient BUN was 56 creatinine 2.47.  Patient follows nephrology.  If he gets another contrast exposure, he can go on dialysis.  Will discuss with nephrology prior to doing another cath if needed  #3 heart failure.  Patient has diastolic dysfunction.  Normal LV systolic function.  Will advised to diurese aggressively so  his weight can come down and his symptoms will get better with same.           MEDS ORDERED DURING VISIT:    Medications Discontinued During This Encounter   Medication Reason    carvedilol (COREG) 25 MG tablet *Therapy completed    nitroglycerin (NITROSTAT) 0.4 MG SL tablet *Therapy completed        No orders of the defined types were placed in this encounter.        Follow Up:   Return in about 6 weeks (around 10/7/2024) for Recheck.    Patient was given instructions and counseling regarding his condition or for health maintenance advice. Please see specific information pulled into the AVS if appropriate.   As always, Susanna Johnson APRN  I appreciate very much the opportunity to participate in the cardiovascular care of your patients. Please do not hesitate to call me with any questions with regards to Matthew Madrid evaluation and management.         This document has been electronically signed by Anant Bennett MD [facility], Interventional Cardiology  August 26, 2024 14:55 EDT    Dictated Utilizing Dragon Dictation: Part of this note may be an electronic transcription/translation of spoken language to printed text using the Dragon Dictation System.

## 2024-08-27 ENCOUNTER — TRANSCRIBE ORDERS (OUTPATIENT)
Dept: ADMINISTRATIVE | Facility: HOSPITAL | Age: 49
End: 2024-08-27
Payer: MEDICARE

## 2024-08-27 ENCOUNTER — LAB (OUTPATIENT)
Dept: LAB | Facility: HOSPITAL | Age: 49
End: 2024-08-27
Payer: MEDICARE

## 2024-08-27 DIAGNOSIS — N18.32 CHRONIC KIDNEY DISEASE, STAGE 3B: ICD-10-CM

## 2024-08-27 DIAGNOSIS — M10.9 GOUT, ARTHRITIS: ICD-10-CM

## 2024-08-27 DIAGNOSIS — N18.32 CHRONIC KIDNEY DISEASE, STAGE 3B: Primary | ICD-10-CM

## 2024-08-27 LAB
ALBUMIN SERPL-MCNC: 4 G/DL (ref 3.5–5.2)
ALBUMIN/GLOB SERPL: 1.5 G/DL
ALP SERPL-CCNC: 92 U/L (ref 39–117)
ALT SERPL W P-5'-P-CCNC: 22 U/L (ref 1–41)
ANION GAP SERPL CALCULATED.3IONS-SCNC: 12.1 MMOL/L (ref 5–15)
AST SERPL-CCNC: 19 U/L (ref 1–40)
BASOPHILS # BLD AUTO: 0.02 10*3/MM3 (ref 0–0.2)
BASOPHILS NFR BLD AUTO: 0.4 % (ref 0–1.5)
BILIRUB SERPL-MCNC: 0.5 MG/DL (ref 0–1.2)
BILIRUB UR QL STRIP: NEGATIVE
BUN SERPL-MCNC: 27 MG/DL (ref 6–20)
BUN/CREAT SERPL: 14.1 (ref 7–25)
CALCIUM SPEC-SCNC: 9.6 MG/DL (ref 8.6–10.5)
CHLORIDE SERPL-SCNC: 99 MMOL/L (ref 98–107)
CLARITY UR: CLEAR
CO2 SERPL-SCNC: 24.9 MMOL/L (ref 22–29)
COLOR UR: YELLOW
CREAT SERPL-MCNC: 1.91 MG/DL (ref 0.76–1.27)
CREAT UR-MCNC: 34.1 MG/DL
DEPRECATED RDW RBC AUTO: 47.2 FL (ref 37–54)
EGFRCR SERPLBLD CKD-EPI 2021: 42.4 ML/MIN/1.73
EOSINOPHIL # BLD AUTO: 0.08 10*3/MM3 (ref 0–0.4)
EOSINOPHIL NFR BLD AUTO: 1.4 % (ref 0.3–6.2)
ERYTHROCYTE [DISTWIDTH] IN BLOOD BY AUTOMATED COUNT: 13.1 % (ref 12.3–15.4)
GLOBULIN UR ELPH-MCNC: 2.6 GM/DL
GLUCOSE SERPL-MCNC: 271 MG/DL (ref 65–99)
GLUCOSE UR STRIP-MCNC: NEGATIVE MG/DL
HCT VFR BLD AUTO: 35 % (ref 37.5–51)
HGB BLD-MCNC: 11.8 G/DL (ref 13–17.7)
HGB UR QL STRIP.AUTO: NEGATIVE
HOLD SPECIMEN: NORMAL
IMM GRANULOCYTES # BLD AUTO: 0.02 10*3/MM3 (ref 0–0.05)
IMM GRANULOCYTES NFR BLD AUTO: 0.4 % (ref 0–0.5)
KETONES UR QL STRIP: NEGATIVE
LEUKOCYTE ESTERASE UR QL STRIP.AUTO: NEGATIVE
LYMPHOCYTES # BLD AUTO: 1.3 10*3/MM3 (ref 0.7–3.1)
LYMPHOCYTES NFR BLD AUTO: 23 % (ref 19.6–45.3)
MCH RBC QN AUTO: 33.5 PG (ref 26.6–33)
MCHC RBC AUTO-ENTMCNC: 33.7 G/DL (ref 31.5–35.7)
MCV RBC AUTO: 99.4 FL (ref 79–97)
MONOCYTES # BLD AUTO: 0.51 10*3/MM3 (ref 0.1–0.9)
MONOCYTES NFR BLD AUTO: 9 % (ref 5–12)
NEUTROPHILS NFR BLD AUTO: 3.72 10*3/MM3 (ref 1.7–7)
NEUTROPHILS NFR BLD AUTO: 65.8 % (ref 42.7–76)
NITRITE UR QL STRIP: NEGATIVE
NRBC BLD AUTO-RTO: 0 /100 WBC (ref 0–0.2)
PH UR STRIP.AUTO: 6.5 [PH] (ref 5–8)
PLATELET # BLD AUTO: 146 10*3/MM3 (ref 140–450)
PMV BLD AUTO: 10.4 FL (ref 6–12)
POTASSIUM SERPL-SCNC: 4.5 MMOL/L (ref 3.5–5.2)
PROT ?TM UR-MCNC: 8.4 MG/DL
PROT SERPL-MCNC: 6.6 G/DL (ref 6–8.5)
PROT UR QL STRIP: NEGATIVE
PROT/CREAT UR: 246.3 MG/G CREA (ref 0–200)
RBC # BLD AUTO: 3.52 10*6/MM3 (ref 4.14–5.8)
SODIUM SERPL-SCNC: 136 MMOL/L (ref 136–145)
SP GR UR STRIP: 1.01 (ref 1–1.03)
URATE SERPL-MCNC: 8.9 MG/DL (ref 3.4–7)
UROBILINOGEN UR QL STRIP: NORMAL
WBC NRBC COR # BLD AUTO: 5.65 10*3/MM3 (ref 3.4–10.8)

## 2024-08-27 PROCEDURE — 80053 COMPREHEN METABOLIC PANEL: CPT

## 2024-08-27 PROCEDURE — 85025 COMPLETE CBC W/AUTO DIFF WBC: CPT

## 2024-08-27 PROCEDURE — 84550 ASSAY OF BLOOD/URIC ACID: CPT

## 2024-08-27 PROCEDURE — 81003 URINALYSIS AUTO W/O SCOPE: CPT

## 2024-08-27 PROCEDURE — 36415 COLL VENOUS BLD VENIPUNCTURE: CPT

## 2024-08-27 RX ORDER — RANOLAZINE 1000 MG/1
1 TABLET, EXTENDED RELEASE ORAL EVERY 12 HOURS
Qty: 60 TABLET | Refills: 0 | Status: SHIPPED | OUTPATIENT
Start: 2024-08-27

## 2024-08-27 NOTE — TELEPHONE ENCOUNTER
I was not recommending temporary dialysis. If you can please call Mr. Castro and let him know that I was mentioning that he may end up needing dialysis, should he be exposed to further contrast and continued use of diuretics.

## 2024-08-28 LAB
BILIRUB UR QL STRIP: NEGATIVE
CLARITY UR: CLEAR
COLOR UR: YELLOW
CREAT UR-MCNC: 33.9 MG/DL
GLUCOSE UR STRIP-MCNC: NEGATIVE MG/DL
HGB UR QL STRIP.AUTO: NEGATIVE
HOLD SPECIMEN: NORMAL
KETONES UR QL STRIP: NEGATIVE
LEUKOCYTE ESTERASE UR QL STRIP.AUTO: NEGATIVE
NITRITE UR QL STRIP: NEGATIVE
PH UR STRIP.AUTO: 6.5 [PH] (ref 5–8)
PROT ?TM UR-MCNC: 10.4 MG/DL
PROT UR QL STRIP: NEGATIVE
PROT/CREAT UR: 306.8 MG/G CREA (ref 0–200)
SP GR UR STRIP: 1.01 (ref 1–1.03)
UROBILINOGEN UR QL STRIP: NORMAL

## 2024-09-03 ENCOUNTER — TRANSCRIBE ORDERS (OUTPATIENT)
Dept: ADMINISTRATIVE | Facility: HOSPITAL | Age: 49
End: 2024-09-03
Payer: MEDICARE

## 2024-09-03 ENCOUNTER — LAB (OUTPATIENT)
Dept: LAB | Facility: HOSPITAL | Age: 49
End: 2024-09-03
Payer: MEDICARE

## 2024-09-03 DIAGNOSIS — N18.32 CHRONIC KIDNEY DISEASE, STAGE 3B: Primary | ICD-10-CM

## 2024-09-03 DIAGNOSIS — N18.32 CHRONIC KIDNEY DISEASE, STAGE 3B: ICD-10-CM

## 2024-09-03 LAB
ALBUMIN SERPL-MCNC: 3.8 G/DL (ref 3.5–5.2)
ALBUMIN/GLOB SERPL: 1.3 G/DL
ALP SERPL-CCNC: 79 U/L (ref 39–117)
ALT SERPL W P-5'-P-CCNC: 22 U/L (ref 1–41)
ANION GAP SERPL CALCULATED.3IONS-SCNC: 11.5 MMOL/L (ref 5–15)
AST SERPL-CCNC: 21 U/L (ref 1–40)
BILIRUB SERPL-MCNC: 0.4 MG/DL (ref 0–1.2)
BILIRUB UR QL STRIP: NEGATIVE
BUN SERPL-MCNC: 33 MG/DL (ref 6–20)
BUN/CREAT SERPL: 15.1 (ref 7–25)
CALCIUM SPEC-SCNC: 9.2 MG/DL (ref 8.6–10.5)
CHLORIDE SERPL-SCNC: 102 MMOL/L (ref 98–107)
CLARITY UR: CLEAR
CO2 SERPL-SCNC: 23.5 MMOL/L (ref 22–29)
COLOR UR: YELLOW
CREAT SERPL-MCNC: 2.18 MG/DL (ref 0.76–1.27)
CREAT UR-MCNC: 38.5 MG/DL
EGFRCR SERPLBLD CKD-EPI 2021: 36.2 ML/MIN/1.73
GLOBULIN UR ELPH-MCNC: 3 GM/DL
GLUCOSE SERPL-MCNC: 406 MG/DL (ref 65–99)
GLUCOSE UR STRIP-MCNC: ABNORMAL MG/DL
HGB UR QL STRIP.AUTO: NEGATIVE
HOLD SPECIMEN: NORMAL
KETONES UR QL STRIP: NEGATIVE
LEUKOCYTE ESTERASE UR QL STRIP.AUTO: NEGATIVE
NITRITE UR QL STRIP: NEGATIVE
PH UR STRIP.AUTO: 6 [PH] (ref 5–8)
POTASSIUM SERPL-SCNC: 4.3 MMOL/L (ref 3.5–5.2)
PROT ?TM UR-MCNC: 5.9 MG/DL
PROT SERPL-MCNC: 6.8 G/DL (ref 6–8.5)
PROT UR QL STRIP: NEGATIVE
PROT/CREAT UR: 153.2 MG/G CREA (ref 0–200)
SODIUM SERPL-SCNC: 137 MMOL/L (ref 136–145)
SP GR UR STRIP: 1.01 (ref 1–1.03)
UROBILINOGEN UR QL STRIP: ABNORMAL

## 2024-09-03 PROCEDURE — 84156 ASSAY OF PROTEIN URINE: CPT

## 2024-09-03 PROCEDURE — 81003 URINALYSIS AUTO W/O SCOPE: CPT

## 2024-09-03 PROCEDURE — 80053 COMPREHEN METABOLIC PANEL: CPT

## 2024-09-03 PROCEDURE — 82570 ASSAY OF URINE CREATININE: CPT

## 2024-09-03 PROCEDURE — 36415 COLL VENOUS BLD VENIPUNCTURE: CPT

## 2024-09-03 RX ORDER — TAMSULOSIN HYDROCHLORIDE 0.4 MG/1
1 CAPSULE ORAL DAILY
Qty: 30 CAPSULE | Refills: 0 | Status: SHIPPED | OUTPATIENT
Start: 2024-09-03

## 2024-09-03 RX ORDER — CALCIUM CARBONATE 300MG(750)
1 TABLET,CHEWABLE ORAL 2 TIMES DAILY
Qty: 60 TABLET | Refills: 0 | Status: SHIPPED | OUTPATIENT
Start: 2024-09-03

## 2024-09-17 ENCOUNTER — HOSPITAL ENCOUNTER (OUTPATIENT)
Dept: CARDIOLOGY | Facility: HOSPITAL | Age: 49
Discharge: HOME | End: 2024-09-17
Admitting: PHYSICIAN ASSISTANT
Payer: MEDICARE

## 2024-09-17 VITALS
BODY MASS INDEX: 40.85 KG/M2 | WEIGHT: 254.2 LBS | SYSTOLIC BLOOD PRESSURE: 133 MMHG | OXYGEN SATURATION: 96 % | HEART RATE: 65 BPM | HEIGHT: 66 IN | DIASTOLIC BLOOD PRESSURE: 70 MMHG

## 2024-09-17 DIAGNOSIS — I50.32 CHF NYHA CLASS II, CHRONIC, DIASTOLIC: ICD-10-CM

## 2024-09-17 DIAGNOSIS — G47.33 OSA ON CPAP: ICD-10-CM

## 2024-09-17 DIAGNOSIS — I50.32 CHRONIC HEART FAILURE WITH PRESERVED EJECTION FRACTION (HFPEF): Primary | ICD-10-CM

## 2024-09-17 DIAGNOSIS — N18.32 STAGE 3B CHRONIC KIDNEY DISEASE: ICD-10-CM

## 2024-09-17 DIAGNOSIS — E66.01 MORBID OBESITY WITH BMI OF 40.0-44.9, ADULT: ICD-10-CM

## 2024-09-17 DIAGNOSIS — I25.10 ASCVD (ARTERIOSCLEROTIC CARDIOVASCULAR DISEASE): Chronic | ICD-10-CM

## 2024-09-17 PROBLEM — B35.3 TINEA PEDIS: Status: RESOLVED | Noted: 2022-11-28 | Resolved: 2024-09-17

## 2024-09-17 PROBLEM — I21.4 NSTEMI, INITIAL EPISODE OF CARE: Status: RESOLVED | Noted: 2021-09-01 | Resolved: 2024-09-17

## 2024-09-17 PROBLEM — I21.4 NSTEMI (NON-ST ELEVATED MYOCARDIAL INFARCTION): Status: RESOLVED | Noted: 2021-09-02 | Resolved: 2024-09-17

## 2024-09-17 PROBLEM — I50.43 CHF (CONGESTIVE HEART FAILURE), NYHA CLASS II, ACUTE ON CHRONIC, COMBINED: Status: RESOLVED | Noted: 2024-01-11 | Resolved: 2024-09-17

## 2024-09-17 PROBLEM — N17.9 ACUTE RENAL FAILURE, UNSPECIFIED ACUTE RENAL FAILURE TYPE: Status: RESOLVED | Noted: 2023-07-04 | Resolved: 2024-09-17

## 2024-09-17 PROBLEM — I50.33 ACUTE ON CHRONIC HEART FAILURE WITH PRESERVED EJECTION FRACTION (HFPEF): Status: RESOLVED | Noted: 2024-01-10 | Resolved: 2024-09-17

## 2024-09-17 LAB
ABSOLUTE LUNG FLUID CONTENT: 35 % (ref 20–35)
ANION GAP SERPL CALCULATED.3IONS-SCNC: 12.8 MMOL/L (ref 5–15)
BUN SERPL-MCNC: 41 MG/DL (ref 6–20)
BUN/CREAT SERPL: 14.8 (ref 7–25)
CALCIUM SPEC-SCNC: 9.2 MG/DL (ref 8.6–10.5)
CHLORIDE SERPL-SCNC: 99 MMOL/L (ref 98–107)
CO2 SERPL-SCNC: 25.2 MMOL/L (ref 22–29)
CREAT SERPL-MCNC: 2.77 MG/DL (ref 0.76–1.27)
EGFRCR SERPLBLD CKD-EPI 2021: 27.2 ML/MIN/1.73
GLUCOSE SERPL-MCNC: 262 MG/DL (ref 65–99)
MAGNESIUM SERPL-MCNC: 2.1 MG/DL (ref 1.6–2.6)
NT-PROBNP SERPL-MCNC: 93.7 PG/ML (ref 0–450)
POTASSIUM SERPL-SCNC: 4.3 MMOL/L (ref 3.5–5.2)
SODIUM SERPL-SCNC: 137 MMOL/L (ref 136–145)

## 2024-09-17 PROCEDURE — 94726 PLETHYSMOGRAPHY LUNG VOLUMES: CPT | Performed by: PHYSICIAN ASSISTANT

## 2024-09-17 PROCEDURE — 36415 COLL VENOUS BLD VENIPUNCTURE: CPT | Performed by: PHYSICIAN ASSISTANT

## 2024-09-17 PROCEDURE — 83880 ASSAY OF NATRIURETIC PEPTIDE: CPT | Performed by: PHYSICIAN ASSISTANT

## 2024-09-17 PROCEDURE — G2211 COMPLEX E/M VISIT ADD ON: HCPCS | Performed by: PHYSICIAN ASSISTANT

## 2024-09-17 PROCEDURE — 99215 OFFICE O/P EST HI 40 MIN: CPT | Performed by: PHYSICIAN ASSISTANT

## 2024-09-17 PROCEDURE — 83735 ASSAY OF MAGNESIUM: CPT | Performed by: PHYSICIAN ASSISTANT

## 2024-09-17 PROCEDURE — 80048 BASIC METABOLIC PNL TOTAL CA: CPT | Performed by: PHYSICIAN ASSISTANT

## 2024-09-17 RX ORDER — TORSEMIDE 20 MG/1
1 TABLET ORAL EVERY 12 HOURS SCHEDULED
COMMUNITY
Start: 2024-08-27

## 2024-09-17 RX ORDER — NITROGLYCERIN 0.4 MG/1
0.4 TABLET SUBLINGUAL
COMMUNITY
Start: 2024-08-26

## 2024-09-17 RX ORDER — CHLORAL HYDRATE 500 MG
1000 CAPSULE ORAL
COMMUNITY

## 2024-09-17 RX ORDER — HYDROXYZINE HYDROCHLORIDE 10 MG/1
10-20 TABLET, FILM COATED ORAL 3 TIMES DAILY PRN
COMMUNITY
Start: 2024-09-06

## 2024-09-17 RX ORDER — EMPAGLIFLOZIN 10 MG/1
1 TABLET, FILM COATED ORAL DAILY
COMMUNITY
Start: 2024-09-10

## 2024-09-19 RX ORDER — HYDRALAZINE HYDROCHLORIDE 50 MG/1
50 TABLET, FILM COATED ORAL EVERY 8 HOURS
Qty: 90 TABLET | Refills: 0 | Status: SHIPPED | OUTPATIENT
Start: 2024-09-19

## 2024-09-19 RX ORDER — TAMSULOSIN HYDROCHLORIDE 0.4 MG/1
1 CAPSULE ORAL DAILY
Qty: 30 CAPSULE | Refills: 3 | Status: SHIPPED | OUTPATIENT
Start: 2024-09-19

## 2024-09-19 RX ORDER — CALCIUM CARBONATE 300MG(750)
1 TABLET,CHEWABLE ORAL 2 TIMES DAILY
Qty: 60 TABLET | Refills: 0 | Status: SHIPPED | OUTPATIENT
Start: 2024-09-19

## 2024-09-19 RX ORDER — FAMOTIDINE 20 MG/1
20 TABLET, FILM COATED ORAL DAILY
Qty: 30 TABLET | Refills: 2 | Status: SHIPPED | OUTPATIENT
Start: 2024-09-19 | End: 2024-12-18

## 2024-09-19 RX ORDER — RANOLAZINE 1000 MG/1
1 TABLET, EXTENDED RELEASE ORAL EVERY 12 HOURS
Qty: 60 TABLET | Refills: 2 | Status: SHIPPED | OUTPATIENT
Start: 2024-09-19

## 2024-09-19 RX ORDER — ISOSORBIDE MONONITRATE 120 MG/1
120 TABLET, EXTENDED RELEASE ORAL DAILY
Qty: 90 TABLET | Refills: 1 | Status: SHIPPED | OUTPATIENT
Start: 2024-09-19 | End: 2025-03-18

## 2024-09-23 RX ORDER — RANOLAZINE 1000 MG/1
1 TABLET, EXTENDED RELEASE ORAL EVERY 12 HOURS
Qty: 60 TABLET | Refills: 0 | OUTPATIENT
Start: 2024-09-23

## 2024-10-02 ENCOUNTER — LAB (OUTPATIENT)
Dept: LAB | Facility: HOSPITAL | Age: 49
End: 2024-10-02
Payer: MEDICARE

## 2024-10-02 ENCOUNTER — TRANSCRIBE ORDERS (OUTPATIENT)
Dept: ADMINISTRATIVE | Facility: HOSPITAL | Age: 49
End: 2024-10-02
Payer: MEDICARE

## 2024-10-02 DIAGNOSIS — N18.32 STAGE 3B CHRONIC KIDNEY DISEASE: Primary | ICD-10-CM

## 2024-10-02 DIAGNOSIS — N18.32 STAGE 3B CHRONIC KIDNEY DISEASE: ICD-10-CM

## 2024-10-02 LAB
ALBUMIN SERPL-MCNC: 4.3 G/DL (ref 3.5–5.2)
ALBUMIN/GLOB SERPL: 1.3 G/DL
ALP SERPL-CCNC: 76 U/L (ref 39–117)
ALT SERPL W P-5'-P-CCNC: 21 U/L (ref 1–41)
ANION GAP SERPL CALCULATED.3IONS-SCNC: 14.6 MMOL/L (ref 5–15)
AST SERPL-CCNC: 22 U/L (ref 1–40)
BILIRUB SERPL-MCNC: 0.6 MG/DL (ref 0–1.2)
BILIRUB UR QL STRIP: NEGATIVE
BUN SERPL-MCNC: 39 MG/DL (ref 6–20)
BUN/CREAT SERPL: 15.1 (ref 7–25)
CALCIUM SPEC-SCNC: 9.7 MG/DL (ref 8.6–10.5)
CHLORIDE SERPL-SCNC: 102 MMOL/L (ref 98–107)
CLARITY UR: CLEAR
CO2 SERPL-SCNC: 24.4 MMOL/L (ref 22–29)
COLOR UR: YELLOW
CREAT SERPL-MCNC: 2.59 MG/DL (ref 0.76–1.27)
CREAT UR-MCNC: 27.7 MG/DL
EGFRCR SERPLBLD CKD-EPI 2021: 29.4 ML/MIN/1.73
GLOBULIN UR ELPH-MCNC: 3.2 GM/DL
GLUCOSE SERPL-MCNC: 241 MG/DL (ref 65–99)
GLUCOSE UR STRIP-MCNC: ABNORMAL MG/DL
HGB UR QL STRIP.AUTO: NEGATIVE
KETONES UR QL STRIP: NEGATIVE
LEUKOCYTE ESTERASE UR QL STRIP.AUTO: NEGATIVE
NITRITE UR QL STRIP: NEGATIVE
PH UR STRIP.AUTO: 6.5 [PH] (ref 5–8)
POTASSIUM SERPL-SCNC: 4 MMOL/L (ref 3.5–5.2)
PROT ?TM UR-MCNC: 8.7 MG/DL
PROT SERPL-MCNC: 7.5 G/DL (ref 6–8.5)
PROT UR QL STRIP: NEGATIVE
PROT/CREAT UR: 314.1 MG/G CREA (ref 0–200)
SODIUM SERPL-SCNC: 141 MMOL/L (ref 136–145)
SP GR UR STRIP: 1.01 (ref 1–1.03)
UROBILINOGEN UR QL STRIP: ABNORMAL

## 2024-10-02 PROCEDURE — 80053 COMPREHEN METABOLIC PANEL: CPT

## 2024-10-02 PROCEDURE — 81003 URINALYSIS AUTO W/O SCOPE: CPT

## 2024-10-02 PROCEDURE — 84156 ASSAY OF PROTEIN URINE: CPT

## 2024-10-02 PROCEDURE — 36415 COLL VENOUS BLD VENIPUNCTURE: CPT

## 2024-10-02 PROCEDURE — 82570 ASSAY OF URINE CREATININE: CPT

## 2024-10-07 ENCOUNTER — OFFICE VISIT (OUTPATIENT)
Dept: SURGERY | Facility: CLINIC | Age: 49
End: 2024-10-07
Payer: MEDICARE

## 2024-10-07 VITALS
DIASTOLIC BLOOD PRESSURE: 68 MMHG | WEIGHT: 253 LBS | BODY MASS INDEX: 40.66 KG/M2 | HEIGHT: 66 IN | SYSTOLIC BLOOD PRESSURE: 124 MMHG

## 2024-10-07 DIAGNOSIS — R19.7 DIARRHEA, UNSPECIFIED TYPE: ICD-10-CM

## 2024-10-07 DIAGNOSIS — Z12.11 SCREENING FOR COLON CANCER: Primary | ICD-10-CM

## 2024-10-07 DIAGNOSIS — R19.5 POSITIVE COLORECTAL CANCER SCREENING USING COLOGUARD TEST: ICD-10-CM

## 2024-10-07 PROCEDURE — 1159F MED LIST DOCD IN RCRD: CPT

## 2024-10-07 PROCEDURE — 3078F DIAST BP <80 MM HG: CPT

## 2024-10-07 PROCEDURE — S0260 H&P FOR SURGERY: HCPCS

## 2024-10-07 PROCEDURE — 1160F RVW MEDS BY RX/DR IN RCRD: CPT

## 2024-10-07 PROCEDURE — 3074F SYST BP LT 130 MM HG: CPT

## 2024-10-07 NOTE — PROGRESS NOTES
Subjective   Matthew Madrid is a 49 y.o. male who presents today for Initial Evaluation    Chief Complaint:    Chief Complaint   Patient presents with    Colonoscopy        History of Present Illness:    History of Present Illness Matthew is a 49-year-old male who presents for evaluation for screening colonoscopy.  He recently had a positive Cologuard.  Reports that he had a colonoscopy in .  Reports it was normal at that time.  He does report he has bowel movements daily, often times multiple times a day.  Denies any blood in his stool.  Denies any known family history of colon cancer.  He does report that he occasionally has incontinence to his stool.  Currently is on Brilinta, last cardiac stent was placed in January of this year.    The following portions of the patient's history were reviewed and updated as appropriate: allergies, current medications, past family history, past medical history, past social history, past surgical history and problem list.    Past Medical History:  Past Medical History:   Diagnosis Date    ASCVD (arteriosclerotic cardiovascular disease) 2021    Chronic heart failure with preserved ejection fraction (HFpEF) 2023    Diabetes mellitus     Elevated cholesterol     GERD (gastroesophageal reflux disease)     Hyperlipidemia     Hypertension     NSTEMI, initial episode of care 2021    Added automatically from request for surgery [id number]      ALIA (obstructive sleep apnea) 2024       Social History:  Social History     Socioeconomic History    Marital status:    Tobacco Use    Smoking status: Former     Current packs/day: 0.00     Types: Cigarettes     Quit date:      Years since quittin.7     Passive exposure: Past    Smokeless tobacco: Never   Vaping Use    Vaping status: Never Used   Substance and Sexual Activity    Alcohol use: Yes     Alcohol/week: 6.0 standard drinks of alcohol     Types: 6 Glasses of wine per week     Comment: Occasional     Drug use: Never    Sexual activity: Defer       Family History:  Family History   Problem Relation Age of Onset    Hyperlipidemia Mother     Hyperlipidemia Father     Heart attack Paternal Uncle        Past Surgical History:  Past Surgical History:   Procedure Laterality Date    CARDIAC CATHETERIZATION N/A 09/02/2021    Procedure: Left Heart Cath;  Surgeon: Vasile Moulton MD;  Location:  [facility];  Service: Cardiology;  Laterality: N/A;    CARDIAC CATHETERIZATION N/A 09/02/2021    Procedure: Percutaneous Coronary Intervention;  Surgeon: Vasile Moulton MD;  Location:  [facility];  Service: Cardiology;  Laterality: N/A;    CARDIAC CATHETERIZATION N/A 1/15/2024    Procedure: Coronary angiography;  Surgeon: Anant Bennett MD;  Location:  [facility];  Service: Cardiology;  Laterality: N/A;    COLONOSCOPY  2013    COLONOSCOPY N/A 10/4/2022    Procedure: COLONOSCOPY;  Surgeon: Gianluca Rodríguez MD;  Location: [facility] OR;  Service: Gastroenterology;  Laterality: N/A;    ENDOSCOPY  2013    ENDOSCOPY N/A 10/4/2022    Procedure: ESOPHAGOGASTRODUODENOSCOPY;  Surgeon: Gianluca Rodríguez MD;  Location: [facility] OR;  Service: Gastroenterology;  Laterality: N/A;    LAPAROSCOPIC CHOLECYSTECTOMY         Problem List:  Patient Active Problem List   Diagnosis    ASCVD (arteriosclerotic cardiovascular disease)    Essential hypertension    Dyslipidemia    DM (diabetes mellitus), type 2 with complications    SOB (shortness of breath)    Severe obesity (BMI 35.0-39.9) with comorbidity    Diarrhea    Abdominal pain    Dysphagia    Chronic heart failure with preserved ejection fraction (HFpEF)    Deformity of metatarsal    Diabetic peripheral neuropathy associated with type 2 diabetes mellitus    Epidermoid cyst    Foot pain    Hammer toe    Hereditary peripheral neuropathy    Low back pain    Lumbosacral radiculopathy    Lumbar spondylosis    Muscle pain    Onychomycosis of toenail     Pain of right hip joint    Peripheral vascular disease    Stasis dermatitis with venous ulcer of lower extremity due to chronic peripheral venous hypertension    Torticollis    Iron deficiency anemia    Hypomagnesemia    ALIA (obstructive sleep apnea)    Atypical angina       Allergy:   Allergies   Allergen Reactions    Tuberculin Tests Rash        Current Medications:   Current Outpatient Medications   Medication Sig Dispense Refill    albuterol (PROVENTIL) (2.5 MG/3ML) 0.083% nebulizer solution Take 2.5 mg by nebulization Every 6 (Six) Hours As Needed for Shortness of Air. 360 mL 2    allopurinol (ZYLOPRIM) 100 MG tablet Take 1 tablet by mouth Daily.      ALPRAZolam (XANAX) 1 MG tablet Take 1 tablet by mouth 3 (Three) Times a Day As Needed for Anxiety.      amLODIPine (NORVASC) 5 MG tablet Take 1 tablet by mouth Daily.      aspirin 81 MG EC tablet Take 1 tablet by mouth Daily. 90 tablet 3    atorvastatin (LIPITOR) 40 MG tablet Take 1 tablet by mouth Daily.      famotidine (Pepcid) 20 MG tablet Take 1 tablet by mouth Daily for 90 days. 30 tablet 2    ferrous sulfate 325 (65 FE) MG tablet Take 1 tablet by mouth 3 (Three) Times a Day With Meals. Taking BID      FLUoxetine (PROzac) 20 MG capsule Take 2 capsules by mouth Daily.      FLUoxetine (PROzac) 40 MG capsule Take 1 capsule by mouth Daily.      gabapentin (NEURONTIN) 800 MG tablet Take 1 tablet by mouth 3 (Three) Times a Day.      HumuLIN 70/30 KwikPen (70-30) 100 UNIT/ML suspension pen-injector Inject 40 Units under the skin into the appropriate area as directed 2 (Two) Times a Day. Takes at AM and 4 PM      hydrALAZINE (APRESOLINE) 50 MG tablet Take 1 tablet by mouth Every 8 (Eight) Hours. 90 tablet 0    hydrOXYzine (ATARAX) 10 MG tablet Take 1-2 tablets by mouth 3 (Three) Times a Day As Needed for Anxiety.      Insulin Lispro, 0.5 Unit Dial, (HUMALOG KWIKPEN ROSALBA) 100 UNIT/ML solution pen-injector Inject 60 Units under the skin into the appropriate area as  directed As Needed. Max of 60 units daily- use sliding scale PRN      isosorbide mononitrate (IMDUR) 120 MG 24 hr tablet Take 1 tablet by mouth Daily for 180 days. 90 tablet 1    Jardiance 10 MG tablet tablet Take 1 tablet by mouth Daily.      levocetirizine (XYZAL) 5 MG tablet Take 1 tablet by mouth Every Evening.      Magnesium 400 MG tablet Take 1 tablet by mouth 2 (Two) Times a Day. 60 tablet 0    Misc. Devices (Classics Rolling Walker) misc Use 1 Units Daily. 1 each 0    Misc. Devices (Classics Rolling Walker) misc Use 1 Units Daily. 1 each 0    nitroglycerin (NITROSTAT) 0.4 MG SL tablet Place 1 tablet under the tongue Every 5 (Five) Minutes As Needed for Chest Pain. do not exceed a total of 3 doses in 15 minutes      Omega-3 Fatty Acids (fish oil) 1000 MG capsule capsule Take 1 capsule by mouth Daily With Breakfast.      OneTouch Ultra test strip       pantoprazole (PROTONIX) 40 MG EC tablet Take 1 tablet by mouth Daily.      promethazine (PHENERGAN) 12.5 MG tablet Take 1 tablet by mouth 2 (Two) Times a Day As Needed for Nausea or Vomiting.      ranolazine (RANEXA) 1000 MG 12 hr tablet Take 1 tablet by mouth Every 12 (Twelve) Hours. 60 tablet 2    Semaglutide, 1 MG/DOSE, (Ozempic, 1 MG/DOSE,) 2 MG/1.5ML solution pen-injector Inject  under the skin into the appropriate area as directed 1 (One) Time Per Week.      tamsulosin (FLOMAX) 0.4 MG capsule 24 hr capsule Take 1 capsule by mouth Daily. 30 capsule 3    ticagrelor (BRILINTA) 90 MG tablet tablet Take 1 tablet by mouth 2 (Two) Times a Day. 180 tablet 3    torsemide (DEMADEX) 20 MG tablet Take 1 tablet by mouth Every 12 (Twelve) Hours.      traZODone (DESYREL) 100 MG tablet Take 1 tablet by mouth At Night As Needed for Sleep.      vitamin B-12 (CYANOCOBALAMIN) 1000 MCG tablet Take 1 tablet by mouth Daily.      vitamin D (ERGOCALCIFEROL) 1.25 MG (93229 UT) capsule capsule Take 1 capsule by mouth 1 (One) Time Per Week. 5 capsule 3    psyllium (METAMUCIL) 58.6 %  "powder Take 1 packet by mouth Daily. 30 packet 11     No current facility-administered medications for this visit.       Review of Systems:    Review of Systems   Gastrointestinal:  Negative for blood in stool.         Physical Exam:   Physical Exam  Constitutional:       Appearance: Normal appearance.   HENT:      Head: Normocephalic and atraumatic.      Right Ear: External ear normal.      Left Ear: External ear normal.   Eyes:      Conjunctiva/sclera: Conjunctivae normal.   Pulmonary:      Effort: Pulmonary effort is normal.   Abdominal:      General: Abdomen is flat.   Musculoskeletal:         General: Normal range of motion.      Cervical back: Normal range of motion and neck supple.   Skin:     General: Skin is warm and dry.      Capillary Refill: Capillary refill takes less than 2 seconds.   Neurological:      General: No focal deficit present.      Mental Status: He is alert and oriented to person, place, and time.   Psychiatric:         Mood and Affect: Mood normal.         Behavior: Behavior normal.       Vitals:  Blood pressure 124/68, height 167.6 cm (65.98\"), weight 115 kg (253 lb).   Body mass index is 40.86 kg/m².       Assessment & Plan   Diagnoses and all orders for this visit:    1. Screening for colon cancer (Primary)  -     Case Request; Standing  -     Case Request    2. Positive colorectal cancer screening using Cologuard test  -     Case Request; Standing  -     Case Request    3. Diarrhea, unspecified type  -     Case Request; Standing  -     Case Request    Other orders  -     psyllium (METAMUCIL) 58.6 % powder; Take 1 packet by mouth Daily.  Dispense: 30 packet; Refill: 11  -     Follow Anesthesia Guidelines / Protocol; Future  -     Follow Anesthesia Guidelines / Protocol; Standing  -     Verify / Perform Chlorhexidine Skin Prep; Standing  -     Obtain Informed Consent; Future  -     Provide NPO Instructions to Patient; Future  -     Chlorhexidine Skin Prep; Future  -     Place Sequential " Compression Device; Standing  -     Maintain Sequential Compression Device; Standing    Matthew is a 49 year old male who presents for a screening colonoscopy. We will attempt to obtain cardiac clearance from Dr. Bennett. If cleared he will undergo a colonoscopy with Dr. Rodríguez.       Visit Diagnoses:    ICD-10-CM ICD-9-CM   1. Screening for colon cancer  Z12.11 V76.51   2. Positive colorectal cancer screening using Cologuard test  R19.5 787.7   3. Diarrhea, unspecified type  R19.7 787.91         MEDS ORDERED DURING VISIT:  New Medications Ordered This Visit   Medications    psyllium (METAMUCIL) 58.6 % powder     Sig: Take 1 packet by mouth Daily.     Dispense:  30 packet     Refill:  11       Return for Follow up post-op.             This document has been electronically signed by CARLOS Farmer  October 7, 2024 13:39 EDT    Please note that portions of this note were completed with a voice recognition program.

## 2024-10-08 PROBLEM — Z12.11 SCREENING FOR COLON CANCER: Status: ACTIVE | Noted: 2024-10-07

## 2024-10-08 PROBLEM — R19.5 POSITIVE COLORECTAL CANCER SCREENING USING COLOGUARD TEST: Status: ACTIVE | Noted: 2024-10-07

## 2024-10-11 ENCOUNTER — APPOINTMENT (OUTPATIENT)
Dept: MRI IMAGING | Facility: HOSPITAL | Age: 49
DRG: 312 | End: 2024-10-11
Payer: MEDICARE

## 2024-10-11 ENCOUNTER — APPOINTMENT (OUTPATIENT)
Dept: CT IMAGING | Facility: HOSPITAL | Age: 49
DRG: 312 | End: 2024-10-11
Payer: MEDICARE

## 2024-10-11 ENCOUNTER — HOSPITAL ENCOUNTER (INPATIENT)
Facility: HOSPITAL | Age: 49
LOS: 1 days | Discharge: HOME | DRG: 312 | End: 2024-10-12
Attending: STUDENT IN AN ORGANIZED HEALTH CARE EDUCATION/TRAINING PROGRAM | Admitting: HOSPITALIST
Payer: MEDICARE

## 2024-10-11 DIAGNOSIS — S09.90XA INJURY OF HEAD, INITIAL ENCOUNTER: ICD-10-CM

## 2024-10-11 DIAGNOSIS — I25.10 ASCVD (ARTERIOSCLEROTIC CARDIOVASCULAR DISEASE): Chronic | ICD-10-CM

## 2024-10-11 DIAGNOSIS — S30.1XXA CONTUSION OF ABDOMINAL WALL, INITIAL ENCOUNTER: ICD-10-CM

## 2024-10-11 DIAGNOSIS — R55 SYNCOPE, UNSPECIFIED SYNCOPE TYPE: Primary | ICD-10-CM

## 2024-10-11 PROBLEM — R07.9 CHEST PAIN: Status: ACTIVE | Noted: 2024-10-11

## 2024-10-11 LAB
A-A DO2: 17.5 MMHG (ref 0–300)
ALBUMIN SERPL-MCNC: 4.3 G/DL (ref 3.5–5.2)
ALBUMIN/GLOB SERPL: 1.3 G/DL
ALP SERPL-CCNC: 86 U/L (ref 39–117)
ALT SERPL W P-5'-P-CCNC: 35 U/L (ref 1–41)
AMMONIA BLD-SCNC: 13 UMOL/L (ref 16–60)
AMPHET+METHAMPHET UR QL: NEGATIVE
AMPHETAMINES UR QL: NEGATIVE
ANION GAP SERPL CALCULATED.3IONS-SCNC: 10.6 MMOL/L (ref 5–15)
ANION GAP SERPL CALCULATED.3IONS-SCNC: 11.1 MMOL/L (ref 5–15)
ARTERIAL PATENCY WRIST A: ABNORMAL
AST SERPL-CCNC: 33 U/L (ref 1–40)
ATMOSPHERIC PRESS: 731 MMHG
BARBITURATES UR QL SCN: NEGATIVE
BASE EXCESS BLDA CALC-SCNC: 1.7 MMOL/L (ref 0–2)
BASOPHILS # BLD AUTO: 0.02 10*3/MM3 (ref 0–0.2)
BASOPHILS # BLD AUTO: 0.03 10*3/MM3 (ref 0–0.2)
BASOPHILS NFR BLD AUTO: 0.3 % (ref 0–1.5)
BASOPHILS NFR BLD AUTO: 0.6 % (ref 0–1.5)
BDY SITE: ABNORMAL
BENZODIAZ UR QL SCN: POSITIVE
BILIRUB SERPL-MCNC: 0.5 MG/DL (ref 0–1.2)
BILIRUB UR QL STRIP: NEGATIVE
BUN SERPL-MCNC: 36 MG/DL (ref 6–20)
BUN SERPL-MCNC: 39 MG/DL (ref 6–20)
BUN/CREAT SERPL: 14.6 (ref 7–25)
BUN/CREAT SERPL: 14.9 (ref 7–25)
BUPRENORPHINE SERPL-MCNC: NEGATIVE NG/ML
CALCIUM SPEC-SCNC: 9 MG/DL (ref 8.6–10.5)
CALCIUM SPEC-SCNC: 9.3 MG/DL (ref 8.6–10.5)
CANNABINOIDS SERPL QL: NEGATIVE
CHLORIDE SERPL-SCNC: 101 MMOL/L (ref 98–107)
CHLORIDE SERPL-SCNC: 99 MMOL/L (ref 98–107)
CK SERPL-CCNC: 184 U/L (ref 20–200)
CLARITY UR: CLEAR
CO2 BLDA-SCNC: 28.9 MMOL/L (ref 22–33)
CO2 SERPL-SCNC: 25.4 MMOL/L (ref 22–29)
CO2 SERPL-SCNC: 29.9 MMOL/L (ref 22–29)
COCAINE UR QL: NEGATIVE
COHGB MFR BLD: 0.3 % (ref 0–5)
COLOR UR: YELLOW
CREAT SERPL-MCNC: 2.47 MG/DL (ref 0.76–1.27)
CREAT SERPL-MCNC: 2.61 MG/DL (ref 0.76–1.27)
DEPRECATED RDW RBC AUTO: 44.6 FL (ref 37–54)
DEPRECATED RDW RBC AUTO: 46.6 FL (ref 37–54)
EGFRCR SERPLBLD CKD-EPI 2021: 29.2 ML/MIN/1.73
EGFRCR SERPLBLD CKD-EPI 2021: 31.2 ML/MIN/1.73
EOSINOPHIL # BLD AUTO: 0.07 10*3/MM3 (ref 0–0.4)
EOSINOPHIL # BLD AUTO: 0.09 10*3/MM3 (ref 0–0.4)
EOSINOPHIL NFR BLD AUTO: 1.2 % (ref 0.3–6.2)
EOSINOPHIL NFR BLD AUTO: 1.8 % (ref 0.3–6.2)
ERYTHROCYTE [DISTWIDTH] IN BLOOD BY AUTOMATED COUNT: 12.7 % (ref 12.3–15.4)
ERYTHROCYTE [DISTWIDTH] IN BLOOD BY AUTOMATED COUNT: 12.9 % (ref 12.3–15.4)
ETHANOL BLD-MCNC: <10 MG/DL (ref 0–10)
ETHANOL UR QL: <0.01 %
FENTANYL UR-MCNC: NEGATIVE NG/ML
GLOBULIN UR ELPH-MCNC: 3.3 GM/DL
GLUCOSE BLDC GLUCOMTR-MCNC: 199 MG/DL (ref 70–130)
GLUCOSE BLDC GLUCOMTR-MCNC: 241 MG/DL (ref 70–130)
GLUCOSE BLDC GLUCOMTR-MCNC: 279 MG/DL (ref 70–130)
GLUCOSE SERPL-MCNC: 215 MG/DL (ref 65–99)
GLUCOSE SERPL-MCNC: 276 MG/DL (ref 65–99)
GLUCOSE UR STRIP-MCNC: ABNORMAL MG/DL
HCO3 BLDA-SCNC: 27.5 MMOL/L (ref 20–26)
HCT VFR BLD AUTO: 37.8 % (ref 37.5–51)
HCT VFR BLD AUTO: 37.9 % (ref 37.5–51)
HCT VFR BLD CALC: 36.2 % (ref 38–51)
HGB BLD-MCNC: 12.1 G/DL (ref 13–17.7)
HGB BLD-MCNC: 12.5 G/DL (ref 13–17.7)
HGB BLDA-MCNC: 11.8 G/DL (ref 14–18)
HGB UR QL STRIP.AUTO: NEGATIVE
HOLD SPECIMEN: NORMAL
HOLD SPECIMEN: NORMAL
IMM GRANULOCYTES # BLD AUTO: 0.01 10*3/MM3 (ref 0–0.05)
IMM GRANULOCYTES # BLD AUTO: 0.02 10*3/MM3 (ref 0–0.05)
IMM GRANULOCYTES NFR BLD AUTO: 0.2 % (ref 0–0.5)
IMM GRANULOCYTES NFR BLD AUTO: 0.4 % (ref 0–0.5)
INHALED O2 CONCENTRATION: 21 %
KETONES UR QL STRIP: NEGATIVE
LEUKOCYTE ESTERASE UR QL STRIP.AUTO: NEGATIVE
LYMPHOCYTES # BLD AUTO: 1.1 10*3/MM3 (ref 0.7–3.1)
LYMPHOCYTES # BLD AUTO: 1.16 10*3/MM3 (ref 0.7–3.1)
LYMPHOCYTES NFR BLD AUTO: 19.1 % (ref 19.6–45.3)
LYMPHOCYTES NFR BLD AUTO: 23 % (ref 19.6–45.3)
Lab: ABNORMAL
MAGNESIUM SERPL-MCNC: 2.2 MG/DL (ref 1.6–2.6)
MAGNESIUM SERPL-MCNC: 2.2 MG/DL (ref 1.6–2.6)
MCH RBC QN AUTO: 31.7 PG (ref 26.6–33)
MCH RBC QN AUTO: 31.8 PG (ref 26.6–33)
MCHC RBC AUTO-ENTMCNC: 31.9 G/DL (ref 31.5–35.7)
MCHC RBC AUTO-ENTMCNC: 33.1 G/DL (ref 31.5–35.7)
MCV RBC AUTO: 95.9 FL (ref 79–97)
MCV RBC AUTO: 99.7 FL (ref 79–97)
METHADONE UR QL SCN: NEGATIVE
METHGB BLD QL: 0.2 % (ref 0–3)
MODALITY: ABNORMAL
MONOCYTES # BLD AUTO: 0.5 10*3/MM3 (ref 0.1–0.9)
MONOCYTES # BLD AUTO: 0.59 10*3/MM3 (ref 0.1–0.9)
MONOCYTES NFR BLD AUTO: 10.2 % (ref 5–12)
MONOCYTES NFR BLD AUTO: 9.9 % (ref 5–12)
NEUTROPHILS NFR BLD AUTO: 3.24 10*3/MM3 (ref 1.7–7)
NEUTROPHILS NFR BLD AUTO: 3.97 10*3/MM3 (ref 1.7–7)
NEUTROPHILS NFR BLD AUTO: 64.3 % (ref 42.7–76)
NEUTROPHILS NFR BLD AUTO: 69 % (ref 42.7–76)
NITRITE UR QL STRIP: NEGATIVE
NRBC BLD AUTO-RTO: 0 /100 WBC (ref 0–0.2)
NRBC BLD AUTO-RTO: 0 /100 WBC (ref 0–0.2)
OPIATES UR QL: NEGATIVE
OXYCODONE UR QL SCN: NEGATIVE
OXYHGB MFR BLDV: 90.9 % (ref 94–99)
PCO2 BLDA: 47.3 MM HG (ref 35–45)
PCO2 TEMP ADJ BLD: ABNORMAL MM[HG]
PCP UR QL SCN: NEGATIVE
PH BLDA: 7.37 PH UNITS (ref 7.35–7.45)
PH UR STRIP.AUTO: 6 [PH] (ref 5–8)
PH, TEMP CORRECTED: ABNORMAL
PHOSPHATE SERPL-MCNC: 3.7 MG/DL (ref 2.5–4.5)
PLATELET # BLD AUTO: 130 10*3/MM3 (ref 140–450)
PLATELET # BLD AUTO: 148 10*3/MM3 (ref 140–450)
PMV BLD AUTO: 10.4 FL (ref 6–12)
PMV BLD AUTO: 10.5 FL (ref 6–12)
PO2 BLDA: 72.7 MM HG (ref 83–108)
PO2 TEMP ADJ BLD: ABNORMAL MM[HG]
POTASSIUM SERPL-SCNC: 4 MMOL/L (ref 3.5–5.2)
POTASSIUM SERPL-SCNC: 4.1 MMOL/L (ref 3.5–5.2)
PROT SERPL-MCNC: 7.6 G/DL (ref 6–8.5)
PROT UR QL STRIP: NEGATIVE
RBC # BLD AUTO: 3.8 10*6/MM3 (ref 4.14–5.8)
RBC # BLD AUTO: 3.94 10*6/MM3 (ref 4.14–5.8)
SAO2 % BLDCOA: 91.4 % (ref 94–99)
SODIUM SERPL-SCNC: 137 MMOL/L (ref 136–145)
SODIUM SERPL-SCNC: 140 MMOL/L (ref 136–145)
SP GR UR STRIP: 1.01 (ref 1–1.03)
TRICYCLICS UR QL SCN: NEGATIVE
TROPONIN T SERPL HS-MCNC: 23 NG/L
TSH SERPL DL<=0.05 MIU/L-ACNC: 0.87 UIU/ML (ref 0.27–4.2)
UROBILINOGEN UR QL STRIP: ABNORMAL
VENTILATOR MODE: ABNORMAL
WBC NRBC COR # BLD AUTO: 5.04 10*3/MM3 (ref 3.4–10.8)
WBC NRBC COR # BLD AUTO: 5.76 10*3/MM3 (ref 3.4–10.8)
WHOLE BLOOD HOLD COAG: NORMAL
WHOLE BLOOD HOLD SPECIMEN: NORMAL

## 2024-10-11 PROCEDURE — 82948 REAGENT STRIP/BLOOD GLUCOSE: CPT

## 2024-10-11 PROCEDURE — A9270 NON-COVERED ITEM OR SERVICE: HCPCS | Performed by: HOSPITALIST

## 2024-10-11 PROCEDURE — 63710000001 HYDRALAZINE 50 MG TABLET: Performed by: HOSPITALIST

## 2024-10-11 PROCEDURE — 25010000002 MORPHINE PER 10 MG: Performed by: STUDENT IN AN ORGANIZED HEALTH CARE EDUCATION/TRAINING PROGRAM

## 2024-10-11 PROCEDURE — 71275 CT ANGIOGRAPHY CHEST: CPT

## 2024-10-11 PROCEDURE — 25810000003 SODIUM CHLORIDE 0.9 % SOLUTION: Performed by: HOSPITALIST

## 2024-10-11 PROCEDURE — 63710000001 HYDROCODONE-ACETAMINOPHEN 5-325 MG TABLET: Performed by: HOSPITALIST

## 2024-10-11 PROCEDURE — 72128 CT CHEST SPINE W/O DYE: CPT | Performed by: RADIOLOGY

## 2024-10-11 PROCEDURE — 93010 ELECTROCARDIOGRAM REPORT: CPT | Performed by: INTERNAL MEDICINE

## 2024-10-11 PROCEDURE — 36415 COLL VENOUS BLD VENIPUNCTURE: CPT

## 2024-10-11 PROCEDURE — 72128 CT CHEST SPINE W/O DYE: CPT

## 2024-10-11 PROCEDURE — 25510000001 IOPAMIDOL PER 1 ML: Performed by: STUDENT IN AN ORGANIZED HEALTH CARE EDUCATION/TRAINING PROGRAM

## 2024-10-11 PROCEDURE — 96372 THER/PROPH/DIAG INJ SC/IM: CPT

## 2024-10-11 PROCEDURE — 99285 EMERGENCY DEPT VISIT HI MDM: CPT

## 2024-10-11 PROCEDURE — 84100 ASSAY OF PHOSPHORUS: CPT | Performed by: HOSPITALIST

## 2024-10-11 PROCEDURE — 82077 ASSAY SPEC XCP UR&BREATH IA: CPT | Performed by: PHYSICIAN ASSISTANT

## 2024-10-11 PROCEDURE — 99223 1ST HOSP IP/OBS HIGH 75: CPT

## 2024-10-11 PROCEDURE — 63710000001 NYSTATIN 100000 UNIT/GM CREAM 15 G TUBE: Performed by: HOSPITALIST

## 2024-10-11 PROCEDURE — 85025 COMPLETE CBC W/AUTO DIFF WBC: CPT | Performed by: PHYSICIAN ASSISTANT

## 2024-10-11 PROCEDURE — 70498 CT ANGIOGRAPHY NECK: CPT | Performed by: RADIOLOGY

## 2024-10-11 PROCEDURE — 72125 CT NECK SPINE W/O DYE: CPT

## 2024-10-11 PROCEDURE — 81003 URINALYSIS AUTO W/O SCOPE: CPT | Performed by: PHYSICIAN ASSISTANT

## 2024-10-11 PROCEDURE — 94799 UNLISTED PULMONARY SVC/PX: CPT

## 2024-10-11 PROCEDURE — 70551 MRI BRAIN STEM W/O DYE: CPT

## 2024-10-11 PROCEDURE — 63710000001 MAGNESIUM OXIDE -MG SUPPLEMENT 400 (240 MG) MG TABLET: Performed by: HOSPITALIST

## 2024-10-11 PROCEDURE — 96361 HYDRATE IV INFUSION ADD-ON: CPT

## 2024-10-11 PROCEDURE — 83735 ASSAY OF MAGNESIUM: CPT | Performed by: PHYSICIAN ASSISTANT

## 2024-10-11 PROCEDURE — 82550 ASSAY OF CK (CPK): CPT | Performed by: PHYSICIAN ASSISTANT

## 2024-10-11 PROCEDURE — 82140 ASSAY OF AMMONIA: CPT | Performed by: PHYSICIAN ASSISTANT

## 2024-10-11 PROCEDURE — 72125 CT NECK SPINE W/O DYE: CPT | Performed by: RADIOLOGY

## 2024-10-11 PROCEDURE — 72131 CT LUMBAR SPINE W/O DYE: CPT | Performed by: RADIOLOGY

## 2024-10-11 PROCEDURE — 94664 DEMO&/EVAL PT USE INHALER: CPT

## 2024-10-11 PROCEDURE — G0378 HOSPITAL OBSERVATION PER HR: HCPCS

## 2024-10-11 PROCEDURE — 82375 ASSAY CARBOXYHB QUANT: CPT

## 2024-10-11 PROCEDURE — 85025 COMPLETE CBC W/AUTO DIFF WBC: CPT | Performed by: HOSPITALIST

## 2024-10-11 PROCEDURE — 74174 CTA ABD&PLVS W/CONTRAST: CPT | Performed by: RADIOLOGY

## 2024-10-11 PROCEDURE — 84484 ASSAY OF TROPONIN QUANT: CPT | Performed by: PHYSICIAN ASSISTANT

## 2024-10-11 PROCEDURE — 80048 BASIC METABOLIC PNL TOTAL CA: CPT | Performed by: HOSPITALIST

## 2024-10-11 PROCEDURE — 93005 ELECTROCARDIOGRAM TRACING: CPT | Performed by: PHYSICIAN ASSISTANT

## 2024-10-11 PROCEDURE — 70498 CT ANGIOGRAPHY NECK: CPT

## 2024-10-11 PROCEDURE — 80307 DRUG TEST PRSMV CHEM ANLYZR: CPT | Performed by: PHYSICIAN ASSISTANT

## 2024-10-11 PROCEDURE — 63710000001 INSULIN LISPRO (HUMAN) PER 5 UNITS: Performed by: HOSPITALIST

## 2024-10-11 PROCEDURE — 63710000001 ALPRAZOLAM 1 MG TABLET: Performed by: HOSPITALIST

## 2024-10-11 PROCEDURE — 63710000001 TICAGRELOR 90 MG TABLET: Performed by: HOSPITALIST

## 2024-10-11 PROCEDURE — 94761 N-INVAS EAR/PLS OXIMETRY MLT: CPT

## 2024-10-11 PROCEDURE — 63710000001 TRAZODONE 50 MG TABLET: Performed by: HOSPITALIST

## 2024-10-11 PROCEDURE — 70551 MRI BRAIN STEM W/O DYE: CPT | Performed by: RADIOLOGY

## 2024-10-11 PROCEDURE — 63710000001 SENNOSIDES-DOCUSATE 8.6-50 MG TABLET: Performed by: HOSPITALIST

## 2024-10-11 PROCEDURE — 80053 COMPREHEN METABOLIC PANEL: CPT | Performed by: HOSPITALIST

## 2024-10-11 PROCEDURE — 96374 THER/PROPH/DIAG INJ IV PUSH: CPT

## 2024-10-11 PROCEDURE — 72131 CT LUMBAR SPINE W/O DYE: CPT

## 2024-10-11 PROCEDURE — 25810000003 SODIUM CHLORIDE 0.9 % SOLUTION: Performed by: PHYSICIAN ASSISTANT

## 2024-10-11 PROCEDURE — 63710000001 AMLODIPINE 5 MG TABLET: Performed by: HOSPITALIST

## 2024-10-11 PROCEDURE — 63710000001 GABAPENTIN 400 MG CAPSULE: Performed by: HOSPITALIST

## 2024-10-11 PROCEDURE — 70450 CT HEAD/BRAIN W/O DYE: CPT

## 2024-10-11 PROCEDURE — 74174 CTA ABD&PLVS W/CONTRAST: CPT

## 2024-10-11 PROCEDURE — 25010000002 ENOXAPARIN PER 10 MG: Performed by: HOSPITALIST

## 2024-10-11 PROCEDURE — 63710000001 FERROUS SULFATE 325 (65 FE) MG TABLET: Performed by: HOSPITALIST

## 2024-10-11 PROCEDURE — 63710000001 RANOLAZINE 500 MG TABLET SUSTAINED-RELEASE 12 HOUR: Performed by: HOSPITALIST

## 2024-10-11 PROCEDURE — 63710000001 INSULIN LISPRO PROTAMINE-INSULIN LISPRO (75-25) 100 UNIT/ML SUSPENSION: Performed by: HOSPITALIST

## 2024-10-11 PROCEDURE — 83050 HGB METHEMOGLOBIN QUAN: CPT

## 2024-10-11 PROCEDURE — 84443 ASSAY THYROID STIM HORMONE: CPT | Performed by: PHYSICIAN ASSISTANT

## 2024-10-11 PROCEDURE — 82805 BLOOD GASES W/O2 SATURATION: CPT

## 2024-10-11 PROCEDURE — 36600 WITHDRAWAL OF ARTERIAL BLOOD: CPT

## 2024-10-11 PROCEDURE — 83735 ASSAY OF MAGNESIUM: CPT | Performed by: HOSPITALIST

## 2024-10-11 PROCEDURE — 71275 CT ANGIOGRAPHY CHEST: CPT | Performed by: RADIOLOGY

## 2024-10-11 RX ORDER — ALPRAZOLAM 1 MG/1
1 TABLET ORAL 3 TIMES DAILY PRN
COMMUNITY

## 2024-10-11 RX ORDER — IOPAMIDOL 755 MG/ML
100 INJECTION, SOLUTION INTRAVASCULAR
Status: COMPLETED | OUTPATIENT
Start: 2024-10-11 | End: 2024-10-11

## 2024-10-11 RX ORDER — POLYETHYLENE GLYCOL 3350 17 G/17G
17 POWDER, FOR SOLUTION ORAL DAILY PRN
Status: DISCONTINUED | OUTPATIENT
Start: 2024-10-11 | End: 2024-10-12 | Stop reason: HOSPADM

## 2024-10-11 RX ORDER — TORSEMIDE 20 MG/1
20 TABLET ORAL 2 TIMES DAILY
Status: CANCELLED | OUTPATIENT
Start: 2024-10-11

## 2024-10-11 RX ORDER — CETIRIZINE HYDROCHLORIDE 10 MG/1
10 TABLET ORAL DAILY
Status: DISCONTINUED | OUTPATIENT
Start: 2024-10-12 | End: 2024-10-12 | Stop reason: HOSPADM

## 2024-10-11 RX ORDER — PANTOPRAZOLE SODIUM 40 MG/1
40 TABLET, DELAYED RELEASE ORAL DAILY
COMMUNITY

## 2024-10-11 RX ORDER — ALBUTEROL SULFATE 0.83 MG/ML
2.5 SOLUTION RESPIRATORY (INHALATION) EVERY 8 HOURS PRN
COMMUNITY

## 2024-10-11 RX ORDER — ASPIRIN 81 MG/1
81 TABLET ORAL DAILY
COMMUNITY
End: 2024-10-12 | Stop reason: HOSPADM

## 2024-10-11 RX ORDER — ISOSORBIDE MONONITRATE 120 MG/1
120 TABLET, EXTENDED RELEASE ORAL DAILY
COMMUNITY
End: 2024-10-12

## 2024-10-11 RX ORDER — AMOXICILLIN 250 MG
2 CAPSULE ORAL 2 TIMES DAILY
Status: DISCONTINUED | OUTPATIENT
Start: 2024-10-11 | End: 2024-10-12 | Stop reason: HOSPADM

## 2024-10-11 RX ORDER — INSULIN LISPRO 100 [IU]/ML
2-7 INJECTION, SOLUTION INTRAVENOUS; SUBCUTANEOUS
Status: DISCONTINUED | OUTPATIENT
Start: 2024-10-11 | End: 2024-10-12 | Stop reason: HOSPADM

## 2024-10-11 RX ORDER — DEXTROSE MONOHYDRATE 25 G/50ML
25 INJECTION, SOLUTION INTRAVENOUS
Status: DISCONTINUED | OUTPATIENT
Start: 2024-10-11 | End: 2024-10-12 | Stop reason: HOSPADM

## 2024-10-11 RX ORDER — RANOLAZINE 1000 MG/1
1000 TABLET, EXTENDED RELEASE ORAL EVERY 12 HOURS SCHEDULED
COMMUNITY
End: 2024-11-06

## 2024-10-11 RX ORDER — ERGOCALCIFEROL 1.25 MG/1
50000 CAPSULE, LIQUID FILLED ORAL WEEKLY
COMMUNITY

## 2024-10-11 RX ORDER — GABAPENTIN 400 MG/1
400 CAPSULE ORAL EVERY 12 HOURS SCHEDULED
Status: DISCONTINUED | OUTPATIENT
Start: 2024-10-11 | End: 2024-10-12 | Stop reason: HOSPADM

## 2024-10-11 RX ORDER — INSULIN LISPRO 100 [IU]/ML
0-60 INJECTION, SOLUTION INTRAVENOUS; SUBCUTANEOUS DAILY
Status: CANCELLED | OUTPATIENT
Start: 2024-10-11

## 2024-10-11 RX ORDER — LANOLIN ALCOHOL/MO/W.PET/CERES
1000 CREAM (GRAM) TOPICAL DAILY
COMMUNITY
End: 2024-10-12 | Stop reason: HOSPADM

## 2024-10-11 RX ORDER — SEMAGLUTIDE 0.68 MG/ML
0.5 INJECTION, SOLUTION SUBCUTANEOUS WEEKLY
COMMUNITY

## 2024-10-11 RX ORDER — ASPIRIN 81 MG/1
81 TABLET ORAL DAILY
Status: DISCONTINUED | OUTPATIENT
Start: 2024-10-11 | End: 2024-10-12 | Stop reason: HOSPADM

## 2024-10-11 RX ORDER — ATORVASTATIN CALCIUM 40 MG/1
40 TABLET, FILM COATED ORAL DAILY
Status: DISCONTINUED | OUTPATIENT
Start: 2024-10-11 | End: 2024-10-12 | Stop reason: HOSPADM

## 2024-10-11 RX ORDER — NICOTINE POLACRILEX 4 MG
15 LOZENGE BUCCAL
Status: DISCONTINUED | OUTPATIENT
Start: 2024-10-11 | End: 2024-10-12 | Stop reason: HOSPADM

## 2024-10-11 RX ORDER — SODIUM CHLORIDE 0.9 % (FLUSH) 0.9 %
10 SYRINGE (ML) INJECTION AS NEEDED
Status: DISCONTINUED | OUTPATIENT
Start: 2024-10-11 | End: 2024-10-12 | Stop reason: HOSPADM

## 2024-10-11 RX ORDER — NYSTATIN 100000 U/G
1 CREAM TOPICAL 2 TIMES DAILY
COMMUNITY

## 2024-10-11 RX ORDER — FERROUS SULFATE 325(65) MG
325 TABLET ORAL
Status: DISCONTINUED | OUTPATIENT
Start: 2024-10-11 | End: 2024-10-12 | Stop reason: HOSPADM

## 2024-10-11 RX ORDER — ALBUTEROL SULFATE 0.83 MG/ML
2.5 SOLUTION RESPIRATORY (INHALATION) EVERY 6 HOURS PRN
Status: DISCONTINUED | OUTPATIENT
Start: 2024-10-11 | End: 2024-10-12 | Stop reason: HOSPADM

## 2024-10-11 RX ORDER — ALLOPURINOL 300 MG/1
300 TABLET ORAL DAILY
Status: DISCONTINUED | OUTPATIENT
Start: 2024-10-12 | End: 2024-10-12 | Stop reason: HOSPADM

## 2024-10-11 RX ORDER — GLUCAGON 1 MG/ML
1 KIT INJECTION
Status: DISCONTINUED | OUTPATIENT
Start: 2024-10-11 | End: 2024-10-12 | Stop reason: HOSPADM

## 2024-10-11 RX ORDER — FAMOTIDINE 20 MG/1
20 TABLET, FILM COATED ORAL DAILY
COMMUNITY

## 2024-10-11 RX ORDER — FLUOXETINE 40 MG/1
40 CAPSULE ORAL DAILY
COMMUNITY

## 2024-10-11 RX ORDER — BISACODYL 10 MG
10 SUPPOSITORY, RECTAL RECTAL DAILY PRN
Status: DISCONTINUED | OUTPATIENT
Start: 2024-10-11 | End: 2024-10-12 | Stop reason: HOSPADM

## 2024-10-11 RX ORDER — LANOLIN ALCOHOL/MO/W.PET/CERES
1000 CREAM (GRAM) TOPICAL DAILY
Status: DISCONTINUED | OUTPATIENT
Start: 2024-10-12 | End: 2024-10-12 | Stop reason: HOSPADM

## 2024-10-11 RX ORDER — BISACODYL 5 MG/1
5 TABLET, DELAYED RELEASE ORAL DAILY PRN
Status: DISCONTINUED | OUTPATIENT
Start: 2024-10-11 | End: 2024-10-12 | Stop reason: HOSPADM

## 2024-10-11 RX ORDER — RANOLAZINE 500 MG/1
1000 TABLET, EXTENDED RELEASE ORAL EVERY 12 HOURS SCHEDULED
Status: DISCONTINUED | OUTPATIENT
Start: 2024-10-11 | End: 2024-10-12 | Stop reason: HOSPADM

## 2024-10-11 RX ORDER — TRAZODONE HYDROCHLORIDE 150 MG/1
1-2 TABLET ORAL NIGHTLY PRN
COMMUNITY

## 2024-10-11 RX ORDER — LEVOCETIRIZINE DIHYDROCHLORIDE 5 MG/1
5 TABLET, FILM COATED ORAL DAILY
COMMUNITY

## 2024-10-11 RX ORDER — HYDROXYZINE HYDROCHLORIDE 25 MG/1
1-2 TABLET, FILM COATED ORAL 3 TIMES DAILY PRN
COMMUNITY

## 2024-10-11 RX ORDER — FERROUS SULFATE 324(65)MG
324 TABLET, DELAYED RELEASE (ENTERIC COATED) ORAL 3 TIMES DAILY
COMMUNITY

## 2024-10-11 RX ORDER — NITROGLYCERIN 0.4 MG/1
0.4 TABLET SUBLINGUAL
COMMUNITY

## 2024-10-11 RX ORDER — NITROGLYCERIN 0.4 MG/1
0.4 TABLET SUBLINGUAL
Status: DISCONTINUED | OUTPATIENT
Start: 2024-10-11 | End: 2024-10-12 | Stop reason: HOSPADM

## 2024-10-11 RX ORDER — TORSEMIDE 20 MG/1
20 TABLET ORAL 2 TIMES DAILY
COMMUNITY

## 2024-10-11 RX ORDER — ENOXAPARIN SODIUM 100 MG/ML
40 INJECTION SUBCUTANEOUS EVERY 12 HOURS
Status: DISCONTINUED | OUTPATIENT
Start: 2024-10-11 | End: 2024-10-12 | Stop reason: HOSPADM

## 2024-10-11 RX ORDER — PANTOPRAZOLE SODIUM 40 MG/1
40 TABLET, DELAYED RELEASE ORAL DAILY
Status: DISCONTINUED | OUTPATIENT
Start: 2024-10-12 | End: 2024-10-12 | Stop reason: HOSPADM

## 2024-10-11 RX ORDER — FAMOTIDINE 20 MG/1
20 TABLET, FILM COATED ORAL DAILY
Status: DISCONTINUED | OUTPATIENT
Start: 2024-10-11 | End: 2024-10-11

## 2024-10-11 RX ORDER — INSULIN LISPRO 100 [IU]/ML
0-60 INJECTION, SOLUTION SUBCUTANEOUS DAILY
COMMUNITY

## 2024-10-11 RX ORDER — HYDRALAZINE HYDROCHLORIDE 50 MG/1
50 TABLET, FILM COATED ORAL EVERY 8 HOURS
COMMUNITY

## 2024-10-11 RX ORDER — NYSTATIN 100000 U/G
1 CREAM TOPICAL 2 TIMES DAILY
Status: DISCONTINUED | OUTPATIENT
Start: 2024-10-11 | End: 2024-10-12 | Stop reason: HOSPADM

## 2024-10-11 RX ORDER — TRAZODONE HYDROCHLORIDE 50 MG/1
150 TABLET, FILM COATED ORAL NIGHTLY PRN
Status: DISCONTINUED | OUTPATIENT
Start: 2024-10-11 | End: 2024-10-12 | Stop reason: HOSPADM

## 2024-10-11 RX ORDER — SODIUM CHLORIDE 9 MG/ML
100 INJECTION, SOLUTION INTRAVENOUS CONTINUOUS
Status: DISCONTINUED | OUTPATIENT
Start: 2024-10-11 | End: 2024-10-12

## 2024-10-11 RX ORDER — AMLODIPINE BESYLATE 5 MG/1
5 TABLET ORAL NIGHTLY
COMMUNITY

## 2024-10-11 RX ORDER — ACETAMINOPHEN 325 MG/1
650 TABLET ORAL EVERY 6 HOURS PRN
Status: DISCONTINUED | OUTPATIENT
Start: 2024-10-11 | End: 2024-10-12 | Stop reason: HOSPADM

## 2024-10-11 RX ORDER — ALBUTEROL SULFATE 90 UG/1
2 INHALANT RESPIRATORY (INHALATION) EVERY 4 HOURS PRN
COMMUNITY

## 2024-10-11 RX ORDER — ALPRAZOLAM 1 MG/1
1 TABLET ORAL 3 TIMES DAILY PRN
Status: DISCONTINUED | OUTPATIENT
Start: 2024-10-11 | End: 2024-10-12 | Stop reason: HOSPADM

## 2024-10-11 RX ORDER — HYDROXYZINE HYDROCHLORIDE 25 MG/1
25 TABLET, FILM COATED ORAL 3 TIMES DAILY PRN
Status: DISCONTINUED | OUTPATIENT
Start: 2024-10-11 | End: 2024-10-12 | Stop reason: HOSPADM

## 2024-10-11 RX ORDER — PROMETHAZINE HYDROCHLORIDE 25 MG/1
12.5 TABLET ORAL 2 TIMES DAILY PRN
Status: DISCONTINUED | OUTPATIENT
Start: 2024-10-11 | End: 2024-10-12 | Stop reason: HOSPADM

## 2024-10-11 RX ORDER — ISOSORBIDE MONONITRATE 60 MG/1
120 TABLET, EXTENDED RELEASE ORAL DAILY
Status: DISCONTINUED | OUTPATIENT
Start: 2024-10-11 | End: 2024-10-12 | Stop reason: HOSPADM

## 2024-10-11 RX ORDER — MAGNESIUM OXIDE 400 MG/1
400 TABLET ORAL 2 TIMES DAILY
COMMUNITY

## 2024-10-11 RX ORDER — PROMETHAZINE HYDROCHLORIDE 12.5 MG/1
12.5 TABLET ORAL 2 TIMES DAILY PRN
COMMUNITY

## 2024-10-11 RX ORDER — AMLODIPINE BESYLATE 5 MG/1
5 TABLET ORAL NIGHTLY
Status: DISCONTINUED | OUTPATIENT
Start: 2024-10-11 | End: 2024-10-12 | Stop reason: HOSPADM

## 2024-10-11 RX ORDER — SODIUM CHLORIDE 0.9 % (FLUSH) 0.9 %
10 SYRINGE (ML) INJECTION EVERY 12 HOURS SCHEDULED
Status: DISCONTINUED | OUTPATIENT
Start: 2024-10-11 | End: 2024-10-12 | Stop reason: HOSPADM

## 2024-10-11 RX ORDER — ALLOPURINOL 100 MG/1
100 TABLET ORAL DAILY
COMMUNITY

## 2024-10-11 RX ORDER — ATORVASTATIN CALCIUM 40 MG/1
40 TABLET, FILM COATED ORAL DAILY
COMMUNITY

## 2024-10-11 RX ORDER — INSULIN HUMAN 100 [IU]/ML
40 INJECTION, SUSPENSION SUBCUTANEOUS 2 TIMES DAILY
COMMUNITY

## 2024-10-11 RX ORDER — TAMSULOSIN HYDROCHLORIDE 0.4 MG/1
0.4 CAPSULE ORAL DAILY
Status: DISCONTINUED | OUTPATIENT
Start: 2024-10-12 | End: 2024-10-12 | Stop reason: HOSPADM

## 2024-10-11 RX ORDER — GABAPENTIN 800 MG/1
800 TABLET ORAL 3 TIMES DAILY
COMMUNITY

## 2024-10-11 RX ORDER — HYDROCODONE BITARTRATE AND ACETAMINOPHEN 5; 325 MG/1; MG/1
1 TABLET ORAL EVERY 6 HOURS PRN
Status: DISCONTINUED | OUTPATIENT
Start: 2024-10-11 | End: 2024-10-12 | Stop reason: HOSPADM

## 2024-10-11 RX ORDER — TAMSULOSIN HYDROCHLORIDE 0.4 MG/1
1 CAPSULE ORAL DAILY
COMMUNITY

## 2024-10-11 RX ORDER — BUMETANIDE 1 MG/1
2 TABLET ORAL 2 TIMES DAILY
COMMUNITY
End: 2024-10-12 | Stop reason: HOSPADM

## 2024-10-11 RX ORDER — BUMETANIDE 1 MG/1
2 TABLET ORAL 2 TIMES DAILY
Status: CANCELLED | OUTPATIENT
Start: 2024-10-11

## 2024-10-11 RX ORDER — HYDRALAZINE HYDROCHLORIDE 50 MG/1
50 TABLET, FILM COATED ORAL EVERY 8 HOURS
Status: DISCONTINUED | OUTPATIENT
Start: 2024-10-11 | End: 2024-10-12 | Stop reason: HOSPADM

## 2024-10-11 RX ADMIN — SENNOSIDES AND DOCUSATE SODIUM 2 TABLET: 50; 8.6 TABLET ORAL at 20:04

## 2024-10-11 RX ADMIN — NYSTATIN 1 APPLICATION: 100000 CREAM TOPICAL at 20:05

## 2024-10-11 RX ADMIN — IOPAMIDOL 70 ML: 755 INJECTION, SOLUTION INTRAVENOUS at 11:05

## 2024-10-11 RX ADMIN — HYDROCODONE BITARTRATE AND ACETAMINOPHEN 1 TABLET: 5; 325 TABLET ORAL at 15:39

## 2024-10-11 RX ADMIN — TRAZODONE HYDROCHLORIDE 150 MG: 50 TABLET ORAL at 19:35

## 2024-10-11 RX ADMIN — GABAPENTIN 400 MG: 400 CAPSULE ORAL at 19:35

## 2024-10-11 RX ADMIN — INSULIN LISPRO 40 UNITS: 100 INJECTION, SUSPENSION SUBCUTANEOUS at 20:23

## 2024-10-11 RX ADMIN — HYDRALAZINE HYDROCHLORIDE 50 MG: 50 TABLET ORAL at 20:04

## 2024-10-11 RX ADMIN — SODIUM CHLORIDE 1000 ML: 9 INJECTION, SOLUTION INTRAVENOUS at 11:59

## 2024-10-11 RX ADMIN — Medication 400 MG: at 20:04

## 2024-10-11 RX ADMIN — INSULIN LISPRO 3 UNITS: 100 INJECTION, SOLUTION INTRAVENOUS; SUBCUTANEOUS at 13:35

## 2024-10-11 RX ADMIN — SODIUM CHLORIDE 100 ML/HR: 9 INJECTION, SOLUTION INTRAVENOUS at 15:39

## 2024-10-11 RX ADMIN — IOPAMIDOL 80 ML: 755 INJECTION, SOLUTION INTRAVENOUS at 11:13

## 2024-10-11 RX ADMIN — INSULIN LISPRO 2 UNITS: 100 INJECTION, SOLUTION INTRAVENOUS; SUBCUTANEOUS at 18:15

## 2024-10-11 RX ADMIN — Medication 10 ML: at 20:05

## 2024-10-11 RX ADMIN — AMLODIPINE BESYLATE 5 MG: 5 TABLET ORAL at 20:04

## 2024-10-11 RX ADMIN — MORPHINE SULFATE 4 MG: 4 INJECTION, SOLUTION INTRAMUSCULAR; INTRAVENOUS at 12:25

## 2024-10-11 RX ADMIN — Medication 10 ML: at 15:40

## 2024-10-11 RX ADMIN — TICAGRELOR 90 MG: 90 TABLET ORAL at 20:04

## 2024-10-11 RX ADMIN — FERROUS SULFATE TAB 325 MG (65 MG ELEMENTAL FE) 325 MG: 325 (65 FE) TAB at 20:04

## 2024-10-11 RX ADMIN — ALPRAZOLAM 1 MG: 1 TABLET ORAL at 19:35

## 2024-10-11 RX ADMIN — INSULIN LISPRO 4 UNITS: 100 INJECTION, SOLUTION INTRAVENOUS; SUBCUTANEOUS at 20:23

## 2024-10-11 RX ADMIN — RANOLAZINE 1000 MG: 500 TABLET, FILM COATED, EXTENDED RELEASE ORAL at 20:04

## 2024-10-11 RX ADMIN — ENOXAPARIN SODIUM 40 MG: 100 INJECTION SUBCUTANEOUS at 15:39

## 2024-10-11 NOTE — PLAN OF CARE
Pt admitted from ED this shift. Pt complained of pain; PRN medication given; see MAR.  No s/s of acute distress noted.  VSS

## 2024-10-11 NOTE — CASE MANAGEMENT/SOCIAL WORK
Discharge Planning Assessment  [facility]     Patient Name: Matthew Madrid  MRN: [id number]  Today's Date: 10/11/2024    Admit Date: 10/11/2024    Plan: Pt lives at home with spouse Flora who is at bedside and plans to return home at discharge his spouse will provide transportation. Pt's address verified on demographics. Pcp is Susanna Johnson, he uses [facility] in [place] and has [place] [employer] and [place] Medicaid. Pt use a cane, walker, motorized wheelchair and o2 prn from [facility], he does not utilize home health services.   Discharge Needs Assessment       Row Name 10/11/24 1238       Living Environment    People in Home spouse    Current Living Arrangements home    Primary Care Provided by self    Family Caregiver if Needed spouse    Quality of Family Relationships helpful;involved;supportive       Resource/Environmental Concerns    Resource/Environmental Concerns none       Transition Planning    Patient/Family Anticipates Transition to home with family    Patient/Family Anticipated Services at Transition none    Transportation Anticipated family or friend will provide       Discharge Needs Assessment    Readmission Within the Last 30 Days no previous admission in last 30 days    Equipment Currently Used at Home wheelchair, motorized;cane, straight;walker, standard;oxygen    Concerns to be Addressed no discharge needs identified;denies needs/concerns at this time    Anticipated Changes Related to Illness none    Equipment Needed After Discharge none                   Discharge Plan       Row Name 10/11/24 9549       Plan    Plan Pt lives at home with spouse Flora who is at bedside and plans to return home at discharge his spouse will provide transportation. Pt's address verified on demographics. Pcp is Susanna Johnson, he uses [facility] in [place] and has [place] [employer] and [place] Medicaid. Pt use a cane, walker, motorized wheelchair and o2 prn from [facility], he does not utilize home  health services.    Patient/Family in Agreement with Plan yes                  Continued Care and Services - Admitted Since 10/11/2024    No active coordination exists for this encounter.       Selected Continued Care - Episodes Includes continued care and service providers with selected services from the active episodes listed below      COPD Episode start date: 1/22/2024   There are no active outsourced providers for this episode.             Heart Failure Episode start date: 11/28/2023   There are no active outsourced providers for this episode.                 Expected Discharge Date and Time       Expected Discharge Date Expected Discharge Time    Oct 13, 2024            Demographic Summary       Row Name 10/11/24 1238       General Information    Admission Type inpatient    Arrived From home    Referral Source emergency department    Reason for Consult discharge planning                  Francie Clark RN
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
## 2024-10-12 ENCOUNTER — READMISSION MANAGEMENT (OUTPATIENT)
Dept: CALL CENTER | Facility: HOSPITAL | Age: 49
End: 2024-10-12
Payer: COMMERCIAL

## 2024-10-12 ENCOUNTER — APPOINTMENT (OUTPATIENT)
Dept: CARDIOLOGY | Facility: HOSPITAL | Age: 49
DRG: 312 | End: 2024-10-12
Payer: MEDICARE

## 2024-10-12 VITALS
BODY MASS INDEX: 41.77 KG/M2 | RESPIRATION RATE: 18 BRPM | TEMPERATURE: 97.8 F | HEIGHT: 66 IN | OXYGEN SATURATION: 95 % | HEART RATE: 63 BPM | WEIGHT: 259.9 LBS | DIASTOLIC BLOOD PRESSURE: 68 MMHG | SYSTOLIC BLOOD PRESSURE: 118 MMHG

## 2024-10-12 LAB
ANION GAP SERPL CALCULATED.3IONS-SCNC: 10.6 MMOL/L (ref 5–15)
BASOPHILS # BLD AUTO: 0.02 10*3/MM3 (ref 0–0.2)
BASOPHILS NFR BLD AUTO: 0.3 % (ref 0–1.5)
BH CV ECHO MEAS - ACS: 1.9 CM
BH CV ECHO MEAS - AO MAX PG: 10.5 MMHG
BH CV ECHO MEAS - AO MEAN PG: 4 MMHG
BH CV ECHO MEAS - AO ROOT DIAM: 3.5 CM
BH CV ECHO MEAS - AO V2 MAX: 162 CM/SEC
BH CV ECHO MEAS - AO V2 VTI: 31 CM
BH CV ECHO MEAS - AVA(I,D): 1.4 CM2
BH CV ECHO MEAS - EDV(CUBED): 85.2 ML
BH CV ECHO MEAS - EDV(MOD-SP4): 141 ML
BH CV ECHO MEAS - EF(MOD-SP4): 64.5 %
BH CV ECHO MEAS - ESV(CUBED): 29.8 ML
BH CV ECHO MEAS - ESV(MOD-SP4): 50 ML
BH CV ECHO MEAS - FS: 29.5 %
BH CV ECHO MEAS - IVS/LVPW: 1 CM
BH CV ECHO MEAS - IVSD: 1 CM
BH CV ECHO MEAS - LA DIMENSION: 3.4 CM
BH CV ECHO MEAS - LAT PEAK E' VEL: 12.5 CM/SEC
BH CV ECHO MEAS - LV MASS(C)D: 147.8 GRAMS
BH CV ECHO MEAS - LV MAX PG: 3.1 MMHG
BH CV ECHO MEAS - LV MEAN PG: 2 MMHG
BH CV ECHO MEAS - LV V1 MAX: 88.6 CM/SEC
BH CV ECHO MEAS - LV V1 VTI: 13.8 CM
BH CV ECHO MEAS - LVIDD: 4.4 CM
BH CV ECHO MEAS - LVIDS: 3.1 CM
BH CV ECHO MEAS - LVOT AREA: 3.1 CM2
BH CV ECHO MEAS - LVOT DIAM: 2 CM
BH CV ECHO MEAS - LVPWD: 1 CM
BH CV ECHO MEAS - MED PEAK E' VEL: 9 CM/SEC
BH CV ECHO MEAS - MV A MAX VEL: 109 CM/SEC
BH CV ECHO MEAS - MV DEC SLOPE: 547 CM/SEC2
BH CV ECHO MEAS - MV DEC TIME: 0.24 SEC
BH CV ECHO MEAS - MV E MAX VEL: 132 CM/SEC
BH CV ECHO MEAS - MV E/A: 1.21
BH CV ECHO MEAS - PA ACC TIME: 0.13 SEC
BH CV ECHO MEAS - SV(LVOT): 43.4 ML
BH CV ECHO MEAS - SV(MOD-SP4): 91 ML
BH CV ECHO MEAS - TAPSE (>1.6): 2.39 CM
BH CV ECHO MEASUREMENTS AVERAGE E/E' RATIO: 12.28
BUN SERPL-MCNC: 33 MG/DL (ref 6–20)
BUN/CREAT SERPL: 13 (ref 7–25)
CALCIUM SPEC-SCNC: 8.6 MG/DL (ref 8.6–10.5)
CHLORIDE SERPL-SCNC: 104 MMOL/L (ref 98–107)
CO2 SERPL-SCNC: 24.4 MMOL/L (ref 22–29)
CREAT SERPL-MCNC: 2.53 MG/DL (ref 0.76–1.27)
DEPRECATED RDW RBC AUTO: 45.2 FL (ref 37–54)
EGFRCR SERPLBLD CKD-EPI 2021: 30.3 ML/MIN/1.73
EOSINOPHIL # BLD AUTO: 0.07 10*3/MM3 (ref 0–0.4)
EOSINOPHIL NFR BLD AUTO: 1.2 % (ref 0.3–6.2)
ERYTHROCYTE [DISTWIDTH] IN BLOOD BY AUTOMATED COUNT: 12.8 % (ref 12.3–15.4)
GLUCOSE BLDC GLUCOMTR-MCNC: 134 MG/DL (ref 70–130)
GLUCOSE BLDC GLUCOMTR-MCNC: 148 MG/DL (ref 70–130)
GLUCOSE BLDC GLUCOMTR-MCNC: 223 MG/DL (ref 70–130)
GLUCOSE SERPL-MCNC: 114 MG/DL (ref 65–99)
HCT VFR BLD AUTO: 32 % (ref 37.5–51)
HGB BLD-MCNC: 10.6 G/DL (ref 13–17.7)
IMM GRANULOCYTES # BLD AUTO: 0.02 10*3/MM3 (ref 0–0.05)
IMM GRANULOCYTES NFR BLD AUTO: 0.3 % (ref 0–0.5)
LV EF 2D ECHO EST: 60 %
LYMPHOCYTES # BLD AUTO: 0.95 10*3/MM3 (ref 0.7–3.1)
LYMPHOCYTES NFR BLD AUTO: 15.9 % (ref 19.6–45.3)
MAGNESIUM SERPL-MCNC: 2.2 MG/DL (ref 1.6–2.6)
MCH RBC QN AUTO: 32.2 PG (ref 26.6–33)
MCHC RBC AUTO-ENTMCNC: 33.1 G/DL (ref 31.5–35.7)
MCV RBC AUTO: 97.3 FL (ref 79–97)
MONOCYTES # BLD AUTO: 0.76 10*3/MM3 (ref 0.1–0.9)
MONOCYTES NFR BLD AUTO: 12.7 % (ref 5–12)
NEUTROPHILS NFR BLD AUTO: 4.16 10*3/MM3 (ref 1.7–7)
NEUTROPHILS NFR BLD AUTO: 69.6 % (ref 42.7–76)
NRBC BLD AUTO-RTO: 0 /100 WBC (ref 0–0.2)
PHOSPHATE SERPL-MCNC: 4.6 MG/DL (ref 2.5–4.5)
PLATELET # BLD AUTO: 131 10*3/MM3 (ref 140–450)
PMV BLD AUTO: 10.7 FL (ref 6–12)
POTASSIUM SERPL-SCNC: 3.9 MMOL/L (ref 3.5–5.2)
QT INTERVAL: 470 MS
QTC INTERVAL: 480 MS
RBC # BLD AUTO: 3.29 10*6/MM3 (ref 4.14–5.8)
SODIUM SERPL-SCNC: 139 MMOL/L (ref 136–145)
WBC NRBC COR # BLD AUTO: 5.98 10*3/MM3 (ref 3.4–10.8)

## 2024-10-12 PROCEDURE — A9270 NON-COVERED ITEM OR SERVICE: HCPCS | Performed by: HOSPITALIST

## 2024-10-12 PROCEDURE — 63710000001 MAGNESIUM OXIDE -MG SUPPLEMENT 400 (240 MG) MG TABLET: Performed by: HOSPITALIST

## 2024-10-12 PROCEDURE — 63710000001 CETIRIZINE 10 MG TABLET: Performed by: HOSPITALIST

## 2024-10-12 PROCEDURE — 25010000002 SULFUR HEXAFLUORIDE MICROSPH 60.7-25 MG RECONSTITUTED SUSPENSION: Performed by: STUDENT IN AN ORGANIZED HEALTH CARE EDUCATION/TRAINING PROGRAM

## 2024-10-12 PROCEDURE — 82948 REAGENT STRIP/BLOOD GLUCOSE: CPT

## 2024-10-12 PROCEDURE — 94799 UNLISTED PULMONARY SVC/PX: CPT

## 2024-10-12 PROCEDURE — 84100 ASSAY OF PHOSPHORUS: CPT | Performed by: HOSPITALIST

## 2024-10-12 PROCEDURE — 63710000001 FLUOXETINE 20 MG CAPSULE: Performed by: HOSPITALIST

## 2024-10-12 PROCEDURE — 63710000001 PANTOPRAZOLE 40 MG TABLET DELAYED-RELEASE: Performed by: HOSPITALIST

## 2024-10-12 PROCEDURE — 63710000001 FERROUS SULFATE 325 (65 FE) MG TABLET: Performed by: HOSPITALIST

## 2024-10-12 PROCEDURE — 85025 COMPLETE CBC W/AUTO DIFF WBC: CPT | Performed by: HOSPITALIST

## 2024-10-12 PROCEDURE — 99239 HOSP IP/OBS DSCHRG MGMT >30: CPT | Performed by: STUDENT IN AN ORGANIZED HEALTH CARE EDUCATION/TRAINING PROGRAM

## 2024-10-12 PROCEDURE — 63710000001 GABAPENTIN 400 MG CAPSULE: Performed by: HOSPITALIST

## 2024-10-12 PROCEDURE — 96372 THER/PROPH/DIAG INJ SC/IM: CPT

## 2024-10-12 PROCEDURE — 63710000001 INSULIN LISPRO (HUMAN) PER 5 UNITS: Performed by: HOSPITALIST

## 2024-10-12 PROCEDURE — 63710000001 RANOLAZINE 500 MG TABLET SUSTAINED-RELEASE 12 HOUR: Performed by: HOSPITALIST

## 2024-10-12 PROCEDURE — 63710000001 ALLOPURINOL 300 MG TABLET: Performed by: HOSPITALIST

## 2024-10-12 PROCEDURE — 63710000001 TAMSULOSIN 0.4 MG CAPSULE: Performed by: HOSPITALIST

## 2024-10-12 PROCEDURE — 63710000001 EMPAGLIFLOZIN 10 MG TABLET: Performed by: HOSPITALIST

## 2024-10-12 PROCEDURE — 63710000001 ASPIRIN 81 MG TABLET DELAYED-RELEASE: Performed by: HOSPITALIST

## 2024-10-12 PROCEDURE — 63710000001 VITAMIN B-12 1000 MCG TABLET: Performed by: HOSPITALIST

## 2024-10-12 PROCEDURE — 63710000001 ISOSORBIDE MONONITRATE 60 MG TABLET SUSTAINED-RELEASE 24 HOUR: Performed by: HOSPITALIST

## 2024-10-12 PROCEDURE — 63710000001 ATORVASTATIN 40 MG TABLET: Performed by: HOSPITALIST

## 2024-10-12 PROCEDURE — 63710000001 HYDRALAZINE 50 MG TABLET: Performed by: HOSPITALIST

## 2024-10-12 PROCEDURE — 83735 ASSAY OF MAGNESIUM: CPT | Performed by: HOSPITALIST

## 2024-10-12 PROCEDURE — 93306 TTE W/DOPPLER COMPLETE: CPT

## 2024-10-12 PROCEDURE — 25810000003 SODIUM CHLORIDE 0.9 % SOLUTION: Performed by: HOSPITALIST

## 2024-10-12 PROCEDURE — 93306 TTE W/DOPPLER COMPLETE: CPT | Performed by: INTERNAL MEDICINE

## 2024-10-12 PROCEDURE — 63710000001 INSULIN LISPRO PROTAMINE-INSULIN LISPRO (75-25) 100 UNIT/ML SUSPENSION: Performed by: HOSPITALIST

## 2024-10-12 PROCEDURE — 96361 HYDRATE IV INFUSION ADD-ON: CPT

## 2024-10-12 PROCEDURE — 25010000002 ENOXAPARIN PER 10 MG: Performed by: HOSPITALIST

## 2024-10-12 PROCEDURE — 63710000001 HYDROCODONE-ACETAMINOPHEN 5-325 MG TABLET: Performed by: HOSPITALIST

## 2024-10-12 PROCEDURE — 63710000001 TICAGRELOR 90 MG TABLET: Performed by: HOSPITALIST

## 2024-10-12 PROCEDURE — 80048 BASIC METABOLIC PNL TOTAL CA: CPT | Performed by: HOSPITALIST

## 2024-10-12 RX ORDER — ISOSORBIDE MONONITRATE 120 MG/1
120 TABLET, EXTENDED RELEASE ORAL DAILY
Qty: 90 TABLET | Refills: 1 | Status: SHIPPED | OUTPATIENT
Start: 2024-10-12

## 2024-10-12 RX ORDER — ASPIRIN 81 MG/1
81 TABLET ORAL DAILY
Qty: 90 TABLET | Refills: 3 | Status: SHIPPED | OUTPATIENT
Start: 2024-10-12

## 2024-10-12 RX ADMIN — TAMSULOSIN HYDROCHLORIDE 0.4 MG: 0.4 CAPSULE ORAL at 08:18

## 2024-10-12 RX ADMIN — ALLOPURINOL 300 MG: 300 TABLET ORAL at 08:19

## 2024-10-12 RX ADMIN — INSULIN LISPRO 40 UNITS: 100 INJECTION, SUSPENSION SUBCUTANEOUS at 08:17

## 2024-10-12 RX ADMIN — SODIUM CHLORIDE 100 ML/HR: 9 INJECTION, SOLUTION INTRAVENOUS at 02:38

## 2024-10-12 RX ADMIN — ATORVASTATIN CALCIUM 40 MG: 40 TABLET, FILM COATED ORAL at 08:19

## 2024-10-12 RX ADMIN — ISOSORBIDE MONONITRATE 120 MG: 60 TABLET, EXTENDED RELEASE ORAL at 08:20

## 2024-10-12 RX ADMIN — INSULIN LISPRO 40 UNITS: 100 INJECTION, SUSPENSION SUBCUTANEOUS at 16:34

## 2024-10-12 RX ADMIN — Medication 400 MG: at 08:20

## 2024-10-12 RX ADMIN — GABAPENTIN 400 MG: 400 CAPSULE ORAL at 08:19

## 2024-10-12 RX ADMIN — RANOLAZINE 1000 MG: 500 TABLET, FILM COATED, EXTENDED RELEASE ORAL at 08:20

## 2024-10-12 RX ADMIN — EMPAGLIFLOZIN 10 MG: 10 TABLET, FILM COATED ORAL at 08:20

## 2024-10-12 RX ADMIN — Medication 1000 MCG: at 08:19

## 2024-10-12 RX ADMIN — TICAGRELOR 90 MG: 90 TABLET ORAL at 08:20

## 2024-10-12 RX ADMIN — PANTOPRAZOLE SODIUM 40 MG: 40 TABLET, DELAYED RELEASE ORAL at 08:20

## 2024-10-12 RX ADMIN — SULFUR HEXAFLUORIDE 3 ML: KIT at 13:34

## 2024-10-12 RX ADMIN — HYDROCODONE BITARTRATE AND ACETAMINOPHEN 1 TABLET: 5; 325 TABLET ORAL at 02:38

## 2024-10-12 RX ADMIN — ASPIRIN 81 MG: 81 TABLET, COATED ORAL at 08:20

## 2024-10-12 RX ADMIN — HYDROCODONE BITARTRATE AND ACETAMINOPHEN 1 TABLET: 5; 325 TABLET ORAL at 09:49

## 2024-10-12 RX ADMIN — NYSTATIN 1 APPLICATION: 100000 CREAM TOPICAL at 08:18

## 2024-10-12 RX ADMIN — ENOXAPARIN SODIUM 40 MG: 100 INJECTION SUBCUTANEOUS at 14:07

## 2024-10-12 RX ADMIN — HYDRALAZINE HYDROCHLORIDE 50 MG: 50 TABLET ORAL at 11:51

## 2024-10-12 RX ADMIN — INSULIN LISPRO 3 UNITS: 100 INJECTION, SOLUTION INTRAVENOUS; SUBCUTANEOUS at 08:17

## 2024-10-12 RX ADMIN — FERROUS SULFATE TAB 325 MG (65 MG ELEMENTAL FE) 325 MG: 325 (65 FE) TAB at 11:51

## 2024-10-12 RX ADMIN — Medication 10 ML: at 08:21

## 2024-10-12 RX ADMIN — FLUOXETINE HYDROCHLORIDE 40 MG: 20 CAPSULE ORAL at 08:20

## 2024-10-12 RX ADMIN — ENOXAPARIN SODIUM 40 MG: 100 INJECTION SUBCUTANEOUS at 02:37

## 2024-10-12 RX ADMIN — HYDROCODONE BITARTRATE AND ACETAMINOPHEN 1 TABLET: 5; 325 TABLET ORAL at 16:33

## 2024-10-12 RX ADMIN — CETIRIZINE HYDROCHLORIDE 10 MG: 10 TABLET, FILM COATED ORAL at 08:19

## 2024-10-12 RX ADMIN — FERROUS SULFATE TAB 325 MG (65 MG ELEMENTAL FE) 325 MG: 325 (65 FE) TAB at 08:20
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
## 2024-10-12 NOTE — NURSING NOTE
Discharge instructions follow ups medication and educations handouts given. Pt and spouse vervalize understanding. IV and telemetry removed. Waiting for transport.

## 2024-10-12 NOTE — PLAN OF CARE
Problem: Adult Inpatient Plan of Care  Goal: Plan of Care Review  Goal Outcome Evaluation:  Plan of Care Reviewed With: patient      Patient resting in bed. NAD noted. VSS. IV fluid infusing per order. IV patent. No acute events overnight. POC ongoing.
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
## 2024-10-14 ENCOUNTER — TRANSCRIBE ORDERS (OUTPATIENT)
Dept: ADMINISTRATIVE | Facility: HOSPITAL | Age: 49
End: 2024-10-14
Payer: COMMERCIAL

## 2024-10-14 ENCOUNTER — LAB (OUTPATIENT)
Dept: LAB | Facility: HOSPITAL | Age: 49
End: 2024-10-14
Payer: MEDICARE

## 2024-10-14 DIAGNOSIS — N18.32 STAGE 3B CHRONIC KIDNEY DISEASE: ICD-10-CM

## 2024-10-14 DIAGNOSIS — N18.32 STAGE 3B CHRONIC KIDNEY DISEASE: Primary | ICD-10-CM

## 2024-10-14 LAB
ANION GAP SERPL CALCULATED.3IONS-SCNC: 12.5 MMOL/L (ref 5–15)
BUN SERPL-MCNC: 36 MG/DL (ref 6–20)
BUN/CREAT SERPL: 15.3 (ref 7–25)
CALCIUM SPEC-SCNC: 9.2 MG/DL (ref 8.6–10.5)
CHLORIDE SERPL-SCNC: 101 MMOL/L (ref 98–107)
CO2 SERPL-SCNC: 24.5 MMOL/L (ref 22–29)
CREAT SERPL-MCNC: 2.35 MG/DL (ref 0.76–1.27)
EGFRCR SERPLBLD CKD-EPI 2021: 33.1 ML/MIN/1.73
GLUCOSE SERPL-MCNC: 231 MG/DL (ref 65–99)
POTASSIUM SERPL-SCNC: 3.9 MMOL/L (ref 3.5–5.2)
SODIUM SERPL-SCNC: 138 MMOL/L (ref 136–145)

## 2024-10-14 PROCEDURE — 80048 BASIC METABOLIC PNL TOTAL CA: CPT

## 2024-10-14 PROCEDURE — 36415 COLL VENOUS BLD VENIPUNCTURE: CPT
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
## 2024-10-16 ENCOUNTER — READMISSION MANAGEMENT (OUTPATIENT)
Dept: CALL CENTER | Facility: HOSPITAL | Age: 49
End: 2024-10-16
Payer: COMMERCIAL
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
## 2024-10-18 ENCOUNTER — HOSPITAL ENCOUNTER (EMERGENCY)
Facility: HOSPITAL | Age: 49
Discharge: HOME | End: 2024-10-18
Attending: STUDENT IN AN ORGANIZED HEALTH CARE EDUCATION/TRAINING PROGRAM
Payer: COMMERCIAL

## 2024-10-18 ENCOUNTER — APPOINTMENT (OUTPATIENT)
Dept: CT IMAGING | Facility: HOSPITAL | Age: 49
End: 2024-10-18
Payer: COMMERCIAL

## 2024-10-18 VITALS
RESPIRATION RATE: 17 BRPM | OXYGEN SATURATION: 95 % | SYSTOLIC BLOOD PRESSURE: 130 MMHG | HEART RATE: 73 BPM | TEMPERATURE: 98.4 F | WEIGHT: 256 LBS | BODY MASS INDEX: 41.14 KG/M2 | HEIGHT: 66 IN | DIASTOLIC BLOOD PRESSURE: 72 MMHG

## 2024-10-18 DIAGNOSIS — K59.00 CONSTIPATION, UNSPECIFIED CONSTIPATION TYPE: Primary | ICD-10-CM

## 2024-10-18 LAB
ALBUMIN SERPL-MCNC: 3.9 G/DL (ref 3.5–5.2)
ALBUMIN/GLOB SERPL: 1.1 G/DL
ALP SERPL-CCNC: 87 U/L (ref 39–117)
ALT SERPL W P-5'-P-CCNC: 20 U/L (ref 1–41)
ANION GAP SERPL CALCULATED.3IONS-SCNC: 14.6 MMOL/L (ref 5–15)
AST SERPL-CCNC: 20 U/L (ref 1–40)
BASOPHILS # BLD AUTO: 0.05 10*3/MM3 (ref 0–0.2)
BASOPHILS NFR BLD AUTO: 0.7 % (ref 0–1.5)
BILIRUB SERPL-MCNC: 0.7 MG/DL (ref 0–1.2)
BILIRUB UR QL STRIP: NEGATIVE
BUN SERPL-MCNC: 38 MG/DL (ref 6–20)
BUN/CREAT SERPL: 13.1 (ref 7–25)
CALCIUM SPEC-SCNC: 9.1 MG/DL (ref 8.6–10.5)
CHLORIDE SERPL-SCNC: 94 MMOL/L (ref 98–107)
CLARITY UR: CLEAR
CO2 SERPL-SCNC: 25.4 MMOL/L (ref 22–29)
COLOR UR: YELLOW
CREAT SERPL-MCNC: 2.91 MG/DL (ref 0.76–1.27)
CRP SERPL-MCNC: 5.22 MG/DL (ref 0–0.5)
DEPRECATED RDW RBC AUTO: 44.4 FL (ref 37–54)
EGFRCR SERPLBLD CKD-EPI 2021: 25.6 ML/MIN/1.73
EOSINOPHIL # BLD AUTO: 0.14 10*3/MM3 (ref 0–0.4)
EOSINOPHIL NFR BLD AUTO: 2.1 % (ref 0.3–6.2)
ERYTHROCYTE [DISTWIDTH] IN BLOOD BY AUTOMATED COUNT: 12.8 % (ref 12.3–15.4)
ERYTHROCYTE [SEDIMENTATION RATE] IN BLOOD: 59 MM/HR (ref 0–15)
GLOBULIN UR ELPH-MCNC: 3.6 GM/DL
GLUCOSE SERPL-MCNC: 244 MG/DL (ref 65–99)
GLUCOSE UR STRIP-MCNC: ABNORMAL MG/DL
HCT VFR BLD AUTO: 33.1 % (ref 37.5–51)
HGB BLD-MCNC: 11.1 G/DL (ref 13–17.7)
HGB UR QL STRIP.AUTO: NEGATIVE
HOLD SPECIMEN: NORMAL
HOLD SPECIMEN: NORMAL
IMM GRANULOCYTES # BLD AUTO: 0.03 10*3/MM3 (ref 0–0.05)
IMM GRANULOCYTES NFR BLD AUTO: 0.4 % (ref 0–0.5)
KETONES UR QL STRIP: NEGATIVE
LEUKOCYTE ESTERASE UR QL STRIP.AUTO: NEGATIVE
LYMPHOCYTES # BLD AUTO: 1.17 10*3/MM3 (ref 0.7–3.1)
LYMPHOCYTES NFR BLD AUTO: 17.5 % (ref 19.6–45.3)
MCH RBC QN AUTO: 32.3 PG (ref 26.6–33)
MCHC RBC AUTO-ENTMCNC: 33.5 G/DL (ref 31.5–35.7)
MCV RBC AUTO: 96.2 FL (ref 79–97)
MONOCYTES # BLD AUTO: 0.81 10*3/MM3 (ref 0.1–0.9)
MONOCYTES NFR BLD AUTO: 12.1 % (ref 5–12)
NEUTROPHILS NFR BLD AUTO: 4.47 10*3/MM3 (ref 1.7–7)
NEUTROPHILS NFR BLD AUTO: 67.2 % (ref 42.7–76)
NITRITE UR QL STRIP: NEGATIVE
NRBC BLD AUTO-RTO: 0 /100 WBC (ref 0–0.2)
PH UR STRIP.AUTO: 6.5 [PH] (ref 5–8)
PLATELET # BLD AUTO: 171 10*3/MM3 (ref 140–450)
PMV BLD AUTO: 10.4 FL (ref 6–12)
POTASSIUM SERPL-SCNC: 4.3 MMOL/L (ref 3.5–5.2)
PROT SERPL-MCNC: 7.5 G/DL (ref 6–8.5)
PROT UR QL STRIP: NEGATIVE
RBC # BLD AUTO: 3.44 10*6/MM3 (ref 4.14–5.8)
SODIUM SERPL-SCNC: 134 MMOL/L (ref 136–145)
SP GR UR STRIP: 1.01 (ref 1–1.03)
UROBILINOGEN UR QL STRIP: ABNORMAL
WBC NRBC COR # BLD AUTO: 6.67 10*3/MM3 (ref 3.4–10.8)
WHOLE BLOOD HOLD COAG: NORMAL
WHOLE BLOOD HOLD SPECIMEN: NORMAL

## 2024-10-18 PROCEDURE — 81003 URINALYSIS AUTO W/O SCOPE: CPT | Performed by: PHYSICIAN ASSISTANT

## 2024-10-18 PROCEDURE — 80053 COMPREHEN METABOLIC PANEL: CPT | Performed by: PHYSICIAN ASSISTANT

## 2024-10-18 PROCEDURE — 85652 RBC SED RATE AUTOMATED: CPT | Performed by: PHYSICIAN ASSISTANT

## 2024-10-18 PROCEDURE — 74176 CT ABD & PELVIS W/O CONTRAST: CPT

## 2024-10-18 PROCEDURE — 36415 COLL VENOUS BLD VENIPUNCTURE: CPT

## 2024-10-18 PROCEDURE — 86140 C-REACTIVE PROTEIN: CPT | Performed by: PHYSICIAN ASSISTANT

## 2024-10-18 PROCEDURE — 74176 CT ABD & PELVIS W/O CONTRAST: CPT | Performed by: RADIOLOGY

## 2024-10-18 PROCEDURE — 99284 EMERGENCY DEPT VISIT MOD MDM: CPT

## 2024-10-18 PROCEDURE — 85025 COMPLETE CBC W/AUTO DIFF WBC: CPT | Performed by: PHYSICIAN ASSISTANT

## 2024-10-18 RX ORDER — AMOXICILLIN 250 MG
2 CAPSULE ORAL DAILY
Qty: 30 TABLET | Refills: 0 | Status: SHIPPED | OUTPATIENT
Start: 2024-10-18
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
## 2024-10-21 ENCOUNTER — HOSPITAL ENCOUNTER (OUTPATIENT)
Dept: NUCLEAR MEDICINE | Facility: HOSPITAL | Age: 49
Discharge: HOME | End: 2024-10-21
Payer: MEDICARE

## 2024-10-21 ENCOUNTER — HOSPITAL ENCOUNTER (OUTPATIENT)
Dept: CARDIOLOGY | Facility: HOSPITAL | Age: 49
Discharge: HOME | End: 2024-10-21
Payer: MEDICARE

## 2024-10-21 PROCEDURE — 0 TECHNETIUM SESTAMIBI: Performed by: INTERNAL MEDICINE

## 2024-10-21 PROCEDURE — 78452 HT MUSCLE IMAGE SPECT MULT: CPT | Performed by: INTERNAL MEDICINE

## 2024-10-21 PROCEDURE — 93018 CV STRESS TEST I&R ONLY: CPT | Performed by: INTERNAL MEDICINE

## 2024-10-21 PROCEDURE — A9500 TC99M SESTAMIBI: HCPCS | Performed by: INTERNAL MEDICINE

## 2024-10-21 PROCEDURE — 25010000002 REGADENOSON 0.4 MG/5ML SOLUTION: Performed by: INTERNAL MEDICINE

## 2024-10-21 PROCEDURE — 93017 CV STRESS TEST TRACING ONLY: CPT

## 2024-10-21 PROCEDURE — 78452 HT MUSCLE IMAGE SPECT MULT: CPT

## 2024-10-21 RX ORDER — REGADENOSON 0.08 MG/ML
0.4 INJECTION, SOLUTION INTRAVENOUS
Status: COMPLETED | OUTPATIENT
Start: 2024-10-21 | End: 2024-10-21

## 2024-10-21 RX ADMIN — TECHNETIUM TC 99M SESTAMIBI 1 DOSE: 1 INJECTION INTRAVENOUS at 08:39

## 2024-10-21 RX ADMIN — TECHNETIUM TC 99M SESTAMIBI 1 DOSE: 1 INJECTION INTRAVENOUS at 07:27

## 2024-10-21 RX ADMIN — REGADENOSON 0.4 MG: 0.08 INJECTION, SOLUTION INTRAVENOUS at 08:39

## 2024-10-22 LAB
BH CV NUCLEAR PRIOR STUDY: 3
BH CV REST NUCLEAR ISOTOPE DOSE: 7.9 MCI
BH CV STRESS BP STAGE 1: NORMAL
BH CV STRESS COMMENTS STAGE 1: NORMAL
BH CV STRESS DOSE REGADENOSON STAGE 1: 0.4
BH CV STRESS DURATION MIN STAGE 1: 0
BH CV STRESS DURATION SEC STAGE 1: 10
BH CV STRESS HR STAGE 1: 74
BH CV STRESS NUCLEAR ISOTOPE DOSE: 25.1 MCI
BH CV STRESS PROTOCOL 1: NORMAL
BH CV STRESS RECOVERY BP: NORMAL MMHG
BH CV STRESS RECOVERY HR: 71 BPM
BH CV STRESS STAGE 1: 1
LV EF NUC BP: 68 %
MAXIMAL PREDICTED HEART RATE: 171 BPM
PERCENT MAX PREDICTED HR: 43.27 %
STRESS BASELINE BP: NORMAL MMHG
STRESS BASELINE HR: 61 BPM
STRESS PERCENT HR: 51 %
STRESS POST PEAK BP: NORMAL MMHG
STRESS POST PEAK HR: 74 BPM
STRESS TARGET HR: 145 BPM

## 2024-10-24 ENCOUNTER — TELEPHONE (OUTPATIENT)
Dept: CARDIOLOGY | Facility: CLINIC | Age: 49
End: 2024-10-24
Payer: COMMERCIAL

## 2024-10-24 NOTE — TELEPHONE ENCOUNTER
Spoke to patient regarding normal stress results. Patient voiced understanding and will keep follow up on 11/06/24

## 2024-10-24 NOTE — TELEPHONE ENCOUNTER
----- Message from Anant Bennett sent at 10/24/2024  9:20 AM EDT -----  Please call the patient regarding his normal result.

## 2024-10-25 ENCOUNTER — READMISSION MANAGEMENT (OUTPATIENT)
Dept: CALL CENTER | Facility: HOSPITAL | Age: 49
End: 2024-10-25
Payer: COMMERCIAL
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
## 2024-11-05 ENCOUNTER — OFFICE VISIT (OUTPATIENT)
Dept: CARDIOLOGY | Facility: CLINIC | Age: 49
End: 2024-11-05
Payer: MEDICARE

## 2024-11-05 VITALS
OXYGEN SATURATION: 95 % | SYSTOLIC BLOOD PRESSURE: 120 MMHG | DIASTOLIC BLOOD PRESSURE: 68 MMHG | HEIGHT: 66 IN | BODY MASS INDEX: 40.18 KG/M2 | HEART RATE: 69 BPM | WEIGHT: 250 LBS

## 2024-11-05 DIAGNOSIS — N18.32 STAGE 3B CHRONIC KIDNEY DISEASE: ICD-10-CM

## 2024-11-05 DIAGNOSIS — I25.10 ASCVD (ARTERIOSCLEROTIC CARDIOVASCULAR DISEASE): Primary | Chronic | ICD-10-CM

## 2024-11-05 DIAGNOSIS — E78.5 DYSLIPIDEMIA: Chronic | ICD-10-CM

## 2024-11-05 DIAGNOSIS — I10 ESSENTIAL HYPERTENSION: Chronic | ICD-10-CM

## 2024-11-05 PROCEDURE — 3078F DIAST BP <80 MM HG: CPT | Performed by: INTERNAL MEDICINE

## 2024-11-05 PROCEDURE — 99214 OFFICE O/P EST MOD 30 MIN: CPT | Performed by: INTERNAL MEDICINE

## 2024-11-05 PROCEDURE — 3074F SYST BP LT 130 MM HG: CPT | Performed by: INTERNAL MEDICINE

## 2024-11-05 RX ORDER — BUPROPION HYDROCHLORIDE 100 MG/1
100 TABLET, EXTENDED RELEASE ORAL DAILY
COMMUNITY
Start: 2024-11-01

## 2024-11-05 RX ORDER — PROCHLORPERAZINE 25 MG/1
SUPPOSITORY RECTAL
COMMUNITY
Start: 2024-11-01

## 2024-11-05 NOTE — PROGRESS NOTES
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 Healthy appearance. Not in distress.   Eyes:      Conjunctiva/sclera: Conjunctivae normal.   HENT:      Head: Normocephalic and atraumatic.      Nose: Nose normal.    Mouth/Throat:      Pharynx: Oropharynx is clear.   Neck:      Thyroid: Thyroid normal.      Vascular: No carotid bruit or JVD.   Pulmonary:      Effort: Pulmonary effort is normal.      Breath sounds: Normal breath sounds.   Chest:      Chest wall: Not tender to palpatation.   Cardiovascular:      PMI at left midclavicular line. Normal rate. Regular rhythm. Normal S1 with normal intensity. Normal S2 with normal intensity.       Murmurs: There is no murmur.      No gallop.  No rub.   Pulses:     Intact distal pulses.   Edema:     Peripheral edema absent.   Abdominal:      General: Bowel sounds are normal. There is no distension.      Palpations: Abdomen is soft. There is no hepatomegaly.      Tenderness: There is no abdominal tenderness.   Musculoskeletal: Normal range of motion.      Cervical back: Normal range of motion. Skin:     General: Skin is warm and dry. There is no cyanosis.      Coloration: Skin is not jaundiced.   Neurological:      Mental Status: Alert and oriented to person, place and time.      Motor: Motor function is intact.      Gait: Gait  is intact.   Psychiatric:         Attention and Perception: Attention and perception normal.         Speech: Speech normal.         Behavior: Behavior is cooperative.         Cognition and Memory: Cognition and memory normal.         Judgment: Judgment normal.          Procedure   Procedures       Assessment & Plan     Diagnoses and all orders for this visit:    1. ASCVD (arteriosclerotic cardiovascular disease) (Primary)    2. Essential hypertension    3. Dyslipidemia    4. Stage 3b chronic kidney disease          PLAN  1 cardiac.  Patient with history of coronary artery with PTCA and stent placement to the mid RCA in January 15, 2024.  Patient had moderate disease in the proximal RCA but had normal IFR to same.  Patient had negative pharmacological stress test on October 21, 2024 with EF 68%.  Will continue to follow closely and clinically.  Continue dual antiplatelet therapy for now.  #2 nephrology.  Patient does have renal insufficiency and follows nephrology for same.  He does have elevated creatinine.  If he has worsening angina symptoms, he may need a cath done but will discuss with nephrology prior to doing same.  Will watch clinically for now.  #3 hypertension.  Continue current medication.  Patient blood pressure stable  #4 hyperlipidemia.  Continue HMG Co. inhibitors           MEDS ORDERED DURING VISIT:    There are no discontinued medications.     No orders of the defined types were placed in this encounter.        Follow Up:   Return in about 3 months (around 2/5/2025) for Recheck.    Patient was given instructions and counseling regarding his condition or for health maintenance advice. Please see specific information pulled into the AVS if appropriate.   As always, Susanna Johnson APRN  I appreciate very much the opportunity to participate in the cardiovascular care of your patients. Please do not hesitate to call me with any questions with regards to Matthew Madrid evaluation and management.          This document has been electronically signed by Anant Bennett MD [facility], Interventional Cardiology  November 5, 2024 14:43 EST    Dictated Utilizing Dragon Dictation: Part of this note may be an electronic transcription/translation of spoken language to printed text using the Dragon Dictation System.

## 2024-11-06 RX ORDER — RANOLAZINE 1000 MG/1
TABLET, EXTENDED RELEASE ORAL
Qty: 60 TABLET | Refills: 3 | Status: SHIPPED | OUTPATIENT
Start: 2024-11-06

## 2024-11-11 ENCOUNTER — TELEPHONE (OUTPATIENT)
Dept: SURGERY | Facility: CLINIC | Age: 49
End: 2024-11-11
Payer: COMMERCIAL

## 2024-11-11 NOTE — TELEPHONE ENCOUNTER
You are scheduled for a colonoscopy with Dr. Gianluca Rodríguez on NOV 15 2024 at 9:00.   Dr. Rodríguez's colonoscopy prep is a 2 day regimen.    Day 1: Clear liquid diet (items below) all day from the time you wake up until midnight. Between 1-3 pm Take 4 Dulcolax tabs with 8 oz of liquid.     Soft Drink Mt. Dew, Sprite, Ginger-Ida (Yellow to clear in color)   Broth Beef or Chicken   Jell-O No ORANGE, RED or PURPLE    Gatorade No ORANGE,RED or PURPLE    Popsicles No ORANGE, RED or PURPLE    Coffee/Tea Black ONLY (no cream or sugar for tea or coffee)   Water Tap or Bottled   Juice Apple or White Grape       Day 2:  Clear liquid diet (items below) all day from the time you wake up until midnight. Between 1-3 pm Take 4 Dulcolax tabs with 8 oz of liquid.   At 4 pm Mix 32 oz of Gatorade Zero (No RED,ORANGE,PURPLE) with a 238 gram bottle of Miralax (store brand is fine). Once thoroughly mixed, drink entire contents within 2 hours.  Follow up prep by drinking 32 oz of plain water.     Soft Drink Mt. Dew, Sprite, Ginger-Ida (Yellow to clear in color)   Broth Beef or Chicken   Jell-O No ORANGE, RED or PURPLE    Gatorade No ORANGE,RED or PURPLE    Popsicles No ORANGE, RED or PURPLE    Coffee/Tea Black ONLY (no cream or sugar for tea or coffee)   Water Tap or Bottled   Juice Apple or White Grape        All patients are to be NPO (no food or drink) after midnight the night before surgery.     All patients must have a  accompany them on the day of surgery. That person is REQUIRED to stay here on the hospital campus during your surgery! They cannot drop you off and come back to pick you up!

## 2024-11-15 ENCOUNTER — ANESTHESIA EVENT (OUTPATIENT)
Dept: PERIOP | Facility: HOSPITAL | Age: 49
End: 2024-11-15
Payer: MEDICARE

## 2024-11-15 ENCOUNTER — HOSPITAL ENCOUNTER (OUTPATIENT)
Facility: HOSPITAL | Age: 49
Setting detail: HOSPITAL OUTPATIENT SURGERY
Discharge: HOME | End: 2024-11-15
Attending: SURGERY | Admitting: SURGERY
Payer: MEDICARE

## 2024-11-15 ENCOUNTER — ANESTHESIA (OUTPATIENT)
Dept: PERIOP | Facility: HOSPITAL | Age: 49
End: 2024-11-15
Payer: MEDICARE

## 2024-11-15 VITALS
RESPIRATION RATE: 18 BRPM | SYSTOLIC BLOOD PRESSURE: 126 MMHG | TEMPERATURE: 97.2 F | HEIGHT: 66 IN | BODY MASS INDEX: 40.98 KG/M2 | OXYGEN SATURATION: 95 % | DIASTOLIC BLOOD PRESSURE: 74 MMHG | WEIGHT: 255 LBS | HEART RATE: 55 BPM

## 2024-11-15 LAB — GLUCOSE BLDC GLUCOMTR-MCNC: 231 MG/DL (ref 70–130)

## 2024-11-15 PROCEDURE — 25010000002 PROPOFOL 200 MG/20ML EMULSION: Performed by: NURSE ANESTHETIST, CERTIFIED REGISTERED

## 2024-11-15 PROCEDURE — S0260 H&P FOR SURGERY: HCPCS | Performed by: SURGERY

## 2024-11-15 PROCEDURE — 82948 REAGENT STRIP/BLOOD GLUCOSE: CPT

## 2024-11-15 RX ORDER — MIDAZOLAM HYDROCHLORIDE 1 MG/ML
1 INJECTION, SOLUTION INTRAMUSCULAR; INTRAVENOUS
Status: DISCONTINUED | OUTPATIENT
Start: 2024-11-15 | End: 2024-11-15 | Stop reason: HOSPADM

## 2024-11-15 RX ORDER — SODIUM CHLORIDE 0.9 % (FLUSH) 0.9 %
10 SYRINGE (ML) INJECTION AS NEEDED
Status: DISCONTINUED | OUTPATIENT
Start: 2024-11-15 | End: 2024-11-15 | Stop reason: HOSPADM

## 2024-11-15 RX ORDER — FENTANYL CITRATE 50 UG/ML
50 INJECTION, SOLUTION INTRAMUSCULAR; INTRAVENOUS
Status: DISCONTINUED | OUTPATIENT
Start: 2024-11-15 | End: 2024-11-15 | Stop reason: HOSPADM

## 2024-11-15 RX ORDER — IPRATROPIUM BROMIDE AND ALBUTEROL SULFATE 2.5; .5 MG/3ML; MG/3ML
3 SOLUTION RESPIRATORY (INHALATION) ONCE AS NEEDED
Status: DISCONTINUED | OUTPATIENT
Start: 2024-11-15 | End: 2024-11-15 | Stop reason: HOSPADM

## 2024-11-15 RX ORDER — OXYCODONE AND ACETAMINOPHEN 5; 325 MG/1; MG/1
1 TABLET ORAL ONCE AS NEEDED
Status: DISCONTINUED | OUTPATIENT
Start: 2024-11-15 | End: 2024-11-15 | Stop reason: HOSPADM

## 2024-11-15 RX ORDER — ONDANSETRON 2 MG/ML
4 INJECTION INTRAMUSCULAR; INTRAVENOUS AS NEEDED
Status: DISCONTINUED | OUTPATIENT
Start: 2024-11-15 | End: 2024-11-15 | Stop reason: HOSPADM

## 2024-11-15 RX ORDER — SODIUM CHLORIDE 0.9 % (FLUSH) 0.9 %
10 SYRINGE (ML) INJECTION EVERY 12 HOURS SCHEDULED
Status: DISCONTINUED | OUTPATIENT
Start: 2024-11-15 | End: 2024-11-15 | Stop reason: HOSPADM

## 2024-11-15 RX ORDER — KETOROLAC TROMETHAMINE 30 MG/ML
30 INJECTION, SOLUTION INTRAMUSCULAR; INTRAVENOUS EVERY 6 HOURS PRN
Status: DISCONTINUED | OUTPATIENT
Start: 2024-11-15 | End: 2024-11-15 | Stop reason: HOSPADM

## 2024-11-15 RX ORDER — PROPOFOL 10 MG/ML
INJECTION, EMULSION INTRAVENOUS AS NEEDED
Status: DISCONTINUED | OUTPATIENT
Start: 2024-11-15 | End: 2024-11-15 | Stop reason: SURG

## 2024-11-15 RX ADMIN — PROPOFOL 50 MG: 10 INJECTION, EMULSION INTRAVENOUS at 09:35

## 2024-11-15 RX ADMIN — PROPOFOL 250 MCG/KG/MIN: 10 INJECTION, EMULSION INTRAVENOUS at 09:37

## 2024-11-15 NOTE — ANESTHESIA POSTPROCEDURE EVALUATION
Patient: Matthew Madrid    Procedure Summary       Date: 11/15/24 Room / Location: [facility] OR  /  [facility] OR    Anesthesia Start: 0934 Anesthesia Stop: 0945    Procedure: COLONOSCOPY Diagnosis:       Screening for colon cancer      Positive colorectal cancer screening using Cologuard test      Diarrhea, unspecified type      (Screening for colon cancer [Z12.11])      (Positive colorectal cancer screening using Cologuard test [R19.5])      (Diarrhea, unspecified type [R19.7])    Surgeons: Gianluca Rodríguez MD Provider: Shawn Macedo MD    Anesthesia Type: general ASA Status: 3            Anesthesia Type: general    Vitals  Vitals Value Taken Time   /74 11/15/24 1005   Temp 97.2 °F (36.2 °C) 11/15/24 0948   Pulse 54 11/15/24 1009   Resp 18 11/15/24 0948   SpO2 95 % 11/15/24 1008   Vitals shown include unfiled device data.        Post Anesthesia Care and Evaluation    Patient location during evaluation: PACU  Patient participation: complete - patient participated  Level of consciousness: awake  Pain score: 0  Pain management: satisfactory to patient    Airway patency: patent  Anesthetic complications: No anesthetic complications  PONV Status: none  Cardiovascular status: hemodynamically stable  Respiratory status: nasal cannula  Hydration status: acceptable

## 2024-11-15 NOTE — ANESTHESIA PREPROCEDURE EVALUATION
Anesthesia Evaluation     Patient summary reviewed and Nursing notes reviewed   no history of anesthetic complications:   NPO Solid Status: > 8 hours  NPO Liquid Status: > 8 hours           Airway   Mallampati: II  TM distance: >3 FB  Neck ROM: full  Dental    (+) edentulous    Pulmonary - normal exam    breath sounds clear to auscultation  (+) ,shortness of breath, sleep apnea on CPAP  Cardiovascular - normal exam  Exercise tolerance: good (4-7 METS)    ECG reviewed  Rhythm: regular  Rate: normal    (+) hypertension, past MI (times 3) , cardiac stents (2 stents) , angina, CHF , PVD, hyperlipidemia    ROS comment: Stress 10/2021  Interpretation Summary    · Diaphragmatic attenuation artifact is present.  · Left ventricular ejection fraction is normal. (Calculated EF = 53%).  · Myocardial perfusion imaging indicates a normal myocardial perfusion study with no evidence of ischemia.  · Impressions are consistent with a low risk study.  · There is no prior study available for comparison.  · Findings consistent with a normal ECG stress test.  · Minor apical fixed defect is likely from artifact         Neuro/Psych  (+) numbness  GI/Hepatic/Renal/Endo    (+) obesity, morbid obesity, GERD, renal disease- CRI, diabetes mellitus type 2 poorly controlled    Musculoskeletal     Abdominal   (+) obese    Abdomen: soft.   Substance History - negative use     OB/GYN negative ob/gyn ROS         Other   arthritis, blood dyscrasia anemia,     ROS/Med Hx Other: 1012/24  ·  Left ventricular systolic function is normal. Estimated left ventricular EF = 60%  ·  Left ventricular diastolic function is consistent with (grade II w/high LAP) pseudonormalization.  ·  Normal right ventricular cavity size and systolic function noted.  ·  There is no evidence of pericardial effusion  ·  No significant valvular abnormalities are seen                      Anesthesia Plan    ASA 3     general     intravenous induction     Anesthetic plan, risks,  benefits, and alternatives have been provided, discussed and informed consent has been obtained with: patient.  Pre-procedure education provided  Use of blood products discussed with patient  Consented to blood products.        CODE STATUS:      FULL    Lab Results   Component Value Date    WBC 6.67 10/18/2024    HGB 11.1 (L) 10/18/2024    HCT 33.1 (L) 10/18/2024    MCV 96.2 10/18/2024     10/18/2024       Lab Results   Component Value Date    GLUCOSE 244 (H) 10/18/2024    BUN 38 (H) 10/18/2024    CREATININE 2.91 (H) 10/18/2024    EGFRIFNONA 68 12/29/2021    BCR 13.1 10/18/2024    K 4.3 10/18/2024    CO2 25.4 10/18/2024    CALCIUM 9.1 10/18/2024    PROTENTOTREF 6.0 09/01/2023    ALBUMIN 3.9 10/18/2024    LABIL2 1.1 09/01/2023    AST 20 10/18/2024    ALT 20 10/18/2024

## 2024-11-15 NOTE — H&P
Chief Complaint:        Chief Complaint   Patient presents with    Colonoscopy         History of Present Illness:    History of Present Illness Matthew is a 49-year-old male who presents for evaluation for screening colonoscopy.  He recently had a positive Cologuard.  Reports that he had a colonoscopy in .  Reports it was normal at that time.  He does report he has bowel movements daily, often times multiple times a day.  Denies any blood in his stool.  Denies any known family history of colon cancer.  He does report that he occasionally has incontinence to his stool.  Currently is on Brilinta, last cardiac stent was placed in January of this year.    No interval changes to medical history. Saw cardiology. Held brillinta     Past Medical History:  Medical History        Past Medical History:   Diagnosis Date    ASCVD (arteriosclerotic cardiovascular disease) 2021    Chronic heart failure with preserved ejection fraction (HFpEF) 2023    Diabetes mellitus      Elevated cholesterol      GERD (gastroesophageal reflux disease)      Hyperlipidemia      Hypertension      NSTEMI, initial episode of care 2021     Added automatically from request for surgery [id number]      ALIA (obstructive sleep apnea) 2024            Social History:  Social History   Social History            Socioeconomic History    Marital status:    Tobacco Use    Smoking status: Former       Current packs/day: 0.00       Types: Cigarettes       Quit date: 2000       Years since quittin.7       Passive exposure: Past    Smokeless tobacco: Never   Vaping Use    Vaping status: Never Used   Substance and Sexual Activity    Alcohol use: Yes       Alcohol/week: 6.0 standard drinks of alcohol       Types: 6 Glasses of wine per week       Comment: Occasional    Drug use: Never    Sexual activity: Defer            Family History:        Family History   Problem Relation Age of Onset    Hyperlipidemia Mother       Hyperlipidemia Father      Heart attack Paternal Uncle           Past Surgical History:  Surgical History         Past Surgical History:   Procedure Laterality Date    CARDIAC CATHETERIZATION N/A 09/02/2021     Procedure: Left Heart Cath;  Surgeon: Vasile Moulton MD;  Location: [facility] CATH INVASIVE LOCATION;  Service: Cardiology;  Laterality: N/A;    CARDIAC CATHETERIZATION N/A 09/02/2021     Procedure: Percutaneous Coronary Intervention;  Surgeon: Vasile Moulton MD;  Location: [facility] CATH INVASIVE LOCATION;  Service: Cardiology;  Laterality: N/A;    CARDIAC CATHETERIZATION N/A 1/15/2024     Procedure: Coronary angiography;  Surgeon: Anant Bennett MD;  Location: [facility] CATH INVASIVE LOCATION;  Service: Cardiology;  Laterality: N/A;    COLONOSCOPY   2013    COLONOSCOPY N/A 10/4/2022     Procedure: COLONOSCOPY;  Surgeon: Gianluca Rodríguez MD;  Location: [facility] OR;  Service: Gastroenterology;  Laterality: N/A;    ENDOSCOPY   2013    ENDOSCOPY N/A 10/4/2022     Procedure: ESOPHAGOGASTRODUODENOSCOPY;  Surgeon: Gianluca Rodríguez MD;  Location: [facility] OR;  Service: Gastroenterology;  Laterality: N/A;    LAPAROSCOPIC CHOLECYSTECTOMY                Problem List:  Problem List       Patient Active Problem List   Diagnosis    ASCVD (arteriosclerotic cardiovascular disease)    Essential hypertension    Dyslipidemia    DM (diabetes mellitus), type 2 with complications    SOB (shortness of breath)    Severe obesity (BMI 35.0-39.9) with comorbidity    Diarrhea    Abdominal pain    Dysphagia    Chronic heart failure with preserved ejection fraction (HFpEF)    Deformity of metatarsal    Diabetic peripheral neuropathy associated with type 2 diabetes mellitus    Epidermoid cyst    Foot pain    Hammer toe    Hereditary peripheral neuropathy    Low back pain    Lumbosacral radiculopathy    Lumbar spondylosis    Muscle pain    Onychomycosis of toenail    Pain of right hip joint    Peripheral vascular disease    Stasis dermatitis  with venous ulcer of lower extremity due to chronic peripheral venous hypertension    Torticollis    Iron deficiency anemia    Hypomagnesemia    ALIA (obstructive sleep apnea)    Atypical angina            Allergy:   Allergies        Allergies   Allergen Reactions    Tuberculin Tests Rash            Current Medications:   Current Medications          Current Outpatient Medications   Medication Sig Dispense Refill    albuterol (PROVENTIL) (2.5 MG/3ML) 0.083% nebulizer solution Take 2.5 mg by nebulization Every 6 (Six) Hours As Needed for Shortness of Air. 360 mL 2    allopurinol (ZYLOPRIM) 100 MG tablet Take 1 tablet by mouth Daily.        ALPRAZolam (XANAX) 1 MG tablet Take 1 tablet by mouth 3 (Three) Times a Day As Needed for Anxiety.        amLODIPine (NORVASC) 5 MG tablet Take 1 tablet by mouth Daily.        aspirin 81 MG EC tablet Take 1 tablet by mouth Daily. 90 tablet 3    atorvastatin (LIPITOR) 40 MG tablet Take 1 tablet by mouth Daily.        famotidine (Pepcid) 20 MG tablet Take 1 tablet by mouth Daily for 90 days. 30 tablet 2    ferrous sulfate 325 (65 FE) MG tablet Take 1 tablet by mouth 3 (Three) Times a Day With Meals. Taking BID        FLUoxetine (PROzac) 20 MG capsule Take 2 capsules by mouth Daily.        FLUoxetine (PROzac) 40 MG capsule Take 1 capsule by mouth Daily.        gabapentin (NEURONTIN) 800 MG tablet Take 1 tablet by mouth 3 (Three) Times a Day.        HumuLIN 70/30 KwikPen (70-30) 100 UNIT/ML suspension pen-injector Inject 40 Units under the skin into the appropriate area as directed 2 (Two) Times a Day. Takes at AM and 4 PM        hydrALAZINE (APRESOLINE) 50 MG tablet Take 1 tablet by mouth Every 8 (Eight) Hours. 90 tablet 0    hydrOXYzine (ATARAX) 10 MG tablet Take 1-2 tablets by mouth 3 (Three) Times a Day As Needed for Anxiety.        Insulin Lispro, 0.5 Unit Dial, (HUMALOG KWIKPEN ROSALBA) 100 UNIT/ML solution pen-injector Inject 60 Units under the skin into the appropriate area as  directed As Needed. Max of 60 units daily- use sliding scale PRN        isosorbide mononitrate (IMDUR) 120 MG 24 hr tablet Take 1 tablet by mouth Daily for 180 days. 90 tablet 1    Jardiance 10 MG tablet tablet Take 1 tablet by mouth Daily.        levocetirizine (XYZAL) 5 MG tablet Take 1 tablet by mouth Every Evening.        Magnesium 400 MG tablet Take 1 tablet by mouth 2 (Two) Times a Day. 60 tablet 0    Misc. Devices (Classics Rolling Walker) misc Use 1 Units Daily. 1 each 0    Misc. Devices (Classics Rolling Walker) misc Use 1 Units Daily. 1 each 0    nitroglycerin (NITROSTAT) 0.4 MG SL tablet Place 1 tablet under the tongue Every 5 (Five) Minutes As Needed for Chest Pain. do not exceed a total of 3 doses in 15 minutes        Omega-3 Fatty Acids (fish oil) 1000 MG capsule capsule Take 1 capsule by mouth Daily With Breakfast.        OneTouch Ultra test strip          pantoprazole (PROTONIX) 40 MG EC tablet Take 1 tablet by mouth Daily.        promethazine (PHENERGAN) 12.5 MG tablet Take 1 tablet by mouth 2 (Two) Times a Day As Needed for Nausea or Vomiting.        ranolazine (RANEXA) 1000 MG 12 hr tablet Take 1 tablet by mouth Every 12 (Twelve) Hours. 60 tablet 2    Semaglutide, 1 MG/DOSE, (Ozempic, 1 MG/DOSE,) 2 MG/1.5ML solution pen-injector Inject  under the skin into the appropriate area as directed 1 (One) Time Per Week.        tamsulosin (FLOMAX) 0.4 MG capsule 24 hr capsule Take 1 capsule by mouth Daily. 30 capsule 3    ticagrelor (BRILINTA) 90 MG tablet tablet Take 1 tablet by mouth 2 (Two) Times a Day. 180 tablet 3    torsemide (DEMADEX) 20 MG tablet Take 1 tablet by mouth Every 12 (Twelve) Hours.        traZODone (DESYREL) 100 MG tablet Take 1 tablet by mouth At Night As Needed for Sleep.        vitamin B-12 (CYANOCOBALAMIN) 1000 MCG tablet Take 1 tablet by mouth Daily.        vitamin D (ERGOCALCIFEROL) 1.25 MG (51505 UT) capsule capsule Take 1 capsule by mouth 1 (One) Time Per Week. 5 capsule 3     psyllium (METAMUCIL) 58.6 % powder Take 1 packet by mouth Daily. 30 packet 11      No current facility-administered medications for this visit.            Review of Systems:    Review of Systems   Gastrointestinal:  Negative for blood in stool.            Physical Exam:   Physical Exam  Constitutional:       Appearance: Normal appearance.   HENT:      Head: Normocephalic and atraumatic.      Right Ear: External ear normal.      Left Ear: External ear normal.   Eyes:      Conjunctiva/sclera: Conjunctivae normal.   Pulmonary:      Effort: Pulmonary effort is normal.   Abdominal:      General: Abdomen is flat.   Musculoskeletal:         General: Normal range of motion.      Cervical back: Normal range of motion and neck supple.   Skin:     General: Skin is warm and dry.      Capillary Refill: Capillary refill takes less than 2 seconds.   Neurological:      General: No focal deficit present.      Mental Status: He is alert and oriented to person, place, and time.   Psychiatric:         Mood and Affect: Mood normal.         Behavior: Behavior normal.              Assessment & Plan  Diagnoses and all orders for this visit:        Matthew is a 49 year old male who presents for a screening colonoscopy    To endoscopy for colonoscopy

## 2024-11-19 ENCOUNTER — LAB (OUTPATIENT)
Dept: LAB | Facility: HOSPITAL | Age: 49
End: 2024-11-19
Payer: MEDICARE

## 2024-11-19 ENCOUNTER — TRANSCRIBE ORDERS (OUTPATIENT)
Dept: ADMINISTRATIVE | Facility: HOSPITAL | Age: 49
End: 2024-11-19
Payer: COMMERCIAL

## 2024-11-19 DIAGNOSIS — N18.32 STAGE 3B CHRONIC KIDNEY DISEASE: Primary | ICD-10-CM

## 2024-11-19 DIAGNOSIS — N18.32 STAGE 3B CHRONIC KIDNEY DISEASE: ICD-10-CM

## 2024-11-19 LAB
ALBUMIN SERPL-MCNC: 4 G/DL (ref 3.5–5.2)
ALBUMIN/GLOB SERPL: 1.3 G/DL
ALP SERPL-CCNC: 101 U/L (ref 39–117)
ALT SERPL W P-5'-P-CCNC: 28 U/L (ref 1–41)
ANION GAP SERPL CALCULATED.3IONS-SCNC: 16.3 MMOL/L (ref 5–15)
AST SERPL-CCNC: 18 U/L (ref 1–40)
BILIRUB SERPL-MCNC: 0.7 MG/DL (ref 0–1.2)
BILIRUB UR QL STRIP: NEGATIVE
BUN SERPL-MCNC: 40 MG/DL (ref 6–20)
BUN/CREAT SERPL: 17.8 (ref 7–25)
CALCIUM SPEC-SCNC: 8.8 MG/DL (ref 8.6–10.5)
CHLORIDE SERPL-SCNC: 102 MMOL/L (ref 98–107)
CLARITY UR: CLEAR
CO2 SERPL-SCNC: 21.7 MMOL/L (ref 22–29)
COLOR UR: YELLOW
CREAT SERPL-MCNC: 2.25 MG/DL (ref 0.76–1.27)
CREAT UR-MCNC: 16.8 MG/DL
EGFRCR SERPLBLD CKD-EPI 2021: 34.9 ML/MIN/1.73
GLOBULIN UR ELPH-MCNC: 3.2 GM/DL
GLUCOSE SERPL-MCNC: 243 MG/DL (ref 65–99)
GLUCOSE UR STRIP-MCNC: ABNORMAL MG/DL
HGB UR QL STRIP.AUTO: NEGATIVE
KETONES UR QL STRIP: NEGATIVE
LEUKOCYTE ESTERASE UR QL STRIP.AUTO: NEGATIVE
NITRITE UR QL STRIP: NEGATIVE
PH UR STRIP.AUTO: 6 [PH] (ref 5–8)
POTASSIUM SERPL-SCNC: 3.4 MMOL/L (ref 3.5–5.2)
PROT ?TM UR-MCNC: <4 MG/DL
PROT SERPL-MCNC: 7.2 G/DL (ref 6–8.5)
PROT UR QL STRIP: NEGATIVE
PROT/CREAT UR: NORMAL MG/G{CREAT}
SODIUM SERPL-SCNC: 140 MMOL/L (ref 136–145)
SP GR UR STRIP: 1.01 (ref 1–1.03)
UROBILINOGEN UR QL STRIP: ABNORMAL

## 2024-11-19 PROCEDURE — 82570 ASSAY OF URINE CREATININE: CPT

## 2024-11-19 PROCEDURE — 36415 COLL VENOUS BLD VENIPUNCTURE: CPT

## 2024-11-19 PROCEDURE — 81003 URINALYSIS AUTO W/O SCOPE: CPT

## 2024-11-19 PROCEDURE — 80053 COMPREHEN METABOLIC PANEL: CPT

## 2024-11-19 PROCEDURE — 84156 ASSAY OF PROTEIN URINE: CPT

## 2024-11-20 RX ORDER — TAMSULOSIN HYDROCHLORIDE 0.4 MG/1
1 CAPSULE ORAL DAILY
Qty: 90 CAPSULE | Refills: 1 | Status: SHIPPED | OUTPATIENT
Start: 2024-11-20 | End: 2025-05-19

## 2024-12-10 ENCOUNTER — TRANSCRIBE ORDERS (OUTPATIENT)
Dept: ADMINISTRATIVE | Facility: HOSPITAL | Age: 49
End: 2024-12-10
Payer: MEDICARE

## 2024-12-10 ENCOUNTER — LAB (OUTPATIENT)
Dept: LAB | Facility: HOSPITAL | Age: 49
End: 2024-12-10
Payer: MEDICARE

## 2024-12-10 DIAGNOSIS — N18.32 STAGE 3B CHRONIC KIDNEY DISEASE: ICD-10-CM

## 2024-12-10 DIAGNOSIS — N18.32 STAGE 3B CHRONIC KIDNEY DISEASE: Primary | ICD-10-CM

## 2024-12-10 LAB
ANION GAP SERPL CALCULATED.3IONS-SCNC: 10.5 MMOL/L (ref 5–15)
BUN SERPL-MCNC: 26 MG/DL (ref 6–20)
BUN/CREAT SERPL: 12 (ref 7–25)
CALCIUM SPEC-SCNC: 9.2 MG/DL (ref 8.6–10.5)
CHLORIDE SERPL-SCNC: 103 MMOL/L (ref 98–107)
CO2 SERPL-SCNC: 22.5 MMOL/L (ref 22–29)
CREAT SERPL-MCNC: 2.17 MG/DL (ref 0.76–1.27)
EGFRCR SERPLBLD CKD-EPI 2021: 36.4 ML/MIN/1.73
GLUCOSE SERPL-MCNC: 201 MG/DL (ref 65–99)
POTASSIUM SERPL-SCNC: 3.9 MMOL/L (ref 3.5–5.2)
SODIUM SERPL-SCNC: 136 MMOL/L (ref 136–145)

## 2024-12-10 PROCEDURE — 36415 COLL VENOUS BLD VENIPUNCTURE: CPT

## 2024-12-10 PROCEDURE — 80048 BASIC METABOLIC PNL TOTAL CA: CPT

## 2025-01-03 ENCOUNTER — LAB (OUTPATIENT)
Dept: LAB | Facility: HOSPITAL | Age: 50
End: 2025-01-03
Payer: MEDICARE

## 2025-01-03 ENCOUNTER — TRANSCRIBE ORDERS (OUTPATIENT)
Dept: ADMINISTRATIVE | Facility: HOSPITAL | Age: 50
End: 2025-01-03
Payer: MEDICARE

## 2025-01-03 DIAGNOSIS — M10.9 ARTICULAR GOUT: ICD-10-CM

## 2025-01-03 DIAGNOSIS — M10.9 ARTICULAR GOUT: Primary | ICD-10-CM

## 2025-01-03 DIAGNOSIS — N18.32 STAGE 3B CHRONIC KIDNEY DISEASE: ICD-10-CM

## 2025-01-03 LAB
ALBUMIN SERPL-MCNC: 4.1 G/DL (ref 3.5–5.2)
ALBUMIN/GLOB SERPL: 1.1 G/DL
ALP SERPL-CCNC: 100 U/L (ref 39–117)
ALT SERPL W P-5'-P-CCNC: 19 U/L (ref 1–41)
ANION GAP SERPL CALCULATED.3IONS-SCNC: 12.3 MMOL/L (ref 5–15)
AST SERPL-CCNC: 14 U/L (ref 1–40)
BILIRUB SERPL-MCNC: 0.6 MG/DL (ref 0–1.2)
BILIRUB UR QL STRIP: NEGATIVE
BUN SERPL-MCNC: 32 MG/DL (ref 6–20)
BUN/CREAT SERPL: 13.9 (ref 7–25)
CALCIUM SPEC-SCNC: 9.2 MG/DL (ref 8.6–10.5)
CHLORIDE SERPL-SCNC: 105 MMOL/L (ref 98–107)
CLARITY UR: CLEAR
CO2 SERPL-SCNC: 22.7 MMOL/L (ref 22–29)
COLOR UR: YELLOW
CREAT SERPL-MCNC: 2.31 MG/DL (ref 0.76–1.27)
CREAT UR-MCNC: 71.3 MG/DL
EGFRCR SERPLBLD CKD-EPI 2021: 33.8 ML/MIN/1.73
GLOBULIN UR ELPH-MCNC: 3.6 GM/DL
GLUCOSE SERPL-MCNC: 198 MG/DL (ref 65–99)
GLUCOSE UR STRIP-MCNC: ABNORMAL MG/DL
HGB UR QL STRIP.AUTO: NEGATIVE
KETONES UR QL STRIP: NEGATIVE
LEUKOCYTE ESTERASE UR QL STRIP.AUTO: NEGATIVE
NITRITE UR QL STRIP: NEGATIVE
PH UR STRIP.AUTO: 6 [PH] (ref 5–8)
POTASSIUM SERPL-SCNC: 4.4 MMOL/L (ref 3.5–5.2)
PROT ?TM UR-MCNC: 15.1 MG/DL
PROT SERPL-MCNC: 7.7 G/DL (ref 6–8.5)
PROT UR QL STRIP: ABNORMAL
PROT/CREAT UR: 211.8 MG/G CREA (ref 0–200)
SODIUM SERPL-SCNC: 140 MMOL/L (ref 136–145)
SP GR UR STRIP: 1.02 (ref 1–1.03)
URATE SERPL-MCNC: 7.9 MG/DL (ref 3.4–7)
UROBILINOGEN UR QL STRIP: ABNORMAL

## 2025-01-03 PROCEDURE — 36415 COLL VENOUS BLD VENIPUNCTURE: CPT

## 2025-01-03 PROCEDURE — 84156 ASSAY OF PROTEIN URINE: CPT

## 2025-01-03 PROCEDURE — 82570 ASSAY OF URINE CREATININE: CPT

## 2025-01-03 PROCEDURE — 81003 URINALYSIS AUTO W/O SCOPE: CPT

## 2025-01-03 PROCEDURE — 84550 ASSAY OF BLOOD/URIC ACID: CPT

## 2025-01-03 PROCEDURE — 80053 COMPREHEN METABOLIC PANEL: CPT

## 2025-01-14 ENCOUNTER — HOSPITAL ENCOUNTER (OUTPATIENT)
Dept: CARDIOLOGY | Facility: HOSPITAL | Age: 50
Discharge: HOME | End: 2025-01-14
Admitting: PHYSICIAN ASSISTANT
Payer: MEDICARE

## 2025-01-14 VITALS
WEIGHT: 242.6 LBS | HEART RATE: 68 BPM | HEIGHT: 66 IN | BODY MASS INDEX: 38.99 KG/M2 | OXYGEN SATURATION: 96 % | SYSTOLIC BLOOD PRESSURE: 118 MMHG | DIASTOLIC BLOOD PRESSURE: 68 MMHG

## 2025-01-14 DIAGNOSIS — I50.32 CHRONIC HEART FAILURE WITH PRESERVED EJECTION FRACTION (HFPEF): Primary | ICD-10-CM

## 2025-01-14 DIAGNOSIS — I25.10 ASCVD (ARTERIOSCLEROTIC CARDIOVASCULAR DISEASE): Chronic | ICD-10-CM

## 2025-01-14 DIAGNOSIS — I10 ESSENTIAL HYPERTENSION: Chronic | ICD-10-CM

## 2025-01-14 DIAGNOSIS — E66.01 MORBIDLY OBESE: ICD-10-CM

## 2025-01-14 DIAGNOSIS — G47.33 OSA (OBSTRUCTIVE SLEEP APNEA): ICD-10-CM

## 2025-01-14 LAB — ABSOLUTE LUNG FLUID CONTENT: 27 % (ref 20–35)

## 2025-01-14 PROCEDURE — 94726 PLETHYSMOGRAPHY LUNG VOLUMES: CPT | Performed by: PHYSICIAN ASSISTANT

## 2025-01-14 RX ORDER — NYSTATIN 100000 [USP'U]/G
1 POWDER TOPICAL 4 TIMES DAILY
COMMUNITY

## 2025-01-14 RX ORDER — TRIAMCINOLONE ACETONIDE 1 MG/G
1 CREAM TOPICAL 2 TIMES DAILY
COMMUNITY
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
## 2025-01-14 NOTE — PROGRESS NOTES
Heart Failure Clinic    Date: 01/14/25     Vitals:    01/14/25 1257   BP: 118/68   Pulse: 68   SpO2: 96%    Weight 242    Method of arrival: Ambulatory    Weighing self daily: Yes    Monitoring Heart Failure Zones: Yes    Today's HF Zone: Green    Taking medications as prescribed: Yes    Edema Yes    Shortness of Air: No    Number of pillows used at night:Adjustable bed w/ HOB raised    Educational Materials given:  AVS                                                                         ReDS Value: 27  25-35 Optimal Value Status      Stacy Downing MA 01/14/25 12:58 EST

## 2025-01-14 NOTE — PROGRESS NOTES
[facility] Failure Clinic  Pharmacist Note     Matthew Madrid is a 49 y.o. male seen in the Heart Failure Clinic for HFpEF.    Matthew Madrid reports a poor understanding of medications.  His wife is not with him today but he did bring in medication bottles.     He denies any swelling or SOB and says his weight is stable. He called his wife on the phone to confirm he is taking torsemide 20mg only as needed.     He brought in his daily logs and BP ranged from and most recent reading was 120/69 and HR 65.     He reports tolerating Jardiance 25mg well and states it has helped him very much with his swelling.     Medication Use:   Hx of med intolerances: Lisinopril (discontinued at discharge in July 2023. Elevated CK and renal issues);   Retail Rx Management: [facility] in [place].     Past Medical History:   Diagnosis Date    ASCVD (arteriosclerotic cardiovascular disease) 09/13/2021    CHF (congestive heart failure)     Chronic heart failure with preserved ejection fraction (HFpEF) 11/27/2023    Diabetes mellitus     Elevated cholesterol     GERD (gastroesophageal reflux disease)     Hyperlipidemia     Hypertension     NSTEMI, initial episode of care 09/01/2021    Added automatically from request for surgery [id number]      ALIA (obstructive sleep apnea) 07/04/2024     ALLERGIES: Tuberculin tests  Current Outpatient Medications   Medication Sig Dispense Refill    albuterol (PROVENTIL) (2.5 MG/3ML) 0.083% nebulizer solution Take 2.5 mg by nebulization Every 8 (Eight) Hours As Needed for Wheezing.      albuterol sulfate  (90 Base) MCG/ACT inhaler Inhale 2 puffs Every 4 (Four) Hours As Needed for Wheezing.      ALPRAZolam (XANAX) 1 MG tablet Take 1 tablet by mouth 3 (Three) Times a Day As Needed for Anxiety.      amitriptyline (ELAVIL) 25 MG tablet Take 1 tablet by mouth Every Night.      amLODIPine (NORVASC) 5 MG tablet Take 1 tablet by mouth Every Night.      aspirin 81 MG EC tablet Take 1 tablet by mouth Daily. 90 tablet 3     atorvastatin (LIPITOR) 40 MG tablet Take 1 tablet by mouth Daily.      buPROPion SR (WELLBUTRIN SR) 150 MG 12 hr tablet Take 1 tablet by mouth Daily.      Continuous Glucose Sensor (Dexcom G6 Sensor) CHANGE EVERY 10 DAYS      empagliflozin (JARDIANCE) 25 MG tablet tablet Take 1 tablet by mouth Daily.      famotidine (PEPCID) 20 MG tablet Take 1 tablet by mouth Daily.      FLUoxetine (PROzac) 40 MG capsule Take 1 capsule by mouth Daily.      gabapentin (NEURONTIN) 800 MG tablet Take 1 tablet by mouth 3 (Three) Times a Day.      hydrALAZINE (APRESOLINE) 50 MG tablet Take 1 tablet by mouth Every 8 (Eight) Hours.      hydrOXYzine (ATARAX) 25 MG tablet Take 1-2 tablets by mouth 3 (Three) Times a Day As Needed for Itching.      Insulin Lispro, 0.5 Unit Dial, (HumaLOG Kiran KwikPen) 100 UNIT/ML solution pen-injector Inject 0-60 Units under the skin into the appropriate area as directed Daily.      insulin NPH-insulin regular (HumuLIN 70/30) (70-30) 100 UNIT/ML injection Inject 40 Units under the skin into the appropriate area as directed 2 (Two) Times a Day.      isosorbide mononitrate (IMDUR) 120 MG 24 hr tablet Take 1 tablet by mouth Daily. 90 tablet 1    levocetirizine (XYZAL) 5 MG tablet Take 1 tablet by mouth Daily.      nitroglycerin (NITROSTAT) 0.4 MG SL tablet Place 1 tablet under the tongue Every 5 (Five) Minutes As Needed for Chest Pain. Take no more than 3 doses in 15 minutes.      nystatin (MYCOSTATIN) 598820 UNIT/GM cream Apply 1 Application topically to the appropriate area as directed 2 (Two) Times a Day.      nystatin (MYCOSTATIN) 374704 UNIT/GM powder Apply 1 Application topically to the appropriate area as directed 4 (Four) Times a Day.      Omega-3-Acid Eth Est, Dietary, 1 g capsule Take 1 capsule by mouth Daily.      pantoprazole (PROTONIX) 40 MG EC tablet Take 1 tablet by mouth Daily.      ranolazine (RANEXA) 1000 MG 12 hr tablet TAKE 1  BY MOUTH EVERY 12 HOURS 60 tablet 3    Semaglutide,0.25 or  "0.5MG/DOS, (Ozempic, 0.25 or 0.5 MG/DOSE,) 2 MG/3ML solution pen-injector Inject 1 mg under the skin into the appropriate area as directed 1 (One) Time Per Week.      tamsulosin (FLOMAX) 0.4 MG capsule 24 hr capsule Take 1 capsule by mouth Daily for 180 days. 90 capsule 1    ticagrelor (BRILINTA) 90 MG tablet tablet Take 1 tablet by mouth 2 (Two) Times a Day.      torsemide (DEMADEX) 20 MG tablet Take 1 tablet by mouth 2 (Two) Times a Day. (Patient taking differently: Take 1 tablet by mouth Daily As Needed.)      triamcinolone (KENALOG) 0.1 % cream Apply 1 Application topically to the appropriate area as directed 2 (Two) Times a Day.      vitamin D (ERGOCALCIFEROL) 1.25 MG (18020 UT) capsule capsule Take 1 capsule by mouth 1 (One) Time Per Week.      allopurinol (ZYLOPRIM) 100 MG tablet Take 1 tablet by mouth Daily.      magnesium oxide (MAG-OX) 400 MG tablet Take 1 tablet by mouth 2 (Two) Times a Day. (Patient not taking: Reported on 1/14/2025)       No current facility-administered medications for this encounter.       Vaccination History:   Pneumonia: Reports received - unsure when this was; likely a candidate for Prevnar 20  Annual Influenza: UTD  Shingles: Currently not eligible    Objective  Vitals:    01/14/25 1257   BP: 118/68   BP Location: Left arm   Patient Position: Sitting   Cuff Size: Adult   Pulse: 68   SpO2: 96%   Weight: 110 kg (242 lb 9.6 oz)   Height: 167.6 cm (66\")         Wt Readings from Last 3 Encounters:   01/14/25 110 kg (242 lb 9.6 oz)   11/15/24 116 kg (255 lb)   11/05/24 113 kg (250 lb)         01/14/25  1257   Weight: 110 kg (242 lb 9.6 oz)         Lab Results   Component Value Date    GLUCOSE 198 (H) 01/03/2025    BUN 32 (H) 01/03/2025    CREATININE 2.31 (H) 01/03/2025    EGFRIFNONA 68 12/29/2021    BCR 13.9 01/03/2025    K 4.4 01/03/2025    CO2 22.7 01/03/2025    CALCIUM 9.2 01/03/2025    PROTENTOTREF 6.0 09/01/2023    ALBUMIN 4.1 01/03/2025    LABIL2 1.1 09/01/2023    AST 14 " 01/03/2025    ALT 19 01/03/2025     Lab Results   Component Value Date    WBC 6.67 10/18/2024    HGB 11.1 (L) 10/18/2024    HCT 33.1 (L) 10/18/2024    MCV 96.2 10/18/2024     10/18/2024     Lab Results   Component Value Date    CKTOTAL 184 10/11/2024    CKMB 2.40 07/25/2023    TROPONINT 23 (H) 10/11/2024     Lab Results   Component Value Date    PROBNP 93.7 09/17/2024     Results for orders placed during the hospital encounter of 10/11/24    Adult Transthoracic Echo Complete W/ Cont if Necessary Per Protocol    Interpretation Summary    Left ventricular systolic function is normal. Estimated left ventricular EF = 60%    Left ventricular diastolic function is consistent with (grade II w/high LAP) pseudonormalization.    Normal right ventricular cavity size and systolic function noted.    There is no evidence of pericardial effusion    No significant valvular abnormalities are seen         GDMT    Drug Class   Drug   Dose Last Dose Adjustment Additional Titration   Notes   ACEi/ARB/ARNI     Lisinopril D/C at discharge July 2023 - renal/ck    Beta Blocker        MRA    CKD    SGLT2i Jardiance  25 mg 11/26/24 Started by Nephro Farxiga- pain in groin, tolerating Jardiance well.     Imdur 120 mg QD       Hydralazine 50 mg TID          Drug Therapy Problems    None at this time      Recommendations:     None at this time      Patient was educated on heart failure medications and the importance of medication adherence.     All questions were addressed and patient expressed understanding.     Thank you for allowing me to participate in the care of your patient,    Ericka Wei, PharmD  01/14/25  14:57 EST

## 2025-02-05 ENCOUNTER — OFFICE VISIT (OUTPATIENT)
Dept: CARDIOLOGY | Facility: CLINIC | Age: 50
End: 2025-02-05
Payer: MEDICARE

## 2025-02-05 VITALS
WEIGHT: 242.6 LBS | OXYGEN SATURATION: 98 % | HEART RATE: 69 BPM | DIASTOLIC BLOOD PRESSURE: 68 MMHG | HEIGHT: 66 IN | SYSTOLIC BLOOD PRESSURE: 120 MMHG | BODY MASS INDEX: 38.99 KG/M2

## 2025-02-05 DIAGNOSIS — E78.5 DYSLIPIDEMIA: Chronic | ICD-10-CM

## 2025-02-05 DIAGNOSIS — I20.89 ATYPICAL ANGINA: Chronic | ICD-10-CM

## 2025-02-05 DIAGNOSIS — I10 ESSENTIAL HYPERTENSION: Chronic | ICD-10-CM

## 2025-02-05 DIAGNOSIS — I25.10 ASCVD (ARTERIOSCLEROTIC CARDIOVASCULAR DISEASE): Primary | Chronic | ICD-10-CM

## 2025-02-05 RX ORDER — PROMETHAZINE HYDROCHLORIDE 12.5 MG/1
12.5 TABLET ORAL EVERY 6 HOURS PRN
COMMUNITY

## 2025-02-05 RX ORDER — CLOPIDOGREL BISULFATE 75 MG/1
TABLET ORAL
Qty: 98 TABLET | Refills: 3 | Status: SHIPPED | OUTPATIENT
Start: 2025-02-05

## 2025-02-05 NOTE — PROGRESS NOTES
"Chief Complaint  Follow-up (Denies CP, Complains of SOA, mild leg/ankle swelling, hx of kidney disease, severe indigestion. ) and Med Management (Tolerating all current medications.)    Subjective          Matthew Madrid presents to [facility] for follow up.    History of Present Illness    Mr. Madrid presents for follow-up of ASCVD, hypertension, hyperlipidemia, and chronic HFpEF.  His last visit to clinic was 11/05/2024 with Dr. Bennett.  At that time, no changes were made to his medication regimen.  He also follows with the heart failure clinic and was last seen there 01/14/2025.  No changes were made to his medications at that visit either.  History of Present Illness   He reports mild pedal edema, attributing it to his renal function. He reports no chest pain but experiences shortness of breath, which he believes is due to his renal condition and associated swelling. He is currently on \"20 medications\".     He reports no new wounds on his legs but mentions dry skin that necessitates the application of lotion. He was previously on diuretics, which resulted in significant weight loss. He is currently on Ozempic and Jardiance, which have helped maintain his weight between 236 and 242 pounds, with a maximum weight of 245 pounds. He is scheduled to see Dr. Johnson on 02/12/2025.    His last recorded A1c level was 6.5, indicating well-controlled diabetes. He reports fluctuations in his blood sugar levels.           Tobacco Use: Medium Risk (2/5/2025)    Patient History     Smoking Tobacco Use: Former     Smokeless Tobacco Use: Never     Passive Exposure: Past       Objective     Vital Signs:   /68 (BP Location: Left arm, Patient Position: Sitting, Cuff Size: Adult)   Pulse 69   Ht 167.6 cm (66\")   Wt 110 kg (242 lb 9.6 oz)   SpO2 98%   BMI 39.16 kg/m²       Physical Exam  Vitals and nursing note reviewed. Exam conducted with a chaperone present (Wife accompanies).   Constitutional:  "      General: He is not in acute distress.     Appearance: He is obese. He is ill-appearing.      Comments: Appears older than stated age   HENT:      Head: Normocephalic and atraumatic.   Eyes:      Conjunctiva/sclera: Conjunctivae normal.   Neck:      Vascular: No carotid bruit.   Cardiovascular:      Rate and Rhythm: Normal rate and regular rhythm.      Pulses: Normal pulses.   Pulmonary:      Effort: Pulmonary effort is normal.      Breath sounds: Normal breath sounds.   Musculoskeletal:      Cervical back: Neck supple.      Right lower leg: Edema (Trace) present.      Left lower leg: Edema (Trace) present.   Skin:     General: Skin is warm and dry.      Comments: Numerous abrasions on all 4 extremities   Neurological:      General: No focal deficit present.   Psychiatric:         Mood and Affect: Mood normal.         Behavior: Behavior normal.             Result Review :   The following data was reviewed by: CARLOS Mills on 02/05/2025:  Common labs          11/19/2024    08:29 12/10/2024    08:01 1/3/2025    08:10   Common Labs   Glucose 243  201  198    BUN 40  26  32    Creatinine 2.25  2.17  2.31    Sodium 140  136  140    Potassium 3.4  3.9  4.4    Chloride 102  103  105    Calcium 8.8  9.2  9.2    Albumin 4.0   4.1    Total Bilirubin 0.7   0.6    Alkaline Phosphatase 101   100    AST (SGOT) 18   14    ALT (SGPT) 28   19    Uric Acid   7.9      Labs from primary care dated 12/17/2024 reviewed.  CBC unremarkable.  Creatinine 1.82 with an EGFR of 45.  Total cholesterol 122, triglycerides 146, HDL 40, LDL 57.  A1c 6.5    Data reviewed : Cardiology studies as detailed below      Last Cardiac Cath  Results for orders placed during the hospital encounter of 01/09/24    Cardiac Catheterization/Vascular Study    Conclusion    1st Mrg lesion is 30% stenosed.    Prox RCA-1 lesion is 60% stenosed.    Prox RCA-2 lesion is 60% stenosed.    Mid RCA lesion is 70% stenosed.    1.  Successful IFR guided PTCA and  stent placement of the right coronary artery.  Dual antiplatelet therapy to continue.  Medical management for coronary artery disease will be advised.      Last Stress test  Results for orders placed in visit on 08/26/24    Stress Test With Myocardial Perfusion (1 Day)    Interpretation Summary    Myocardial perfusion imaging indicates a normal myocardial perfusion study with no evidence of ischemia. Impressions are consistent with a low risk study.    Left ventricular ejection fraction is normal (Calculated EF = 68%).    TID 1.24.    Findings consistent with a normal ECG stress test.       Last Echo  Results for orders placed during the hospital encounter of 10/11/24    Adult Transthoracic Echo Complete W/ Cont if Necessary Per Protocol    Interpretation Summary    Left ventricular systolic function is normal. Estimated left ventricular EF = 60%    Left ventricular diastolic function is consistent with (grade II w/high LAP) pseudonormalization.    Normal right ventricular cavity size and systolic function noted.    There is no evidence of pericardial effusion    No significant valvular abnormalities are seen             Current Outpatient Medications   Medication Sig Dispense Refill    albuterol (PROVENTIL) (2.5 MG/3ML) 0.083% nebulizer solution Take 2.5 mg by nebulization Every 8 (Eight) Hours As Needed for Wheezing.      albuterol sulfate  (90 Base) MCG/ACT inhaler Inhale 2 puffs Every 4 (Four) Hours As Needed for Wheezing.      allopurinol (ZYLOPRIM) 100 MG tablet Take 1 tablet by mouth Daily.      ALPRAZolam (XANAX) 1 MG tablet Take 1 tablet by mouth 3 (Three) Times a Day As Needed for Anxiety.      amitriptyline (ELAVIL) 50 MG tablet Take 1 tablet by mouth Every Night.      amLODIPine (NORVASC) 5 MG tablet Take 1 tablet by mouth Every Night.      atorvastatin (LIPITOR) 40 MG tablet Take 1 tablet by mouth Daily.      buPROPion SR (WELLBUTRIN SR) 150 MG 12 hr tablet Take 1 tablet by mouth Daily.       Continuous Glucose Sensor (Dexcom G6 Sensor) CHANGE EVERY 10 DAYS      empagliflozin (JARDIANCE) 25 MG tablet tablet Take 1 tablet by mouth Daily.      FLUoxetine (PROzac) 20 MG capsule Take 1 capsule by mouth Daily.      FLUoxetine (PROzac) 40 MG capsule Take 1 capsule by mouth Daily.      gabapentin (NEURONTIN) 800 MG tablet Take 1 tablet by mouth 3 (Three) Times a Day.      hydrALAZINE (APRESOLINE) 50 MG tablet Take 1 tablet by mouth Every 8 (Eight) Hours.      hydrOXYzine (ATARAX) 25 MG tablet Take 1-2 tablets by mouth 3 (Three) Times a Day As Needed for Itching.      Insulin Lispro, 0.5 Unit Dial, (HumaLOG Kiran KwikPen) 100 UNIT/ML solution pen-injector Inject 0-60 Units under the skin into the appropriate area as directed Daily.      insulin NPH-insulin regular (HumuLIN 70/30) (70-30) 100 UNIT/ML injection Inject 40 Units under the skin into the appropriate area as directed 2 (Two) Times a Day.      isosorbide mononitrate (IMDUR) 120 MG 24 hr tablet Take 1 tablet by mouth Daily. 90 tablet 1    levocetirizine (XYZAL) 5 MG tablet Take 1 tablet by mouth Daily.      nitroglycerin (NITROSTAT) 0.4 MG SL tablet Place 1 tablet under the tongue Every 5 (Five) Minutes As Needed for Chest Pain. Take no more than 3 doses in 15 minutes.      nystatin (MYCOSTATIN) 246356 UNIT/GM cream Apply 1 Application topically to the appropriate area as directed 2 (Two) Times a Day.      nystatin (MYCOSTATIN) 942849 UNIT/GM powder Apply 1 Application topically to the appropriate area as directed 4 (Four) Times a Day.      Omega-3-Acid Eth Est, Dietary, 1 g capsule Take 1 capsule by mouth Daily.      pantoprazole (PROTONIX) 40 MG EC tablet Take 1 tablet by mouth Daily.      promethazine (PHENERGAN) 12.5 MG tablet Take 1 tablet by mouth Every 6 (Six) Hours As Needed for Nausea or Vomiting.      Semaglutide,0.25 or 0.5MG/DOS, (Ozempic, 0.25 or 0.5 MG/DOSE,) 2 MG/3ML solution pen-injector Inject 1 mg under the skin into the appropriate  area as directed 1 (One) Time Per Week.      tamsulosin (FLOMAX) 0.4 MG capsule 24 hr capsule Take 1 capsule by mouth Daily for 180 days. 90 capsule 1    torsemide (DEMADEX) 20 MG tablet Take 1 tablet by mouth 2 (Two) Times a Day. (Patient taking differently: Take 1 tablet by mouth Daily As Needed.)      triamcinolone (KENALOG) 0.1 % cream Apply 1 Application topically to the appropriate area as directed 2 (Two) Times a Day.      vitamin D (ERGOCALCIFEROL) 1.25 MG (80152 UT) capsule capsule Take 1 capsule by mouth 1 (One) Time Per Week.      clopidogrel (PLAVIX) 75 MG tablet Take 600 mg (8 tabs) by mouth for a single dose 24 hours after your last dose of Brilinta.  The next day after loading dose, you will begin 75 mg daily 98 tablet 3    famotidine (PEPCID) 20 MG tablet Take 1 tablet by mouth Daily. (Patient not taking: Reported on 2/5/2025)       No current facility-administered medications for this visit.            Assessment and Plan    Problem List Items Addressed This Visit       ASCVD (arteriosclerotic cardiovascular disease) - Primary (Chronic)    Overview     Goals of care-aggressive risk factor modification and GDMT to mitigate disease progression  9/1/2021 [facility]: LVEF 56 to 60%  9/2/2021 [facility] for non-STEMI: PCI MONTSE to distal RCA.  Proximal RCA 60% stenosed and mid RCA 70% stenosed  01/09/2024-[facility]-first March 30% stenosed, proximal RCA 60% stenosed, proximal % stenosed, mid RCA 70% stenosed-IFR guided PTCA and stent placement to the RCA  08/26/2024-stress test-normal MPS with transient ischemic demand of 1.24  10/11/8326-PRW-FQME 60%, grade 2 diastolic dysfunction with high LAP         Current Assessment & Plan     Coronary Artery Disease: Coronary artery disease is improving with treatment.  Goals of Care:Medication tolerance and compliance and control progression of disease  Plan: Medication changes per orders.  Cardiac status will be reassessed in 6 months.           Relevant Medications     clopidogrel (PLAVIX) 75 MG tablet    Essential hypertension (Chronic)    Overview     Goals of care-maintain systolic blood pressure less than 130 and diastolic blood pressure less than 80         Current Assessment & Plan     Hypertension is Controlled  Goals of Care:Medication compliance and tolerance, Systolic blood pressure <130, and Diastolic blood pressure  <80  Plan:  Continue current medications  Follow up:in 6 months           Dyslipidemia (Chronic)    Overview     Goals of care-LDL less than 55  9/3/2021 total cholesterol 142, triglycerides 274, HDL 25 and LDL 72  9/3/2021 new start statin medication  10/14/2021 total cholesterol 98, triglycerides 148, HDL 26, and LDL 47  12/29/2021 total cholesterol 100, triglycerides 160, HDL 25, and LDL 48  3/27/2023 total cholesterol 95, triglycerides 201, HDL 29, and LDL 34  12/17/2024-total cholesterol 122, triglycerides 146, HDL 40, LDL 57         Current Assessment & Plan     Dyslipidemia is  almost at goal  Goals of care: Medication compliance and tolerance, Control progression of disease, and Maintain LDL <55  Plan: Continue current medications  Follow up: in 6 months               Atypical angina (Chronic)    Current Assessment & Plan     He reports that he has not had chest pain in quite some time.  Will do a trial of down titrating antianginal therapy.  If his chest pain returns, we will resume Ranexa.         Relevant Medications    clopidogrel (PLAVIX) 75 MG tablet     Diagnoses and all orders for this visit:    1. ASCVD (arteriosclerotic cardiovascular disease) (Primary)  Assessment & Plan:  Coronary Artery Disease: Coronary artery disease is improving with treatment.  Goals of Care:Medication tolerance and compliance and control progression of disease  Plan: Medication changes per orders.  Cardiac status will be reassessed in 6 months.      Orders:  -     clopidogrel (PLAVIX) 75 MG tablet; Take 600 mg (8 tabs) by mouth for a single dose 24 hours after your  last dose of Brilinta.  The next day after loading dose, you will begin 75 mg daily  Dispense: 98 tablet; Refill: 3    2. Atypical angina  Assessment & Plan:  He reports that he has not had chest pain in quite some time.  Will do a trial of down titrating antianginal therapy.  If his chest pain returns, we will resume Ranexa.      3. Essential hypertension  Assessment & Plan:  Hypertension is Controlled  Goals of Care:Medication compliance and tolerance, Systolic blood pressure <130, and Diastolic blood pressure  <80  Plan:  Continue current medications  Follow up:in 6 months        4. Dyslipidemia  Assessment & Plan:  Dyslipidemia is  almost at goal  Goals of care: Medication compliance and tolerance, Control progression of disease, and Maintain LDL <55  Plan: Continue current medications  Follow up: in 6 months            I have assumed longitudinal care for complex medical condition(s) that require long-term management involving monitoring and adjustments to medications.  Goals of care, history of important events, and historical results can be found with each problem.  Episodic plan of care can be found in the order section.  Assessment & Plan  1. Cardiac issues.  His blood pressure is well-regulated, with readings consistently in the range of 110s systolic and 60s to 70s diastolic. His diabetes is also under control, as evidenced by an A1c level below 7. His cholesterol levels from 2023 were reviewed, and the target for LDL cholesterol is set at less than 55. Given that his risk factors are adequately managed, it is deemed appropriate to transition him to a single antiplatelet medication after a year. The potential benefits of Plavix over aspirin, including its superior event prevention and lower bleeding risk, were discussed. The alternative option of reducing the Brilinta dosage was also considered. He was advised to discontinue aspirin and Brilinta. He was instructed to take a loading dose of Plavix (8  tablets) 24 hours after his last dose of Brilinta, followed by a maintenance dose of 1 tablet daily. He was also advised to discontinue Ranexa (ranolazine) for a period of 2 to 3 weeks. If he remains free of chest pain during this period, the medication will be permanently discontinued. However, if chest pain recurs, he will resume the medication. He was further advised to consult with his ophthalmologist regarding the continuation of omega-3 supplements. He was also recommended to increase the Ozempic dosage, provided he can tolerate it.    2. Diabetes Mellitus.  His diabetes is well-controlled with an A1c level of 6.5. He was advised to continue his current medication regimen, including Ozempic and Jardiance. Increasing the dose of Ozempic to 2.4 mg was recommended to provide additional cardiovascular benefits.    3. Hyperlipidemia.  The goal for LDL cholesterol is set at less than 55. He is almost at goal.             Follow Up     Return in about 3 months (around 5/5/2025).    Patient was given instructions and counseling regarding his condition or for health maintenance advice. Please see specific information pulled into the AVS if appropriate.       Electronically signed by CARLOS Mills, 02/05/25, 11:29 AM EST.    Patient or patient representative verbalized consent for the use of Ambient Listening during the visit with  CARLOS Mills for chart documentation. 2/5/2025  11:29 EST     Dictated Utilizing Dragon Dictation: Part of this note may be an electronic transcription/translation of spoken language to printed text using the Dragon Dictation System

## 2025-02-05 NOTE — ASSESSMENT & PLAN NOTE
Coronary Artery Disease: Coronary artery disease is improving with treatment.  Goals of Care:Medication tolerance and compliance and control progression of disease  Plan: Medication changes per orders.  Cardiac status will be reassessed in 6 months.

## 2025-02-05 NOTE — ASSESSMENT & PLAN NOTE
Dyslipidemia is  almost at goal  Goals of care: Medication compliance and tolerance, Control progression of disease, and Maintain LDL <55  Plan: Continue current medications  Follow up: in 6 months

## 2025-02-05 NOTE — ASSESSMENT & PLAN NOTE
He reports that he has not had chest pain in quite some time.  Will do a trial of down titrating antianginal therapy.  If his chest pain returns, we will resume Ranexa.

## 2025-02-06 RX ORDER — HYDRALAZINE HYDROCHLORIDE 50 MG/1
50 TABLET, FILM COATED ORAL EVERY 8 HOURS
Qty: 90 TABLET | Refills: 2 | Status: SHIPPED | OUTPATIENT
Start: 2025-02-06 | End: 2025-05-07

## 2025-02-06 RX ORDER — ISOSORBIDE MONONITRATE 120 MG/1
120 TABLET, EXTENDED RELEASE ORAL DAILY
Qty: 90 TABLET | Refills: 1 | Status: SHIPPED | OUTPATIENT
Start: 2025-02-06

## 2025-02-06 RX ORDER — TORSEMIDE 20 MG/1
20 TABLET ORAL DAILY PRN
Qty: 30 TABLET | Refills: 2 | Status: SHIPPED | OUTPATIENT
Start: 2025-02-06 | End: 2025-05-07

## 2025-02-06 RX ORDER — TAMSULOSIN HYDROCHLORIDE 0.4 MG/1
1 CAPSULE ORAL DAILY
Qty: 90 CAPSULE | Refills: 1 | Status: SHIPPED | OUTPATIENT
Start: 2025-02-06 | End: 2025-08-05

## 2025-02-24 RX ORDER — RANOLAZINE 1000 MG/1
1 TABLET, EXTENDED RELEASE ORAL EVERY 12 HOURS SCHEDULED
Qty: 60 TABLET | Refills: 0 | OUTPATIENT
Start: 2025-02-24

## 2025-02-24 NOTE — TELEPHONE ENCOUNTER
Hub to relay.     Called pt to see if his taking this. He stated his wife takes care of medication and she is not there right now and will have her call us back.

## 2025-02-25 NOTE — TELEPHONE ENCOUNTER
Caller: ONEYDA CABAN    Relationship: SELF    Best call back number: [phone]    What is the best time to reach you: ANY    Who are you requesting to speak with (clinical staff, provider,  specific staff member): CLINICAL    Do you know the name of the person who called: ARMEN SUMMERS    What was the call regarding: PATIENT CALLED BACK TO LET ARMEN KNOW HE IS NOT TAKING THE MEDICATION ANY LONGER THAT CARLOS CONWAY HAD TAKEN HIM OFF OF THE RANOLAZINE MEDICATION.    Is it okay if the provider responds through MyChart: CALL

## 2025-03-04 ENCOUNTER — TRANSCRIBE ORDERS (OUTPATIENT)
Dept: ADMINISTRATIVE | Facility: HOSPITAL | Age: 50
End: 2025-03-04
Payer: MEDICARE

## 2025-03-04 ENCOUNTER — LAB (OUTPATIENT)
Dept: LAB | Facility: HOSPITAL | Age: 50
End: 2025-03-04
Payer: MEDICARE

## 2025-03-04 DIAGNOSIS — N18.32 STAGE 3B CHRONIC KIDNEY DISEASE: Primary | ICD-10-CM

## 2025-03-04 DIAGNOSIS — N18.32 STAGE 3B CHRONIC KIDNEY DISEASE: ICD-10-CM

## 2025-03-04 LAB
25(OH)D3 SERPL-MCNC: 64.6 NG/ML (ref 30–100)
ALBUMIN SERPL-MCNC: 4.3 G/DL (ref 3.5–5.2)
ALBUMIN/GLOB SERPL: 1.3 G/DL
ALP SERPL-CCNC: 116 U/L (ref 39–117)
ALT SERPL W P-5'-P-CCNC: 52 U/L (ref 1–41)
ANION GAP SERPL CALCULATED.3IONS-SCNC: 11.6 MMOL/L (ref 5–15)
AST SERPL-CCNC: 29 U/L (ref 1–40)
BASOPHILS # BLD AUTO: 0.03 10*3/MM3 (ref 0–0.2)
BASOPHILS NFR BLD AUTO: 0.6 % (ref 0–1.5)
BILIRUB SERPL-MCNC: 0.4 MG/DL (ref 0–1.2)
BILIRUB UR QL STRIP: NEGATIVE
BUN SERPL-MCNC: 29 MG/DL (ref 6–20)
BUN/CREAT SERPL: 15.6 (ref 7–25)
CALCIUM SPEC-SCNC: 9.4 MG/DL (ref 8.6–10.5)
CHLORIDE SERPL-SCNC: 99 MMOL/L (ref 98–107)
CLARITY UR: CLEAR
CO2 SERPL-SCNC: 30.4 MMOL/L (ref 22–29)
COLOR UR: YELLOW
CREAT SERPL-MCNC: 1.86 MG/DL (ref 0.76–1.27)
CREAT UR-MCNC: 36.2 MG/DL
DEPRECATED RDW RBC AUTO: 44 FL (ref 37–54)
EGFRCR SERPLBLD CKD-EPI 2021: 43.8 ML/MIN/1.73
EOSINOPHIL # BLD AUTO: 0.13 10*3/MM3 (ref 0–0.4)
EOSINOPHIL NFR BLD AUTO: 2.5 % (ref 0.3–6.2)
ERYTHROCYTE [DISTWIDTH] IN BLOOD BY AUTOMATED COUNT: 13.1 % (ref 12.3–15.4)
GLOBULIN UR ELPH-MCNC: 3.3 GM/DL
GLUCOSE SERPL-MCNC: 224 MG/DL (ref 65–99)
GLUCOSE UR STRIP-MCNC: ABNORMAL MG/DL
HCT VFR BLD AUTO: 43.5 % (ref 37.5–51)
HGB BLD-MCNC: 14.2 G/DL (ref 13–17.7)
HGB UR QL STRIP.AUTO: NEGATIVE
IMM GRANULOCYTES # BLD AUTO: 0.03 10*3/MM3 (ref 0–0.05)
IMM GRANULOCYTES NFR BLD AUTO: 0.6 % (ref 0–0.5)
KETONES UR QL STRIP: NEGATIVE
LEUKOCYTE ESTERASE UR QL STRIP.AUTO: NEGATIVE
LYMPHOCYTES # BLD AUTO: 1.49 10*3/MM3 (ref 0.7–3.1)
LYMPHOCYTES NFR BLD AUTO: 28.1 % (ref 19.6–45.3)
MCH RBC QN AUTO: 30.1 PG (ref 26.6–33)
MCHC RBC AUTO-ENTMCNC: 32.6 G/DL (ref 31.5–35.7)
MCV RBC AUTO: 92.4 FL (ref 79–97)
MONOCYTES # BLD AUTO: 0.44 10*3/MM3 (ref 0.1–0.9)
MONOCYTES NFR BLD AUTO: 8.3 % (ref 5–12)
NEUTROPHILS NFR BLD AUTO: 3.18 10*3/MM3 (ref 1.7–7)
NEUTROPHILS NFR BLD AUTO: 59.9 % (ref 42.7–76)
NITRITE UR QL STRIP: NEGATIVE
NRBC BLD AUTO-RTO: 0 /100 WBC (ref 0–0.2)
PH UR STRIP.AUTO: 6.5 [PH] (ref 5–8)
PHOSPHATE SERPL-MCNC: 2.9 MG/DL (ref 2.5–4.5)
PLATELET # BLD AUTO: 165 10*3/MM3 (ref 140–450)
PMV BLD AUTO: 10.1 FL (ref 6–12)
POTASSIUM SERPL-SCNC: 3.8 MMOL/L (ref 3.5–5.2)
PROT ?TM UR-MCNC: 6.7 MG/DL
PROT SERPL-MCNC: 7.6 G/DL (ref 6–8.5)
PROT UR QL STRIP: NEGATIVE
PROT/CREAT UR: 185.1 MG/G CREA (ref 0–200)
PTH-INTACT SERPL-MCNC: 106.3 PG/ML (ref 15–65)
RBC # BLD AUTO: 4.71 10*6/MM3 (ref 4.14–5.8)
SODIUM SERPL-SCNC: 141 MMOL/L (ref 136–145)
SP GR UR STRIP: 1.01 (ref 1–1.03)
UROBILINOGEN UR QL STRIP: ABNORMAL
WBC NRBC COR # BLD AUTO: 5.3 10*3/MM3 (ref 3.4–10.8)

## 2025-03-04 PROCEDURE — 83970 ASSAY OF PARATHORMONE: CPT

## 2025-03-04 PROCEDURE — 80053 COMPREHEN METABOLIC PANEL: CPT

## 2025-03-04 PROCEDURE — 36415 COLL VENOUS BLD VENIPUNCTURE: CPT

## 2025-03-04 PROCEDURE — 85025 COMPLETE CBC W/AUTO DIFF WBC: CPT

## 2025-03-04 PROCEDURE — 84100 ASSAY OF PHOSPHORUS: CPT

## 2025-03-04 PROCEDURE — 81003 URINALYSIS AUTO W/O SCOPE: CPT

## 2025-03-04 PROCEDURE — 82306 VITAMIN D 25 HYDROXY: CPT

## 2025-03-04 PROCEDURE — 84156 ASSAY OF PROTEIN URINE: CPT

## 2025-03-04 PROCEDURE — 82570 ASSAY OF URINE CREATININE: CPT

## 2025-05-01 RX ORDER — HYDRALAZINE HYDROCHLORIDE 50 MG/1
50 TABLET, FILM COATED ORAL EVERY 8 HOURS
Qty: 270 TABLET | Refills: 0 | Status: SHIPPED | OUTPATIENT
Start: 2025-05-01

## 2025-05-12 ENCOUNTER — DOCUMENTATION (OUTPATIENT)
Dept: CARDIOLOGY | Facility: HOSPITAL | Age: 50
End: 2025-05-12
Payer: MEDICARE

## 2025-06-02 ENCOUNTER — LAB (OUTPATIENT)
Dept: LAB | Facility: HOSPITAL | Age: 50
End: 2025-06-02
Payer: MEDICARE

## 2025-06-02 ENCOUNTER — TRANSCRIBE ORDERS (OUTPATIENT)
Dept: ADMINISTRATIVE | Facility: HOSPITAL | Age: 50
End: 2025-06-02
Payer: MEDICARE

## 2025-06-02 DIAGNOSIS — N25.81 SECONDARY HYPERPARATHYROIDISM, RENAL: ICD-10-CM

## 2025-06-02 DIAGNOSIS — N18.32 STAGE 3B CHRONIC KIDNEY DISEASE: Primary | ICD-10-CM

## 2025-06-02 DIAGNOSIS — N18.32 STAGE 3B CHRONIC KIDNEY DISEASE: ICD-10-CM

## 2025-06-02 LAB
25(OH)D3 SERPL-MCNC: 50.2 NG/ML (ref 30–100)
ALBUMIN SERPL-MCNC: 4.4 G/DL (ref 3.5–5.2)
ALBUMIN/GLOB SERPL: 1.3 G/DL
ALP SERPL-CCNC: 130 U/L (ref 39–117)
ALT SERPL W P-5'-P-CCNC: 47 U/L (ref 1–41)
ANION GAP SERPL CALCULATED.3IONS-SCNC: 12.9 MMOL/L (ref 5–15)
AST SERPL-CCNC: 31 U/L (ref 1–40)
BILIRUB SERPL-MCNC: 0.5 MG/DL (ref 0–1.2)
BILIRUB UR QL STRIP: NEGATIVE
BUN SERPL-MCNC: 46.1 MG/DL (ref 6–20)
BUN/CREAT SERPL: 22.7 (ref 7–25)
CALCIUM SPEC-SCNC: 9.6 MG/DL (ref 8.6–10.5)
CHLORIDE SERPL-SCNC: 100 MMOL/L (ref 98–107)
CLARITY UR: CLEAR
CO2 SERPL-SCNC: 25.1 MMOL/L (ref 22–29)
COLOR UR: YELLOW
CREAT SERPL-MCNC: 2.03 MG/DL (ref 0.76–1.27)
CREAT UR-MCNC: 21.6 MG/DL
EGFRCR SERPLBLD CKD-EPI 2021: 39.2 ML/MIN/1.73
GLOBULIN UR ELPH-MCNC: 3.5 GM/DL
GLUCOSE SERPL-MCNC: 153 MG/DL (ref 65–99)
GLUCOSE UR STRIP-MCNC: ABNORMAL MG/DL
HGB UR QL STRIP.AUTO: NEGATIVE
KETONES UR QL STRIP: NEGATIVE
LEUKOCYTE ESTERASE UR QL STRIP.AUTO: NEGATIVE
NITRITE UR QL STRIP: NEGATIVE
PH UR STRIP.AUTO: 6 [PH] (ref 5–8)
PHOSPHATE SERPL-MCNC: 2.9 MG/DL (ref 2.5–4.5)
POTASSIUM SERPL-SCNC: 4.2 MMOL/L (ref 3.5–5.2)
PROT ?TM UR-MCNC: 7.8 MG/DL
PROT SERPL-MCNC: 7.9 G/DL (ref 6–8.5)
PROT UR QL STRIP: NEGATIVE
PROT/CREAT UR: 361.1 MG/G CREA (ref 0–200)
PTH-INTACT SERPL-MCNC: 107 PG/ML (ref 15–65)
SODIUM SERPL-SCNC: 138 MMOL/L (ref 136–145)
SP GR UR STRIP: 1.01 (ref 1–1.03)
UROBILINOGEN UR QL STRIP: ABNORMAL

## 2025-06-02 PROCEDURE — 80053 COMPREHEN METABOLIC PANEL: CPT

## 2025-06-02 PROCEDURE — 81003 URINALYSIS AUTO W/O SCOPE: CPT

## 2025-06-02 PROCEDURE — 83970 ASSAY OF PARATHORMONE: CPT

## 2025-06-02 PROCEDURE — 84156 ASSAY OF PROTEIN URINE: CPT

## 2025-06-02 PROCEDURE — 36415 COLL VENOUS BLD VENIPUNCTURE: CPT

## 2025-06-02 PROCEDURE — 84100 ASSAY OF PHOSPHORUS: CPT

## 2025-06-02 PROCEDURE — 82570 ASSAY OF URINE CREATININE: CPT

## 2025-06-02 PROCEDURE — 82306 VITAMIN D 25 HYDROXY: CPT

## 2025-07-01 ENCOUNTER — LAB (OUTPATIENT)
Dept: LAB | Facility: HOSPITAL | Age: 50
End: 2025-07-01
Payer: MEDICARE

## 2025-07-01 ENCOUNTER — TRANSCRIBE ORDERS (OUTPATIENT)
Dept: ADMINISTRATIVE | Facility: HOSPITAL | Age: 50
End: 2025-07-01
Payer: MEDICARE

## 2025-07-01 DIAGNOSIS — N18.31 CHRONIC KIDNEY DISEASE, STAGE 3A: ICD-10-CM

## 2025-07-01 DIAGNOSIS — N18.31 CHRONIC KIDNEY DISEASE, STAGE 3A: Primary | ICD-10-CM

## 2025-07-01 LAB
ANION GAP SERPL CALCULATED.3IONS-SCNC: 13.2 MMOL/L (ref 5–15)
BUN SERPL-MCNC: 31.4 MG/DL (ref 6–20)
BUN/CREAT SERPL: 16 (ref 7–25)
CALCIUM SPEC-SCNC: 9.2 MG/DL (ref 8.6–10.5)
CHLORIDE SERPL-SCNC: 100 MMOL/L (ref 98–107)
CO2 SERPL-SCNC: 23.8 MMOL/L (ref 22–29)
CREAT SERPL-MCNC: 1.96 MG/DL (ref 0.76–1.27)
EGFRCR SERPLBLD CKD-EPI 2021: 40.9 ML/MIN/1.73
GLUCOSE SERPL-MCNC: 206 MG/DL (ref 65–99)
POTASSIUM SERPL-SCNC: 3.8 MMOL/L (ref 3.5–5.2)
SODIUM SERPL-SCNC: 137 MMOL/L (ref 136–145)

## 2025-07-01 PROCEDURE — 36415 COLL VENOUS BLD VENIPUNCTURE: CPT

## 2025-07-01 PROCEDURE — 80048 BASIC METABOLIC PNL TOTAL CA: CPT

## 2025-07-08 ENCOUNTER — HOSPITAL ENCOUNTER (OUTPATIENT)
Dept: CARDIOLOGY | Facility: HOSPITAL | Age: 50
Discharge: HOME | End: 2025-07-08
Admitting: PHYSICIAN ASSISTANT
Payer: MEDICARE

## 2025-07-08 VITALS
SYSTOLIC BLOOD PRESSURE: 122 MMHG | HEIGHT: 66 IN | WEIGHT: 252.4 LBS | BODY MASS INDEX: 40.56 KG/M2 | OXYGEN SATURATION: 95 % | HEART RATE: 80 BPM | DIASTOLIC BLOOD PRESSURE: 67 MMHG

## 2025-07-08 DIAGNOSIS — I10 ESSENTIAL HYPERTENSION: ICD-10-CM

## 2025-07-08 DIAGNOSIS — I50.32 CHRONIC HEART FAILURE WITH PRESERVED EJECTION FRACTION (HFPEF): Primary | ICD-10-CM

## 2025-07-08 DIAGNOSIS — E66.01 MORBIDLY OBESE: ICD-10-CM

## 2025-07-08 DIAGNOSIS — N18.32 STAGE 3B CHRONIC KIDNEY DISEASE: ICD-10-CM

## 2025-07-08 DIAGNOSIS — G47.33 OSA (OBSTRUCTIVE SLEEP APNEA): ICD-10-CM

## 2025-07-08 DIAGNOSIS — I50.32 CHF NYHA CLASS II, CHRONIC, DIASTOLIC: ICD-10-CM

## 2025-07-08 LAB — ABSOLUTE LUNG FLUID CONTENT: 31 % (ref 20–35)

## 2025-07-08 PROCEDURE — 94726 PLETHYSMOGRAPHY LUNG VOLUMES: CPT | Performed by: PHYSICIAN ASSISTANT

## 2025-07-08 RX ORDER — LANOLIN ALCOHOL/MO/W.PET/CERES
1000 CREAM (GRAM) TOPICAL DAILY
COMMUNITY

## 2025-07-08 RX ORDER — FINERENONE 10 MG/1
10 TABLET, FILM COATED ORAL DAILY
COMMUNITY

## 2025-07-08 RX ORDER — CLOPIDOGREL BISULFATE 75 MG/1
75 TABLET ORAL DAILY
COMMUNITY

## 2025-07-08 RX ORDER — TORSEMIDE 20 MG/1
20 TABLET ORAL DAILY PRN
Qty: 30 TABLET | Refills: 5 | Status: SHIPPED | OUTPATIENT
Start: 2025-07-08 | End: 2025-10-06

## 2025-07-08 RX ORDER — HYDRALAZINE HYDROCHLORIDE 50 MG/1
50 TABLET, FILM COATED ORAL EVERY 8 HOURS
Qty: 270 TABLET | Refills: 5 | Status: SHIPPED | OUTPATIENT
Start: 2025-07-08

## 2025-07-08 NOTE — PROGRESS NOTES
" [facility]  Pharmacist Note     Matthew Madrid is a 50 y.o. male seen in the [facility] Failure [facility] for HFpEF.    Matthew Madrid reports a poor understanding of medications.  His wife is with him today and helps with all of his medications. He did bring his bottles with him to clinic.    Patient reports some shortness of breath on activity but denies any swelling or weight gain. He is currently taking torsemide 20 mg daily. He occasionally checks his blood pressure and brought his log with him today. BP readings range from 120-130s/60-70s. BP in clinic today is 122/97 and HR 80. He denies any episodes of dizziness/lightheadedness. Patient does still state, \"I'm having to force myself to pee sometimes.\" He has also addressed this concern with nephrology. He says that Dr. Olvera started him on Kerendia recently.    Patient denies any current issues with medication affordability.    Medication Use:   Hx of med intolerances: Lisinopril (discontinued at discharge in July 2023. Elevated CK and renal issues);   Retail Rx Management: [facility] in [place].     Past Medical History:   Diagnosis Date    ASCVD (arteriosclerotic cardiovascular disease) 09/13/2021    CHF (congestive heart failure)     Chronic heart failure with preserved ejection fraction (HFpEF) 11/27/2023    Diabetes mellitus     Elevated cholesterol     GERD (gastroesophageal reflux disease)     Hyperlipidemia     Hypertension     NSTEMI, initial episode of care 09/01/2021    Added automatically from request for surgery [id number]      ALIA (obstructive sleep apnea) 07/04/2024     ALLERGIES: Tuberculin tests  Current Outpatient Medications   Medication Sig Dispense Refill    albuterol (PROVENTIL) (2.5 MG/3ML) 0.083% nebulizer solution Take 2.5 mg by nebulization Every 8 (Eight) Hours As Needed for Wheezing.      albuterol sulfate  (90 Base) MCG/ACT inhaler Inhale 2 puffs Every 4 (Four) Hours As Needed for Wheezing.      allopurinol (ZYLOPRIM) 100 MG " tablet Take 1 tablet by mouth Daily.      ALPRAZolam (XANAX) 1 MG tablet Take 1 tablet by mouth 3 (Three) Times a Day As Needed for Anxiety.      amitriptyline (ELAVIL) 50 MG tablet Take 1 tablet by mouth Every Night.      amLODIPine (NORVASC) 5 MG tablet Take 1 tablet by mouth Every Night.      atorvastatin (LIPITOR) 40 MG tablet Take 1 tablet by mouth Daily.      buPROPion SR (WELLBUTRIN SR) 150 MG 12 hr tablet Take 1 tablet by mouth Daily.      clopidogrel (PLAVIX) 75 MG tablet Take 600 mg (8 tabs) by mouth for a single dose 24 hours after your last dose of Brilinta.  The next day after loading dose, you will begin 75 mg daily 98 tablet 3    Continuous Glucose Sensor (Dexcom G6 Sensor) CHANGE EVERY 10 DAYS      famotidine (PEPCID) 20 MG tablet Take 1 tablet by mouth Daily. (Patient not taking: Reported on 2/5/2025)      FLUoxetine (PROzac) 20 MG capsule Take 1 capsule by mouth Daily.      FLUoxetine (PROzac) 40 MG capsule Take 1 capsule by mouth Daily.      gabapentin (NEURONTIN) 800 MG tablet Take 1 tablet by mouth 3 (Three) Times a Day.      hydrALAZINE (APRESOLINE) 50 MG tablet TAKE 1 TABLET BY MOUTH EVERY 8 HOURS 270 tablet 0    hydrOXYzine (ATARAX) 25 MG tablet Take 1-2 tablets by mouth 3 (Three) Times a Day As Needed for Itching.      Insulin Lispro, 0.5 Unit Dial, (HumaLOG Kiran KwikPen) 100 UNIT/ML solution pen-injector Inject 0-60 Units under the skin into the appropriate area as directed Daily.      insulin NPH-insulin regular (HumuLIN 70/30) (70-30) 100 UNIT/ML injection Inject 40 Units under the skin into the appropriate area as directed 2 (Two) Times a Day.      isosorbide mononitrate (IMDUR) 120 MG 24 hr tablet Take 1 tablet by mouth Daily. 90 tablet 1    levocetirizine (XYZAL) 5 MG tablet Take 1 tablet by mouth Daily.      nitroglycerin (NITROSTAT) 0.4 MG SL tablet Place 1 tablet under the tongue Every 5 (Five) Minutes As Needed for Chest Pain. Take no more than 3 doses in 15 minutes.       "nystatin (MYCOSTATIN) 246573 UNIT/GM cream Apply 1 Application topically to the appropriate area as directed 2 (Two) Times a Day.      nystatin (MYCOSTATIN) 719509 UNIT/GM powder Apply 1 Application topically to the appropriate area as directed 4 (Four) Times a Day.      Omega-3-Acid Eth Est, Dietary, 1 g capsule Take 1 capsule by mouth Daily.      pantoprazole (PROTONIX) 40 MG EC tablet Take 1 tablet by mouth Daily.      promethazine (PHENERGAN) 12.5 MG tablet Take 1 tablet by mouth Every 6 (Six) Hours As Needed for Nausea or Vomiting.      Semaglutide,0.25 or 0.5MG/DOS, (Ozempic, 0.25 or 0.5 MG/DOSE,) 2 MG/3ML solution pen-injector Inject 1 mg under the skin into the appropriate area as directed 1 (One) Time Per Week.      tamsulosin (FLOMAX) 0.4 MG capsule 24 hr capsule Take 1 capsule by mouth Daily for 180 days. 90 capsule 1    torsemide (DEMADEX) 20 MG tablet Take 1 tablet by mouth Daily As Needed (For swelling, shortness of breath, weight gain of 2 lbs) for up to 90 days. 30 tablet 2    triamcinolone (KENALOG) 0.1 % cream Apply 1 Application topically to the appropriate area as directed 2 (Two) Times a Day.      vitamin D (ERGOCALCIFEROL) 1.25 MG (67239 UT) capsule capsule Take 1 capsule by mouth 1 (One) Time Per Week.       No current facility-administered medications for this encounter.       Vaccination History:   Pneumonia: Reports received - unsure when this was; likely a candidate for Prevnar 20  Annual Influenza: UTD  Shingles: Currently not eligible    Objective  Vitals:    07/08/25 1023   BP: 122/67   BP Location: Left arm   Patient Position: Sitting   Cuff Size: Adult   Pulse: 80   SpO2: 95%   Weight: 114 kg (252 lb 6.4 oz)   Height: 167.6 cm (66\")         Wt Readings from Last 3 Encounters:   07/08/25 114 kg (252 lb 6.4 oz)   02/05/25 110 kg (242 lb 9.6 oz)   01/14/25 110 kg (242 lb 9.6 oz)         07/08/25  1023   Weight: 114 kg (252 lb 6.4 oz)         Lab Results   Component Value Date    GLUCOSE " 206 (H) 07/01/2025    BUN 31.4 (H) 07/01/2025    CREATININE 1.96 (H) 07/01/2025    EGFRIFNONA 68 12/29/2021    BCR 16.0 07/01/2025    K 3.8 07/01/2025    CO2 23.8 07/01/2025    CALCIUM 9.2 07/01/2025    ALBUMIN 4.4 06/02/2025    AST 31 06/02/2025    ALT 47 (H) 06/02/2025     Lab Results   Component Value Date    WBC 5.30 03/04/2025    HGB 14.2 03/04/2025    HCT 43.5 03/04/2025    MCV 92.4 03/04/2025     03/04/2025     Lab Results   Component Value Date    CKTOTAL 184 10/11/2024    CKMB 2.40 07/25/2023    TROPONINT 23 (H) 10/11/2024     Lab Results   Component Value Date    PROBNP 93.7 09/17/2024     Results for orders placed during the hospital encounter of 10/11/24    Adult Transthoracic Echo Complete W/ Cont if Necessary Per Protocol 10/12/2024  2:47 PM    Interpretation Summary    Left ventricular systolic function is normal. Estimated left ventricular EF = 60%    Left ventricular diastolic function is consistent with (grade II w/high LAP) pseudonormalization.    Normal right ventricular cavity size and systolic function noted.    There is no evidence of pericardial effusion    No significant valvular abnormalities are seen         GDMT    Drug Class   Drug   Dose Last Dose Adjustment Additional Titration   Notes   ACEi/ARB/ARNI     Lisinopril D/C at discharge July 2023 - renal/ck    Beta Blocker        MRA    CKD    SGLT2i Jardiance  25 mg 11/26/24 Started by Nephro Farxiga- pain in groin, tolerating Jardiance well.     Imdur 120 mg QD       Hydralazine 50 mg TID      Torsemide 20 mg daily    Drug Therapy Problems    Difficulty urinating      Recommendations:     Notified Zuri Johnson. Patient also follows with nephrology regarding this concern.      Patient was educated on heart failure medications and the importance of medication adherence.     All questions were addressed and patient expressed understanding.     Thank you for allowing me to participate in the care of your patient,    Cher Ramirez  PharmD  07/08/25  10:34 EDT

## 2025-07-08 NOTE — PATIENT INSTRUCTIONS
Heart Failure Action Plan  A heart failure action plan helps you know what to do when you have symptoms of heart failure. Your action plan is a color-coded plan that lists the symptoms to watch for and indicates what actions to take.  If you have symptoms in the green zone, you're doing well.  If you have symptoms in the yellow zone, you're having problems.  If you have symptoms in the red zone, you need medical care right away.  Follow the plan that was created by you and your health care provider. Review your plan each time you visit your provider.  Green zone  These signs mean you're doing well and can continue what you're doing:  You don't have new or worsening shortness of breath.  You have very little swelling or no new swelling.  Your weight is stable (no gain or loss).  You have a normal activity level.  You don't have chest pain or any other new symptoms.  Yellow zone  These signs and symptoms mean your condition may be getting worse and you should make some changes:  You have trouble breathing when you're active.  You have swelling in your feet or legs or have discomfort in your belly.  You gain 2-3 lb (0.9-1.4 kg) in 24 hours, or 5 lb (2.3 kg) in a week. This amount may be more or less depending on your condition.  You get tired easily.  You have trouble sleeping.  You have a dry cough.  If you have any of these symptoms:  Contact your provider within the next day.  Your provider may adjust your medicines.  Red zone  These signs and symptoms mean you should get medical help right away:  You have trouble breathing when resting or cannot lie flat and you need to raise your head to help you breathe.  You have a dry cough that's getting worse.  You have swelling or pain in your feet or legs or discomfort in your belly that's getting worse.  You suddenly gain more than 2-3 lb (0.9-1.4 kg) in 24 hours, or more than 5 lb (2.3 kg) in a week. This amount may be more or less depending on your condition.  You have  trouble staying awake or you feel confused.  You don't have an appetite.  You have worsening sadness or depression.  These symptoms may be an emergency. Call 911 right away.  Do not wait to see if the symptoms will go away.  Do not drive yourself to the hospital.  Follow these instructions at home:  Take medicines only as told.  Eat a heart-healthy diet. Work with a dietitian to create an eating plan that's best for you.  Weigh yourself each day.   Call your provider if you gain more than 3 lb in 24 hours, or more than 5 lb in a week.  Health care provider name: Zuri [facility] care provider phone number: [phone]

## 2025-07-08 NOTE — PROGRESS NOTES
Heart Failure Clinic    Date: 07/08/25     Vitals:    07/08/25 1023   BP: 122/67   Pulse: 80   SpO2: 95%      Weight 252.4  Method of arrival: Ambulatory    Weighing self daily: Most days    Monitoring Heart Failure Zones: Most days    Today's HF Zone: Green    Taking medications as prescribed: Yes    Edema No    Shortness of Air: Yes with activity    Number of pillows used at night:hospital bed    Educational Materials given:  ulysses Frias Value: 31  25-35 Optimal Value Status      Dimitrios Maki RN 07/08/25 10:24 EDT
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
## 2025-07-29 ENCOUNTER — OFFICE VISIT (OUTPATIENT)
Dept: SURGERY | Facility: CLINIC | Age: 50
End: 2025-07-29
Payer: MEDICARE

## 2025-07-29 VITALS
SYSTOLIC BLOOD PRESSURE: 110 MMHG | BODY MASS INDEX: 41.46 KG/M2 | DIASTOLIC BLOOD PRESSURE: 62 MMHG | WEIGHT: 258 LBS | HEIGHT: 66 IN

## 2025-07-29 DIAGNOSIS — R13.19 OTHER DYSPHAGIA: Primary | ICD-10-CM

## 2025-07-29 NOTE — PROGRESS NOTES
Subjective   Matthew Madrid is a 50 y.o. male who presents today for Initial Evaluation    Chief Complaint:    Chief Complaint   Patient presents with    EGD        History of Present Illness:    History of Present Illness Matthew is a 50-year-old male who presents for evaluation for dysphagia.  Reports it has been ongoing times the past several months.  He states that his symptoms have been worsening.  He typically has issues with all foods causing his symptoms.  Does report an episode of choking.  Also endorses vomiting at times.  He is unsure if he has ever had an EGD in the past.  Also reports that he has been having unintentional weight gain.    The following portions of the patient's history were reviewed and updated as appropriate: allergies, current medications, past family history, past medical history, past social history, past surgical history and problem list.    Past Medical History:  Past Medical History:   Diagnosis Date    ASCVD (arteriosclerotic cardiovascular disease) 2021    CHF (congestive heart failure)     Chronic heart failure with preserved ejection fraction (HFpEF) 2023    Diabetes mellitus     Elevated cholesterol     GERD (gastroesophageal reflux disease)     Hyperlipidemia     Hypertension     NSTEMI, initial episode of care 2021    Added automatically from request for surgery [id number]      ALIA (obstructive sleep apnea) 2024       Social History:  Social History     Socioeconomic History    Marital status:    Tobacco Use    Smoking status: Former     Current packs/day: 0.00     Types: Cigarettes     Quit date:      Years since quittin.5     Passive exposure: Past    Smokeless tobacco: Never   Vaping Use    Vaping status: Never Used   Substance and Sexual Activity    Alcohol use: Yes     Alcohol/week: 6.0 standard drinks of alcohol     Types: 6 Cans of beer per week     Comment: Occasional    Drug use: Never    Sexual activity: Defer     Birth  control/protection: Abstinence       Family History:  Family History   Problem Relation Age of Onset    Hyperlipidemia Mother     Hyperlipidemia Father     Heart attack Paternal Uncle        Past Surgical History:  Past Surgical History:   Procedure Laterality Date    CARDIAC CATHETERIZATION N/A 09/02/2021    Procedure: Left Heart Cath;  Surgeon: Vasile Moulton MD;  Location:  [facility];  Service: Cardiology;  Laterality: N/A;    CARDIAC CATHETERIZATION N/A 09/02/2021    Procedure: Percutaneous Coronary Intervention;  Surgeon: Vasile Moulton MD;  Location:  [facility];  Service: Cardiology;  Laterality: N/A;    CARDIAC CATHETERIZATION N/A 1/15/2024    Procedure: Coronary angiography;  Surgeon: Anant Bennett MD;  Location:  [facility];  Service: Cardiology;  Laterality: N/A;    COLONOSCOPY  2013    COLONOSCOPY N/A 10/4/2022    Procedure: COLONOSCOPY;  Surgeon: Gianluca Rodríguez MD;  Location: [facility] OR;  Service: Gastroenterology;  Laterality: N/A;    COLONOSCOPY N/A 11/15/2024    Procedure: COLONOSCOPY;  Surgeon: Gianluca Rodríguez MD;  Location: [facility] OR;  Service: Gastroenterology;  Laterality: N/A;    ENDOSCOPY  2013    ENDOSCOPY N/A 10/4/2022    Procedure: ESOPHAGOGASTRODUODENOSCOPY;  Surgeon: Gianluca Rodríguez MD;  Location: [facility] OR;  Service: Gastroenterology;  Laterality: N/A;    LAPAROSCOPIC CHOLECYSTECTOMY         Problem List:  Patient Active Problem List   Diagnosis    ASCVD (arteriosclerotic cardiovascular disease)    Essential hypertension    Dyslipidemia    DM (diabetes mellitus), type 2 with complications    SOB (shortness of breath)    Severe obesity (BMI 35.0-39.9) with comorbidity    Diarrhea    Abdominal pain    Dysphagia    Chronic heart failure with preserved ejection fraction (HFpEF)    Deformity of metatarsal    Diabetic peripheral neuropathy associated with type 2 diabetes mellitus    Epidermoid cyst    Foot pain    Hammer toe     Hereditary peripheral neuropathy    Low back pain    Lumbosacral radiculopathy    Lumbar spondylosis    Muscle pain    Onychomycosis of toenail    Pain of right hip joint    Peripheral vascular disease    Stasis dermatitis with venous ulcer of lower extremity due to chronic peripheral venous hypertension    Torticollis    Iron deficiency anemia    Hypomagnesemia    ALIA (obstructive sleep apnea)    Atypical angina    Screening for colon cancer    Positive colorectal cancer screening using Cologuard test    Chest pain    Stage 3b chronic kidney disease       Allergy:   Allergies   Allergen Reactions    Tuberculin Tests Rash        Current Medications:   Current Outpatient Medications   Medication Sig Dispense Refill    albuterol (PROVENTIL) (2.5 MG/3ML) 0.083% nebulizer solution Take 2.5 mg by nebulization Every 8 (Eight) Hours As Needed for Wheezing.      allopurinol (ZYLOPRIM) 100 MG tablet Take 1 tablet by mouth Daily.      ALPRAZolam (XANAX) 1 MG tablet Take 1 tablet by mouth 3 (Three) Times a Day As Needed for Anxiety.      amitriptyline (ELAVIL) 100 MG tablet Take 1 tablet by mouth Every Night.      amLODIPine (NORVASC) 5 MG tablet Take 1 tablet by mouth Every Night.      atorvastatin (LIPITOR) 40 MG tablet Take 1 tablet by mouth Daily.      buPROPion SR (WELLBUTRIN SR) 200 MG 12 hr tablet Take 1 tablet by mouth Daily.      Continuous Glucose Sensor (Dexcom G6 Sensor) CHANGE EVERY 10 DAYS      empagliflozin (Jardiance) 25 MG tablet tablet Take 1 tablet by mouth Daily.      Finerenone (Kerendia) 10 MG tablet Take 1 tablet by mouth Daily.      FLUoxetine (PROzac) 20 MG capsule Take 1 capsule by mouth Daily.      FLUoxetine (PROzac) 40 MG capsule Take 1 capsule by mouth Daily.      gabapentin (NEURONTIN) 800 MG tablet Take 1 tablet by mouth 3 (Three) Times a Day.      hydrALAZINE (APRESOLINE) 50 MG tablet Take 1 tablet by mouth Every 8 (Eight) Hours. 270 tablet 5    hydrOXYzine (ATARAX) 25 MG tablet Take 1-2  tablets by mouth 3 (Three) Times a Day As Needed for Itching.      Insulin Lispro, 0.5 Unit Dial, (HumaLOG Kiran KwikPen) 100 UNIT/ML solution pen-injector Inject 0-60 Units under the skin into the appropriate area as directed Daily.      insulin NPH-insulin regular (HumuLIN 70/30) (70-30) 100 UNIT/ML injection Inject 40 Units under the skin into the appropriate area as directed 2 (Two) Times a Day.      isosorbide mononitrate (IMDUR) 120 MG 24 hr tablet Take 1 tablet by mouth Daily. 90 tablet 1    levocetirizine (XYZAL) 5 MG tablet Take 1 tablet by mouth Daily.      nitroglycerin (NITROSTAT) 0.4 MG SL tablet Place 1 tablet under the tongue Every 5 (Five) Minutes As Needed for Chest Pain. Take no more than 3 doses in 15 minutes.      nystatin (MYCOSTATIN) 129987 UNIT/GM cream Apply 1 Application topically to the appropriate area as directed 2 (Two) Times a Day.      nystatin (MYCOSTATIN) 025202 UNIT/GM powder Apply 1 Application topically to the appropriate area as directed 4 (Four) Times a Day.      pantoprazole (PROTONIX) 40 MG EC tablet Take 1 tablet by mouth Daily.      promethazine (PHENERGAN) 12.5 MG tablet Take 1 tablet by mouth Every 6 (Six) Hours As Needed for Nausea or Vomiting.      Semaglutide,0.25 or 0.5MG/DOS, (Ozempic, 0.25 or 0.5 MG/DOSE,) 2 MG/3ML solution pen-injector Inject 1 mg under the skin into the appropriate area as directed 1 (One) Time Per Week.      tamsulosin (FLOMAX) 0.4 MG capsule 24 hr capsule Take 1 capsule by mouth Daily for 180 days. 90 capsule 1    torsemide (DEMADEX) 20 MG tablet Take 1 tablet by mouth Daily As Needed (For swelling, shortness of breath, weight gain of 2 lbs) for up to 90 days. 30 tablet 5    triamcinolone (KENALOG) 0.1 % cream Apply 1 Application topically to the appropriate area as directed 2 (Two) Times a Day.      vitamin B-12 (CYANOCOBALAMIN) 1000 MCG tablet Take 1 tablet by mouth Daily.      vitamin D (ERGOCALCIFEROL) 1.25 MG (40170 UT) capsule capsule Take  "1 capsule by mouth 1 (One) Time Per Week.      albuterol sulfate  (90 Base) MCG/ACT inhaler Inhale 2 puffs Every 4 (Four) Hours As Needed for Wheezing.       No current facility-administered medications for this visit.       Review of Systems:    Review of Systems   Constitutional:  Positive for unexpected weight gain.   HENT:  Positive for trouble swallowing.          Physical Exam:   Physical Exam  Constitutional:       Appearance: Normal appearance. He is obese.   HENT:      Head: Normocephalic and atraumatic.      Right Ear: External ear normal.      Left Ear: External ear normal.   Eyes:      Conjunctiva/sclera: Conjunctivae normal.   Cardiovascular:      Pulses: Normal pulses.   Pulmonary:      Effort: Pulmonary effort is normal.   Abdominal:      General: Abdomen is flat.   Musculoskeletal:         General: Normal range of motion.      Cervical back: Normal range of motion.   Skin:     General: Skin is warm and dry.      Capillary Refill: Capillary refill takes less than 2 seconds.   Neurological:      General: No focal deficit present.      Mental Status: He is alert and oriented to person, place, and time.   Psychiatric:         Mood and Affect: Mood normal.         Behavior: Behavior normal.       Vitals:  Blood pressure 110/62, height 167.6 cm (65.98\"), weight 117 kg (258 lb).   Body mass index is 41.66 kg/m².      Assessment & Plan   Diagnoses and all orders for this visit:    1. Other dysphagia (Primary)  -     Case Request; Standing  -     Case Request  -     FL ESOPHAGRAM SINGLE CONTRAST; Future    Other orders  -     Follow Anesthesia Guidelines / Protocol; Future  -     Follow Anesthesia Guidelines / Protocol; Standing  -     Verify / Perform Chlorhexidine Skin Prep; Standing  -     Provide NPO Instructions to Patient; Future  -     Chlorhexidine Skin Prep; Future  -     Place Sequential Compression Device; Standing  -     Maintain Sequential Compression Device; Standing    Matthew is a " 50-year-old male who presents for evaluation for dysphagia.  Have ordered an esophagram for further evaluation.  He also undergo EGD with possible dilatation with Dr. Rodríguez.  Verbalized understanding to hold semaglutide 2 weeks prior to procedure.  We also discussed gastric sleeve surgery, patient does have interest at this time.  I did provide him a bariatric packet to bring back to his follow-up appointment so that we can start that process.  Verbalized understanding of prep instructions and procedure and wishes to proceed.    Visit Diagnoses:    ICD-10-CM ICD-9-CM   1. Other dysphagia  R13.19 787.29         MEDS ORDERED DURING VISIT:  No orders of the defined types were placed in this encounter.      Return for Follow up post-op.             This document has been electronically signed by CARLOS Farmer  July 29, 2025 09:49 EDT    Please note that portions of this note were completed with a voice recognition program.

## 2025-08-12 ENCOUNTER — TRANSCRIBE ORDERS (OUTPATIENT)
Dept: ADMINISTRATIVE | Facility: HOSPITAL | Age: 50
End: 2025-08-12
Payer: MEDICARE

## 2025-08-12 ENCOUNTER — LAB (OUTPATIENT)
Dept: LAB | Facility: HOSPITAL | Age: 50
End: 2025-08-12
Payer: MEDICARE

## 2025-08-12 ENCOUNTER — OFFICE VISIT (OUTPATIENT)
Dept: CARDIOLOGY | Facility: CLINIC | Age: 50
End: 2025-08-12
Payer: MEDICARE

## 2025-08-12 VITALS
OXYGEN SATURATION: 93 % | SYSTOLIC BLOOD PRESSURE: 132 MMHG | WEIGHT: 261 LBS | HEIGHT: 65 IN | DIASTOLIC BLOOD PRESSURE: 76 MMHG | HEART RATE: 82 BPM | BODY MASS INDEX: 43.49 KG/M2

## 2025-08-12 DIAGNOSIS — N18.32 STAGE 3B CHRONIC KIDNEY DISEASE: ICD-10-CM

## 2025-08-12 DIAGNOSIS — E11.8 DM (DIABETES MELLITUS), TYPE 2 WITH COMPLICATIONS: Chronic | ICD-10-CM

## 2025-08-12 DIAGNOSIS — N18.32 STAGE 3B CHRONIC KIDNEY DISEASE: Primary | ICD-10-CM

## 2025-08-12 DIAGNOSIS — E78.5 DYSLIPIDEMIA: Primary | Chronic | ICD-10-CM

## 2025-08-12 DIAGNOSIS — E66.01 MORBID OBESITY WITH BMI OF 40.0-44.9, ADULT: Chronic | ICD-10-CM

## 2025-08-12 DIAGNOSIS — I10 ESSENTIAL HYPERTENSION: Chronic | ICD-10-CM

## 2025-08-12 DIAGNOSIS — I50.32 CHRONIC HEART FAILURE WITH PRESERVED EJECTION FRACTION (HFPEF): ICD-10-CM

## 2025-08-12 DIAGNOSIS — I25.10 ASCVD (ARTERIOSCLEROTIC CARDIOVASCULAR DISEASE): Chronic | ICD-10-CM

## 2025-08-12 LAB
ALBUMIN SERPL-MCNC: 4 G/DL (ref 3.5–5.2)
ALBUMIN/GLOB SERPL: 1.3 G/DL
ALP SERPL-CCNC: 109 U/L (ref 39–117)
ALT SERPL W P-5'-P-CCNC: 50 U/L (ref 1–41)
ANION GAP SERPL CALCULATED.3IONS-SCNC: 13.8 MMOL/L (ref 5–15)
AST SERPL-CCNC: 34 U/L (ref 1–40)
BILIRUB SERPL-MCNC: 0.4 MG/DL (ref 0–1.2)
BILIRUB UR QL STRIP: NEGATIVE
BUN SERPL-MCNC: 36.7 MG/DL (ref 6–20)
BUN/CREAT SERPL: 18.5 (ref 7–25)
CALCIUM SPEC-SCNC: 8.9 MG/DL (ref 8.6–10.5)
CHLORIDE SERPL-SCNC: 100 MMOL/L (ref 98–107)
CLARITY UR: CLEAR
CO2 SERPL-SCNC: 23.2 MMOL/L (ref 22–29)
COLOR UR: YELLOW
CREAT SERPL-MCNC: 1.98 MG/DL (ref 0.76–1.27)
CREAT UR-MCNC: 35.4 MG/DL
EGFRCR SERPLBLD CKD-EPI 2021: 40.4 ML/MIN/1.73
GLOBULIN UR ELPH-MCNC: 3 GM/DL
GLUCOSE SERPL-MCNC: 189 MG/DL (ref 65–99)
GLUCOSE UR STRIP-MCNC: ABNORMAL MG/DL
HGB UR QL STRIP.AUTO: NEGATIVE
KETONES UR QL STRIP: NEGATIVE
LEUKOCYTE ESTERASE UR QL STRIP.AUTO: NEGATIVE
NITRITE UR QL STRIP: NEGATIVE
PH UR STRIP.AUTO: 6 [PH] (ref 5–8)
POTASSIUM SERPL-SCNC: 4.3 MMOL/L (ref 3.5–5.2)
PROT ?TM UR-MCNC: 8.9 MG/DL
PROT SERPL-MCNC: 7 G/DL (ref 6–8.5)
PROT UR QL STRIP: NEGATIVE
PROT/CREAT UR: 251.4 MG/G CREA (ref 0–200)
SODIUM SERPL-SCNC: 137 MMOL/L (ref 136–145)
SP GR UR STRIP: 1.01 (ref 1–1.03)
UROBILINOGEN UR QL STRIP: ABNORMAL

## 2025-08-12 PROCEDURE — 81003 URINALYSIS AUTO W/O SCOPE: CPT

## 2025-08-12 PROCEDURE — 84156 ASSAY OF PROTEIN URINE: CPT

## 2025-08-12 PROCEDURE — 82570 ASSAY OF URINE CREATININE: CPT

## 2025-08-12 PROCEDURE — 36415 COLL VENOUS BLD VENIPUNCTURE: CPT

## 2025-08-12 PROCEDURE — 80053 COMPREHEN METABOLIC PANEL: CPT

## 2025-08-12 RX ORDER — ATORVASTATIN CALCIUM 40 MG/1
40 TABLET, FILM COATED ORAL DAILY
Qty: 90 TABLET | Refills: 3 | Status: SHIPPED | OUTPATIENT
Start: 2025-08-12

## 2025-08-12 RX ORDER — ISOSORBIDE MONONITRATE 60 MG/1
60 TABLET, EXTENDED RELEASE ORAL DAILY
Qty: 90 TABLET | Refills: 3 | Status: SHIPPED | OUTPATIENT
Start: 2025-08-12

## 2025-08-12 RX ORDER — CLOPIDOGREL BISULFATE 75 MG/1
75 TABLET ORAL DAILY
COMMUNITY
End: 2025-08-12 | Stop reason: SDUPTHER

## 2025-08-12 RX ORDER — TAMSULOSIN HYDROCHLORIDE 0.4 MG/1
1 CAPSULE ORAL DAILY
COMMUNITY

## 2025-08-12 RX ORDER — MIRTAZAPINE 15 MG/1
15 TABLET, FILM COATED ORAL NIGHTLY
COMMUNITY

## 2025-08-12 RX ORDER — AMLODIPINE BESYLATE 5 MG/1
5 TABLET ORAL NIGHTLY
Qty: 90 TABLET | Refills: 3 | Status: SHIPPED | OUTPATIENT
Start: 2025-08-12

## 2025-08-12 RX ORDER — CLOPIDOGREL BISULFATE 75 MG/1
75 TABLET ORAL DAILY
Qty: 90 TABLET | Refills: 3 | Status: SHIPPED | OUTPATIENT
Start: 2025-08-12

## 2025-08-21 ENCOUNTER — HOSPITAL ENCOUNTER (OUTPATIENT)
Dept: GENERAL RADIOLOGY | Facility: HOSPITAL | Age: 50
Discharge: HOME | End: 2025-08-21
Payer: MEDICARE

## 2025-08-21 DIAGNOSIS — R13.19 OTHER DYSPHAGIA: ICD-10-CM

## 2025-08-21 PROCEDURE — 74220 X-RAY XM ESOPHAGUS 1CNTRST: CPT

## 2025-08-22 ENCOUNTER — TELEPHONE (OUTPATIENT)
Dept: SURGERY | Facility: CLINIC | Age: 50
End: 2025-08-22
Payer: MEDICARE

## (undated) DEVICE — RUNWAY RADL W/TOP PAD

## (undated) DEVICE — CONN Y IRR DISP 1P/U

## (undated) DEVICE — CATH F5 INF 3DRC 100CM: Brand: INFINITI

## (undated) DEVICE — CATH F5 INF AL I 100CM: Brand: INFINITI

## (undated) DEVICE — PK CATH CARD 70

## (undated) DEVICE — TR BAND RADIAL ARTERY COMPRESSION DEVICE: Brand: TR BAND

## (undated) DEVICE — 360 - 360I 10"/10" CMSQ 8RA: Brand: MEDLINE

## (undated) DEVICE — CATH F5 INF JL 3.5 100CM: Brand: INFINITI

## (undated) DEVICE — Device: Brand: OMNIWIRE PRESSURE GUIDE WIRE

## (undated) DEVICE — HI-TORQUE WHISPER MS GUIDE WIRE .014 STRAIGHT TIP 3.0 CM X 190 CM: Brand: HI-TORQUE WHISPER

## (undated) DEVICE — DRSNG SURESITE WNDW 4X4.5

## (undated) DEVICE — DRAPE, RADIAL, STERILE: Brand: MEDLINE

## (undated) DEVICE — DEV INFL MONARCH 25W

## (undated) DEVICE — PAD, DEFIB, ADULT, RADIOTRANS, ZOLL: Brand: MEDLINE

## (undated) DEVICE — RADIFOCUS OPTITORQUE ANGIOGRAPHIC CATHETER: Brand: OPTITORQUE

## (undated) DEVICE — CATH F5 INF JL 4 100CM: Brand: INFINITI

## (undated) DEVICE — GW INQW FIX/CORE PTFE J/3MM .035 260CM

## (undated) DEVICE — LN FLTR ORL/NASL MICROSTREAM NONINTUB A/LNG

## (undated) DEVICE — ADULT DISPOSABLE SINGLE-PATIENT USE PULSE OXIMETER SENSOR: Brand: NONIN

## (undated) DEVICE — THE BITE BLOCK MAXI, LATEX FREE STRAP IS USED TO PROTECT THE ENDOSCOPE INSERTION TUBE FROM BEING BITTEN BY THE PATIENT.

## (undated) DEVICE — SHEATH INTRO SUPERSHEATH SHRT .035 4F 11CM

## (undated) DEVICE — NC TREK CORONARY DILATATION CATHETER 2.5 MM X 15 MM / RAPID-EXCHANGE: Brand: NC TREK

## (undated) DEVICE — LN INJ CONTRST FLXCIL HP F/M LL 1200PSI10

## (undated) DEVICE — Device: Brand: DEFENDO AIR/WATER/SUCTION AND BIOPSY VALVE

## (undated) DEVICE — COPILOT BLEEDBACK CONTROL VALVE: Brand: COPILOT

## (undated) DEVICE — 6F .070 JR 4 100CM: Brand: CORDIS

## (undated) DEVICE — LN SMPL O2 NASL/ORL SMART/CAPNOLINE PLS A/

## (undated) DEVICE — PINNACLE INTRODUCER SHEATH: Brand: PINNACLE

## (undated) DEVICE — MINI TREK CORONARY DILATATION CATHETER 2.0 MM X 20 MM / RAPID-EXCHANGE: Brand: MINI TREK

## (undated) DEVICE — GW J/TP MOVE/CORE 0.035 3MM/TP 145CM

## (undated) DEVICE — GLIDESHEATH SLENDER STAINLESS STEEL KIT: Brand: GLIDESHEATH SLENDER

## (undated) DEVICE — ST EXT IV SMARTSITE 2VLV SP M LL 5ML IV1

## (undated) DEVICE — Device: Brand: MEDEX

## (undated) DEVICE — RADIAL RUNWAY TOP PADS: Brand: RADIAL RUNWAY TOP PADS

## (undated) DEVICE — ST INF PRI SMRTSTE 20DRP 2VLV 24ML 117

## (undated) DEVICE — Device

## (undated) DEVICE — ENDOGATOR AUXILIARY WATER JET CONNECTOR: Brand: ENDOGATOR

## (undated) DEVICE — MANIFLD NAMIC PRECEPTOR INTEGR/TRANSD RT/HND 1/PRT 500PSI

## (undated) DEVICE — ST EXT IV SMRTSTE 2VLV FIX M LL 6ML 41

## (undated) DEVICE — MTS LHK BAPTIST CORBIN: Brand: NAMIC

## (undated) DEVICE — ANGIO-SEAL VIP VASCULAR CLOSURE DEVICE: Brand: ANGIO-SEAL

## (undated) DEVICE — 6F .070 3 DRC 100CM: Brand: VISTA BRITE TIP